# Patient Record
Sex: FEMALE | Employment: FULL TIME | URBAN - METROPOLITAN AREA
[De-identification: names, ages, dates, MRNs, and addresses within clinical notes are randomized per-mention and may not be internally consistent; named-entity substitution may affect disease eponyms.]

---

## 2017-03-17 PROBLEM — A59.9 TRICHOMONIASIS: Status: ACTIVE | Noted: 2017-03-17

## 2017-11-13 ENCOUNTER — OFFICE VISIT (OUTPATIENT)
Dept: URGENT CARE | Facility: CLINIC | Age: 37
End: 2017-11-13
Payer: MEDICAID

## 2017-11-13 VITALS
SYSTOLIC BLOOD PRESSURE: 120 MMHG | WEIGHT: 160 LBS | BODY MASS INDEX: 24.25 KG/M2 | HEART RATE: 79 BPM | TEMPERATURE: 98 F | HEIGHT: 68 IN | DIASTOLIC BLOOD PRESSURE: 76 MMHG | OXYGEN SATURATION: 96 % | RESPIRATION RATE: 18 BRPM

## 2017-11-13 DIAGNOSIS — T30.0 SECOND DEGREE BURNS OF MULTIPLE SITES: Primary | ICD-10-CM

## 2017-11-13 PROCEDURE — 90714 TD VACC NO PRESV 7 YRS+ IM: CPT | Mod: SL,S$GLB,, | Performed by: EMERGENCY MEDICINE

## 2017-11-13 PROCEDURE — 90471 IMMUNIZATION ADMIN: CPT | Mod: S$GLB,VFC,, | Performed by: EMERGENCY MEDICINE

## 2017-11-13 PROCEDURE — 99214 OFFICE O/P EST MOD 30 MIN: CPT | Mod: 25,S$GLB,, | Performed by: PHYSICIAN ASSISTANT

## 2017-11-13 RX ORDER — SILVER SULFADIAZINE 10 G/1000G
CREAM TOPICAL
Qty: 50 G | Refills: 1 | Status: SHIPPED | OUTPATIENT
Start: 2017-11-13 | End: 2018-11-13
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
## 2019-06-08 ENCOUNTER — HOSPITAL ENCOUNTER (EMERGENCY)
Facility: HOSPITAL | Age: 39
Discharge: HOME | End: 2019-06-08
Attending: EMERGENCY MEDICINE
Payer: MEDICAID

## 2019-06-08 VITALS
SYSTOLIC BLOOD PRESSURE: 139 MMHG | HEART RATE: 63 BPM | RESPIRATION RATE: 14 BRPM | DIASTOLIC BLOOD PRESSURE: 74 MMHG | HEIGHT: 68 IN | TEMPERATURE: 98 F | OXYGEN SATURATION: 100 % | WEIGHT: 165 LBS | BODY MASS INDEX: 25.01 KG/M2

## 2019-06-08 DIAGNOSIS — G89.29 CHRONIC LEFT-SIDED LOW BACK PAIN WITH LEFT-SIDED SCIATICA: Primary | ICD-10-CM

## 2019-06-08 DIAGNOSIS — M54.42 CHRONIC LEFT-SIDED LOW BACK PAIN WITH LEFT-SIDED SCIATICA: Primary | ICD-10-CM

## 2019-06-08 LAB
B-HCG UR QL: NEGATIVE
CTP QC/QA: YES

## 2019-06-08 PROCEDURE — 25000003 PHARM REV CODE 250: Performed by: EMERGENCY MEDICINE

## 2019-06-08 PROCEDURE — 99284 EMERGENCY DEPT VISIT MOD MDM: CPT | Mod: 25

## 2019-06-08 PROCEDURE — 81025 URINE PREGNANCY TEST: CPT | Performed by: EMERGENCY MEDICINE

## 2019-06-08 PROCEDURE — 63600175 PHARM REV CODE 636 W HCPCS: Performed by: EMERGENCY MEDICINE

## 2019-06-08 PROCEDURE — 96372 THER/PROPH/DIAG INJ SC/IM: CPT

## 2019-06-08 RX ORDER — ACETAMINOPHEN 500 MG
1000 TABLET ORAL
Status: COMPLETED | OUTPATIENT
Start: 2019-06-08 | End: 2019-06-08

## 2019-06-08 RX ORDER — KETOROLAC TROMETHAMINE 10 MG/1
10 TABLET, FILM COATED ORAL EVERY 6 HOURS PRN
Qty: 28 TABLET | Refills: 0 | Status: SHIPPED | OUTPATIENT
Start: 2019-06-08 | End: 2021-04-25 | Stop reason: CLARIF

## 2019-06-08 RX ORDER — CAPSAICIN 0.03 G/100G
CREAM TOPICAL 2 TIMES DAILY
Qty: 45 G | Refills: 0 | Status: SHIPPED | OUTPATIENT
Start: 2019-06-08 | End: 2021-04-29

## 2019-06-08 RX ORDER — ACETAMINOPHEN 500 MG
500 TABLET ORAL EVERY 6 HOURS PRN
Qty: 28 TABLET | Refills: 0 | Status: SHIPPED | OUTPATIENT
Start: 2019-06-08 | End: 2021-04-25 | Stop reason: CLARIF

## 2019-06-08 RX ORDER — KETOROLAC TROMETHAMINE 30 MG/ML
30 INJECTION, SOLUTION INTRAMUSCULAR; INTRAVENOUS
Status: COMPLETED | OUTPATIENT
Start: 2019-06-08 | End: 2019-06-08

## 2019-06-08 RX ADMIN — ACETAMINOPHEN 1000 MG: 500 TABLET ORAL at 09:06

## 2019-06-08 RX ADMIN — KETOROLAC TROMETHAMINE 30 MG: 30 INJECTION, SOLUTION INTRAMUSCULAR at 08:06

## 2019-06-08 NOTE — ED NOTES
Pt having left sided back pain for a week and a half. Pt states this is reoccurring back pain since accident it has flared up since she tweaked it moving things. Pt has taken 4 ibuprofen, gabapentin, muscle relaxer and placed a lidocaine patch.  Pain radiated from lower back down left leg. Pt denies any fever, chills, chest pain, urinary/bowel incontinence.

## 2019-06-08 NOTE — ED PROVIDER NOTES
Encounter Date: 2019    SCRIBE #1 NOTE: I, Franko Rod, am scribing for, and in the presence of,  Dr. Jerome. I have scribed the entire note.       History     Chief Complaint   Patient presents with    Hip Pain     Reports hx of back pains to L side over past week. Reports pain going from L hip down LLE.      Wandy Shah is a 39 y.o. female who  has a past medical history of Back pain.    The patient presents to the ED due to left lower back pain radiating down left leg for the past 10 days. She notes that her pain started after lifting a heavy object while at work, and she reports increased pain after waking up this morning. Patient reports minimal relief with muscle relaxers and steroids prescribed on 19, as well as Gabapentin and Ibuprofen 800 mg this morning. Patient denies any fever, CP, SOB, abdominal pain, dysuria, hematuria, numbness, weakness, or urinary/bowel incontinence. Patient notes that she has had similar exacerbations of back pain since a prior fall.    The history is provided by the patient.     Review of patient's allergies indicates:   Allergen Reactions    Raspberry (rubus idaeus)      Past Medical History:   Diagnosis Date    Back pain      Past Surgical History:   Procedure Laterality Date     SECTION      TUBAL LIGATION       Family History   Problem Relation Age of Onset    Breast cancer Neg Hx     Colon cancer Neg Hx     Ovarian cancer Neg Hx      Social History     Tobacco Use    Smoking status: Current Every Day Smoker    Smokeless tobacco: Current User   Substance Use Topics    Alcohol use: No    Drug use: No     Review of Systems   Constitutional: Negative for chills and fever.   HENT: Negative for sore throat.    Respiratory: Negative for cough and shortness of breath.    Cardiovascular: Negative for chest pain.   Gastrointestinal: Negative for nausea and vomiting.   Genitourinary: Negative for dysuria, frequency and urgency.   Musculoskeletal: Positive for  back pain. Negative for neck pain and neck stiffness.   Skin: Negative for rash and wound.   Neurological: Negative for syncope and weakness.   Hematological: Does not bruise/bleed easily.   Psychiatric/Behavioral: Negative for agitation, behavioral problems and confusion.       Physical Exam     Initial Vitals [06/08/19 0746]   BP Pulse Resp Temp SpO2   (!) 139/97 69 19 98.3 °F (36.8 °C) 100 %      MAP       --         Physical Exam    Nursing note and vitals reviewed.  Constitutional: She appears well-developed and well-nourished. She is not diaphoretic. No distress.   HENT:   Head: Normocephalic and atraumatic.   Mouth/Throat: Oropharynx is clear and moist.   Eyes: EOM are normal. Pupils are equal, round, and reactive to light.   Neck: No tracheal deviation present.   Cardiovascular: Normal rate, regular rhythm, normal heart sounds and intact distal pulses.   Pulmonary/Chest: Breath sounds normal. No stridor. No respiratory distress. She has no wheezes.   Abdominal: Soft. Bowel sounds are normal. She exhibits no distension and no mass. There is no tenderness.   Musculoskeletal: Normal range of motion. She exhibits tenderness. She exhibits no edema.   Diffuse lumbosacral tenderness  Positive straight leg raise on the left  No CVA TTP   Neurological: She is alert and oriented to person, place, and time. She has normal strength and normal reflexes. No cranial nerve deficit or sensory deficit.   No hyperreflexia   Skin: Skin is warm and dry. Capillary refill takes less than 2 seconds. No rash noted.   No rash noted         ED Course   Procedures  Labs Reviewed   POCT URINE PREGNANCY             Medical Decision Making:   Differential Diagnosis:   Differential Diagnosis includes, but is not limited to:  Cauda equina syndrome, diskitis/osteomyelitis, epidural/paraspinal abscess,disc herniation, spinal stenosis, sciatica, radiculopathy,  lumbar muscle strain, muscle spasm, neuropathic pain.   Clinical Tests:   Lab  Tests: Ordered and Reviewed  ED Management:  After complete evaluation, including thorough history and physical exam, the patient's symptoms are most likely due to  mechanical back pain. There are no concerning features of bilateral weakness, significant new motor/sensory deficits, saddle anesthesia, or bowel/bladder incontinence to suggest acute cauda equina syndrome. On physical exam, there is no focal midline bony tenderness or evidence of significant trauma to suggest acute fracture or hematoma. There is no fever, history of recent surgery, or erythema/fluctuance to suggest acute diskitis, infection, or abscess. The patient was treated with supportive care  and improved. Will provide short course RX of toradol acetaminophen and capsacin upon D/C. Discussed symptomatic and supportive care instructions, including use of heating pads, stretching and ROM exercises, improving posture, and slowly resuming activity as tolerated. Counseled on need to follow-up with PCP for further evaluation if symptoms persist, including further imaging as an outpatient if symptoms persist.                   ED Course as of Jun 08 0914   Sat Jun 08, 2019   0839 Patient reports continued pain. Will continue to observe for medication onset effect.     [EW]   0909 Pain has now improved.    [EW]   0913 39 year old female with acute on chronic back pain. No red flags today. Patient reports is similar pain in past. No history of recent trauma no focal neuro deficits. Patient with pain improved, will discharge with return precautions.     [RN]      ED Course User Index  [EW] Franko Rod  [RN] Javy Jerome Jr., MD     Clinical Impression:       ICD-10-CM ICD-9-CM   1. Chronic left-sided low back pain with left-sided sciatica M54.42 724.2    G89.29 724.3     338.29       Disposition:   Disposition: Discharged  Condition: Stable      Scribe attestation I, Javy Jerome,  personally performed the services described in this documentation. All  medical record entries made by the scribe were at my direction and in my presence.  I have reviewed the chart and agree that the record reflects my personal performance and is accurate and complete. Javy Jerome M.D. 9:14 AM06/08/2019       Javy Jerome Jr., MD  06/08/19 0914

## 2020-06-26 ENCOUNTER — HOSPITAL ENCOUNTER (EMERGENCY)
Facility: HOSPITAL | Age: 40
Discharge: HOME | End: 2020-06-26
Attending: EMERGENCY MEDICINE
Payer: MEDICAID

## 2020-06-26 VITALS
HEART RATE: 81 BPM | WEIGHT: 175 LBS | OXYGEN SATURATION: 98 % | TEMPERATURE: 98 F | BODY MASS INDEX: 26.52 KG/M2 | SYSTOLIC BLOOD PRESSURE: 129 MMHG | DIASTOLIC BLOOD PRESSURE: 67 MMHG | HEIGHT: 68 IN | RESPIRATION RATE: 17 BRPM
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
## 2020-06-26 DIAGNOSIS — S76.011A STRAIN OF RIGHT HIP, INITIAL ENCOUNTER: ICD-10-CM

## 2020-06-26 DIAGNOSIS — S86.911A STRAIN OF RIGHT KNEE, INITIAL ENCOUNTER: Primary | ICD-10-CM

## 2020-06-26 LAB
B-HCG UR QL: NEGATIVE
CTP QC/QA: YES

## 2020-06-26 PROCEDURE — 25000003 PHARM REV CODE 250: Performed by: EMERGENCY MEDICINE

## 2020-06-26 PROCEDURE — 99284 PR EMERGENCY DEPT VISIT,LEVEL IV: ICD-10-PCS | Mod: ,,, | Performed by: EMERGENCY MEDICINE

## 2020-06-26 PROCEDURE — 99284 EMERGENCY DEPT VISIT MOD MDM: CPT | Mod: ,,, | Performed by: EMERGENCY MEDICINE

## 2020-06-26 PROCEDURE — 81025 URINE PREGNANCY TEST: CPT | Performed by: EMERGENCY MEDICINE

## 2020-06-26 PROCEDURE — 99283 EMERGENCY DEPT VISIT LOW MDM: CPT | Mod: 25

## 2020-06-26 RX ORDER — ACETAMINOPHEN 325 MG/1
650 TABLET ORAL
Status: COMPLETED | OUTPATIENT
Start: 2020-06-26 | End: 2020-06-26

## 2020-06-26 RX ORDER — IBUPROFEN 600 MG/1
600 TABLET ORAL
Status: COMPLETED | OUTPATIENT
Start: 2020-06-26 | End: 2020-06-26

## 2020-06-26 RX ADMIN — ACETAMINOPHEN 650 MG: 325 TABLET ORAL at 05:06

## 2020-06-26 RX ADMIN — IBUPROFEN 600 MG: 600 TABLET, FILM COATED ORAL at 05:06

## 2020-06-26 NOTE — ED NOTES
Patient identifiers verified and correct for Wandy Shah  LOC: The patient is awake, alert and aware of environment with an appropriate affect, the patient is oriented x 3 and speaking appropriately.   APPEARANCE: Patient appears comfortable and in no acute distress, patient is clean and well groomed.  SKIN: The skin is warm and dry, color consistent with ethnicity, patient has normal skin turgor and moist mucus membranes, skin intact, no breakdown or bruising noted.   MUSCULOSKELETAL: Patient moving all extremities spontaneously, no swelling noted.slipped at work and c/o right hip pain and right knee pain,  RESPIRATORY: Airway is open and patent, respirations are spontaneous, patient has a normal effort and rate, no accessory muscle use noted  CARDIAC: . Patient has a normal rate and regular rhythm, no edema noted, capillary refill < 3 seconds.   GASTRO: Soft and non tender to palpation, no distention noted, normoactive bowel sounds present in all four quadrants. Pt states bowel movements have been regular.  : Pt denies any pain or frequency with urination.  NEURO: Pt opens eyes spontaneously, behavior appropriate to situation, follows commands, facial expression symmetrical, bilateral hand grasp equal and even, purposeful motor response noted, normal sensation in all extremities when touched with a finger.

## 2020-06-26 NOTE — ED PROVIDER NOTES
Encounter Date: 2020       History     Chief Complaint   Patient presents with    Fall     slipped at work and c/o right hip pain and right knee pain, did not hit head. no loc      HPI     40yF with h/o back pain presents after a slip and fall at work.  She was walking at her workplace and stepped into something slippery and her right leg slid to the right and she caught herself on some freezer is on the left side.  She subsequently developed acute onset right-sided hip pain and right anterior knee pain.  She has not taken anything for the pain as of yet.  She did not hit her head, did not lose consciousness.  She denies any other medical history.  Review of patient's allergies indicates:   Allergen Reactions    Raspberry (rubus idaeus)      Past Medical History:   Diagnosis Date    Back pain      Past Surgical History:   Procedure Laterality Date     SECTION      TUBAL LIGATION       Family History   Problem Relation Age of Onset    Breast cancer Neg Hx     Colon cancer Neg Hx     Ovarian cancer Neg Hx      Social History     Tobacco Use    Smoking status: Current Every Day Smoker    Smokeless tobacco: Current User   Substance Use Topics    Alcohol use: No    Drug use: No     Review of Systems   Constitutional: Negative for chills, diaphoresis, fatigue and fever.   HENT: Negative for congestion, rhinorrhea and sore throat.    Eyes: Negative for visual disturbance.   Respiratory: Negative for cough, chest tightness and shortness of breath.    Cardiovascular: Negative for chest pain.   Gastrointestinal: Negative for abdominal pain, blood in stool, constipation, diarrhea and vomiting.   Genitourinary: Negative for dysuria, hematuria and urgency.   Musculoskeletal: Positive for arthralgias. Negative for back pain.   Skin: Negative for rash.   Neurological: Negative for seizures and syncope.   Hematological: Does not bruise/bleed easily.   Psychiatric/Behavioral: Negative for agitation and  hallucinations.       Physical Exam     Initial Vitals [06/26/20 1659]   BP Pulse Resp Temp SpO2   129/67 81 17 98.1 °F (36.7 °C) 98 %      MAP       --         Physical Exam  Gen: AxOx4, NAD, appears stated age  Eye: EOMI, no scleral icterus, no periorbital edema or ecchymosis  Head: NCAT, no lesions  ENT: neck supple, no stridor, no masses  CVS: warm and well perfused  PULM: normal work of breathing, no stridor  ABD: scaphoid, nondistended  Ext:  Her pelvis is stable to AP and lateral compression, she has mild tenderness over the right greater trochanter and iliac crest, no deformity or pain with internal and external rotation of the hip.  Her knee exam does not show any obvious deformity, does show some mild effusion in the prepatellar area, which is tender to palpation.  She has tenderness over the lateral knee/meniscus.  There is no weakness with extension of the leg or knee.  She does not have any bony deformities or rash or lesion/abrasion over the knee.  There is no laxity or instability of the knee.  She is able to bear weight.  no rash, no deformities  Neuro: MARLOW, gait intact    ED Course   Procedures  Labs Reviewed   POCT URINE PREGNANCY          Imaging Results          X-Ray Knee 3 View Right (Final result)  Result time 06/26/20 18:44:06    Final result by Sami Machado MD (06/26/20 18:44:06)                 Impression:      1. No acute displaced fracture or dislocation of the knee.      Electronically signed by: Sami Machado MD  Date:    06/26/2020  Time:    18:44             Narrative:    EXAMINATION:  XR KNEE 3 VIEW RIGHT    CLINICAL HISTORY:  Unspecified fall, initial encounter    TECHNIQUE:  AP, lateral, and Merchant views of the right knee were performed.    COMPARISON:  None    FINDINGS:  Three views right knee.    No acute displaced fracture or dislocation of the knee.  No radiopaque foreign body.  No large knee joint effusion.                               X-Ray Hip 2 View Right (Final  result)  Result time 06/26/20 18:43:36    Final result by Sami Machado MD (06/26/20 18:43:36)                 Impression:      1. No acute displaced fracture or dislocation of the right hip.      Electronically signed by: Sami Machado MD  Date:    06/26/2020  Time:    18:43             Narrative:    EXAMINATION:  XR HIP 2 VIEW RIGHT    CLINICAL HISTORY:  Unspecified fall, initial encounter    TECHNIQUE:  AP view of the pelvis and frog leg lateral view of the right hip were performed.    COMPARISON:  None    FINDINGS:  Three views right hip.    The bilateral sacroiliac joints are intact.  The pubic symphysis is intact.  The bilateral femoroacetabular joints are intact.                                 Medical Decision Making:   History:   Old Medical Records: I decided to obtain old medical records.  Initial Assessment:   This is a 40-year-old woman who presents status post a slip and fall at work.  She has anterior tender knee, and some hip pain, we will obtain x-rays of both.  I have overall low suspicion for dislocation versus acute fracture, with think x-rays are indicated.  I do not suspect a pelvic fracture, or neurovascular injury.  We will give the patient Tylenol and Motrin.  Clinical Tests:   Radiological Study: Ordered and Reviewed  ED Management:  X-rays by my independent interpretation are negative for acute bony injury.  Patient felt improved after symptomatic treatment.  She was discharged in improved condition.                                 Clinical Impression:       ICD-10-CM ICD-9-CM   1. Strain of right knee, initial encounter  S86.911A 844.9   2. Fall  W19.XXXA E888.9   3. Strain of right hip, initial encounter  S76.011A 843.9             ED Disposition Condition    Discharge Stable        ED Prescriptions     None        Follow-up Information     Follow up With Specialties Details Why Contact Info    [facility] Emergency Medicine  If symptoms worsen [address]  [place] [address]  [phone]                                     Esthela Arizmendi MD  06/27/20 0059

## 2020-06-26 NOTE — DISCHARGE INSTRUCTIONS
For your pain, please take:  Acetaminophen (Tylenol) 650mg by mouth every six hours as needed. Do not take more than instructed, as too much acetaminophen can lead to liver damage.  Naproxen 500mg by mouth every twelve hours as needed. Do not take more than instructed, as too much can cause damage to your stomach and kidneys.     Note: naproxen, aleve, ibuprofen, advil, etc are all the same kind of medicine (Non-steroidal anti-inflammatory medications) and should not be taken together. Choose just one type from this group to take.

## 2020-06-26 NOTE — Clinical Note
Wandy Shah was seen and treated in our emergency department on 6/26/2020.  She may return to work on 06/27/2020.       If you have any questions or concerns, please don't hesitate to call.      Esthela Arizmendi MD

## 2020-10-26 ENCOUNTER — HOSPITAL ENCOUNTER (EMERGENCY)
Facility: HOSPITAL | Age: 40
Discharge: HOME | End: 2020-10-26
Attending: EMERGENCY MEDICINE
Payer: MEDICAID

## 2020-10-26 VITALS
OXYGEN SATURATION: 99 % | DIASTOLIC BLOOD PRESSURE: 75 MMHG | TEMPERATURE: 98 F | RESPIRATION RATE: 18 BRPM | SYSTOLIC BLOOD PRESSURE: 117 MMHG | WEIGHT: 170 LBS | HEIGHT: 68 IN | HEART RATE: 65 BPM | BODY MASS INDEX: 25.76 KG/M2
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
## 2020-10-26 DIAGNOSIS — S16.1XXA STRAIN OF NECK MUSCLE, INITIAL ENCOUNTER: ICD-10-CM

## 2020-10-26 LAB
B-HCG UR QL: NEGATIVE
CTP QC/QA: YES

## 2020-10-26 PROCEDURE — 81025 URINE PREGNANCY TEST: CPT | Performed by: EMERGENCY MEDICINE

## 2020-10-26 PROCEDURE — 99284 PR EMERGENCY DEPT VISIT,LEVEL IV: ICD-10-PCS | Mod: ,,, | Performed by: EMERGENCY MEDICINE

## 2020-10-26 PROCEDURE — 99284 EMERGENCY DEPT VISIT MOD MDM: CPT | Mod: ,,, | Performed by: EMERGENCY MEDICINE

## 2020-10-26 PROCEDURE — 99284 EMERGENCY DEPT VISIT MOD MDM: CPT

## 2020-10-26 PROCEDURE — 25000003 PHARM REV CODE 250: Performed by: EMERGENCY MEDICINE

## 2020-10-26 RX ORDER — METHOCARBAMOL 500 MG/1
500 TABLET, FILM COATED ORAL 3 TIMES DAILY PRN
Qty: 15 TABLET | Refills: 0 | Status: SHIPPED | OUTPATIENT
Start: 2020-10-26 | End: 2020-10-31

## 2020-10-26 RX ORDER — KETOROLAC TROMETHAMINE 10 MG/1
10 TABLET, FILM COATED ORAL
Status: COMPLETED | OUTPATIENT
Start: 2020-10-26 | End: 2020-10-26

## 2020-10-26 RX ORDER — NAPROXEN 500 MG/1
500 TABLET ORAL 2 TIMES DAILY WITH MEALS
Qty: 10 TABLET | Refills: 0 | Status: SHIPPED | OUTPATIENT
Start: 2020-10-26 | End: 2020-10-31

## 2020-10-26 RX ADMIN — KETOROLAC TROMETHAMINE 10 MG: 10 TABLET, FILM COATED ORAL at 10:10
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
## 2020-10-26 NOTE — ED NOTES
Appearance:  Pt awake, alert & oriented to person, place & time.  Pt in no acute distress at present time.  Skin:  Skin warm, dry & intact.  Mucous membranes moist.  Skin turgor normal.  Respiratory:  Respirations even, non-labored.    Neurologic:  Pt moving all extremities without difficulty.  Sensation intact.
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
## 2020-11-17 ENCOUNTER — HOSPITAL ENCOUNTER (EMERGENCY)
Facility: HOSPITAL | Age: 40
Discharge: HOME | End: 2020-11-17
Attending: EMERGENCY MEDICINE
Payer: MEDICAID

## 2020-11-17 VITALS
HEART RATE: 66 BPM | DIASTOLIC BLOOD PRESSURE: 66 MMHG | HEIGHT: 68 IN | BODY MASS INDEX: 25.76 KG/M2 | SYSTOLIC BLOOD PRESSURE: 112 MMHG | WEIGHT: 170 LBS | OXYGEN SATURATION: 100 % | RESPIRATION RATE: 18 BRPM | TEMPERATURE: 99 F

## 2020-11-17 DIAGNOSIS — R05.9 COUGH: ICD-10-CM

## 2020-11-17 DIAGNOSIS — J40 BRONCHITIS: Primary | ICD-10-CM

## 2020-11-17 LAB
B-HCG UR QL: NEGATIVE
CTP QC/QA: YES

## 2020-11-17 PROCEDURE — 81025 URINE PREGNANCY TEST: CPT | Performed by: EMERGENCY MEDICINE

## 2020-11-17 PROCEDURE — 99283 EMERGENCY DEPT VISIT LOW MDM: CPT | Mod: 25

## 2020-11-17 RX ORDER — BENZONATATE 100 MG/1
200 CAPSULE ORAL 3 TIMES DAILY PRN
Qty: 20 CAPSULE | Refills: 0 | Status: SHIPPED | OUTPATIENT
Start: 2020-11-17 | End: 2021-04-25 | Stop reason: CLARIF

## 2020-11-17 RX ORDER — ALBUTEROL SULFATE 90 UG/1
1-2 AEROSOL, METERED RESPIRATORY (INHALATION) EVERY 6 HOURS PRN
Qty: 8 G | Refills: 0 | Status: SHIPPED | OUTPATIENT
Start: 2020-11-17 | End: 2023-03-11

## 2020-11-17 RX ORDER — ALBUTEROL SULFATE 90 UG/1
1-2 AEROSOL, METERED RESPIRATORY (INHALATION) EVERY 6 HOURS PRN
Qty: 8 G | Refills: 0 | Status: SHIPPED | OUTPATIENT
Start: 2020-11-17 | End: 2020-11-17 | Stop reason: SDUPTHER

## 2020-11-17 RX ORDER — BENZONATATE 100 MG/1
200 CAPSULE ORAL 3 TIMES DAILY PRN
Qty: 20 CAPSULE | Refills: 0 | Status: SHIPPED | OUTPATIENT
Start: 2020-11-17 | End: 2020-11-17 | Stop reason: SDUPTHER

## 2020-11-17 NOTE — ED PROVIDER NOTES
Encounter Date: 2020       History     Chief Complaint   Patient presents with    Cough     c/o cough for the past few days. had a virtual visit a few days ago and was started on a cough medication and antibiotic. then went to get a covid test performed yesterday, due to possible recent exposure, but is awaiting results. states yesterday she began having pain to left upper back when coughing, so was told to come to the ER for a chest xray.      40 yr old male presents to the ER with reports of cough. Pt states she was tested for COVID and results are pending. Was also placed on Amoxil and phenergan for cough however is requesting something less sedating. Denies hemoptysis. Not toxic in appearance. No rash or neck stiffness. No vomiting or diarrhea. Pt reports left rib pain from coughing so much. No PMH reported.     The history is provided by the patient. No  was used.     Review of patient's allergies indicates:   Allergen Reactions    Raspberry (rubus idaeus)      Past Medical History:   Diagnosis Date    Back pain      Past Surgical History:   Procedure Laterality Date     SECTION      SINUS SURGERY      TUBAL LIGATION       Family History   Problem Relation Age of Onset    Breast cancer Neg Hx     Colon cancer Neg Hx     Ovarian cancer Neg Hx      Social History     Tobacco Use    Smoking status: Current Every Day Smoker     Packs/day: 1.00     Types: Cigarettes    Smokeless tobacco: Current User   Substance Use Topics    Alcohol use: No    Drug use: No     Review of Systems   Constitutional: Negative for fatigue and fever.   Respiratory: Positive for cough. Negative for shortness of breath.    Cardiovascular: Negative for chest pain and leg swelling.   Gastrointestinal: Negative for constipation, diarrhea and vomiting.   Musculoskeletal: Negative for neck pain and neck stiffness.   Skin: Negative for rash.       Physical Exam     Initial Vitals [20 1014]   BP  Pulse Resp Temp SpO2   113/68 69 18 98.7 °F (37.1 °C) 100 %      MAP       --         Physical Exam    Nursing note and vitals reviewed.  Constitutional: She appears well-developed and well-nourished.   HENT:   Head: Normocephalic.   Right Ear: Hearing and tympanic membrane normal.   Left Ear: Hearing and tympanic membrane normal.   Nose: Nose normal.   Mouth/Throat: Oropharynx is clear and moist.   Eyes: Lids are normal. Pupils are equal, round, and reactive to light.   Neck: Normal range of motion. No neck rigidity.   Cardiovascular: Normal rate.   Pulmonary/Chest: Breath sounds normal. No respiratory distress. She has no wheezes. She has no rhonchi. She has no rales.   Abdominal: Soft. There is no abdominal tenderness.   Musculoskeletal: Normal range of motion.   Neurological: She is alert and oriented to person, place, and time.   Skin: Skin is warm and dry. No rash noted.   Psychiatric: She has a normal mood and affect. Her behavior is normal. Judgment and thought content normal.         ED Course   Procedures  Labs Reviewed   POCT URINE PREGNANCY          Imaging Results          X-Ray Chest 1 View (Final result)  Result time 11/17/20 11:25:45    Final result by Harshad Cedeno DO (11/17/20 11:25:45)                 Impression:      No acute abnormality.      Electronically signed by: Harshad Cedeno  Date:    11/17/2020  Time:    11:25             Narrative:    EXAMINATION:  XR CHEST 1 VIEW    CLINICAL HISTORY:  Cough.    TECHNIQUE:  Single frontal view of the chest was performed.    COMPARISON:  Multiple prior radiographs of the chest, most recent from 07/27/2009.    FINDINGS:  The lungs are well expanded and clear. No focal opacities are seen. The pleural spaces are clear.  The cardiac silhouette is unremarkable.  The visualized osseous structures are unremarkable.                                 Medical Decision Making:   Initial Assessment:   40 yr old male presents to the ER with reports of cough. Pt  states she was tested for COVID and results are pending. Was also placed on Amoxil and phenergan for cough however is requesting something less sedating. Denies hemoptysis. Not toxic in appearance. No rash or neck stiffness. No vomiting or diarrhea. Pt reports left rib pain from coughing so much. No PMH reported.     The history is provided by the patient. No  was used.     Clinical Tests:   Lab Tests: Ordered and Reviewed  Radiological Study: Ordered and Reviewed  ED Management:  ED Course as of Nov 17 1203 Tue Nov 17, 2020  1201 Pt notified of the need for follow up care with pcp and the meds prescribed. She is to quarantine till results of COVId testing are back. Pt is not toxic in appearance with sats of 100%^. Chest xray reviewed with neg findings. Pt is a daily smoker who states she occasionally hears herself wheeze when she get a coughing attack. Pt will be placed on proair, is to continue with previously prescribed meds and will be given Tessalon for day time cough meds.     (DT)    ED Course User Index  (DT) Katy Piedra NP                     ED Course as of Nov 17 1855 Tue Nov 17, 2020   1201 Pt notified of the need for follow up care with pcp and the meds prescribed. She is to quarantine till results of COVId testing are back. Pt is not toxic in appearance with sats of 100%^. Chest xray reviewed with neg findings. Pt is a daily smoker who states she occasionally hears herself wheeze when she get a coughing attack. Pt will be placed on proair, is to continue with previously prescribed meds and will be given Tessalon for day time cough meds.     [DT]      ED Course User Index  [DT] Katy Piedra NP            Clinical Impression:       ICD-10-CM ICD-9-CM   1. Bronchitis  J40 490   2. Cough  R05 786.2                          ED Disposition Condition    Discharge Stable        ED Prescriptions     Medication Sig Dispense Start Date End Date Auth. Provider    albuterol  (PROVENTIL/VENTOLIN HFA) 90 mcg/actuation inhaler  (Status: Discontinued) Inhale 1-2 puffs into the lungs every 6 (six) hours as needed for Wheezing. Rescue 8 g 11/17/2020 11/17/2020 Katy Piedra NP    benzonatate (TESSALON) 100 MG capsule  (Status: Discontinued) Take 2 capsules (200 mg total) by mouth 3 (three) times daily as needed for Cough. 20 capsule 11/17/2020 11/17/2020 Katy Piedra NP    albuterol (PROVENTIL/VENTOLIN HFA) 90 mcg/actuation inhaler Inhale 1-2 puffs into the lungs every 6 (six) hours as needed for Wheezing. Rescue 8 g 11/17/2020 11/17/2021 Katy Piedra NP    benzonatate (TESSALON) 100 MG capsule Take 2 capsules (200 mg total) by mouth 3 (three) times daily as needed for Cough. 20 capsule 11/17/2020  Katy Piedra NP        Follow-up Information     Follow up With Specialties Details Why Contact Info Additional Information    [facility] Internal Medicine Internal Medicine Schedule an appointment as soon as possible for a visit in 2 days  [address], [facility] [address]  [facility] [address] [phone] 2nd Floor [facility], [address] At this time [facility] will only use these entries [facility], [facility], and Emergency Department due to COVID-19 precautions.                                        Katy Piedra NP  11/17/20 1203       Katy Piedra NP  11/17/20 8310

## 2020-11-17 NOTE — ED NOTES
Pt presents to the ED c/o left upper back pain with cough that began on yesterday.  Recently exposed to Covid and tested yesterday but awaiting results. Had telemedicine appointment an few days ago and put on abx for cough. Told to come to ED for chest xray today.     Patient identifiers for Wandy Shah checked and correct.    LOC: The patient is awake, alert and aware of environment with an appropriate affect, the patient is oriented x 3 and speaking appropriately.  APPEARANCE: Patient resting comfortably and in no acute distress, patient is clean and well groomed, patient's clothing are properly fastened.  SKIN: The skin is warm dry and intact .  MUSCULOSKELETAL: Patient moving all extremities well/upper left back pain with coughing   RESPIRATORY: Airway is open and patent, respirations are spontaneous,+ cough   CARDIAC: Patient has a normal rate and rhythm, no periphreal edema noted, capillary refill < 3 seconds.  ABDOMEN: Soft and non tender to palpation, no distention noted.   NEUROLOGIC: PERRL

## 2021-04-25 ENCOUNTER — HOSPITAL ENCOUNTER (EMERGENCY)
Facility: HOSPITAL | Age: 41
Discharge: HOME | End: 2021-04-25
Attending: EMERGENCY MEDICINE
Payer: MEDICAID

## 2021-04-25 VITALS
DIASTOLIC BLOOD PRESSURE: 77 MMHG | SYSTOLIC BLOOD PRESSURE: 126 MMHG | TEMPERATURE: 98 F | HEIGHT: 68 IN | BODY MASS INDEX: 23.04 KG/M2 | HEART RATE: 74 BPM | OXYGEN SATURATION: 99 % | RESPIRATION RATE: 18 BRPM | WEIGHT: 152 LBS

## 2021-04-25 DIAGNOSIS — K63.5 POLYP OF COLON, UNSPECIFIED PART OF COLON, UNSPECIFIED TYPE: ICD-10-CM

## 2021-04-25 DIAGNOSIS — R10.13 EPIGASTRIC PAIN: Primary | ICD-10-CM

## 2021-04-25 DIAGNOSIS — R06.02 SHORTNESS OF BREATH: ICD-10-CM

## 2021-04-25 PROBLEM — F41.1 ANXIETY STATE: Status: ACTIVE | Noted: 2021-04-24

## 2021-04-25 PROBLEM — J32.3 CHRONIC SPHENOIDAL SINUSITIS: Status: ACTIVE | Noted: 2019-06-25

## 2021-04-25 PROBLEM — K21.9 GERD WITHOUT ESOPHAGITIS: Status: ACTIVE | Noted: 2021-04-24

## 2021-04-25 PROBLEM — J34.3 HYPERTROPHY OF BOTH INFERIOR NASAL TURBINATES: Status: ACTIVE | Noted: 2019-06-25

## 2021-04-25 PROBLEM — J32.2 CHRONIC ANTERIOR ETHMOIDAL SINUSITIS: Status: ACTIVE | Noted: 2019-06-25

## 2021-04-25 LAB
ALBUMIN SERPL BCP-MCNC: 3.5 G/DL (ref 3.5–5.2)
ALP SERPL-CCNC: 42 U/L (ref 55–135)
ALT SERPL W/O P-5'-P-CCNC: 10 U/L (ref 10–44)
ANION GAP SERPL CALC-SCNC: 8 MMOL/L (ref 8–16)
AST SERPL-CCNC: 11 U/L (ref 10–40)
B-HCG UR QL: NEGATIVE
BASOPHILS # BLD AUTO: 0.05 K/UL (ref 0–0.2)
BASOPHILS NFR BLD: 0.4 % (ref 0–1.9)
BILIRUB SERPL-MCNC: 0.3 MG/DL (ref 0.1–1)
BILIRUB UR QL STRIP: NEGATIVE
BUN SERPL-MCNC: 11 MG/DL (ref 6–20)
BUN SERPL-MCNC: 12 MG/DL (ref 6–30)
CALCIUM SERPL-MCNC: 8.3 MG/DL (ref 8.7–10.5)
CHLORIDE SERPL-SCNC: 106 MMOL/L (ref 95–110)
CHLORIDE SERPL-SCNC: 110 MMOL/L (ref 95–110)
CLARITY UR REFRACT.AUTO: CLEAR
CO2 SERPL-SCNC: 21 MMOL/L (ref 23–29)
COLOR UR AUTO: YELLOW
CREAT SERPL-MCNC: 0.6 MG/DL (ref 0.5–1.4)
CREAT SERPL-MCNC: 0.7 MG/DL (ref 0.5–1.4)
CTP QC/QA: YES
DIFFERENTIAL METHOD: ABNORMAL
EOSINOPHIL # BLD AUTO: 0.2 K/UL (ref 0–0.5)
EOSINOPHIL NFR BLD: 1.3 % (ref 0–8)
ERYTHROCYTE [DISTWIDTH] IN BLOOD BY AUTOMATED COUNT: 12.9 % (ref 11.5–14.5)
EST. GFR  (AFRICAN AMERICAN): >60 ML/MIN/1.73 M^2
EST. GFR  (NON AFRICAN AMERICAN): >60 ML/MIN/1.73 M^2
GLUCOSE SERPL-MCNC: 94 MG/DL (ref 70–110)
GLUCOSE SERPL-MCNC: 95 MG/DL (ref 70–110)
GLUCOSE UR QL STRIP: NEGATIVE
HCT VFR BLD AUTO: 38.8 % (ref 37–48.5)
HCT VFR BLD CALC: 38 %PCV (ref 36–54)
HGB BLD-MCNC: 12.7 G/DL (ref 12–16)
HGB UR QL STRIP: NEGATIVE
IMM GRANULOCYTES # BLD AUTO: 0.04 K/UL (ref 0–0.04)
IMM GRANULOCYTES NFR BLD AUTO: 0.3 % (ref 0–0.5)
KETONES UR QL STRIP: NEGATIVE
LEUKOCYTE ESTERASE UR QL STRIP: ABNORMAL
LIPASE SERPL-CCNC: 14 U/L (ref 4–60)
LYMPHOCYTES # BLD AUTO: 2.3 K/UL (ref 1–4.8)
LYMPHOCYTES NFR BLD: 20.1 % (ref 18–48)
MCH RBC QN AUTO: 31 PG (ref 27–31)
MCHC RBC AUTO-ENTMCNC: 32.7 G/DL (ref 32–36)
MCV RBC AUTO: 95 FL (ref 82–98)
MICROSCOPIC COMMENT: NORMAL
MONOCYTES # BLD AUTO: 0.9 K/UL (ref 0.3–1)
MONOCYTES NFR BLD: 7.8 % (ref 4–15)
NEUTROPHILS # BLD AUTO: 8 K/UL (ref 1.8–7.7)
NEUTROPHILS NFR BLD: 70.1 % (ref 38–73)
NITRITE UR QL STRIP: NEGATIVE
NRBC BLD-RTO: 0 /100 WBC
PH UR STRIP: 5 [PH] (ref 5–8)
PLATELET # BLD AUTO: 187 K/UL (ref 150–450)
PMV BLD AUTO: 10.2 FL (ref 9.2–12.9)
POC IONIZED CALCIUM: 1.16 MMOL/L (ref 1.06–1.42)
POC TCO2 (MEASURED): 21 MMOL/L (ref 23–29)
POTASSIUM BLD-SCNC: 3.8 MMOL/L (ref 3.5–5.1)
POTASSIUM SERPL-SCNC: 4 MMOL/L (ref 3.5–5.1)
PROT SERPL-MCNC: 6.8 G/DL (ref 6–8.4)
PROT UR QL STRIP: NEGATIVE
RBC # BLD AUTO: 4.1 M/UL (ref 4–5.4)
RBC #/AREA URNS AUTO: 1 /HPF (ref 0–4)
SAMPLE: ABNORMAL
SODIUM BLD-SCNC: 138 MMOL/L (ref 136–145)
SODIUM SERPL-SCNC: 139 MMOL/L (ref 136–145)
SP GR UR STRIP: 1.01 (ref 1–1.03)
SQUAMOUS #/AREA URNS AUTO: 4 /HPF
TROPONIN I SERPL DL<=0.01 NG/ML-MCNC: 0.01 NG/ML (ref 0–0.03)
URN SPEC COLLECT METH UR: ABNORMAL
WBC # BLD AUTO: 11.47 K/UL (ref 3.9–12.7)
WBC #/AREA URNS AUTO: 1 /HPF (ref 0–5)

## 2021-04-25 PROCEDURE — 25500020 PHARM REV CODE 255: Performed by: EMERGENCY MEDICINE

## 2021-04-25 PROCEDURE — 80053 COMPREHEN METABOLIC PANEL: CPT | Performed by: PHYSICIAN ASSISTANT

## 2021-04-25 PROCEDURE — 99285 PR EMERGENCY DEPT VISIT,LEVEL V: ICD-10-PCS | Mod: ,,, | Performed by: PHYSICIAN ASSISTANT

## 2021-04-25 PROCEDURE — 25000003 PHARM REV CODE 250: Performed by: PHYSICIAN ASSISTANT

## 2021-04-25 PROCEDURE — 93010 EKG 12-LEAD: ICD-10-PCS | Mod: ,,, | Performed by: STUDENT IN AN ORGANIZED HEALTH CARE EDUCATION/TRAINING PROGRAM

## 2021-04-25 PROCEDURE — 85025 COMPLETE CBC W/AUTO DIFF WBC: CPT | Performed by: PHYSICIAN ASSISTANT

## 2021-04-25 PROCEDURE — 83690 ASSAY OF LIPASE: CPT | Performed by: PHYSICIAN ASSISTANT

## 2021-04-25 PROCEDURE — 81025 URINE PREGNANCY TEST: CPT | Performed by: PHYSICIAN ASSISTANT

## 2021-04-25 PROCEDURE — 99285 EMERGENCY DEPT VISIT HI MDM: CPT | Mod: ,,, | Performed by: PHYSICIAN ASSISTANT

## 2021-04-25 PROCEDURE — 86803 HEPATITIS C AB TEST: CPT | Performed by: EMERGENCY MEDICINE

## 2021-04-25 PROCEDURE — 81001 URINALYSIS AUTO W/SCOPE: CPT | Performed by: PHYSICIAN ASSISTANT

## 2021-04-25 PROCEDURE — 86703 HIV-1/HIV-2 1 RESULT ANTBDY: CPT | Performed by: EMERGENCY MEDICINE

## 2021-04-25 PROCEDURE — 84484 ASSAY OF TROPONIN QUANT: CPT | Performed by: PHYSICIAN ASSISTANT

## 2021-04-25 PROCEDURE — 99285 EMERGENCY DEPT VISIT HI MDM: CPT | Mod: 25

## 2021-04-25 PROCEDURE — 93005 ELECTROCARDIOGRAM TRACING: CPT

## 2021-04-25 PROCEDURE — 82330 ASSAY OF CALCIUM: CPT

## 2021-04-25 PROCEDURE — 93010 ELECTROCARDIOGRAM REPORT: CPT | Mod: ,,, | Performed by: STUDENT IN AN ORGANIZED HEALTH CARE EDUCATION/TRAINING PROGRAM

## 2021-04-25 PROCEDURE — 80047 BASIC METABLC PNL IONIZED CA: CPT

## 2021-04-25 RX ORDER — MAG HYDROX/ALUMINUM HYD/SIMETH 200-200-20
30 SUSPENSION, ORAL (FINAL DOSE FORM) ORAL
Status: COMPLETED | OUTPATIENT
Start: 2021-04-25 | End: 2021-04-25

## 2021-04-25 RX ORDER — CHLORZOXAZONE 500 MG/1
500 TABLET ORAL 2 TIMES DAILY PRN
COMMUNITY
Start: 2021-01-17 | End: 2021-04-29

## 2021-04-25 RX ORDER — GABAPENTIN 300 MG/1
300 CAPSULE ORAL 3 TIMES DAILY PRN
COMMUNITY
Start: 2021-04-14 | End: 2022-10-11 | Stop reason: SDUPTHER

## 2021-04-25 RX ORDER — ESCITALOPRAM OXALATE 10 MG/1
TABLET ORAL
COMMUNITY
Start: 2021-04-24 | End: 2021-04-29

## 2021-04-25 RX ORDER — MAG HYDROX/ALUMINUM HYD/SIMETH 200-200-20
30 SUSPENSION, ORAL (FINAL DOSE FORM) ORAL
Qty: 769 ML | Refills: 2 | Status: SHIPPED | OUTPATIENT
Start: 2021-04-25 | End: 2021-06-11

## 2021-04-25 RX ADMIN — ALUMINUM HYDROXIDE, MAGNESIUM HYDROXIDE, AND SIMETHICONE 30 ML: 200; 200; 20 SUSPENSION ORAL at 07:04

## 2021-04-25 RX ADMIN — IOHEXOL 75 ML: 350 INJECTION, SOLUTION INTRAVENOUS at 08:04

## 2021-04-26 ENCOUNTER — TELEPHONE (OUTPATIENT)
Dept: EMERGENCY MEDICINE | Facility: HOSPITAL | Age: 41
End: 2021-04-26

## 2021-04-26 ENCOUNTER — NURSE TRIAGE (OUTPATIENT)
Dept: ADMINISTRATIVE | Facility: CLINIC | Age: 41
End: 2021-04-26

## 2021-04-26 LAB
HCV AB SERPL QL IA: NEGATIVE
HIV 1+2 AB+HIV1 P24 AG SERPL QL IA: NEGATIVE

## 2021-04-27 ENCOUNTER — OFFICE VISIT (OUTPATIENT)
Dept: GASTROENTEROLOGY | Facility: CLINIC | Age: 41
End: 2021-04-27
Payer: MEDICAID

## 2021-04-27 ENCOUNTER — PATIENT MESSAGE (OUTPATIENT)
Dept: GASTROENTEROLOGY | Facility: CLINIC | Age: 41
End: 2021-04-27

## 2021-04-27 VITALS
WEIGHT: 149.88 LBS | HEART RATE: 96 BPM | RESPIRATION RATE: 16 BRPM | BODY MASS INDEX: 22.71 KG/M2 | HEIGHT: 68 IN | SYSTOLIC BLOOD PRESSURE: 100 MMHG | OXYGEN SATURATION: 98 % | DIASTOLIC BLOOD PRESSURE: 62 MMHG

## 2021-04-27 DIAGNOSIS — R10.84 GENERALIZED ABDOMINAL PAIN: ICD-10-CM

## 2021-04-27 DIAGNOSIS — K59.04 CHRONIC IDIOPATHIC CONSTIPATION: ICD-10-CM

## 2021-04-27 DIAGNOSIS — Z01.818 PREOPERATIVE EXAMINATION: ICD-10-CM

## 2021-04-27 DIAGNOSIS — R93.5 ABNORMAL CT OF THE ABDOMEN: Primary | ICD-10-CM

## 2021-04-27 PROCEDURE — 99999 PR PBB SHADOW E&M-EST. PATIENT-LVL V: CPT | Mod: PBBFAC,,, | Performed by: NURSE PRACTITIONER

## 2021-04-27 PROCEDURE — 99999 PR PBB SHADOW E&M-EST. PATIENT-LVL V: ICD-10-PCS | Mod: PBBFAC,,, | Performed by: NURSE PRACTITIONER

## 2021-04-27 PROCEDURE — 99203 PR OFFICE/OUTPT VISIT, NEW, LEVL III, 30-44 MIN: ICD-10-PCS | Mod: S$PBB,,, | Performed by: NURSE PRACTITIONER

## 2021-04-27 PROCEDURE — 99203 OFFICE O/P NEW LOW 30 MIN: CPT | Mod: S$PBB,,, | Performed by: NURSE PRACTITIONER

## 2021-04-27 PROCEDURE — 99215 OFFICE O/P EST HI 40 MIN: CPT | Mod: PBBFAC,PO | Performed by: NURSE PRACTITIONER

## 2021-04-27 RX ORDER — DICYCLOMINE HYDROCHLORIDE 20 MG/1
20 TABLET ORAL 3 TIMES DAILY PRN
Qty: 60 TABLET | Refills: 2 | Status: SHIPPED | OUTPATIENT
Start: 2021-04-27 | End: 2021-12-13 | Stop reason: SDUPTHER

## 2021-04-27 RX ORDER — POLYETHYLENE GLYCOL 3350 17 G/17G
17 POWDER, FOR SOLUTION ORAL DAILY
Qty: 510 G | Refills: 11 | Status: SHIPPED | OUTPATIENT
Start: 2021-04-27 | End: 2021-06-10 | Stop reason: ALTCHOICE

## 2021-04-27 RX ORDER — ERGOCALCIFEROL 1.25 MG/1
50000 CAPSULE ORAL
COMMUNITY
Start: 2021-04-24 | End: 2021-09-07

## 2021-04-27 RX ORDER — SODIUM, POTASSIUM,MAG SULFATES 17.5-3.13G
1 SOLUTION, RECONSTITUTED, ORAL ORAL DAILY
Qty: 1 KIT | Refills: 0 | Status: SHIPPED | OUTPATIENT
Start: 2021-04-27 | End: 2021-04-29

## 2021-04-27 RX ORDER — MELOXICAM 7.5 MG/1
7.5 TABLET ORAL 2 TIMES DAILY PRN
COMMUNITY
Start: 2021-04-14 | End: 2021-04-29

## 2021-04-28 PROBLEM — R93.5 ABNORMAL CT OF THE ABDOMEN: Status: ACTIVE | Noted: 2021-04-28

## 2021-04-28 PROBLEM — R10.84 GENERALIZED ABDOMINAL PAIN: Status: ACTIVE | Noted: 2021-04-28

## 2021-04-28 PROBLEM — K59.04 CHRONIC IDIOPATHIC CONSTIPATION: Status: ACTIVE | Noted: 2021-04-28

## 2021-04-29 ENCOUNTER — PATIENT MESSAGE (OUTPATIENT)
Dept: FAMILY MEDICINE | Facility: HOSPITAL | Age: 41
End: 2021-04-29

## 2021-04-29 ENCOUNTER — OFFICE VISIT (OUTPATIENT)
Dept: FAMILY MEDICINE | Facility: HOSPITAL | Age: 41
End: 2021-04-29
Attending: FAMILY MEDICINE
Payer: MEDICAID

## 2021-04-29 VITALS
WEIGHT: 154.31 LBS | BODY MASS INDEX: 23.39 KG/M2 | DIASTOLIC BLOOD PRESSURE: 63 MMHG | SYSTOLIC BLOOD PRESSURE: 100 MMHG | HEIGHT: 68 IN | HEART RATE: 64 BPM

## 2021-04-29 DIAGNOSIS — K59.04 CHRONIC IDIOPATHIC CONSTIPATION: ICD-10-CM

## 2021-04-29 DIAGNOSIS — G89.29 CHRONIC BACK PAIN GREATER THAN 3 MONTHS DURATION: ICD-10-CM

## 2021-04-29 DIAGNOSIS — E55.9 VITAMIN D DEFICIENCY: ICD-10-CM

## 2021-04-29 DIAGNOSIS — D64.9 ANEMIA, UNSPECIFIED TYPE: ICD-10-CM

## 2021-04-29 DIAGNOSIS — R10.84 GENERALIZED ABDOMINAL PAIN: Primary | ICD-10-CM

## 2021-04-29 DIAGNOSIS — R93.5 ABNORMAL CT OF THE ABDOMEN: ICD-10-CM

## 2021-04-29 DIAGNOSIS — M54.9 CHRONIC BACK PAIN GREATER THAN 3 MONTHS DURATION: ICD-10-CM

## 2021-04-29 PROCEDURE — 99214 OFFICE O/P EST MOD 30 MIN: CPT | Performed by: STUDENT IN AN ORGANIZED HEALTH CARE EDUCATION/TRAINING PROGRAM

## 2021-04-29 RX ORDER — LIDOCAINE 50 MG/G
1 PATCH TOPICAL DAILY
Qty: 30 PATCH | Refills: 0 | Status: SHIPPED | OUTPATIENT
Start: 2021-04-29 | End: 2021-05-29

## 2021-05-03 ENCOUNTER — PATIENT MESSAGE (OUTPATIENT)
Dept: FAMILY MEDICINE | Facility: HOSPITAL | Age: 41
End: 2021-05-03

## 2021-05-03 ENCOUNTER — TELEPHONE (OUTPATIENT)
Dept: FAMILY MEDICINE | Facility: HOSPITAL | Age: 41
End: 2021-05-03

## 2021-05-04 ENCOUNTER — TELEPHONE (OUTPATIENT)
Dept: GASTROENTEROLOGY | Facility: CLINIC | Age: 41
End: 2021-05-04

## 2021-05-08 ENCOUNTER — LAB VISIT (OUTPATIENT)
Dept: FAMILY MEDICINE | Facility: CLINIC | Age: 41
End: 2021-05-08
Payer: MEDICAID

## 2021-05-08 DIAGNOSIS — Z01.818 PREOPERATIVE EXAMINATION: ICD-10-CM

## 2021-05-08 LAB — SARS-COV-2 RNA RESP QL NAA+PROBE: NOT DETECTED

## 2021-05-08 PROCEDURE — U0003 INFECTIOUS AGENT DETECTION BY NUCLEIC ACID (DNA OR RNA); SEVERE ACUTE RESPIRATORY SYNDROME CORONAVIRUS 2 (SARS-COV-2) (CORONAVIRUS DISEASE [COVID-19]), AMPLIFIED PROBE TECHNIQUE, MAKING USE OF HIGH THROUGHPUT TECHNOLOGIES AS DESCRIBED BY CMS-2020-01-R: HCPCS | Performed by: NURSE PRACTITIONER

## 2021-05-08 PROCEDURE — U0005 INFEC AGEN DETEC AMPLI PROBE: HCPCS | Performed by: NURSE PRACTITIONER

## 2021-05-11 ENCOUNTER — TELEPHONE (OUTPATIENT)
Dept: GASTROENTEROLOGY | Facility: CLINIC | Age: 41
End: 2021-05-11

## 2021-05-11 PROBLEM — R93.3 ABNORMAL FINDING ON GI TRACT IMAGING: Status: ACTIVE | Noted: 2021-05-11

## 2021-05-14 ENCOUNTER — TELEPHONE (OUTPATIENT)
Dept: GASTROENTEROLOGY | Facility: CLINIC | Age: 41
End: 2021-05-14

## 2021-05-16 ENCOUNTER — HOSPITAL ENCOUNTER (EMERGENCY)
Facility: HOSPITAL | Age: 41
Discharge: HOME | End: 2021-05-16
Attending: EMERGENCY MEDICINE
Payer: MEDICAID

## 2021-05-16 VITALS
OXYGEN SATURATION: 100 % | TEMPERATURE: 98 F | WEIGHT: 150 LBS | SYSTOLIC BLOOD PRESSURE: 95 MMHG | HEART RATE: 66 BPM | BODY MASS INDEX: 22.73 KG/M2 | HEIGHT: 68 IN | RESPIRATION RATE: 18 BRPM | DIASTOLIC BLOOD PRESSURE: 59 MMHG

## 2021-05-16 DIAGNOSIS — R10.9 ABDOMINAL PAIN, UNSPECIFIED ABDOMINAL LOCATION: Primary | ICD-10-CM

## 2021-05-16 LAB
ALBUMIN SERPL BCP-MCNC: 3.5 G/DL (ref 3.5–5.2)
ALP SERPL-CCNC: 38 U/L (ref 55–135)
ALT SERPL W/O P-5'-P-CCNC: 10 U/L (ref 10–44)
ANION GAP SERPL CALC-SCNC: 8 MMOL/L (ref 8–16)
AST SERPL-CCNC: 13 U/L (ref 10–40)
B-HCG UR QL: NEGATIVE
BASOPHILS # BLD AUTO: 0.07 K/UL (ref 0–0.2)
BASOPHILS NFR BLD: 0.8 % (ref 0–1.9)
BILIRUB SERPL-MCNC: 0.3 MG/DL (ref 0.1–1)
BILIRUB UR QL STRIP: NEGATIVE
BUN SERPL-MCNC: 8 MG/DL (ref 6–20)
BUN SERPL-MCNC: 8 MG/DL (ref 6–30)
CALCIUM SERPL-MCNC: 8.7 MG/DL (ref 8.7–10.5)
CHLORIDE SERPL-SCNC: 102 MMOL/L (ref 95–110)
CHLORIDE SERPL-SCNC: 106 MMOL/L (ref 95–110)
CLARITY UR REFRACT.AUTO: CLEAR
CO2 SERPL-SCNC: 24 MMOL/L (ref 23–29)
COLOR UR AUTO: YELLOW
CREAT SERPL-MCNC: 0.7 MG/DL (ref 0.5–1.4)
CREAT SERPL-MCNC: 0.7 MG/DL (ref 0.5–1.4)
CTP QC/QA: YES
DIFFERENTIAL METHOD: ABNORMAL
EOSINOPHIL # BLD AUTO: 0.2 K/UL (ref 0–0.5)
EOSINOPHIL NFR BLD: 2.1 % (ref 0–8)
ERYTHROCYTE [DISTWIDTH] IN BLOOD BY AUTOMATED COUNT: 13.2 % (ref 11.5–14.5)
EST. GFR  (AFRICAN AMERICAN): >60 ML/MIN/1.73 M^2
EST. GFR  (NON AFRICAN AMERICAN): >60 ML/MIN/1.73 M^2
GLUCOSE SERPL-MCNC: 109 MG/DL (ref 70–110)
GLUCOSE SERPL-MCNC: 109 MG/DL (ref 70–110)
GLUCOSE UR QL STRIP: NEGATIVE
HCT VFR BLD AUTO: 36.4 % (ref 37–48.5)
HCT VFR BLD CALC: 35 %PCV (ref 36–54)
HGB BLD-MCNC: 11.7 G/DL (ref 12–16)
HGB UR QL STRIP: NEGATIVE
IMM GRANULOCYTES # BLD AUTO: 0.04 K/UL (ref 0–0.04)
IMM GRANULOCYTES NFR BLD AUTO: 0.5 % (ref 0–0.5)
KETONES UR QL STRIP: NEGATIVE
LEUKOCYTE ESTERASE UR QL STRIP: NEGATIVE
LYMPHOCYTES # BLD AUTO: 2.1 K/UL (ref 1–4.8)
LYMPHOCYTES NFR BLD: 23.7 % (ref 18–48)
MCH RBC QN AUTO: 30.5 PG (ref 27–31)
MCHC RBC AUTO-ENTMCNC: 32.1 G/DL (ref 32–36)
MCV RBC AUTO: 95 FL (ref 82–98)
MONOCYTES # BLD AUTO: 0.6 K/UL (ref 0.3–1)
MONOCYTES NFR BLD: 7 % (ref 4–15)
NEUTROPHILS # BLD AUTO: 5.7 K/UL (ref 1.8–7.7)
NEUTROPHILS NFR BLD: 65.9 % (ref 38–73)
NITRITE UR QL STRIP: NEGATIVE
NRBC BLD-RTO: 0 /100 WBC
PH UR STRIP: 7 [PH] (ref 5–8)
PLATELET # BLD AUTO: 203 K/UL (ref 150–450)
PMV BLD AUTO: 10.1 FL (ref 9.2–12.9)
POC IONIZED CALCIUM: 1.1 MMOL/L (ref 1.06–1.42)
POC TCO2 (MEASURED): 27 MMOL/L (ref 23–29)
POTASSIUM BLD-SCNC: 3.8 MMOL/L (ref 3.5–5.1)
POTASSIUM SERPL-SCNC: 3.8 MMOL/L (ref 3.5–5.1)
PROT SERPL-MCNC: 6.5 G/DL (ref 6–8.4)
PROT UR QL STRIP: NEGATIVE
RBC # BLD AUTO: 3.83 M/UL (ref 4–5.4)
SAMPLE: ABNORMAL
SODIUM BLD-SCNC: 139 MMOL/L (ref 136–145)
SODIUM SERPL-SCNC: 138 MMOL/L (ref 136–145)
SP GR UR STRIP: 1.01 (ref 1–1.03)
URN SPEC COLLECT METH UR: NORMAL
WBC # BLD AUTO: 8.69 K/UL (ref 3.9–12.7)

## 2021-05-16 PROCEDURE — 25000003 PHARM REV CODE 250: Performed by: PHYSICIAN ASSISTANT

## 2021-05-16 PROCEDURE — 25500020 PHARM REV CODE 255: Performed by: EMERGENCY MEDICINE

## 2021-05-16 PROCEDURE — 96361 HYDRATE IV INFUSION ADD-ON: CPT

## 2021-05-16 PROCEDURE — 80047 BASIC METABLC PNL IONIZED CA: CPT

## 2021-05-16 PROCEDURE — 99285 EMERGENCY DEPT VISIT HI MDM: CPT | Mod: 25

## 2021-05-16 PROCEDURE — 99285 EMERGENCY DEPT VISIT HI MDM: CPT | Mod: ,,, | Performed by: PHYSICIAN ASSISTANT

## 2021-05-16 PROCEDURE — 82330 ASSAY OF CALCIUM: CPT

## 2021-05-16 PROCEDURE — 96360 HYDRATION IV INFUSION INIT: CPT

## 2021-05-16 PROCEDURE — 81025 URINE PREGNANCY TEST: CPT | Performed by: PHYSICIAN ASSISTANT

## 2021-05-16 PROCEDURE — 85025 COMPLETE CBC W/AUTO DIFF WBC: CPT | Performed by: PHYSICIAN ASSISTANT

## 2021-05-16 PROCEDURE — 99285 PR EMERGENCY DEPT VISIT,LEVEL V: ICD-10-PCS | Mod: ,,, | Performed by: PHYSICIAN ASSISTANT

## 2021-05-16 PROCEDURE — 80053 COMPREHEN METABOLIC PANEL: CPT | Performed by: PHYSICIAN ASSISTANT

## 2021-05-16 PROCEDURE — 81003 URINALYSIS AUTO W/O SCOPE: CPT | Performed by: PHYSICIAN ASSISTANT

## 2021-05-16 RX ORDER — DICYCLOMINE HYDROCHLORIDE 10 MG/1
20 CAPSULE ORAL
Status: COMPLETED | OUTPATIENT
Start: 2021-05-16 | End: 2021-05-16

## 2021-05-16 RX ORDER — ACETAMINOPHEN 500 MG
1000 TABLET ORAL
Status: COMPLETED | OUTPATIENT
Start: 2021-05-16 | End: 2021-05-16

## 2021-05-16 RX ORDER — MORPHINE SULFATE 4 MG/ML
4 INJECTION, SOLUTION INTRAMUSCULAR; INTRAVENOUS
Status: DISCONTINUED | OUTPATIENT
Start: 2021-05-16 | End: 2021-05-16

## 2021-05-16 RX ADMIN — ACETAMINOPHEN 1000 MG: 500 TABLET, FILM COATED ORAL at 10:05

## 2021-05-16 RX ADMIN — SODIUM CHLORIDE 1000 ML: 0.9 INJECTION, SOLUTION INTRAVENOUS at 09:05

## 2021-05-16 RX ADMIN — IOHEXOL 75 ML: 350 INJECTION, SOLUTION INTRAVENOUS at 07:05

## 2021-05-16 RX ADMIN — SODIUM CHLORIDE 1000 ML: 0.9 INJECTION, SOLUTION INTRAVENOUS at 06:05

## 2021-05-16 RX ADMIN — DICYCLOMINE HYDROCHLORIDE 20 MG: 10 CAPSULE ORAL at 03:05

## 2021-05-19 ENCOUNTER — TELEPHONE (OUTPATIENT)
Dept: GASTROENTEROLOGY | Facility: CLINIC | Age: 41
End: 2021-05-19

## 2021-05-25 ENCOUNTER — OFFICE VISIT (OUTPATIENT)
Dept: GASTROENTEROLOGY | Facility: CLINIC | Age: 41
End: 2021-05-25
Payer: MEDICAID

## 2021-05-25 VITALS
BODY MASS INDEX: 24.13 KG/M2 | HEART RATE: 61 BPM | WEIGHT: 158.69 LBS | SYSTOLIC BLOOD PRESSURE: 110 MMHG | DIASTOLIC BLOOD PRESSURE: 60 MMHG

## 2021-05-25 DIAGNOSIS — K21.9 GERD WITHOUT ESOPHAGITIS: Primary | ICD-10-CM

## 2021-05-25 DIAGNOSIS — R10.84 GENERALIZED ABDOMINAL PAIN: ICD-10-CM

## 2021-05-25 DIAGNOSIS — Z01.818 PREOPERATIVE EXAMINATION: ICD-10-CM

## 2021-05-25 DIAGNOSIS — K59.04 CHRONIC IDIOPATHIC CONSTIPATION: ICD-10-CM

## 2021-05-25 PROCEDURE — 99999 PR PBB SHADOW E&M-EST. PATIENT-LVL IV: CPT | Mod: PBBFAC,,, | Performed by: NURSE PRACTITIONER

## 2021-05-25 PROCEDURE — 99214 OFFICE O/P EST MOD 30 MIN: CPT | Mod: S$PBB,,, | Performed by: NURSE PRACTITIONER

## 2021-05-25 PROCEDURE — 99214 PR OFFICE/OUTPT VISIT, EST, LEVL IV, 30-39 MIN: ICD-10-PCS | Mod: S$PBB,,, | Performed by: NURSE PRACTITIONER

## 2021-05-25 PROCEDURE — 99214 OFFICE O/P EST MOD 30 MIN: CPT | Mod: PBBFAC,PO | Performed by: NURSE PRACTITIONER

## 2021-05-25 PROCEDURE — 99999 PR PBB SHADOW E&M-EST. PATIENT-LVL IV: ICD-10-PCS | Mod: PBBFAC,,, | Performed by: NURSE PRACTITIONER

## 2021-05-25 RX ORDER — PREDNISONE 20 MG/1
40 TABLET ORAL DAILY
COMMUNITY
Start: 2020-12-29 | End: 2021-07-26

## 2021-05-25 RX ORDER — ESCITALOPRAM OXALATE 10 MG/1
TABLET ORAL
COMMUNITY
Start: 2021-05-24 | End: 2021-06-01

## 2021-06-01 ENCOUNTER — OFFICE VISIT (OUTPATIENT)
Dept: FAMILY MEDICINE | Facility: HOSPITAL | Age: 41
End: 2021-06-01
Payer: MEDICAID

## 2021-06-01 VITALS
DIASTOLIC BLOOD PRESSURE: 60 MMHG | HEART RATE: 69 BPM | HEIGHT: 68 IN | WEIGHT: 162.5 LBS | SYSTOLIC BLOOD PRESSURE: 95 MMHG | BODY MASS INDEX: 24.63 KG/M2

## 2021-06-01 DIAGNOSIS — F41.9 ANXIETY: ICD-10-CM

## 2021-06-01 DIAGNOSIS — R10.13 CHRONIC EPIGASTRIC PAIN: ICD-10-CM

## 2021-06-01 DIAGNOSIS — M54.9 BACK PAIN, UNSPECIFIED BACK LOCATION, UNSPECIFIED BACK PAIN LATERALITY, UNSPECIFIED CHRONICITY: Primary | ICD-10-CM

## 2021-06-01 DIAGNOSIS — G89.29 CHRONIC EPIGASTRIC PAIN: ICD-10-CM

## 2021-06-01 PROCEDURE — 99214 OFFICE O/P EST MOD 30 MIN: CPT | Performed by: STUDENT IN AN ORGANIZED HEALTH CARE EDUCATION/TRAINING PROGRAM

## 2021-06-01 RX ORDER — DULOXETIN HYDROCHLORIDE 20 MG/1
20 CAPSULE, DELAYED RELEASE ORAL DAILY
Qty: 30 CAPSULE | Refills: 11 | Status: SHIPPED | OUTPATIENT
Start: 2021-06-01 | End: 2022-05-29 | Stop reason: SDUPTHER

## 2021-06-08 ENCOUNTER — LAB VISIT (OUTPATIENT)
Dept: FAMILY MEDICINE | Facility: CLINIC | Age: 41
End: 2021-06-08
Payer: MEDICAID

## 2021-06-08 DIAGNOSIS — Z01.818 PREOPERATIVE EXAMINATION: ICD-10-CM

## 2021-06-08 LAB — SARS-COV-2 RNA RESP QL NAA+PROBE: NOT DETECTED

## 2021-06-08 PROCEDURE — U0005 INFEC AGEN DETEC AMPLI PROBE: HCPCS | Performed by: NURSE PRACTITIONER

## 2021-06-08 PROCEDURE — U0003 INFECTIOUS AGENT DETECTION BY NUCLEIC ACID (DNA OR RNA); SEVERE ACUTE RESPIRATORY SYNDROME CORONAVIRUS 2 (SARS-COV-2) (CORONAVIRUS DISEASE [COVID-19]), AMPLIFIED PROBE TECHNIQUE, MAKING USE OF HIGH THROUGHPUT TECHNOLOGIES AS DESCRIBED BY CMS-2020-01-R: HCPCS | Performed by: NURSE PRACTITIONER

## 2021-06-11 PROBLEM — K21.9 GERD (GASTROESOPHAGEAL REFLUX DISEASE): Status: ACTIVE | Noted: 2021-06-11

## 2021-06-17 ENCOUNTER — OFFICE VISIT (OUTPATIENT)
Dept: FAMILY MEDICINE | Facility: HOSPITAL | Age: 41
End: 2021-06-17
Payer: MEDICAID

## 2021-06-17 VITALS
WEIGHT: 166.25 LBS | BODY MASS INDEX: 25.2 KG/M2 | HEART RATE: 69 BPM | SYSTOLIC BLOOD PRESSURE: 90 MMHG | HEIGHT: 68 IN | DIASTOLIC BLOOD PRESSURE: 55 MMHG

## 2021-06-17 DIAGNOSIS — Z12.4 ENCOUNTER FOR PAP SMEAR OF CERVIX WITH HPV DNA COTESTING: Primary | ICD-10-CM

## 2021-06-17 DIAGNOSIS — K21.9 GASTROESOPHAGEAL REFLUX DISEASE, UNSPECIFIED WHETHER ESOPHAGITIS PRESENT: ICD-10-CM

## 2021-06-17 PROCEDURE — 99214 OFFICE O/P EST MOD 30 MIN: CPT | Performed by: STUDENT IN AN ORGANIZED HEALTH CARE EDUCATION/TRAINING PROGRAM

## 2021-06-17 RX ORDER — DEXAMETHASONE 4 MG/1
1 TABLET ORAL 2 TIMES DAILY
COMMUNITY
Start: 2021-06-03

## 2021-06-17 RX ORDER — OMEPRAZOLE 20 MG/1
20 CAPSULE, DELAYED RELEASE ORAL DAILY
Qty: 90 CAPSULE | Refills: 3 | Status: SHIPPED | OUTPATIENT
Start: 2021-06-17 | End: 2022-05-29 | Stop reason: SDUPTHER

## 2021-06-18 ENCOUNTER — TELEPHONE (OUTPATIENT)
Dept: GASTROENTEROLOGY | Facility: CLINIC | Age: 41
End: 2021-06-18

## 2021-06-21 ENCOUNTER — TELEPHONE (OUTPATIENT)
Dept: FAMILY MEDICINE | Facility: HOSPITAL | Age: 41
End: 2021-06-21

## 2021-07-02 ENCOUNTER — IMMUNIZATION (OUTPATIENT)
Dept: INTERNAL MEDICINE | Facility: CLINIC | Age: 41
End: 2021-07-02
Payer: MEDICAID

## 2021-07-02 DIAGNOSIS — Z23 NEED FOR VACCINATION: Primary | ICD-10-CM

## 2021-07-02 PROCEDURE — 0011A COVID-19, MRNA, LNP-S, PF, 100 MCG/0.5 ML DOSE VACCINE: CPT | Mod: PBBFAC,PO

## 2021-07-16 ENCOUNTER — TELEPHONE (OUTPATIENT)
Dept: FAMILY MEDICINE | Facility: HOSPITAL | Age: 41
End: 2021-07-16

## 2021-07-16 DIAGNOSIS — R87.612 LOW GRADE SQUAMOUS INTRAEPITHELIAL LESION ON CYTOLOGIC SMEAR OF CERVIX (LGSIL): Primary | ICD-10-CM

## 2021-07-21 ENCOUNTER — TELEPHONE (OUTPATIENT)
Dept: GASTROENTEROLOGY | Facility: CLINIC | Age: 41
End: 2021-07-21

## 2021-07-21 ENCOUNTER — HOSPITAL ENCOUNTER (OUTPATIENT)
Dept: RADIOLOGY | Facility: HOSPITAL | Age: 41
Discharge: HOME | End: 2021-07-21
Attending: INTERNAL MEDICINE
Payer: MEDICAID

## 2021-07-21 DIAGNOSIS — K21.9 GERD (GASTROESOPHAGEAL REFLUX DISEASE): ICD-10-CM

## 2021-07-21 DIAGNOSIS — R10.84 GENERALIZED ABDOMINAL PAIN: ICD-10-CM

## 2021-07-21 PROCEDURE — 78264 GASTRIC EMPTYING IMG STUDY: CPT | Mod: 26,,, | Performed by: RADIOLOGY

## 2021-07-21 PROCEDURE — 78264 GASTRIC EMPTYING IMG STUDY: CPT | Mod: TC

## 2021-07-21 PROCEDURE — 78264 NM GASTRIC EMPTYING: ICD-10-PCS | Mod: 26,,, | Performed by: RADIOLOGY

## 2021-07-22 ENCOUNTER — TELEPHONE (OUTPATIENT)
Dept: GASTROENTEROLOGY | Facility: CLINIC | Age: 41
End: 2021-07-22

## 2021-07-26 ENCOUNTER — OFFICE VISIT (OUTPATIENT)
Dept: FAMILY MEDICINE | Facility: HOSPITAL | Age: 41
End: 2021-07-26
Payer: MEDICAID

## 2021-07-26 ENCOUNTER — LAB VISIT (OUTPATIENT)
Dept: LAB | Facility: HOSPITAL | Age: 41
End: 2021-07-26
Payer: MEDICAID

## 2021-07-26 VITALS
HEIGHT: 68 IN | SYSTOLIC BLOOD PRESSURE: 109 MMHG | HEART RATE: 60 BPM | WEIGHT: 165.81 LBS | DIASTOLIC BLOOD PRESSURE: 71 MMHG | BODY MASS INDEX: 25.13 KG/M2

## 2021-07-26 DIAGNOSIS — R42 POSTURAL DIZZINESS WITH PRESYNCOPE: Primary | ICD-10-CM

## 2021-07-26 DIAGNOSIS — R55 POSTURAL DIZZINESS WITH PRESYNCOPE: ICD-10-CM

## 2021-07-26 DIAGNOSIS — R42 POSTURAL DIZZINESS WITH PRESYNCOPE: ICD-10-CM

## 2021-07-26 DIAGNOSIS — R55 POSTURAL DIZZINESS WITH PRESYNCOPE: Primary | ICD-10-CM

## 2021-07-26 LAB
ALBUMIN SERPL BCP-MCNC: 4 G/DL (ref 3.5–5.2)
ALP SERPL-CCNC: 41 U/L (ref 55–135)
ALT SERPL W/O P-5'-P-CCNC: 16 U/L (ref 10–44)
ANION GAP SERPL CALC-SCNC: 7 MMOL/L (ref 8–16)
AST SERPL-CCNC: 18 U/L (ref 10–40)
BASOPHILS # BLD AUTO: 0.08 K/UL (ref 0–0.2)
BASOPHILS NFR BLD: 0.7 % (ref 0–1.9)
BILIRUB SERPL-MCNC: 0.2 MG/DL (ref 0.1–1)
BUN SERPL-MCNC: 8 MG/DL (ref 6–20)
CALCIUM SERPL-MCNC: 9.2 MG/DL (ref 8.7–10.5)
CHLORIDE SERPL-SCNC: 108 MMOL/L (ref 95–110)
CO2 SERPL-SCNC: 24 MMOL/L (ref 23–29)
CREAT SERPL-MCNC: 0.8 MG/DL (ref 0.5–1.4)
DIFFERENTIAL METHOD: ABNORMAL
EOSINOPHIL # BLD AUTO: 0.1 K/UL (ref 0–0.5)
EOSINOPHIL NFR BLD: 1 % (ref 0–8)
ERYTHROCYTE [DISTWIDTH] IN BLOOD BY AUTOMATED COUNT: 12.8 % (ref 11.5–14.5)
EST. GFR  (AFRICAN AMERICAN): >60 ML/MIN/1.73 M^2
EST. GFR  (NON AFRICAN AMERICAN): >60 ML/MIN/1.73 M^2
GLUCOSE SERPL-MCNC: 91 MG/DL (ref 70–110)
HCT VFR BLD AUTO: 39.5 % (ref 37–48.5)
HGB BLD-MCNC: 12.7 G/DL (ref 12–16)
IMM GRANULOCYTES # BLD AUTO: 0.05 K/UL (ref 0–0.04)
IMM GRANULOCYTES NFR BLD AUTO: 0.5 % (ref 0–0.5)
LYMPHOCYTES # BLD AUTO: 2.5 K/UL (ref 1–4.8)
LYMPHOCYTES NFR BLD: 23.1 % (ref 18–48)
MCH RBC QN AUTO: 31 PG (ref 27–31)
MCHC RBC AUTO-ENTMCNC: 32.2 G/DL (ref 32–36)
MCV RBC AUTO: 96 FL (ref 82–98)
MONOCYTES # BLD AUTO: 0.6 K/UL (ref 0.3–1)
MONOCYTES NFR BLD: 5.5 % (ref 4–15)
NEUTROPHILS # BLD AUTO: 7.5 K/UL (ref 1.8–7.7)
NEUTROPHILS NFR BLD: 69.2 % (ref 38–73)
NRBC BLD-RTO: 0 /100 WBC
PLATELET # BLD AUTO: 255 K/UL (ref 150–450)
PMV BLD AUTO: 10.2 FL (ref 9.2–12.9)
POTASSIUM SERPL-SCNC: 4.8 MMOL/L (ref 3.5–5.1)
PROT SERPL-MCNC: 7.3 G/DL (ref 6–8.4)
RBC # BLD AUTO: 4.1 M/UL (ref 4–5.4)
SODIUM SERPL-SCNC: 139 MMOL/L (ref 136–145)
WBC # BLD AUTO: 10.8 K/UL (ref 3.9–12.7)

## 2021-07-26 PROCEDURE — 80053 COMPREHEN METABOLIC PANEL: CPT

## 2021-07-26 PROCEDURE — 99215 OFFICE O/P EST HI 40 MIN: CPT

## 2021-07-26 PROCEDURE — 85025 COMPLETE CBC W/AUTO DIFF WBC: CPT

## 2021-07-26 PROCEDURE — 36415 COLL VENOUS BLD VENIPUNCTURE: CPT

## 2021-07-26 RX ORDER — ESCITALOPRAM OXALATE 10 MG/1
TABLET ORAL
COMMUNITY
Start: 2021-06-25 | End: 2021-09-07

## 2021-07-26 RX ORDER — MELOXICAM 7.5 MG/1
TABLET ORAL
Status: ON HOLD | COMMUNITY
Start: 2021-07-23 | End: 2021-12-10

## 2021-07-28 ENCOUNTER — PATIENT MESSAGE (OUTPATIENT)
Dept: FAMILY MEDICINE | Facility: HOSPITAL | Age: 41
End: 2021-07-28

## 2021-07-30 ENCOUNTER — IMMUNIZATION (OUTPATIENT)
Dept: INTERNAL MEDICINE | Facility: CLINIC | Age: 41
End: 2021-07-30
Payer: MEDICAID

## 2021-07-30 DIAGNOSIS — Z23 NEED FOR VACCINATION: Primary | ICD-10-CM

## 2021-07-30 PROCEDURE — 0012A COVID-19, MRNA, LNP-S, PF, 100 MCG/0.5 ML DOSE VACCINE: CPT | Mod: CV19,S$GLB,, | Performed by: INTERNAL MEDICINE

## 2021-07-30 PROCEDURE — 91301 COVID-19, MRNA, LNP-S, PF, 100 MCG/0.5 ML DOSE VACCINE: ICD-10-PCS | Mod: S$GLB,,, | Performed by: INTERNAL MEDICINE

## 2021-07-30 PROCEDURE — 0012A COVID-19, MRNA, LNP-S, PF, 100 MCG/0.5 ML DOSE VACCINE: ICD-10-PCS | Mod: CV19,S$GLB,, | Performed by: INTERNAL MEDICINE

## 2021-07-30 PROCEDURE — 91301 COVID-19, MRNA, LNP-S, PF, 100 MCG/0.5 ML DOSE VACCINE: CPT | Mod: S$GLB,,, | Performed by: INTERNAL MEDICINE

## 2021-08-15 ENCOUNTER — HOSPITAL ENCOUNTER (EMERGENCY)
Facility: HOSPITAL | Age: 41
Discharge: HOME | End: 2021-08-15
Attending: EMERGENCY MEDICINE
Payer: MEDICAID

## 2021-08-15 VITALS
WEIGHT: 162 LBS | BODY MASS INDEX: 24.55 KG/M2 | HEART RATE: 94 BPM | HEIGHT: 68 IN | RESPIRATION RATE: 18 BRPM | SYSTOLIC BLOOD PRESSURE: 108 MMHG | TEMPERATURE: 99 F | OXYGEN SATURATION: 95 % | DIASTOLIC BLOOD PRESSURE: 69 MMHG

## 2021-08-15 DIAGNOSIS — M54.41 ACUTE RIGHT-SIDED LOW BACK PAIN WITH RIGHT-SIDED SCIATICA: ICD-10-CM

## 2021-08-15 DIAGNOSIS — R09.81 SINUS CONGESTION: Primary | ICD-10-CM

## 2021-08-15 DIAGNOSIS — J32.3 CHRONIC SPHENOIDAL SINUSITIS: ICD-10-CM

## 2021-08-15 DIAGNOSIS — M54.31 SCIATICA OF RIGHT SIDE: ICD-10-CM

## 2021-08-15 LAB
CTP QC/QA: YES
SARS-COV-2 RDRP RESP QL NAA+PROBE: NEGATIVE

## 2021-08-15 PROCEDURE — U0002 COVID-19 LAB TEST NON-CDC: HCPCS | Performed by: EMERGENCY MEDICINE

## 2021-08-15 PROCEDURE — 25000003 PHARM REV CODE 250: Performed by: PHYSICIAN ASSISTANT

## 2021-08-15 PROCEDURE — 99284 EMERGENCY DEPT VISIT MOD MDM: CPT | Mod: 25

## 2021-08-15 PROCEDURE — 99285 PR EMERGENCY DEPT VISIT,LEVEL V: ICD-10-PCS | Mod: CR,CS,, | Performed by: PHYSICIAN ASSISTANT

## 2021-08-15 PROCEDURE — 99285 EMERGENCY DEPT VISIT HI MDM: CPT | Mod: CR,CS,, | Performed by: PHYSICIAN ASSISTANT

## 2021-08-15 RX ORDER — LIDOCAINE 50 MG/G
1 PATCH TOPICAL
Status: DISCONTINUED | OUTPATIENT
Start: 2021-08-15 | End: 2021-08-15 | Stop reason: HOSPADM

## 2021-08-15 RX ORDER — ACETAMINOPHEN 500 MG
1000 TABLET ORAL
Status: COMPLETED | OUTPATIENT
Start: 2021-08-15 | End: 2021-08-15

## 2021-08-15 RX ORDER — OXYMETAZOLINE HCL 0.05 %
1 SPRAY, NON-AEROSOL (ML) NASAL
Status: COMPLETED | OUTPATIENT
Start: 2021-08-15 | End: 2021-08-15

## 2021-08-15 RX ORDER — METHOCARBAMOL 500 MG/1
1000 TABLET, FILM COATED ORAL 3 TIMES DAILY PRN
Qty: 30 TABLET | Refills: 0 | Status: SHIPPED | OUTPATIENT
Start: 2021-08-15 | End: 2021-08-20

## 2021-08-15 RX ORDER — LIDOCAINE 50 MG/G
1 PATCH TOPICAL DAILY
Qty: 30 PATCH | Refills: 2 | Status: SHIPPED | OUTPATIENT
Start: 2021-08-15 | End: 2023-03-06 | Stop reason: SDUPTHER

## 2021-08-15 RX ORDER — NAPROXEN 500 MG/1
500 TABLET ORAL
Status: COMPLETED | OUTPATIENT
Start: 2021-08-15 | End: 2021-08-15

## 2021-08-15 RX ORDER — METHYLPREDNISOLONE 4 MG/1
TABLET ORAL
Qty: 21 PACKAGE | Refills: 0 | Status: SHIPPED | OUTPATIENT
Start: 2021-08-15 | End: 2021-09-05

## 2021-08-15 RX ORDER — NAPROXEN 500 MG/1
500 TABLET ORAL 2 TIMES DAILY WITH MEALS
Qty: 30 TABLET | Refills: 0 | Status: SHIPPED | OUTPATIENT
Start: 2021-08-15 | End: 2022-02-16

## 2021-08-15 RX ADMIN — LIDOCAINE 1 PATCH: 50 PATCH TOPICAL at 10:08

## 2021-08-15 RX ADMIN — ACETAMINOPHEN 1000 MG: 500 TABLET ORAL at 10:08

## 2021-08-15 RX ADMIN — Medication 1 SPRAY: at 10:08

## 2021-08-15 RX ADMIN — NAPROXEN 500 MG: 500 TABLET ORAL at 10:08

## 2021-08-17 ENCOUNTER — OFFICE VISIT (OUTPATIENT)
Dept: OBSTETRICS AND GYNECOLOGY | Facility: CLINIC | Age: 41
End: 2021-08-17
Payer: MEDICAID

## 2021-08-17 ENCOUNTER — CLINICAL SUPPORT (OUTPATIENT)
Dept: OBSTETRICS AND GYNECOLOGY | Facility: CLINIC | Age: 41
End: 2021-08-17
Payer: MEDICAID

## 2021-08-17 VITALS
SYSTOLIC BLOOD PRESSURE: 119 MMHG | DIASTOLIC BLOOD PRESSURE: 82 MMHG | WEIGHT: 166.75 LBS | BODY MASS INDEX: 25.36 KG/M2

## 2021-08-17 DIAGNOSIS — Z23 NEED FOR HPV VACCINE: Primary | ICD-10-CM

## 2021-08-17 DIAGNOSIS — R87.612 LOW GRADE SQUAMOUS INTRAEPITHELIAL LESION ON CYTOLOGIC SMEAR OF CERVIX (LGSIL): ICD-10-CM

## 2021-08-17 PROCEDURE — 99213 OFFICE O/P EST LOW 20 MIN: CPT | Mod: PBBFAC,PO | Performed by: OBSTETRICS & GYNECOLOGY

## 2021-08-17 PROCEDURE — 99203 PR OFFICE/OUTPT VISIT, NEW, LEVL III, 30-44 MIN: ICD-10-PCS | Mod: S$PBB,,, | Performed by: OBSTETRICS & GYNECOLOGY

## 2021-08-17 PROCEDURE — 99203 OFFICE O/P NEW LOW 30 MIN: CPT | Mod: S$PBB,,, | Performed by: OBSTETRICS & GYNECOLOGY

## 2021-08-17 PROCEDURE — 90471 IMMUNIZATION ADMIN: CPT | Mod: PBBFAC,PO

## 2021-08-17 PROCEDURE — 99999 PR PBB SHADOW E&M-EST. PATIENT-LVL III: CPT | Mod: PBBFAC,,, | Performed by: OBSTETRICS & GYNECOLOGY

## 2021-08-17 PROCEDURE — 99999 PR PBB SHADOW E&M-EST. PATIENT-LVL III: ICD-10-PCS | Mod: PBBFAC,,, | Performed by: OBSTETRICS & GYNECOLOGY

## 2021-08-21 ENCOUNTER — PATIENT MESSAGE (OUTPATIENT)
Dept: FAMILY MEDICINE | Facility: HOSPITAL | Age: 41
End: 2021-08-21

## 2021-08-23 ENCOUNTER — TELEPHONE (OUTPATIENT)
Dept: HEPATOLOGY | Facility: HOSPITAL | Age: 41
End: 2021-08-23

## 2021-08-24 DIAGNOSIS — K59.04 CHRONIC IDIOPATHIC CONSTIPATION: ICD-10-CM

## 2021-08-26 RX ORDER — DULOXETIN HYDROCHLORIDE 20 MG/1
20 CAPSULE, DELAYED RELEASE ORAL DAILY
Qty: 30 CAPSULE | Refills: 11 | Status: CANCELLED | OUTPATIENT
Start: 2021-08-26 | End: 2022-08-26

## 2021-08-28 ENCOUNTER — HOSPITAL ENCOUNTER (EMERGENCY)
Facility: HOSPITAL | Age: 41
Discharge: HOME | End: 2021-08-28
Attending: EMERGENCY MEDICINE
Payer: MEDICAID

## 2021-08-28 VITALS
HEIGHT: 68 IN | SYSTOLIC BLOOD PRESSURE: 123 MMHG | HEART RATE: 73 BPM | TEMPERATURE: 98 F | OXYGEN SATURATION: 98 % | WEIGHT: 163 LBS | DIASTOLIC BLOOD PRESSURE: 77 MMHG | RESPIRATION RATE: 18 BRPM | BODY MASS INDEX: 24.71 KG/M2

## 2021-08-28 DIAGNOSIS — J01.90 SUBACUTE SINUSITIS, UNSPECIFIED LOCATION: Primary | ICD-10-CM

## 2021-08-28 LAB
CTP QC/QA: YES
SARS-COV-2 RDRP RESP QL NAA+PROBE: NEGATIVE

## 2021-08-28 PROCEDURE — 99284 EMERGENCY DEPT VISIT MOD MDM: CPT | Mod: 25

## 2021-08-28 PROCEDURE — U0002 COVID-19 LAB TEST NON-CDC: HCPCS | Performed by: EMERGENCY MEDICINE

## 2021-08-28 PROCEDURE — 99284 EMERGENCY DEPT VISIT MOD MDM: CPT | Mod: CS,,, | Performed by: EMERGENCY MEDICINE

## 2021-08-28 PROCEDURE — 99284 PR EMERGENCY DEPT VISIT,LEVEL IV: ICD-10-PCS | Mod: CS,,, | Performed by: EMERGENCY MEDICINE

## 2021-08-28 RX ORDER — PSEUDOEPHEDRINE HCL 120 MG/1
120 TABLET, FILM COATED, EXTENDED RELEASE ORAL DAILY
Qty: 10 TABLET | Refills: 0 | Status: SHIPPED | OUTPATIENT
Start: 2021-08-28 | End: 2021-09-07

## 2021-08-28 RX ORDER — AMOXICILLIN AND CLAVULANATE POTASSIUM 875; 125 MG/1; MG/1
1 TABLET, FILM COATED ORAL 2 TIMES DAILY
Qty: 14 TABLET | Refills: 0 | Status: SHIPPED | OUTPATIENT
Start: 2021-08-28 | End: 2021-10-23

## 2021-09-07 ENCOUNTER — OFFICE VISIT (OUTPATIENT)
Dept: OTOLARYNGOLOGY | Facility: CLINIC | Age: 41
End: 2021-09-07
Payer: MEDICAID

## 2021-09-07 DIAGNOSIS — J34.89 SINUS PRESSURE: ICD-10-CM

## 2021-09-07 DIAGNOSIS — H92.03 EAR PAIN, BILATERAL: ICD-10-CM

## 2021-09-07 DIAGNOSIS — J31.0 CHRONIC RHINITIS: Primary | ICD-10-CM

## 2021-09-07 DIAGNOSIS — J32.8 OTHER CHRONIC SINUSITIS: ICD-10-CM

## 2021-09-07 DIAGNOSIS — Z98.890 HISTORY OF SINUS SURGERY: ICD-10-CM

## 2021-09-07 PROCEDURE — 99214 OFFICE O/P EST MOD 30 MIN: CPT | Mod: PBBFAC | Performed by: NURSE PRACTITIONER

## 2021-09-07 PROCEDURE — 99999 PR PBB SHADOW E&M-EST. PATIENT-LVL IV: ICD-10-PCS | Mod: PBBFAC,,, | Performed by: NURSE PRACTITIONER

## 2021-09-07 PROCEDURE — 99205 OFFICE O/P NEW HI 60 MIN: CPT | Mod: S$PBB,,, | Performed by: NURSE PRACTITIONER

## 2021-09-07 PROCEDURE — 99999 PR PBB SHADOW E&M-EST. PATIENT-LVL IV: CPT | Mod: PBBFAC,,, | Performed by: NURSE PRACTITIONER

## 2021-09-07 PROCEDURE — 99205 PR OFFICE/OUTPT VISIT, NEW, LEVL V, 60-74 MIN: ICD-10-PCS | Mod: S$PBB,,, | Performed by: NURSE PRACTITIONER

## 2021-09-09 ENCOUNTER — HOSPITAL ENCOUNTER (OUTPATIENT)
Dept: RADIOLOGY | Facility: HOSPITAL | Age: 41
Discharge: HOME | End: 2021-09-09
Attending: NURSE PRACTITIONER
Payer: MEDICAID

## 2021-09-09 DIAGNOSIS — J32.8 OTHER CHRONIC SINUSITIS: ICD-10-CM

## 2021-09-09 PROCEDURE — 70486 CT MEDTRONIC SINUSES WITHOUT: ICD-10-PCS | Mod: 26,,, | Performed by: RADIOLOGY

## 2021-09-09 PROCEDURE — 70486 CT MAXILLOFACIAL W/O DYE: CPT | Mod: TC

## 2021-09-09 PROCEDURE — 70486 CT MAXILLOFACIAL W/O DYE: CPT | Mod: 26,,, | Performed by: RADIOLOGY

## 2021-09-13 ENCOUNTER — TELEPHONE (OUTPATIENT)
Dept: OTOLARYNGOLOGY | Facility: CLINIC | Age: 41
End: 2021-09-13

## 2021-09-13 DIAGNOSIS — R51.9 CHRONIC NONINTRACTABLE HEADACHE, UNSPECIFIED HEADACHE TYPE: Primary | ICD-10-CM

## 2021-09-13 DIAGNOSIS — G89.29 CHRONIC NONINTRACTABLE HEADACHE, UNSPECIFIED HEADACHE TYPE: Primary | ICD-10-CM

## 2021-09-14 ENCOUNTER — PROCEDURE VISIT (OUTPATIENT)
Dept: OBSTETRICS AND GYNECOLOGY | Facility: CLINIC | Age: 41
End: 2021-09-14
Payer: MEDICAID

## 2021-09-14 VITALS
DIASTOLIC BLOOD PRESSURE: 70 MMHG | BODY MASS INDEX: 25.14 KG/M2 | WEIGHT: 165.38 LBS | SYSTOLIC BLOOD PRESSURE: 104 MMHG

## 2021-09-14 DIAGNOSIS — R87.612 LOW GRADE SQUAMOUS INTRAEPITHELIAL LESION ON CYTOLOGIC SMEAR OF CERVIX (LGSIL): Primary | ICD-10-CM

## 2021-09-14 LAB
B-HCG UR QL: NEGATIVE
CTP QC/QA: YES

## 2021-09-14 PROCEDURE — 88305 TISSUE EXAM BY PATHOLOGIST: CPT | Mod: 26,,, | Performed by: PATHOLOGY

## 2021-09-14 PROCEDURE — 57454 BX/CURETT OF CERVIX W/SCOPE: CPT | Mod: PBBFAC,PO | Performed by: OBSTETRICS & GYNECOLOGY

## 2021-09-14 PROCEDURE — 81025 URINE PREGNANCY TEST: CPT | Mod: PBBFAC,PO | Performed by: OBSTETRICS & GYNECOLOGY

## 2021-09-14 PROCEDURE — 99499 NO LOS: ICD-10-PCS | Mod: S$PBB,,, | Performed by: OBSTETRICS & GYNECOLOGY

## 2021-09-14 PROCEDURE — 88305 TISSUE EXAM BY PATHOLOGIST: ICD-10-PCS | Mod: 26,,, | Performed by: PATHOLOGY

## 2021-09-14 PROCEDURE — 57454 PR COLPOSC,CERVIX W/ADJ VAG,W/BX & CURRETAG: ICD-10-PCS | Mod: S$PBB,,, | Performed by: OBSTETRICS & GYNECOLOGY

## 2021-09-14 PROCEDURE — 88305 TISSUE EXAM BY PATHOLOGIST: CPT | Mod: 59 | Performed by: PATHOLOGY

## 2021-09-14 PROCEDURE — 57454 BX/CURETT OF CERVIX W/SCOPE: CPT | Mod: S$PBB,,, | Performed by: OBSTETRICS & GYNECOLOGY

## 2021-09-14 PROCEDURE — 99499 UNLISTED E&M SERVICE: CPT | Mod: S$PBB,,, | Performed by: OBSTETRICS & GYNECOLOGY

## 2021-09-15 ENCOUNTER — TELEPHONE (OUTPATIENT)
Dept: OBSTETRICS AND GYNECOLOGY | Facility: CLINIC | Age: 41
End: 2021-09-15

## 2021-09-16 ENCOUNTER — TELEPHONE (OUTPATIENT)
Dept: OBSTETRICS AND GYNECOLOGY | Facility: CLINIC | Age: 41
End: 2021-09-16

## 2021-09-21 ENCOUNTER — PATIENT MESSAGE (OUTPATIENT)
Dept: OBSTETRICS AND GYNECOLOGY | Facility: CLINIC | Age: 41
End: 2021-09-21

## 2021-09-21 ENCOUNTER — TELEPHONE (OUTPATIENT)
Dept: OBSTETRICS AND GYNECOLOGY | Facility: CLINIC | Age: 41
End: 2021-09-21

## 2021-09-21 LAB
FINAL PATHOLOGIC DIAGNOSIS: NORMAL
GROSS: NORMAL
Lab: NORMAL

## 2021-09-22 ENCOUNTER — TELEPHONE (OUTPATIENT)
Dept: OBSTETRICS AND GYNECOLOGY | Facility: CLINIC | Age: 41
End: 2021-09-22

## 2021-09-22 ENCOUNTER — TELEPHONE (OUTPATIENT)
Dept: NEUROLOGY | Facility: CLINIC | Age: 41
End: 2021-09-22

## 2021-09-22 ENCOUNTER — OFFICE VISIT (OUTPATIENT)
Dept: NEUROLOGY | Facility: CLINIC | Age: 41
End: 2021-09-22
Payer: MEDICAID

## 2021-09-22 VITALS
WEIGHT: 167.75 LBS | HEART RATE: 69 BPM | SYSTOLIC BLOOD PRESSURE: 102 MMHG | BODY MASS INDEX: 25.51 KG/M2 | DIASTOLIC BLOOD PRESSURE: 63 MMHG

## 2021-09-22 DIAGNOSIS — G43.009 MIGRAINE WITHOUT AURA AND WITHOUT STATUS MIGRAINOSUS, NOT INTRACTABLE: ICD-10-CM

## 2021-09-22 DIAGNOSIS — G89.29 CHRONIC NONINTRACTABLE HEADACHE, UNSPECIFIED HEADACHE TYPE: ICD-10-CM

## 2021-09-22 DIAGNOSIS — R51.9 CHRONIC NONINTRACTABLE HEADACHE, UNSPECIFIED HEADACHE TYPE: ICD-10-CM

## 2021-09-22 PROCEDURE — 99999 PR PBB SHADOW E&M-EST. PATIENT-LVL III: CPT | Mod: PBBFAC,,, | Performed by: NEUROMUSCULOSKELETAL MEDICINE & OMM

## 2021-09-22 PROCEDURE — 99205 OFFICE O/P NEW HI 60 MIN: CPT | Mod: S$PBB,,, | Performed by: NEUROMUSCULOSKELETAL MEDICINE & OMM

## 2021-09-22 PROCEDURE — 99213 OFFICE O/P EST LOW 20 MIN: CPT | Mod: PBBFAC,PO | Performed by: NEUROMUSCULOSKELETAL MEDICINE & OMM

## 2021-09-22 PROCEDURE — 99205 PR OFFICE/OUTPT VISIT, NEW, LEVL V, 60-74 MIN: ICD-10-PCS | Mod: S$PBB,,, | Performed by: NEUROMUSCULOSKELETAL MEDICINE & OMM

## 2021-09-22 PROCEDURE — 99999 PR PBB SHADOW E&M-EST. PATIENT-LVL III: ICD-10-PCS | Mod: PBBFAC,,, | Performed by: NEUROMUSCULOSKELETAL MEDICINE & OMM

## 2021-09-22 RX ORDER — SUMATRIPTAN SUCCINATE 100 MG/1
100 TABLET ORAL
Qty: 10 TABLET | Refills: 3 | Status: SHIPPED | OUTPATIENT
Start: 2021-09-22 | End: 2023-03-11

## 2021-09-22 RX ORDER — AMITRIPTYLINE HYDROCHLORIDE 10 MG/1
10 TABLET, FILM COATED ORAL NIGHTLY PRN
Qty: 30 TABLET | Refills: 1 | Status: SHIPPED | OUTPATIENT
Start: 2021-09-22 | End: 2021-11-18 | Stop reason: SDUPTHER

## 2021-09-23 ENCOUNTER — IMMUNIZATION (OUTPATIENT)
Dept: PHARMACY | Facility: CLINIC | Age: 41
End: 2021-09-23
Payer: MEDICAID

## 2021-09-23 ENCOUNTER — TELEPHONE (OUTPATIENT)
Dept: OBSTETRICS AND GYNECOLOGY | Facility: CLINIC | Age: 41
End: 2021-09-23

## 2021-10-23 ENCOUNTER — HOSPITAL ENCOUNTER (EMERGENCY)
Facility: HOSPITAL | Age: 41
Discharge: HOME | End: 2021-10-23
Attending: EMERGENCY MEDICINE
Payer: MEDICAID

## 2021-10-23 VITALS
RESPIRATION RATE: 16 BRPM | OXYGEN SATURATION: 98 % | HEIGHT: 68 IN | WEIGHT: 165 LBS | BODY MASS INDEX: 25.01 KG/M2 | TEMPERATURE: 98 F | DIASTOLIC BLOOD PRESSURE: 71 MMHG | HEART RATE: 92 BPM | SYSTOLIC BLOOD PRESSURE: 115 MMHG

## 2021-10-23 DIAGNOSIS — J06.9 URI WITH COUGH AND CONGESTION: Primary | ICD-10-CM

## 2021-10-23 LAB
CTP QC/QA: YES
SARS-COV-2 RDRP RESP QL NAA+PROBE: NEGATIVE

## 2021-10-23 PROCEDURE — U0002 COVID-19 LAB TEST NON-CDC: HCPCS | Performed by: EMERGENCY MEDICINE

## 2021-10-23 PROCEDURE — 99282 EMERGENCY DEPT VISIT SF MDM: CPT | Mod: CS,,, | Performed by: EMERGENCY MEDICINE

## 2021-10-23 PROCEDURE — 99282 PR EMERGENCY DEPT VISIT,LEVEL II: ICD-10-PCS | Mod: CS,,, | Performed by: EMERGENCY MEDICINE

## 2021-10-23 PROCEDURE — 99283 EMERGENCY DEPT VISIT LOW MDM: CPT

## 2021-10-23 RX ORDER — BENZONATATE 100 MG/1
100 CAPSULE ORAL 3 TIMES DAILY PRN
Qty: 20 CAPSULE | Refills: 0 | Status: SHIPPED | OUTPATIENT
Start: 2021-10-23 | End: 2021-11-02

## 2021-10-24 ENCOUNTER — PATIENT MESSAGE (OUTPATIENT)
Dept: FAMILY MEDICINE | Facility: HOSPITAL | Age: 41
End: 2021-10-24
Payer: MEDICAID

## 2021-11-11 ENCOUNTER — OFFICE VISIT (OUTPATIENT)
Dept: FAMILY MEDICINE | Facility: HOSPITAL | Age: 41
End: 2021-11-11
Attending: FAMILY MEDICINE
Payer: MEDICAID

## 2021-11-11 VITALS
DIASTOLIC BLOOD PRESSURE: 65 MMHG | HEIGHT: 68 IN | HEART RATE: 73 BPM | BODY MASS INDEX: 24.96 KG/M2 | WEIGHT: 164.69 LBS | SYSTOLIC BLOOD PRESSURE: 104 MMHG

## 2021-11-11 DIAGNOSIS — D18.01 CHERRY ANGIOMA: Primary | ICD-10-CM

## 2021-11-11 DIAGNOSIS — Z23 INFLUENZA VACCINE ADMINISTERED: ICD-10-CM

## 2021-11-11 DIAGNOSIS — F43.22 ADJUSTMENT DISORDER WITH ANXIETY: ICD-10-CM

## 2021-11-11 PROCEDURE — 90471 IMMUNIZATION ADMIN: CPT

## 2021-11-11 PROCEDURE — 99214 OFFICE O/P EST MOD 30 MIN: CPT | Performed by: STUDENT IN AN ORGANIZED HEALTH CARE EDUCATION/TRAINING PROGRAM

## 2021-11-11 RX ORDER — FLUTICASONE PROPIONATE 50 MCG
SPRAY, SUSPENSION (ML) NASAL
Qty: 16 G | Refills: 3 | Status: SHIPPED | OUTPATIENT
Start: 2021-11-11 | End: 2022-10-11 | Stop reason: SDUPTHER

## 2021-11-12 PROBLEM — D18.01 CHERRY ANGIOMA: Status: ACTIVE | Noted: 2021-11-12

## 2021-11-16 ENCOUNTER — TELEPHONE (OUTPATIENT)
Dept: NEUROLOGY | Facility: CLINIC | Age: 41
End: 2021-11-16
Payer: MEDICAID

## 2021-11-19 RX ORDER — AMITRIPTYLINE HYDROCHLORIDE 10 MG/1
10 TABLET, FILM COATED ORAL NIGHTLY PRN
Qty: 30 TABLET | Refills: 1 | Status: SHIPPED | OUTPATIENT
Start: 2021-11-19 | End: 2022-03-31

## 2021-11-30 ENCOUNTER — OFFICE VISIT (OUTPATIENT)
Dept: OBSTETRICS AND GYNECOLOGY | Facility: CLINIC | Age: 41
End: 2021-11-30
Payer: MEDICAID

## 2021-11-30 VITALS
SYSTOLIC BLOOD PRESSURE: 100 MMHG | BODY MASS INDEX: 25.13 KG/M2 | DIASTOLIC BLOOD PRESSURE: 64 MMHG | WEIGHT: 165.81 LBS | HEIGHT: 68 IN

## 2021-11-30 DIAGNOSIS — N87.1 MODERATE DYSPLASIA OF CERVIX (CIN II): ICD-10-CM

## 2021-11-30 DIAGNOSIS — N87.1 DYSPLASIA OF CERVIX, HIGH GRADE CIN 2: Primary | ICD-10-CM

## 2021-11-30 PROCEDURE — 99999 PR PBB SHADOW E&M-EST. PATIENT-LVL III: ICD-10-PCS | Mod: PBBFAC,,, | Performed by: OBSTETRICS & GYNECOLOGY

## 2021-11-30 PROCEDURE — 99499 NO LOS: ICD-10-PCS | Mod: S$PBB,,, | Performed by: OBSTETRICS & GYNECOLOGY

## 2021-11-30 PROCEDURE — 99999 PR PBB SHADOW E&M-EST. PATIENT-LVL III: CPT | Mod: PBBFAC,,, | Performed by: OBSTETRICS & GYNECOLOGY

## 2021-11-30 PROCEDURE — 99213 OFFICE O/P EST LOW 20 MIN: CPT | Mod: PBBFAC,PO | Performed by: OBSTETRICS & GYNECOLOGY

## 2021-11-30 PROCEDURE — 99499 UNLISTED E&M SERVICE: CPT | Mod: S$PBB,,, | Performed by: OBSTETRICS & GYNECOLOGY

## 2021-11-30 RX ORDER — LIDOCAINE HYDROCHLORIDE 10 MG/ML
1 INJECTION, SOLUTION EPIDURAL; INFILTRATION; INTRACAUDAL; PERINEURAL ONCE
Status: CANCELLED | OUTPATIENT
Start: 2021-11-30 | End: 2021-11-30

## 2021-11-30 RX ORDER — MUPIROCIN 20 MG/G
OINTMENT TOPICAL
Status: CANCELLED | OUTPATIENT
Start: 2021-11-30

## 2021-11-30 RX ORDER — ONDANSETRON 4 MG/1
8 TABLET, ORALLY DISINTEGRATING ORAL EVERY 8 HOURS PRN
Status: CANCELLED | OUTPATIENT
Start: 2021-11-30

## 2021-12-06 ENCOUNTER — TELEPHONE (OUTPATIENT)
Dept: OBSTETRICS AND GYNECOLOGY | Facility: CLINIC | Age: 41
End: 2021-12-06
Payer: MEDICAID

## 2021-12-11 DIAGNOSIS — R10.84 GENERALIZED ABDOMINAL PAIN: ICD-10-CM

## 2021-12-13 ENCOUNTER — TELEPHONE (OUTPATIENT)
Dept: GASTROENTEROLOGY | Facility: CLINIC | Age: 41
End: 2021-12-13
Payer: MEDICAID

## 2021-12-13 DIAGNOSIS — R10.84 GENERALIZED ABDOMINAL PAIN: ICD-10-CM

## 2021-12-13 RX ORDER — DICYCLOMINE HYDROCHLORIDE 20 MG/1
20 TABLET ORAL 3 TIMES DAILY PRN
Qty: 60 TABLET | Refills: 2 | Status: SHIPPED | OUTPATIENT
Start: 2021-12-13 | End: 2022-07-11 | Stop reason: SDUPTHER

## 2021-12-13 RX ORDER — DICYCLOMINE HYDROCHLORIDE 20 MG/1
20 TABLET ORAL 3 TIMES DAILY PRN
Qty: 60 TABLET | Refills: 2 | Status: CANCELLED | OUTPATIENT
Start: 2021-12-13

## 2021-12-21 ENCOUNTER — CLINICAL SUPPORT (OUTPATIENT)
Dept: OBSTETRICS AND GYNECOLOGY | Facility: CLINIC | Age: 41
End: 2021-12-21
Payer: MEDICAID

## 2021-12-21 ENCOUNTER — TELEPHONE (OUTPATIENT)
Dept: OBSTETRICS AND GYNECOLOGY | Facility: CLINIC | Age: 41
End: 2021-12-21

## 2021-12-21 DIAGNOSIS — Z23 NEED FOR HPV VACCINE: Primary | ICD-10-CM

## 2021-12-21 PROCEDURE — 90651 9VHPV VACCINE 2/3 DOSE IM: CPT | Mod: PBBFAC,PO

## 2021-12-21 PROCEDURE — 90471 IMMUNIZATION ADMIN: CPT | Mod: PBBFAC,PO

## 2021-12-21 RX ADMIN — HUMAN PAPILLOMAVIRUS 9-VALENT VACCINE, RECOMBINANT 0.5 ML: 30; 40; 60; 40; 20; 20; 20; 20; 20 INJECTION, SUSPENSION INTRAMUSCULAR at 10:12

## 2022-01-03 ENCOUNTER — PATIENT MESSAGE (OUTPATIENT)
Dept: ADMINISTRATIVE | Facility: OTHER | Age: 42
End: 2022-01-03
Payer: MEDICAID

## 2022-01-04 ENCOUNTER — TELEPHONE (OUTPATIENT)
Dept: OBSTETRICS AND GYNECOLOGY | Facility: HOSPITAL | Age: 42
End: 2022-01-04
Payer: MEDICAID

## 2022-01-04 NOTE — TELEPHONE ENCOUNTER
Patient has appointment already 1/25 at 10am she wanted to know if she can keep that appointment she already has to discuss results or does she really have to come next week. Please advise.

## 2022-01-08 ENCOUNTER — IMMUNIZATION (OUTPATIENT)
Dept: PRIMARY CARE CLINIC | Facility: CLINIC | Age: 42
End: 2022-01-08
Payer: MEDICAID

## 2022-01-08 DIAGNOSIS — Z23 NEED FOR VACCINATION: Primary | ICD-10-CM

## 2022-01-08 PROCEDURE — 0064A COVID-19, MRNA, LNP-S, PF, 100 MCG/0.25 ML DOSE VACCINE (MODERNA BOOSTER): CPT | Mod: CV19,PBBFAC | Performed by: INTERNAL MEDICINE

## 2022-01-08 PROCEDURE — 91306 COVID-19, MRNA, LNP-S, PF, 100 MCG/0.25 ML DOSE VACCINE (MODERNA BOOSTER): CPT | Mod: PBBFAC | Performed by: INTERNAL MEDICINE

## 2022-01-25 ENCOUNTER — OFFICE VISIT (OUTPATIENT)
Dept: OBSTETRICS AND GYNECOLOGY | Facility: CLINIC | Age: 42
End: 2022-01-25
Payer: MEDICAID

## 2022-01-25 VITALS
WEIGHT: 169.56 LBS | SYSTOLIC BLOOD PRESSURE: 110 MMHG | DIASTOLIC BLOOD PRESSURE: 70 MMHG | BODY MASS INDEX: 25.78 KG/M2

## 2022-01-25 DIAGNOSIS — Z11.3 SCREENING FOR STD (SEXUALLY TRANSMITTED DISEASE): Primary | ICD-10-CM

## 2022-01-25 DIAGNOSIS — N87.1 DYSPLASIA OF CERVIX, HIGH GRADE CIN 2: ICD-10-CM

## 2022-01-25 PROCEDURE — 3078F DIAST BP <80 MM HG: CPT | Mod: CPTII,,, | Performed by: OBSTETRICS & GYNECOLOGY

## 2022-01-25 PROCEDURE — 87801 DETECT AGNT MULT DNA AMPLI: CPT | Performed by: OBSTETRICS & GYNECOLOGY

## 2022-01-25 PROCEDURE — 99214 OFFICE O/P EST MOD 30 MIN: CPT | Mod: 57,S$PBB,, | Performed by: OBSTETRICS & GYNECOLOGY

## 2022-01-25 PROCEDURE — 99999 PR PBB SHADOW E&M-EST. PATIENT-LVL III: ICD-10-PCS | Mod: PBBFAC,,, | Performed by: OBSTETRICS & GYNECOLOGY

## 2022-01-25 PROCEDURE — 99999 PR PBB SHADOW E&M-EST. PATIENT-LVL III: CPT | Mod: PBBFAC,,, | Performed by: OBSTETRICS & GYNECOLOGY

## 2022-01-25 PROCEDURE — 3008F BODY MASS INDEX DOCD: CPT | Mod: CPTII,,, | Performed by: OBSTETRICS & GYNECOLOGY

## 2022-01-25 PROCEDURE — 3074F SYST BP LT 130 MM HG: CPT | Mod: CPTII,,, | Performed by: OBSTETRICS & GYNECOLOGY

## 2022-01-25 PROCEDURE — 87481 CANDIDA DNA AMP PROBE: CPT | Mod: 59 | Performed by: OBSTETRICS & GYNECOLOGY

## 2022-01-25 PROCEDURE — 3074F PR MOST RECENT SYSTOLIC BLOOD PRESSURE < 130 MM HG: ICD-10-PCS | Mod: CPTII,,, | Performed by: OBSTETRICS & GYNECOLOGY

## 2022-01-25 PROCEDURE — 87491 CHLMYD TRACH DNA AMP PROBE: CPT | Performed by: OBSTETRICS & GYNECOLOGY

## 2022-01-25 PROCEDURE — 87591 N.GONORRHOEAE DNA AMP PROB: CPT | Mod: 59 | Performed by: OBSTETRICS & GYNECOLOGY

## 2022-01-25 PROCEDURE — 1159F MED LIST DOCD IN RCRD: CPT | Mod: CPTII,,, | Performed by: OBSTETRICS & GYNECOLOGY

## 2022-01-25 PROCEDURE — 99213 OFFICE O/P EST LOW 20 MIN: CPT | Mod: PBBFAC,PO | Performed by: OBSTETRICS & GYNECOLOGY

## 2022-01-25 PROCEDURE — 3008F PR BODY MASS INDEX (BMI) DOCUMENTED: ICD-10-PCS | Mod: CPTII,,, | Performed by: OBSTETRICS & GYNECOLOGY

## 2022-01-25 PROCEDURE — 3078F PR MOST RECENT DIASTOLIC BLOOD PRESSURE < 80 MM HG: ICD-10-PCS | Mod: CPTII,,, | Performed by: OBSTETRICS & GYNECOLOGY

## 2022-01-25 PROCEDURE — 99214 PR OFFICE/OUTPT VISIT, EST, LEVL IV, 30-39 MIN: ICD-10-PCS | Mod: 57,S$PBB,, | Performed by: OBSTETRICS & GYNECOLOGY

## 2022-01-25 PROCEDURE — 1159F PR MEDICATION LIST DOCUMENTED IN MEDICAL RECORD: ICD-10-PCS | Mod: CPTII,,, | Performed by: OBSTETRICS & GYNECOLOGY

## 2022-01-25 NOTE — PROGRESS NOTES
CC:here to discuss path report  Chief Complaint   Patient presents with    Post-op Evaluation       HPI:    41 y.o.   OB History        2    Para   2    Term                AB        Living           SAB        IAB        Ectopic        Multiple        Live Births                   Complaining of: h/o CKC done 12/10/21 with extensive dz across ectocervix, Path came back with + magin of CIN2-3 in 1 area and her to discuss mfgmt plan  No complaints currently    (Not in a hospital admission)      Review of patient's allergies indicates:   Allergen Reactions    Raspberry (rubus idaeus)         Past Medical History:   Diagnosis Date    Anemia     Back pain      Past Surgical History:   Procedure Laterality Date     SECTION      COLD KNIFE CONIZATION OF CERVIX  12/10/2021    Procedure: CONE BIOPSY, CERVIX, USING COLD KNIFE;  Surgeon: Modesto Nassar MD;  Location: [facility] OR;  Service: OB/GYN;;    COLONOSCOPY N/A 2021    Procedure: COLONOSCOPY;  Surgeon: Emily Cruz MD;  Location: [facility] ENDO;  Service: Endoscopy;  Laterality: N/A;    ESOPHAGOGASTRODUODENOSCOPY N/A 2021    Procedure: EGD (ESOPHAGOGASTRODUODENOSCOPY);  Surgeon: Emily Cruz MD;  Location: [facility] ENDO;  Service: Endoscopy;  Laterality: N/A;    SINUS SURGERY      TUBAL LIGATION       Family History   Problem Relation Age of Onset    Hypertension Mother     Diabetes Mother     Breast cancer Neg Hx     Colon cancer Neg Hx     Ovarian cancer Neg Hx      Social History     Tobacco Use    Smoking status: Former Smoker     Packs/day: 0.00     Quit date: 3/3/2021     Years since quittin.8    Smokeless tobacco: Never Used   Substance Use Topics    Alcohol use: No    Drug use: Yes     Types: Marijuana     Comment: daily     ROS:  GENERAL: Feeling well overall. Denies fever or chills.   SKIN: Denies rash or lesions.   HEAD: Denies head injury or headache.   NODES: Denies enlarged lymph nodes.   CHEST:  Denies chest pain or shortness of breath.   CARDIOVASCULAR: Denies palpitations or left sided chest pain.    ABDOMEN: Denies diarrhea, nausea, vomiting or rectal bleeding.   URINARY: No dysuria, hematuria, or burning on urination.  REPRODUCTIVE: See HPI.   BREASTS: Denies pain, lumps, or nipple discharge.   HEMATOLOGIC: No easy bruisability or excessive bleeding.   MUSCULOSKELETAL: Denies joint pain or swelling.   NEUROLOGIC: Denies syncope or weakness.   PSYCHIATRIC: Denies depression, anxiety or mood swings.      PE: /70 (BP Location: Left arm, Patient Position: Sitting)   Wt 76.9 kg (169 lb 8.5 oz)   LMP 01/18/2022 (Exact Date)   BMI 25.78 kg/m²      APPEARANCE: Well nourished, well developed, in no acute distress.  SKIN: Normal skin turgor, no lesions.  NECK: Neck symmetric without masses or thyromegaly.  NODES: No inguinal, cervical, axillary or femoral lymph node enlargement.  CARDIOVASCULAR: Normal S1, S2. No rubs, murmurs or gallops.  NEUROLOGIC: Normal mood and affect. No depression or anxiety.   ABDOMEN: Soft. No tenderness or masses. No hepatosplenomegaly. No hernias.  RESPIRATORY: Normal respiratory effort with no retractions or use of accessory muscles.  BREASTS: Symmetrical, no skin changes or visible lesions. No palpable masses, nipple discharge, or adenopathy bilaterally.   BLADDER: Non-tender  GENITALIA: normal external genitalia, no erythema, no discharge  URETHRA: normal urethra, normal urethral meatus  VAGINA: Normal  CERVIX: minimal residual cervix left but healed  UTERUS: normal  ADNEXA: no mass, fullness, tenderness    ASSESSMENT/ PLAN    Wandy was seen today for post-op evaluation.    Diagnoses and all orders for this visit:    Screening for STD (sexually transmitted disease)  -     C. trachomatis/N. gonorrhoeae by AMP DNA Ochsponce; Vagina  -     Vaginosis Screen by DNA Probe    Dysplasia of cervix, high grade ENZO 2      discuss mgmt options including rpt ckc vs hysterectomy. Pt  desires definitive tx to be hysterectomy. Finished childbearing and repeating ckc would be difficult given minimal amount of residual cervical tissue present  Consents signed today  Robotic TLH/BS keeping nml ovaries and ok with open procedure if necessary  Discussed added risks with h/o c/s x 2       The risks, benefits, and indications of the procedure were discussed with the patient and her family members if present.  These included bleeding, transfusion, infection, damage to surrounding tissues (bowel, bladder, ureter), wound separation, lymphedema, conversion to laparotomy if laparoscopic, perioperative cardiac events, VTE, pneumonia, and possible death.  She voiced understanding, all questions were answered and consents were signed.           Modesto Nassar MD

## 2022-01-27 LAB
BACTERIAL VAGINOSIS DNA: NEGATIVE
C TRACH DNA SPEC QL NAA+PROBE: NOT DETECTED
CANDIDA GLABRATA DNA: NEGATIVE
CANDIDA KRUSEI DNA: NEGATIVE
CANDIDA RRNA VAG QL PROBE: NEGATIVE
N GONORRHOEA DNA SPEC QL NAA+PROBE: NOT DETECTED
T VAGINALIS RRNA GENITAL QL PROBE: NEGATIVE

## 2022-02-01 ENCOUNTER — LAB VISIT (OUTPATIENT)
Dept: LAB | Facility: HOSPITAL | Age: 42
End: 2022-02-01
Attending: OBSTETRICS & GYNECOLOGY
Payer: MEDICAID

## 2022-02-01 ENCOUNTER — PATIENT MESSAGE (OUTPATIENT)
Dept: OBSTETRICS AND GYNECOLOGY | Facility: CLINIC | Age: 42
End: 2022-02-01
Payer: MEDICAID

## 2022-02-01 DIAGNOSIS — N39.0 URINARY TRACT INFECTION WITHOUT HEMATURIA, SITE UNSPECIFIED: Primary | ICD-10-CM

## 2022-02-01 DIAGNOSIS — N39.0 URINARY TRACT INFECTION WITHOUT HEMATURIA, SITE UNSPECIFIED: ICD-10-CM

## 2022-02-01 PROCEDURE — 87086 URINE CULTURE/COLONY COUNT: CPT | Performed by: OBSTETRICS & GYNECOLOGY

## 2022-02-02 LAB — BACTERIA UR CULT: NO GROWTH

## 2022-02-04 DIAGNOSIS — Z01.818 PRE-OP TESTING: ICD-10-CM

## 2022-02-15 ENCOUNTER — LAB VISIT (OUTPATIENT)
Dept: LAB | Facility: HOSPITAL | Age: 42
End: 2022-02-15
Attending: SPECIALIST
Payer: MEDICAID

## 2022-02-15 ENCOUNTER — OFFICE VISIT (OUTPATIENT)
Dept: FAMILY MEDICINE | Facility: HOSPITAL | Age: 42
End: 2022-02-15
Attending: SPECIALIST
Payer: MEDICAID

## 2022-02-15 VITALS
WEIGHT: 166.25 LBS | BODY MASS INDEX: 25.2 KG/M2 | HEART RATE: 69 BPM | DIASTOLIC BLOOD PRESSURE: 71 MMHG | SYSTOLIC BLOOD PRESSURE: 102 MMHG | HEIGHT: 68 IN

## 2022-02-15 DIAGNOSIS — R10.31 RIGHT LOWER QUADRANT ABDOMINAL PAIN: Primary | ICD-10-CM

## 2022-02-15 DIAGNOSIS — Z00.00 ANNUAL PHYSICAL EXAM: ICD-10-CM

## 2022-02-15 DIAGNOSIS — R10.31 RIGHT LOWER QUADRANT ABDOMINAL PAIN: ICD-10-CM

## 2022-02-15 LAB
BASOPHILS # BLD AUTO: 0.06 K/UL (ref 0–0.2)
BASOPHILS NFR BLD: 0.5 % (ref 0–1.9)
CHOLEST SERPL-MCNC: 174 MG/DL (ref 120–199)
CHOLEST/HDLC SERPL: 4.6 {RATIO} (ref 2–5)
DIFFERENTIAL METHOD: ABNORMAL
EOSINOPHIL # BLD AUTO: 0.2 K/UL (ref 0–0.5)
EOSINOPHIL NFR BLD: 1.4 % (ref 0–8)
ERYTHROCYTE [DISTWIDTH] IN BLOOD BY AUTOMATED COUNT: 12.8 % (ref 11.5–14.5)
HCT VFR BLD AUTO: 39.7 % (ref 37–48.5)
HDLC SERPL-MCNC: 38 MG/DL (ref 40–75)
HDLC SERPL: 21.8 % (ref 20–50)
HGB BLD-MCNC: 13.3 G/DL (ref 12–16)
IMM GRANULOCYTES # BLD AUTO: 0.07 K/UL (ref 0–0.04)
IMM GRANULOCYTES NFR BLD AUTO: 0.6 % (ref 0–0.5)
LDLC SERPL CALC-MCNC: 114 MG/DL (ref 63–159)
LYMPHOCYTES # BLD AUTO: 2.6 K/UL (ref 1–4.8)
LYMPHOCYTES NFR BLD: 23.3 % (ref 18–48)
MCH RBC QN AUTO: 31.3 PG (ref 27–31)
MCHC RBC AUTO-ENTMCNC: 33.5 G/DL (ref 32–36)
MCV RBC AUTO: 93 FL (ref 82–98)
MONOCYTES # BLD AUTO: 0.8 K/UL (ref 0.3–1)
MONOCYTES NFR BLD: 7.4 % (ref 4–15)
NEUTROPHILS # BLD AUTO: 7.4 K/UL (ref 1.8–7.7)
NEUTROPHILS NFR BLD: 66.8 % (ref 38–73)
NONHDLC SERPL-MCNC: 136 MG/DL
NRBC BLD-RTO: 0 /100 WBC
PLATELET # BLD AUTO: 231 K/UL (ref 150–450)
PMV BLD AUTO: 10 FL (ref 9.2–12.9)
RBC # BLD AUTO: 4.25 M/UL (ref 4–5.4)
TRIGL SERPL-MCNC: 110 MG/DL (ref 30–150)
WBC # BLD AUTO: 11.01 K/UL (ref 3.9–12.7)

## 2022-02-15 PROCEDURE — 80061 LIPID PANEL: CPT | Performed by: STUDENT IN AN ORGANIZED HEALTH CARE EDUCATION/TRAINING PROGRAM

## 2022-02-15 PROCEDURE — 36415 COLL VENOUS BLD VENIPUNCTURE: CPT | Performed by: STUDENT IN AN ORGANIZED HEALTH CARE EDUCATION/TRAINING PROGRAM

## 2022-02-15 PROCEDURE — 85025 COMPLETE CBC W/AUTO DIFF WBC: CPT | Performed by: STUDENT IN AN ORGANIZED HEALTH CARE EDUCATION/TRAINING PROGRAM

## 2022-02-15 PROCEDURE — 99215 OFFICE O/P EST HI 40 MIN: CPT | Performed by: STUDENT IN AN ORGANIZED HEALTH CARE EDUCATION/TRAINING PROGRAM

## 2022-02-15 NOTE — PROGRESS NOTES
[facility]               Clinic H&P    Subjective                                                                                                                                                                           Chief Complaint: abdominal pain, poor healing of hands    Wandy Shah is a 41 y.o. female who  has a past medical history of Anemia and Back pain. The patient presents to clinic for   HPI     Right lower quadrant pain: 8/10; pressure in lower belly. Aggravated by palpation. 3x a week. Lasting about an hour. Patient on daily ibuprofen with no relief in pain. BM every other day. Loose bowel movements. No straining.   Recent underwent testing with OBGYn  UPT negative and UA negative  Previously evaluated about 6 months ago for similar presentation. CT abdomen and pelvis performed, found to have hiatal hernia and left ovarian follicle but no appendicitis.     asthma: seen today by pulmonology on 2/15/2022. Encouraged to continue rina prn, inhaled fluticasone and h2 blocker. Restart ICS.     Headaches: following with neurology, started on amitryptiline and imitrex for Headaches    Gastroparesis/ slow gastric emptying: follows with GI, underwent EGD recently. No findings noted or on pathology. Recommended to continue gastroparesis diet. Reports following 5 meals a day and reports pain is still persistent.     Pap smear: abnormal pap smears with colpos with OBGyn. Pending possible hysterectomy with ObGyn next month      Health Maintenance   Topic Date Due    Pap Smear  06/17/2022    TETANUS VACCINE  11/13/2027    Hepatitis C Screening  Completed    Lipid Panel  Completed    UTD on covid vaccination x3  Influenza 2021    Review of Systems   Constitutional: Negative for activity change.   Respiratory: Negative for cough and wheezing.    Cardiovascular: Negative for chest pain and palpitations.   Musculoskeletal: Negative for back pain.   Neurological: Negative for headaches.     "    Objective                                                                                                                                                                             Vitals:    02/15/22 1020   BP: 102/71   BP Location: Left arm   Patient Position: Sitting   BP Method: Medium (Automatic)   Pulse: 69   Weight: 75.4 kg (166 lb 3.6 oz)   Height: 5' 8" (1.727 m)      Body mass index is 25.27 kg/m².    Physical Exam  Vitals reviewed.   Constitutional:       General: She is not in acute distress.     Appearance: Normal appearance.   HENT:      Head: Normocephalic and atraumatic.   Cardiovascular:      Rate and Rhythm: Normal rate and regular rhythm.      Pulses: Normal pulses.   Pulmonary:      Effort: Pulmonary effort is normal.      Breath sounds: Normal breath sounds.   Abdominal:      General: Bowel sounds are normal.      Palpations: Abdomen is soft.      Tenderness: There is abdominal tenderness (RLQ tenderness, no rebound).   Musculoskeletal:         General: No swelling or tenderness.   Skin:     Capillary Refill: Capillary refill takes less than 2 seconds.      Coloration: Skin is not jaundiced.   Neurological:      General: No focal deficit present.      Mental Status: She is alert and oriented to person, place, and time.   Psychiatric:         Mood and Affect: Mood normal.         Behavior: Behavior normal.         Laboratory:  Lab Results   Component Value Date    WBC 11.01 02/15/2022    HGB 13.3 02/15/2022    HCT 39.7 02/15/2022    MCV 93 02/15/2022     02/15/2022       Chemistry        Component Value Date/Time     07/26/2021 1546    K 4.8 07/26/2021 1546     07/26/2021 1546    CO2 24 07/26/2021 1546    BUN 8 07/26/2021 1546    CREATININE 0.8 07/26/2021 1546    GLU 91 07/26/2021 1546        Component Value Date/Time    CALCIUM 9.2 07/26/2021 1546    ALKPHOS 41 (L) 07/26/2021 1546    AST 18 07/26/2021 1546    ALT 16 07/26/2021 1546    BILITOT 0.2 07/26/2021 1546    " ESTGFRAFRICA >60 07/26/2021 1546    EGFRNONAA >60 07/26/2021 1546          No results found for: MG, PHOS  Lab Results   Component Value Date    CHOL 174 02/15/2022    HDL 38 (L) 02/15/2022    LDLCALC 114.0 02/15/2022    TRIG 110 02/15/2022     The ASCVD Risk score (Alicia GALLARDO Jr., et al., 2013) failed to calculate for the following reasons:    The 2013 ASCVD risk score is only valid for ages 40 to 79     5-7.4% - shared decision making, consider mod-intensity statin  7.5-14.9% - shared decision making, consider mod-high inten statin (if +diab 40-74 yo, initiate or cont mod/high inten statin)  >15% - initiate or cont mod-high inten statin... consider high-inten statin (if +diab 40-74 yo, initiate or cont high inten statin)  if +diab 40-74 yo, + LDL  - initiate or cont mod inten statin  if +diab 40-74 yo, + LDL >190 - initiate or cont high inten statin  Lab Results   Component Value Date    LDLCALC 114.0 02/15/2022         No results found for: TSH  No results found for: HGBA1C      Assessment/Plan                                                                                                                                                                Wandy Shah is a 41 y.o. female who presents to clinic with:    1. Right lower quadrant abdominal pain    2. Annual physical exam         Problem List Items Addressed This Visit    None     Visit Diagnoses     Right lower quadrant abdominal pain    -  Primary    Relevant Orders    CBC Auto Differential (Completed)    US Abdomen Complete    Annual physical exam        Relevant Orders    Lipid Panel (Completed)      blood work ordered  US ordered 2/2 to persistent pain      Medication List with Changes/Refills   Current Medications    ALBUTEROL (PROVENTIL/VENTOLIN HFA) 90 MCG/ACTUATION INHALER    Inhale 1-2 puffs into the lungs every 6 (six) hours as needed for Wheezing. Rescue    AMITRIPTYLINE (ELAVIL) 10 MG TABLET    Take 1 tablet (10 mg total) by mouth nightly as  needed for Insomnia.    ASCORBIC ACID, VITAMIN C, (VITAMIN C) 500 MG TABLET    Take 500 mg by mouth once daily.    DICYCLOMINE (BENTYL) 20 MG TABLET    Take 1 tablet (20 mg total) by mouth 3 (three) times daily as needed (abdominal pain).    DULOXETINE (CYMBALTA) 20 MG CAPSULE    Take 1 capsule (20 mg total) by mouth once daily.    FERROUS GLUCONATE (FERGON) 324 MG TABLET    Take 324 mg by mouth daily with breakfast.    FLOVENT  MCG/ACTUATION INHALER    1 puff 2 (two) times daily.    FLUTICASONE PROPIONATE (FLONASE) 50 MCG/ACTUATION NASAL SPRAY    SPRAY 2 SPRAY(S) EVERY DAY BY INTRANASAL ROUTE.    GABAPENTIN (NEURONTIN) 300 MG CAPSULE    Take 300 mg by mouth 3 (three) times daily as needed.    IBUPROFEN (ADVIL,MOTRIN) 600 MG TABLET    Take 1 tablet (600 mg total) by mouth every 6 (six) hours as needed for Pain.    LIDOCAINE (LIDODERM) 5 %    Place 1 patch onto the skin once daily. Remove & Discard patch within 12 hours or as directed by MD    LINACLOTIDE (LINZESS) 72 MCG CAP CAPSULE    Take 1 capsule (72 mcg total) by mouth once daily.    LORATADINE (CLARITIN) 10 MG TABLET        NAPROXEN (NAPROSYN) 500 MG TABLET    Take 1 tablet (500 mg total) by mouth 2 (two) times daily with meals.    OMEPRAZOLE (PRILOSEC) 20 MG CAPSULE    Take 1 capsule (20 mg total) by mouth once daily.    OXYCODONE-ACETAMINOPHEN (PERCOCET) 5-325 MG PER TABLET    Take 1 tablet by mouth every 4 (four) hours as needed for Pain.    SUMATRIPTAN (IMITREX) 100 MG TABLET    Take 1 tablet (100 mg total) by mouth every 2 (two) hours as needed for Migraine.       Patient counseled about the importance of healthy dietary habits as well as routine physical activity and exercise for better health outcomes.    The patient's diagnosis, medications, and proper use of medications were dicussed. The importance of close follow up to discuss labs, modify medications, and monitor any potential side effects was also discussed. The patient was also informed of  the importance of any future cancer screening.     No follow-ups on file.     Patient expressed understanding after counseling regarding diagnosis and recommendations.  Case discussed with staff, Dr. Keenan Pineda MD  \[facility]\"" Family Medicine HO-2

## 2022-02-16 ENCOUNTER — PATIENT MESSAGE (OUTPATIENT)
Dept: FAMILY MEDICINE | Facility: HOSPITAL | Age: 42
End: 2022-02-16
Payer: MEDICAID

## 2022-02-20 ENCOUNTER — HOSPITAL ENCOUNTER (EMERGENCY)
Facility: HOSPITAL | Age: 42
Discharge: HOME | End: 2022-02-20
Attending: EMERGENCY MEDICINE
Payer: MEDICAID

## 2022-02-20 VITALS
DIASTOLIC BLOOD PRESSURE: 64 MMHG | SYSTOLIC BLOOD PRESSURE: 92 MMHG | HEART RATE: 57 BPM | OXYGEN SATURATION: 99 % | TEMPERATURE: 98 F | WEIGHT: 166 LBS | RESPIRATION RATE: 16 BRPM | BODY MASS INDEX: 25.24 KG/M2

## 2022-02-20 DIAGNOSIS — R10.9 ABDOMINAL PAIN, UNSPECIFIED ABDOMINAL LOCATION: Primary | ICD-10-CM

## 2022-02-20 LAB
ALBUMIN SERPL BCP-MCNC: 4 G/DL (ref 3.5–5.2)
ALP SERPL-CCNC: 44 U/L (ref 55–135)
ALT SERPL W/O P-5'-P-CCNC: 13 U/L (ref 10–44)
AMORPH CRY UR QL COMP ASSIST: ABNORMAL
ANION GAP SERPL CALC-SCNC: 9 MMOL/L (ref 8–16)
AST SERPL-CCNC: 17 U/L (ref 10–40)
BACTERIA #/AREA URNS AUTO: ABNORMAL /HPF
BASOPHILS # BLD AUTO: 0.05 K/UL (ref 0–0.2)
BASOPHILS NFR BLD: 0.5 % (ref 0–1.9)
BILIRUB SERPL-MCNC: 0.4 MG/DL (ref 0.1–1)
BILIRUB UR QL STRIP: NEGATIVE
BUN SERPL-MCNC: 13 MG/DL (ref 6–20)
CALCIUM SERPL-MCNC: 9.2 MG/DL (ref 8.7–10.5)
CHLORIDE SERPL-SCNC: 107 MMOL/L (ref 95–110)
CLARITY UR REFRACT.AUTO: ABNORMAL
CO2 SERPL-SCNC: 24 MMOL/L (ref 23–29)
COLOR UR AUTO: YELLOW
CREAT SERPL-MCNC: 0.8 MG/DL (ref 0.5–1.4)
DIFFERENTIAL METHOD: ABNORMAL
EOSINOPHIL # BLD AUTO: 0.2 K/UL (ref 0–0.5)
EOSINOPHIL NFR BLD: 2.1 % (ref 0–8)
ERYTHROCYTE [DISTWIDTH] IN BLOOD BY AUTOMATED COUNT: 12.5 % (ref 11.5–14.5)
EST. GFR  (AFRICAN AMERICAN): >60 ML/MIN/1.73 M^2
EST. GFR  (NON AFRICAN AMERICAN): >60 ML/MIN/1.73 M^2
GLUCOSE SERPL-MCNC: 108 MG/DL (ref 70–110)
GLUCOSE UR QL STRIP: NEGATIVE
HCT VFR BLD AUTO: 41.8 % (ref 37–48.5)
HGB BLD-MCNC: 13.7 G/DL (ref 12–16)
HGB UR QL STRIP: ABNORMAL
IMM GRANULOCYTES # BLD AUTO: 0.05 K/UL (ref 0–0.04)
IMM GRANULOCYTES NFR BLD AUTO: 0.5 % (ref 0–0.5)
KETONES UR QL STRIP: NEGATIVE
LEUKOCYTE ESTERASE UR QL STRIP: ABNORMAL
LYMPHOCYTES # BLD AUTO: 2.5 K/UL (ref 1–4.8)
LYMPHOCYTES NFR BLD: 24.3 % (ref 18–48)
MCH RBC QN AUTO: 31.3 PG (ref 27–31)
MCHC RBC AUTO-ENTMCNC: 32.8 G/DL (ref 32–36)
MCV RBC AUTO: 95 FL (ref 82–98)
MICROSCOPIC COMMENT: ABNORMAL
MONOCYTES # BLD AUTO: 0.6 K/UL (ref 0.3–1)
MONOCYTES NFR BLD: 6.1 % (ref 4–15)
NEUTROPHILS # BLD AUTO: 6.9 K/UL (ref 1.8–7.7)
NEUTROPHILS NFR BLD: 66.5 % (ref 38–73)
NITRITE UR QL STRIP: NEGATIVE
NRBC BLD-RTO: 0 /100 WBC
PH UR STRIP: 7 [PH] (ref 5–8)
PLATELET # BLD AUTO: 245 K/UL (ref 150–450)
PMV BLD AUTO: 9.9 FL (ref 9.2–12.9)
POTASSIUM SERPL-SCNC: 4 MMOL/L (ref 3.5–5.1)
PROT SERPL-MCNC: 7.5 G/DL (ref 6–8.4)
PROT UR QL STRIP: NEGATIVE
RBC # BLD AUTO: 4.38 M/UL (ref 4–5.4)
RBC #/AREA URNS AUTO: 2 /HPF (ref 0–4)
SODIUM SERPL-SCNC: 140 MMOL/L (ref 136–145)
SP GR UR STRIP: 1.01 (ref 1–1.03)
SQUAMOUS #/AREA URNS AUTO: 13 /HPF
URN SPEC COLLECT METH UR: ABNORMAL
WBC # BLD AUTO: 10.39 K/UL (ref 3.9–12.7)
WBC #/AREA URNS AUTO: 3 /HPF (ref 0–5)

## 2022-02-20 PROCEDURE — 80053 COMPREHEN METABOLIC PANEL: CPT | Performed by: PHYSICIAN ASSISTANT

## 2022-02-20 PROCEDURE — 99285 EMERGENCY DEPT VISIT HI MDM: CPT | Mod: 25

## 2022-02-20 PROCEDURE — 85025 COMPLETE CBC W/AUTO DIFF WBC: CPT | Performed by: PHYSICIAN ASSISTANT

## 2022-02-20 PROCEDURE — 86803 HEPATITIS C AB TEST: CPT | Performed by: EMERGENCY MEDICINE

## 2022-02-20 PROCEDURE — 99285 PR EMERGENCY DEPT VISIT,LEVEL V: ICD-10-PCS | Mod: ,,, | Performed by: PHYSICIAN ASSISTANT

## 2022-02-20 PROCEDURE — 63600175 PHARM REV CODE 636 W HCPCS: Performed by: PHYSICIAN ASSISTANT

## 2022-02-20 PROCEDURE — 99285 EMERGENCY DEPT VISIT HI MDM: CPT | Mod: ,,, | Performed by: PHYSICIAN ASSISTANT

## 2022-02-20 PROCEDURE — 87389 HIV-1 AG W/HIV-1&-2 AB AG IA: CPT | Performed by: EMERGENCY MEDICINE

## 2022-02-20 PROCEDURE — 81001 URINALYSIS AUTO W/SCOPE: CPT | Performed by: PHYSICIAN ASSISTANT

## 2022-02-20 PROCEDURE — 25500020 PHARM REV CODE 255: Performed by: EMERGENCY MEDICINE

## 2022-02-20 PROCEDURE — 96374 THER/PROPH/DIAG INJ IV PUSH: CPT

## 2022-02-20 RX ORDER — KETOROLAC TROMETHAMINE 30 MG/ML
10 INJECTION, SOLUTION INTRAMUSCULAR; INTRAVENOUS
Status: COMPLETED | OUTPATIENT
Start: 2022-02-20 | End: 2022-02-20

## 2022-02-20 RX ORDER — NAPROXEN 500 MG/1
500 TABLET ORAL 2 TIMES DAILY WITH MEALS
Qty: 20 TABLET | Refills: 0 | Status: ON HOLD | OUTPATIENT
Start: 2022-02-20 | End: 2022-03-16 | Stop reason: HOSPADM

## 2022-02-20 RX ADMIN — IOHEXOL 100 ML: 350 INJECTION, SOLUTION INTRAVENOUS at 09:02

## 2022-02-20 RX ADMIN — KETOROLAC TROMETHAMINE 10 MG: 30 INJECTION, SOLUTION INTRAMUSCULAR at 08:02

## 2022-02-20 NOTE — ED PROVIDER NOTES
"Encounter Date: 2022       History     Chief Complaint   Patient presents with    Abdominal Pain     Reporting RLQ abd x few months, worsening over the last few weeks. Put a pain patch on her stomach, no relief. Denies N/V/D     42 y/o F with history of anemia, HPV scheduled for hysterectomy 3/16/22 presents to the ED c/o RLQ abdominal pain x 1 month, worse x 1 week.  She describes the pain as intermittent 4/10 "pressure". Pain is not improved with BC powder, lidoderm patches, bentyl. She denies any falls or trauma. PSHx significant for  x2 and tubal ligation. She denies f/c, chest pain, SOB, nausea, dysuria, diarrhea, vaginal bleeding, vaginal discharge, headache.     The history is provided by the patient.     Review of patient's allergies indicates:   Allergen Reactions    Raspberry (rubus idaeus)      Past Medical History:   Diagnosis Date    Anemia     Back pain      Past Surgical History:   Procedure Laterality Date     SECTION      COLD KNIFE CONIZATION OF CERVIX  12/10/2021    Procedure: CONE BIOPSY, CERVIX, USING COLD KNIFE;  Surgeon: Modesto Nassar MD;  [facility]  Service: [facility]/GYN;;    COLONOSCOPY N/A 2021    Procedure: COLONOSCOPY;  Surgeon: Emily Cruz MD;  [facility];  Service: Endoscopy;  Laterality: N/A;    ESOPHAGOGASTRODUODENOSCOPY N/A 2021    Procedure: EGD (ESOPHAGOGASTRODUODENOSCOPY);  Surgeon: Emily Cruz MD;  [facility];  Service: Endoscopy;  Laterality: N/A;    SINUS SURGERY      TUBAL LIGATION       Family History   Problem Relation Age of Onset    Hypertension Mother     Diabetes Mother     Breast cancer Neg Hx     Colon cancer Neg Hx     Ovarian cancer Neg Hx      Social History     Tobacco Use    Smoking status: Former Smoker     Packs/day: 0.00     Quit date: 3/3/2021     Years since quittin.9    Smokeless tobacco: Never Used   Substance Use Topics    Alcohol use: No    Drug use: Yes     " Types: Marijuana     Comment: daily     Review of Systems   Constitutional: Negative for chills and fever.   HENT: Negative for congestion, rhinorrhea and sore throat.    Eyes: Negative for photophobia and visual disturbance.   Respiratory: Negative for cough and shortness of breath.    Cardiovascular: Negative for chest pain.   Gastrointestinal: Positive for abdominal pain. Negative for constipation, diarrhea, nausea and vomiting.   Genitourinary: Negative for dysuria, hematuria, vaginal bleeding and vaginal discharge.   Musculoskeletal: Negative for back pain.   Skin: Negative for rash.   Neurological: Negative for light-headedness, numbness and headaches.   Psychiatric/Behavioral: Negative for confusion.       Physical Exam     Initial Vitals [02/20/22 0657]   BP Pulse Resp Temp SpO2   114/71 83 16 98.3 °F (36.8 °C) 98 %      MAP       --         Physical Exam    Nursing note and vitals reviewed.  Constitutional: She appears well-developed and well-nourished. She is not diaphoretic. No distress.   HENT:   Head: Normocephalic and atraumatic.   Neck: Neck supple.   Normal range of motion.  Cardiovascular: Normal rate, regular rhythm and normal heart sounds. Exam reveals no gallop and no friction rub.    No murmur heard.  Pulmonary/Chest: Breath sounds normal. She has no wheezes. She has no rhonchi. She has no rales.   Abdominal: Abdomen is soft. Bowel sounds are normal. There is abdominal tenderness (R lower abdomen). There is no rebound and no guarding.   Musculoskeletal:         General: Normal range of motion.      Cervical back: Normal range of motion and neck supple.     Neurological: She is alert and oriented to person, place, and time.   Skin: Skin is warm and dry. No rash noted. No erythema.   Psychiatric: She has a normal mood and affect.         ED Course   Procedures  Labs Reviewed   CBC W/ AUTO DIFFERENTIAL - Abnormal; Notable for the following components:       Result Value    MCH 31.3 (*)     Immature  Grans (Abs) 0.05 (*)     All other components within normal limits   COMPREHENSIVE METABOLIC PANEL - Abnormal; Notable for the following components:    Alkaline Phosphatase 44 (*)     All other components within normal limits   URINALYSIS, REFLEX TO URINE CULTURE - Abnormal; Notable for the following components:    Appearance, UA Cloudy (*)     Occult Blood UA 2+ (*)     Leukocytes, UA 1+ (*)     All other components within normal limits    Narrative:     Specimen Source->Urine   URINALYSIS MICROSCOPIC - Abnormal; Notable for the following components:    Amorphous, UA Many (*)     All other components within normal limits    Narrative:     Specimen Source->Urine   HIV 1 / 2 ANTIBODY   HEPATITIS C ANTIBODY          Imaging Results          CT Abdomen Pelvis With Contrast (Final result)  Result time 02/20/22 09:34:41    Final result by Sadia Betancourt MD (02/20/22 09:34:41)                 Impression:      1.  Mild, stable hepatomegaly 2.  Etiology for right lower abdominal pain is not well identified.  Previously described fluid in small large bowel no longer seen.      Electronically signed by: Sadia Betancourt  Date:    02/20/2022  Time:    09:34             Narrative:    EXAMINATION:  CT ABDOMEN PELVIS WITH CONTRAST    CLINICAL HISTORY:  RLQ abdominal pain (Age >= 14y);    TECHNIQUE:  Low dose axial images, sagittal and coronal reformations were obtained from the lung bases to the pubic symphysis following the IV administration of 100 mL of Omnipaque 350 .  Oral contrast was not administered.    COMPARISON:  CT of the abdomen    FINDINGS:  Abdomen:    - Lower thorax:Within normal limits.    - Lung bases: No infiltrates and no nodules.    - Liver: 18 cm at the right midclavicular line span; otherwise unremarkable.    - Gallbladder: Within normal limits.    - Bile Ducts: No evidence of intra or extra hepatic biliary ductal dilation.    - Spleen: Within normal limits.    - Kidneys: A few low-density lesions, stable,  largest in left lower pole up to 1.5 cm.    - Adrenals: Stable, mild thickening of the left adrenal gland lateral limb.    - Pancreas: No mass or peripancreatic fat stranding.    - Retroperitoneum:  No significant adenopathy.    - Vascular: Mild calcific disease of the infrarenal abdominal aorta and bilateral common iliac vessels.    - Abdominal wall:  Unremarkable.    Pelvis:    Trace flea fluid.    Bowel/Mesentery:    The appendix is not well identified, however there are no pericecal inflammatory changes.    Bones:  No acute osseous abnormality and no suspicious lytic or blastic lesion.                                 Medications   ketorolac injection 9.999 mg (9.999 mg Intravenous Given 2/20/22 0832)   iohexoL (OMNIPAQUE 350) injection 100 mL (100 mLs Intravenous Given 2/20/22 0915)     Medical Decision Making:   History:   Old Medical Records: I decided to obtain old medical records.  Old Records Summarized: records from clinic visits and records from previous admission(s).       <> Summary of Records: Followed by Dr Cesario ALLEN.  12/10/21 - cone biopsy of the cervix  1/25/22 - appt with OBGYN, plan for hysterectomy (scheduled 3/16/22)  Clinical Tests:   Lab Tests: Ordered  Radiological Study: Ordered       APC / Resident Notes:   42 y/o F with history of anemia, HPV scheduled for hysterectomy 3/16/22 presents to the ED c/o RLQ abdominal pain x 1 month, worse x 1 week. VSS. RRR. Lungs clear. Abdomen soft and tender in RLQ. No acute abdomen. No rashes. No CVA tenderness. DDx includes but is not limited to UTI, pyelonephritis, appendicitis, ovarian cyst/mass. Will get labs, CT abdomen/pelvis.    UA with no evidence of infection.  No leukocytosis.  CMP unremarkable.    CT abdomen pelvis with no identifiable cause of right lower quadrant pain.  Pain resolved with Toradol provided in the ED.  Due to chronicity of pain, do not suspect ovarian torsion at this time.    I do not feel that she needs any further labs  ambulation, po intake, pain control or imaging at this time. Stable for discharge.     She was discharged with a prescription for naproxen.  She will follow up with her OBGYN.  Strict ED return precautions given.  All of the patient's questions were answered.  I reviewed the patient's chart, labs, and imaging and discussed the case with my supervising physician.                    Clinical Impression:   Final diagnoses:  [R10.9] Abdominal pain, unspecified abdominal location (Primary)          ED Disposition Condition    Discharge Stable        ED Prescriptions     Medication Sig Dispense Start Date End Date Auth. Provider    naproxen (NAPROSYN) 500 MG tablet Take 1 tablet (500 mg total) by mouth 2 (two) times daily with meals. 20 tablet 2/20/2022  Jenni Wade PA-C        Follow-up Information     Follow up With Specialties Details Why Contact Info    Modesto Nassar MD Obstetrics and Gynecology   [address] [place] [address]  [address] [place] [id number]  [phone]             Jenni Wade PA-C  02/20/22 3328     as above

## 2022-02-20 NOTE — ED NOTES
Intermittent RLQ abd pain x 1 month that has become more constant x 1 week. Denies n/v/d/f/urinary problems. Feels pressure, rates pain 5/10. Denies bulge.

## 2022-02-20 NOTE — ED NOTES
"Patient upset, yelling regarding request to take jacket off as patient is "cold" Explained need for PIV, blood, CT and med administration. Update to PA, second nurse in to start IV.  "

## 2022-02-22 LAB — HIV 1+2 AB+HIV1 P24 AG SERPL QL IA: NEGATIVE

## 2022-02-23 ENCOUNTER — HOSPITAL ENCOUNTER (OUTPATIENT)
Dept: RADIOLOGY | Facility: HOSPITAL | Age: 42
Discharge: HOME | End: 2022-02-23
Attending: STUDENT IN AN ORGANIZED HEALTH CARE EDUCATION/TRAINING PROGRAM
Payer: MEDICAID

## 2022-02-23 DIAGNOSIS — R10.31 RIGHT LOWER QUADRANT ABDOMINAL PAIN: ICD-10-CM

## 2022-02-23 LAB — HCV AB SERPL QL IA: NEGATIVE

## 2022-02-23 PROCEDURE — 76705 ECHO EXAM OF ABDOMEN: CPT | Mod: 26,,, | Performed by: INTERNAL MEDICINE

## 2022-02-23 PROCEDURE — 76705 US ABDOMEN LIMITED: ICD-10-PCS | Mod: 26,,, | Performed by: INTERNAL MEDICINE

## 2022-02-23 PROCEDURE — 76705 ECHO EXAM OF ABDOMEN: CPT | Mod: TC

## 2022-02-24 ENCOUNTER — PATIENT MESSAGE (OUTPATIENT)
Dept: FAMILY MEDICINE | Facility: HOSPITAL | Age: 42
End: 2022-02-24
Payer: MEDICAID

## 2022-03-04 ENCOUNTER — PATIENT MESSAGE (OUTPATIENT)
Dept: FAMILY MEDICINE | Facility: HOSPITAL | Age: 42
End: 2022-03-04
Payer: MEDICAID

## 2022-03-10 ENCOUNTER — ANESTHESIA EVENT (OUTPATIENT)
Dept: SURGERY | Facility: HOSPITAL | Age: 42
End: 2022-03-10
Payer: MEDICAID

## 2022-03-10 ENCOUNTER — HOSPITAL ENCOUNTER (OUTPATIENT)
Dept: PREADMISSION TESTING | Facility: HOSPITAL | Age: 42
Discharge: HOME | End: 2022-03-10
Attending: OBSTETRICS & GYNECOLOGY
Payer: MEDICAID

## 2022-03-10 VITALS
DIASTOLIC BLOOD PRESSURE: 63 MMHG | BODY MASS INDEX: 25.64 KG/M2 | TEMPERATURE: 98 F | WEIGHT: 169.19 LBS | RESPIRATION RATE: 16 BRPM | OXYGEN SATURATION: 100 % | HEART RATE: 75 BPM | HEIGHT: 68 IN | SYSTOLIC BLOOD PRESSURE: 106 MMHG

## 2022-03-10 RX ORDER — SODIUM CHLORIDE, SODIUM LACTATE, POTASSIUM CHLORIDE, CALCIUM CHLORIDE 600; 310; 30; 20 MG/100ML; MG/100ML; MG/100ML; MG/100ML
INJECTION, SOLUTION INTRAVENOUS CONTINUOUS
Status: CANCELLED | OUTPATIENT
Start: 2022-03-10

## 2022-03-10 RX ORDER — LIDOCAINE HYDROCHLORIDE 10 MG/ML
1 INJECTION, SOLUTION EPIDURAL; INFILTRATION; INTRACAUDAL; PERINEURAL ONCE
Status: CANCELLED | OUTPATIENT
Start: 2022-03-10 | End: 2022-03-10

## 2022-03-10 RX ORDER — SCOLOPAMINE TRANSDERMAL SYSTEM 1 MG/1
1 PATCH, EXTENDED RELEASE TRANSDERMAL
Status: CANCELLED | OUTPATIENT
Start: 2022-03-10

## 2022-03-10 NOTE — ANESTHESIA PREPROCEDURE EVALUATION
03/10/2022  Wandy Shah is a 41 y.o., female scheduled for  ROBOTIC HYSTERECTOMY and ROBOTIC SALPINGECTOMY 3/16/22.    Past Medical History:   Diagnosis Date    Anemia     Back pain      Past Surgical History:   Procedure Laterality Date     SECTION      COLD KNIFE CONIZATION OF CERVIX  12/10/2021    Procedure: CONE BIOPSY, CERVIX, USING COLD KNIFE;  Surgeon: Modesto Nassar MD;  Location: [facility] OR;  Service: OB/GYN;;    COLONOSCOPY N/A 2021    Procedure: COLONOSCOPY;  Surgeon: Emily Cruz MD;  Location: [facility] ENDO;  Service: Endoscopy;  Laterality: N/A;    ESOPHAGOGASTRODUODENOSCOPY N/A 2021    Procedure: EGD (ESOPHAGOGASTRODUODENOSCOPY);  Surgeon: Emily Cruz MD;  Location: [facility] ENDO;  Service: Endoscopy;  Laterality: N/A;    SINUS SURGERY      TUBAL LIGATION       Pre-op Assessment    I have reviewed the Patient Summary Reports.     I have reviewed the Nursing Notes.    I have reviewed the Medications.     Review of Systems  Anesthesia Hx:  No problems with previous Anesthesia Denies Hx of Anesthetic complications  History of prior surgery of interest to airway management or planning:  Denies Personal Hx of Anesthesia complications.   Social:  No Alcohol Use, Non-Smoker    Hematology/Oncology:     Oncology Normal    -- Anemia:   EENT/Dental:EENT/Dental Normal   Cardiovascular:  Cardiovascular Normal Exercise tolerance: good  Denies Pacemaker.  Denies Hypertension.   Denies Angina. no hyperlipidemia    Pulmonary:   Asthma mild Denies Shortness of breath.    Renal/:  Renal/ Normal     Hepatic/GI:   GERD, well controlled Denies Liver Disease.    Musculoskeletal:  Musculoskeletal Normal    Neurological:   Denies TIA. Denies CVA. Headaches Denies Seizures.    Endocrine:  Endocrine Normal    Psych:  Psychiatric Normal           Physical Exam  General: Well nourished  and Cooperative    Airway:  Mallampati: II / I  Mouth Opening: Normal  TM Distance: Normal  Tongue: Normal  Neck ROM: Normal ROM    Dental:  Dentures    Chest/Lungs:  Clear to auscultation, Normal Respiratory Rate    Heart:  Rate: Normal  Rhythm: Regular Rhythm  Sounds: Normal        Anesthesia Plan  Type of Anesthesia, risks & benefits discussed:    Anesthesia Type: Gen ETT  Intra-op Monitoring Plan: Standard ASA Monitors  Post Op Pain Control Plan: multimodal analgesia  Induction:  IV  Airway Plan: Direct  Informed Consent: Informed consent signed with the Patient and all parties understand the risks and agree with anesthesia plan.  All questions answered.   ASA Score: 2  Anesthesia Plan Notes:       Ready For Surgery From Anesthesia Perspective.     .

## 2022-03-10 NOTE — DISCHARGE INSTRUCTIONS
Your surgery is scheduled for 3/16/22  Please report to Hospital [facility] Lobby on the 1st Floor at 0800a.m.    THIS TIME IS SUBJECT TO CHANGE.  YOU WILL RECEIVE A PHONE CALL THE DAY BEFORE SURGERY BY 3:30 PM TO CONFIRM YOUR TIME OF ARRIVAL.  IF YOU HAVE NOT RECEIVED A PHONE CALL BY 3:30 PM THE DAY BEFORE YOUR SURGERY PLEASE CALL [phone]     INSTRUCTIONS IMPORTANT!!!  ¨ Do not eat or drink after 12 midnight-including water, candy, gum, & mints. OK to brush teeth.      ____  Proceed to [facility] on *** for additional testing.        ____  Do not wear makeup, including mascara.  ____  No powder, lotions or creams to surgical area.  ____  Please remove all jewelry, including piercings and leave at home.  ____  No money or valuables needed. Please leave at home.  ____  Please bring any documents given by your doctor.  ____  If going home the same day, arrange for a ride home. You will not be able to             drive if Anesthesia was used.  ____  Children under 18 years require a parent / guardian present the entire time             they are in surgery / recovery.  ____  Wear loose fitting clothing. Allow for dressings, bandages.  ____  Stop Aspirin, Ibuprofen, Motrin, Aleve, Goody's/BC powders, Excedrine and Naproxen (NSAIDS) at least 3-5 days before surgery, unless otherwise instructed by your doctor, or the nurse.   You MAY use Tylenol/acetaminophen until day of surgery.  ____  Wash the surgical area with Hibiclens the night before surgery, and again the             morning of surgery.  Be sure to rinse hibiclens off completely (if instructed by   nurse).  ____  If you take diabetic medication, do not take am of surgery unless instructed by Doctor.  ____  Call MD for temperature above 101 degrees or any other signs of infection such as Urinary (bladder) infection, Upper respiratory infection, skin boils, etc.  ____ Stop taking any Fish Oil supplement or any Vitamins that contain Vitamin E at  least 5 days prior to surgery.  ____ Do Not wear your contact lenses the day of your procedure.  You may wear your glasses.      ____Do not shave surgical site for 3 days prior to surgery.  ____ Practice Good hand washing before, during, and after procedure.      I have read or had read and explained to me, and understand the above information.  Additional comments or instructions:  For additional questions call [phone]      ANESTHESIA SIDE EFFECTS  -For the first 24 hours after surgery:  Do not drive, use heavy equipment, make important decisions, or drink alcohol  -It is normal to feel sleepy for several hours.  Rest until you are more awake.  -Have someone stay with you, if needed.  They can watch for problems and help keep you safe.  -Some possible post anesthesia side effects include: nausea and vomiting, sore throat and hoarseness, sleepiness, and dizziness.        Pre-Op Bathing Instructions    Before surgery, you can play an important role in your own health.    Because skin is not sterile, we need to be sure that your skin is as free of germs as possible. By following the instructions below, you can reduce the number of germs on your skin before surgery.    IMPORTANT: You will need to shower with a special soap called Hibiclens*, available at any pharmacy.  If you are allergic to Chlorhexidine (the antiseptic in Hibiclens), use an antibacterial soap such as Dial Soap for your preoperative shower.  You will shower with Hibiclens both the night before your surgery and the morning of your surgery.  Do not use Hibiclens on the head, face or genitals to avoid injury to those areas.    STEP #1: THE NIGHT BEFORE YOUR SURGERY     Do not shave the area of your body where your surgery will be performed.  Shower and wash your hair and body as usual with your normal soap and shampoo.  Rinse your hair and body thoroughly after you shower to remove all soap residue.  With your hand, apply one packet of Hibiclens soap to  the surgical site.   Wash the site gently for five (5) minutes. Do not scrub your skin too hard.   Do not wash with your regular soap after Hibiclens is used.  Rinse your body thoroughly.  Pat yourself dry with a clean, soft towel.  Do not use lotion, cream, or powder.  Wear clean clothes.    STEP #2: THE MORNING OF YOUR SURGERY     Repeat Step #1.    * Not to be used by people allergic to Chlorhexidine.

## 2022-03-10 NOTE — PRE-PROCEDURE INSTRUCTIONS
Oli Shah [phone]    Allergies, medical, surgical, family and psychosocial histories reviewed with patient. Periop plan of care reviewed. Preop instructions given, including medications to take and to hold. Hibiclens soap and instructions on use given. Time allotted for questions to be addressed.  Patient verbalized understanding.

## 2022-03-13 ENCOUNTER — LAB VISIT (OUTPATIENT)
Dept: URGENT CARE | Facility: CLINIC | Age: 42
End: 2022-03-13
Payer: MEDICAID

## 2022-03-13 DIAGNOSIS — Z01.818 PRE-OP TESTING: ICD-10-CM

## 2022-03-13 PROCEDURE — U0003 INFECTIOUS AGENT DETECTION BY NUCLEIC ACID (DNA OR RNA); SEVERE ACUTE RESPIRATORY SYNDROME CORONAVIRUS 2 (SARS-COV-2) (CORONAVIRUS DISEASE [COVID-19]), AMPLIFIED PROBE TECHNIQUE, MAKING USE OF HIGH THROUGHPUT TECHNOLOGIES AS DESCRIBED BY CMS-2020-01-R: HCPCS | Performed by: OBSTETRICS & GYNECOLOGY

## 2022-03-13 PROCEDURE — U0005 INFEC AGEN DETEC AMPLI PROBE: HCPCS | Performed by: OBSTETRICS & GYNECOLOGY

## 2022-03-13 NOTE — PROGRESS NOTES

## 2022-03-14 LAB
SARS-COV-2 RNA RESP QL NAA+PROBE: NOT DETECTED
SARS-COV-2- CYCLE NUMBER: NORMAL

## 2022-03-16 ENCOUNTER — HOSPITAL ENCOUNTER (OUTPATIENT)
Facility: HOSPITAL | Age: 42
Discharge: HOME | End: 2022-03-16
Attending: OBSTETRICS & GYNECOLOGY | Admitting: OBSTETRICS & GYNECOLOGY
Payer: MEDICAID

## 2022-03-16 ENCOUNTER — ANESTHESIA (OUTPATIENT)
Dept: SURGERY | Facility: HOSPITAL | Age: 42
End: 2022-03-16
Payer: MEDICAID

## 2022-03-16 VITALS
SYSTOLIC BLOOD PRESSURE: 114 MMHG | DIASTOLIC BLOOD PRESSURE: 62 MMHG | RESPIRATION RATE: 16 BRPM | OXYGEN SATURATION: 97 % | BODY MASS INDEX: 24.25 KG/M2 | WEIGHT: 160 LBS | HEIGHT: 68 IN | TEMPERATURE: 98 F | HEART RATE: 65 BPM

## 2022-03-16 DIAGNOSIS — N87.1 MODERATE DYSPLASIA OF CERVIX (CIN II): ICD-10-CM

## 2022-03-16 LAB
ABO + RH BLD: NORMAL
B-HCG UR QL: NEGATIVE
BLD GP AB SCN CELLS X3 SERPL QL: NORMAL
CTP QC/QA: YES
POCT GLUCOSE: 97 MG/DL (ref 70–110)

## 2022-03-16 PROCEDURE — 25000003 PHARM REV CODE 250: Performed by: OBSTETRICS & GYNECOLOGY

## 2022-03-16 PROCEDURE — 88307 TISSUE EXAM BY PATHOLOGIST: CPT | Mod: 26,,, | Performed by: PATHOLOGY

## 2022-03-16 PROCEDURE — 86850 RBC ANTIBODY SCREEN: CPT | Performed by: OBSTETRICS & GYNECOLOGY

## 2022-03-16 PROCEDURE — 25000003 PHARM REV CODE 250: Performed by: NURSE PRACTITIONER

## 2022-03-16 PROCEDURE — 88307 PR  SURG PATH,LEVEL V: ICD-10-PCS | Mod: 26,,, | Performed by: PATHOLOGY

## 2022-03-16 PROCEDURE — 63600175 PHARM REV CODE 636 W HCPCS: Performed by: NURSE PRACTITIONER

## 2022-03-16 PROCEDURE — 36000712 HC OR TIME LEV V 1ST 15 MIN: Performed by: OBSTETRICS & GYNECOLOGY

## 2022-03-16 PROCEDURE — 86900 BLOOD TYPING SEROLOGIC ABO: CPT | Performed by: OBSTETRICS & GYNECOLOGY

## 2022-03-16 PROCEDURE — 27201423 OPTIME MED/SURG SUP & DEVICES STERILE SUPPLY: Performed by: OBSTETRICS & GYNECOLOGY

## 2022-03-16 PROCEDURE — 88307 TISSUE EXAM BY PATHOLOGIST: CPT | Performed by: PATHOLOGY

## 2022-03-16 PROCEDURE — 71000015 HC POSTOP RECOV 1ST HR: Performed by: OBSTETRICS & GYNECOLOGY

## 2022-03-16 PROCEDURE — 25000003 PHARM REV CODE 250: Performed by: NURSE ANESTHETIST, CERTIFIED REGISTERED

## 2022-03-16 PROCEDURE — 37000009 HC ANESTHESIA EA ADD 15 MINS: Performed by: OBSTETRICS & GYNECOLOGY

## 2022-03-16 PROCEDURE — 71000033 HC RECOVERY, INTIAL HOUR: Performed by: OBSTETRICS & GYNECOLOGY

## 2022-03-16 PROCEDURE — 58571 PR LAPAROSCOPY W TOT HYSTERECTUTERUS <=250 GRAM  W TUBE/OVARY: ICD-10-PCS | Mod: 22,,, | Performed by: OBSTETRICS & GYNECOLOGY

## 2022-03-16 PROCEDURE — 63600175 PHARM REV CODE 636 W HCPCS: Performed by: NURSE ANESTHETIST, CERTIFIED REGISTERED

## 2022-03-16 PROCEDURE — 36000713 HC OR TIME LEV V EA ADD 15 MIN: Performed by: OBSTETRICS & GYNECOLOGY

## 2022-03-16 PROCEDURE — C2628 CATHETER, OCCLUSION: HCPCS | Performed by: OBSTETRICS & GYNECOLOGY

## 2022-03-16 PROCEDURE — 81025 URINE PREGNANCY TEST: CPT | Performed by: OBSTETRICS & GYNECOLOGY

## 2022-03-16 PROCEDURE — 00840 ANES IPER PX LOWER ABD NOS: CPT | Performed by: OBSTETRICS & GYNECOLOGY

## 2022-03-16 PROCEDURE — 58571 TLH W/T/O 250 G OR LESS: CPT | Mod: 22,,, | Performed by: OBSTETRICS & GYNECOLOGY

## 2022-03-16 PROCEDURE — 37000008 HC ANESTHESIA 1ST 15 MINUTES: Performed by: OBSTETRICS & GYNECOLOGY

## 2022-03-16 RX ORDER — KETOROLAC TROMETHAMINE 30 MG/ML
INJECTION, SOLUTION INTRAMUSCULAR; INTRAVENOUS
Status: DISCONTINUED | OUTPATIENT
Start: 2022-03-16 | End: 2022-03-16

## 2022-03-16 RX ORDER — CEFAZOLIN SODIUM 1 G/3ML
INJECTION, POWDER, FOR SOLUTION INTRAMUSCULAR; INTRAVENOUS
Status: DISCONTINUED | OUTPATIENT
Start: 2022-03-16 | End: 2022-03-16

## 2022-03-16 RX ORDER — DIPHENHYDRAMINE HCL 25 MG
25 CAPSULE ORAL EVERY 4 HOURS PRN
Status: DISCONTINUED | OUTPATIENT
Start: 2022-03-16 | End: 2022-03-16 | Stop reason: HOSPADM

## 2022-03-16 RX ORDER — ACETAMINOPHEN 10 MG/ML
INJECTION, SOLUTION INTRAVENOUS
Status: DISCONTINUED | OUTPATIENT
Start: 2022-03-16 | End: 2022-03-16

## 2022-03-16 RX ORDER — DEXAMETHASONE SODIUM PHOSPHATE 4 MG/ML
INJECTION, SOLUTION INTRA-ARTICULAR; INTRALESIONAL; INTRAMUSCULAR; INTRAVENOUS; SOFT TISSUE
Status: DISCONTINUED | OUTPATIENT
Start: 2022-03-16 | End: 2022-03-16

## 2022-03-16 RX ORDER — CEFAZOLIN SODIUM 2 G/50ML
2 SOLUTION INTRAVENOUS
Status: DISCONTINUED | OUTPATIENT
Start: 2022-03-16 | End: 2022-03-16 | Stop reason: HOSPADM

## 2022-03-16 RX ORDER — SCOLOPAMINE TRANSDERMAL SYSTEM 1 MG/1
1 PATCH, EXTENDED RELEASE TRANSDERMAL
Status: DISCONTINUED | OUTPATIENT
Start: 2022-03-16 | End: 2022-03-16 | Stop reason: HOSPADM

## 2022-03-16 RX ORDER — OXYCODONE HYDROCHLORIDE 5 MG/1
5 TABLET ORAL EVERY 4 HOURS PRN
Status: DISCONTINUED | OUTPATIENT
Start: 2022-03-16 | End: 2022-03-16 | Stop reason: HOSPADM

## 2022-03-16 RX ORDER — FENTANYL CITRATE 50 UG/ML
INJECTION, SOLUTION INTRAMUSCULAR; INTRAVENOUS
Status: DISCONTINUED | OUTPATIENT
Start: 2022-03-16 | End: 2022-03-16

## 2022-03-16 RX ORDER — BUPIVACAINE HYDROCHLORIDE 2.5 MG/ML
INJECTION, SOLUTION INFILTRATION; PERINEURAL
Status: DISCONTINUED | OUTPATIENT
Start: 2022-03-16 | End: 2022-03-16 | Stop reason: HOSPADM

## 2022-03-16 RX ORDER — IBUPROFEN 600 MG/1
600 TABLET ORAL EVERY 6 HOURS PRN
Qty: 60 TABLET | Refills: 1 | Status: SHIPPED | OUTPATIENT
Start: 2022-03-16 | End: 2022-10-11

## 2022-03-16 RX ORDER — PROPOFOL 10 MG/ML
VIAL (ML) INTRAVENOUS
Status: DISCONTINUED | OUTPATIENT
Start: 2022-03-16 | End: 2022-03-16

## 2022-03-16 RX ORDER — OXYCODONE HYDROCHLORIDE 5 MG/1
5 TABLET ORAL
Status: DISCONTINUED | OUTPATIENT
Start: 2022-03-16 | End: 2022-03-16 | Stop reason: HOSPADM

## 2022-03-16 RX ORDER — HYDROMORPHONE HYDROCHLORIDE 2 MG/ML
0.5 INJECTION, SOLUTION INTRAMUSCULAR; INTRAVENOUS; SUBCUTANEOUS EVERY 5 MIN PRN
Status: DISCONTINUED | OUTPATIENT
Start: 2022-03-16 | End: 2022-03-16 | Stop reason: HOSPADM

## 2022-03-16 RX ORDER — DIPHENHYDRAMINE HYDROCHLORIDE 50 MG/ML
25 INJECTION INTRAMUSCULAR; INTRAVENOUS EVERY 4 HOURS PRN
Status: DISCONTINUED | OUTPATIENT
Start: 2022-03-16 | End: 2022-03-16 | Stop reason: HOSPADM

## 2022-03-16 RX ORDER — ONDANSETRON HYDROCHLORIDE 2 MG/ML
INJECTION, SOLUTION INTRAMUSCULAR; INTRAVENOUS
Status: DISCONTINUED | OUTPATIENT
Start: 2022-03-16 | End: 2022-03-16

## 2022-03-16 RX ORDER — ONDANSETRON 8 MG/1
8 TABLET, ORALLY DISINTEGRATING ORAL EVERY 8 HOURS PRN
Status: DISCONTINUED | OUTPATIENT
Start: 2022-03-16 | End: 2022-03-16 | Stop reason: HOSPADM

## 2022-03-16 RX ORDER — OXYCODONE AND ACETAMINOPHEN 5; 325 MG/1; MG/1
1 TABLET ORAL EVERY 4 HOURS PRN
Qty: 30 TABLET | Refills: 0 | Status: SHIPPED | OUTPATIENT
Start: 2022-03-16 | End: 2022-06-20

## 2022-03-16 RX ORDER — MUPIROCIN 20 MG/G
OINTMENT TOPICAL
Status: DISCONTINUED | OUTPATIENT
Start: 2022-03-16 | End: 2022-03-16 | Stop reason: HOSPADM

## 2022-03-16 RX ORDER — MIDAZOLAM HYDROCHLORIDE 1 MG/ML
INJECTION INTRAMUSCULAR; INTRAVENOUS
Status: DISCONTINUED | OUTPATIENT
Start: 2022-03-16 | End: 2022-03-16

## 2022-03-16 RX ORDER — IBUPROFEN 600 MG/1
600 TABLET ORAL EVERY 6 HOURS PRN
Status: DISCONTINUED | OUTPATIENT
Start: 2022-03-16 | End: 2022-03-16 | Stop reason: HOSPADM

## 2022-03-16 RX ORDER — ONDANSETRON 2 MG/ML
4 INJECTION INTRAMUSCULAR; INTRAVENOUS DAILY PRN
Status: DISCONTINUED | OUTPATIENT
Start: 2022-03-16 | End: 2022-03-16 | Stop reason: HOSPADM

## 2022-03-16 RX ORDER — PROCHLORPERAZINE EDISYLATE 5 MG/ML
5 INJECTION INTRAMUSCULAR; INTRAVENOUS EVERY 6 HOURS PRN
Status: DISCONTINUED | OUTPATIENT
Start: 2022-03-16 | End: 2022-03-16 | Stop reason: HOSPADM

## 2022-03-16 RX ORDER — VECURONIUM BROMIDE FOR INJECTION 1 MG/ML
INJECTION, POWDER, LYOPHILIZED, FOR SOLUTION INTRAVENOUS
Status: DISCONTINUED | OUTPATIENT
Start: 2022-03-16 | End: 2022-03-16

## 2022-03-16 RX ORDER — LIDOCAINE HYDROCHLORIDE 10 MG/ML
1 INJECTION, SOLUTION EPIDURAL; INFILTRATION; INTRACAUDAL; PERINEURAL ONCE
Status: DISCONTINUED | OUTPATIENT
Start: 2022-03-16 | End: 2022-03-16 | Stop reason: HOSPADM

## 2022-03-16 RX ORDER — NEOSTIGMINE METHYLSULFATE 1 MG/ML
INJECTION, SOLUTION INTRAVENOUS
Status: DISCONTINUED | OUTPATIENT
Start: 2022-03-16 | End: 2022-03-16

## 2022-03-16 RX ORDER — LIDOCAINE HCL/PF 100 MG/5ML
SYRINGE (ML) INTRAVENOUS
Status: DISCONTINUED | OUTPATIENT
Start: 2022-03-16 | End: 2022-03-16

## 2022-03-16 RX ORDER — OXYCODONE HYDROCHLORIDE 5 MG/1
10 TABLET ORAL EVERY 4 HOURS PRN
Status: DISCONTINUED | OUTPATIENT
Start: 2022-03-16 | End: 2022-03-16 | Stop reason: HOSPADM

## 2022-03-16 RX ORDER — ROCURONIUM BROMIDE 10 MG/ML
INJECTION, SOLUTION INTRAVENOUS
Status: DISCONTINUED | OUTPATIENT
Start: 2022-03-16 | End: 2022-03-16

## 2022-03-16 RX ORDER — EPHEDRINE SULFATE 50 MG/ML
INJECTION, SOLUTION INTRAVENOUS
Status: DISCONTINUED | OUTPATIENT
Start: 2022-03-16 | End: 2022-03-16

## 2022-03-16 RX ORDER — SODIUM CHLORIDE, SODIUM LACTATE, POTASSIUM CHLORIDE, CALCIUM CHLORIDE 600; 310; 30; 20 MG/100ML; MG/100ML; MG/100ML; MG/100ML
INJECTION, SOLUTION INTRAVENOUS CONTINUOUS
Status: DISCONTINUED | OUTPATIENT
Start: 2022-03-16 | End: 2022-03-16 | Stop reason: HOSPADM

## 2022-03-16 RX ADMIN — SODIUM CHLORIDE, SODIUM LACTATE, POTASSIUM CHLORIDE, AND CALCIUM CHLORIDE: .6; .31; .03; .02 INJECTION, SOLUTION INTRAVENOUS at 07:03

## 2022-03-16 RX ADMIN — MIDAZOLAM HYDROCHLORIDE 2 MG: 1 INJECTION, SOLUTION INTRAMUSCULAR; INTRAVENOUS at 07:03

## 2022-03-16 RX ADMIN — KETOROLAC TROMETHAMINE 30 MG: 30 INJECTION, SOLUTION INTRAMUSCULAR; INTRAVENOUS at 09:03

## 2022-03-16 RX ADMIN — ONDANSETRON 8 MG: 2 INJECTION, SOLUTION INTRAMUSCULAR; INTRAVENOUS at 09:03

## 2022-03-16 RX ADMIN — ROCURONIUM BROMIDE 35 MG: 10 INJECTION, SOLUTION INTRAVENOUS at 07:03

## 2022-03-16 RX ADMIN — EPHEDRINE SULFATE 10 MG: 50 INJECTION, SOLUTION INTRAMUSCULAR; INTRAVENOUS; SUBCUTANEOUS at 07:03

## 2022-03-16 RX ADMIN — FENTANYL CITRATE 50 MCG: 50 INJECTION, SOLUTION INTRAMUSCULAR; INTRAVENOUS at 08:03

## 2022-03-16 RX ADMIN — NEOSTIGMINE METHYLSULFATE 4 MG: 1 INJECTION INTRAVENOUS at 10:03

## 2022-03-16 RX ADMIN — FENTANYL CITRATE 25 MCG: 50 INJECTION, SOLUTION INTRAMUSCULAR; INTRAVENOUS at 10:03

## 2022-03-16 RX ADMIN — FENTANYL CITRATE 100 MCG: 50 INJECTION, SOLUTION INTRAMUSCULAR; INTRAVENOUS at 07:03

## 2022-03-16 RX ADMIN — DEXAMETHASONE SODIUM PHOSPHATE 8 MG: 4 INJECTION, SOLUTION INTRA-ARTICULAR; INTRALESIONAL; INTRAMUSCULAR; INTRAVENOUS; SOFT TISSUE at 07:03

## 2022-03-16 RX ADMIN — SODIUM CHLORIDE, SODIUM LACTATE, POTASSIUM CHLORIDE, AND CALCIUM CHLORIDE: .6; .31; .03; .02 INJECTION, SOLUTION INTRAVENOUS at 05:03

## 2022-03-16 RX ADMIN — GLYCOPYRROLATE 0.6 MG: 0.2 INJECTION, SOLUTION INTRAMUSCULAR; INTRAVITREAL at 10:03

## 2022-03-16 RX ADMIN — VECURONIUM BROMIDE 3 MG: 10 INJECTION, POWDER, LYOPHILIZED, FOR SOLUTION INTRAVENOUS at 08:03

## 2022-03-16 RX ADMIN — ACETAMINOPHEN 1000 MG: 10 INJECTION, SOLUTION INTRAVENOUS at 09:03

## 2022-03-16 RX ADMIN — ROCURONIUM BROMIDE 15 MG: 10 INJECTION, SOLUTION INTRAVENOUS at 07:03

## 2022-03-16 RX ADMIN — SODIUM CHLORIDE: 0.9 INJECTION, SOLUTION INTRAVENOUS at 07:03

## 2022-03-16 RX ADMIN — LIDOCAINE HYDROCHLORIDE 60 MG: 20 INJECTION, SOLUTION INTRAVENOUS at 07:03

## 2022-03-16 RX ADMIN — SCOPALAMINE 1 PATCH: 1 PATCH, EXTENDED RELEASE TRANSDERMAL at 05:03

## 2022-03-16 RX ADMIN — MUPIROCIN: 20 OINTMENT TOPICAL at 05:03

## 2022-03-16 RX ADMIN — VECURONIUM BROMIDE 2 MG: 10 INJECTION, POWDER, LYOPHILIZED, FOR SOLUTION INTRAVENOUS at 08:03

## 2022-03-16 RX ADMIN — CEFAZOLIN 2 G: 330 INJECTION, POWDER, FOR SOLUTION INTRAMUSCULAR; INTRAVENOUS at 07:03

## 2022-03-16 RX ADMIN — PROPOFOL 200 MG: 10 INJECTION, EMULSION INTRAVENOUS at 07:03

## 2022-03-16 RX ADMIN — SUGAMMADEX 145 MG: 100 INJECTION, SOLUTION INTRAVENOUS at 10:03

## 2022-03-16 RX ADMIN — FENTANYL CITRATE 50 MCG: 50 INJECTION, SOLUTION INTRAMUSCULAR; INTRAVENOUS at 09:03

## 2022-03-16 NOTE — H&P
 CIN3         HPI:     41 y.o.            OB History         2    Para   2    Term                AB        Living            SAB        IAB        Ectopic        Multiple        Live Births                     Complaining of: h/o CKC done 12/10/21 with extensive dz across ectocervix, Path came back with + magin of CIN2-3 in 1 area and her to discuss mfgmt plan  No complaints currently     (Not in a hospital admission)             Review of patient's allergies indicates:   Allergen Reactions    Raspberry (rubus idaeus)                Past Medical History:   Diagnosis Date    Anemia      Back pain              Past Surgical History:   Procedure Laterality Date     SECTION        COLD KNIFE CONIZATION OF CERVIX   12/10/2021     Procedure: CONE BIOPSY, CERVIX, USING COLD KNIFE;  Surgeon: Modesto Nassar MD;  Location: [facility] OR;  Service: OB/GYN;;    COLONOSCOPY N/A 2021     Procedure: COLONOSCOPY;  Surgeon: Emily Cruz MD;  Location: [facility] ENDO;  Service: Endoscopy;  Laterality: N/A;    ESOPHAGOGASTRODUODENOSCOPY N/A 2021     Procedure: EGD (ESOPHAGOGASTRODUODENOSCOPY);  Surgeon: Emily Cruz MD;  Location: [facility] ENDO;  Service: Endoscopy;  Laterality: N/A;    SINUS SURGERY        TUBAL LIGATION                Family History   Problem Relation Age of Onset    Hypertension Mother      Diabetes Mother      Breast cancer Neg Hx      Colon cancer Neg Hx      Ovarian cancer Neg Hx        Social History      Tobacco Use    Smoking status: Former Smoker       Packs/day: 0.00       Quit date: 3/3/2021       Years since quittin.8    Smokeless tobacco: Never Used   Substance Use Topics    Alcohol use: No    Drug use: Yes       Types: Marijuana       Comment: daily      ROS:  GENERAL: Feeling well overall. Denies fever or chills.   SKIN: Denies rash or lesions.   HEAD: Denies head injury or headache.   NODES: Denies enlarged lymph nodes.   CHEST: Denies chest  pain or shortness of breath.   CARDIOVASCULAR: Denies palpitations or left sided chest pain.    ABDOMEN: Denies diarrhea, nausea, vomiting or rectal bleeding.   URINARY: No dysuria, hematuria, or burning on urination.  REPRODUCTIVE: See HPI.   BREASTS: Denies pain, lumps, or nipple discharge.   HEMATOLOGIC: No easy bruisability or excessive bleeding.   MUSCULOSKELETAL: Denies joint pain or swelling.   NEUROLOGIC: Denies syncope or weakness.   PSYCHIATRIC: Denies depression, anxiety or mood swings.        PE: /70 (BP Location: Left arm, Patient Position: Sitting)   Wt 76.9 kg (169 lb 8.5 oz)   LMP 01/18/2022 (Exact Date)   BMI 25.78 kg/m²       APPEARANCE: Well nourished, well developed, in no acute distress.  SKIN: Normal skin turgor, no lesions.  NECK: Neck symmetric without masses or thyromegaly.  NODES: No inguinal, cervical, axillary or femoral lymph node enlargement.  CARDIOVASCULAR: Normal S1, S2. No rubs, murmurs or gallops.  NEUROLOGIC: Normal mood and affect. No depression or anxiety.   ABDOMEN: Soft. No tenderness or masses. No hepatosplenomegaly. No hernias.  RESPIRATORY: Normal respiratory effort with no retractions or use of accessory muscles.  BREASTS: Symmetrical, no skin changes or visible lesions. No palpable masses, nipple discharge, or adenopathy bilaterally.   BLADDER: Non-tender  GENITALIA: normal external genitalia, no erythema, no discharge  URETHRA: normal urethra, normal urethral meatus  VAGINA: Normal  CERVIX: minimal residual cervix left but healed  UTERUS: normal  ADNEXA: no mass, fullness, tenderness     ASSESSMENT/ PLAN     Wandy was seen today for post-op evaluation.     Diagnoses and all orders for this visit:     Screening for STD (sexually transmitted disease)  -     C. trachomatis/N. gonorrhoeae by AMP DNA [facility]; Vagina  -     Vaginosis Screen by DNA Probe     Dysplasia of cervix, high grade ENZO 2        discuss mgmt options including rpt ckc vs hysterectomy. Pt desires  definitive tx to be hysterectomy. Finished childbearing and repeating ckc would be difficult given minimal amount of residual cervical tissue present  Consents signed today  Robotic TLH/BS keeping nml ovaries and ok with open procedure if necessary  Discussed added risks with h/o c/s x 2        The risks, benefits, and indications of the procedure were discussed with the patient and her family members if present.  These included bleeding, transfusion, infection, damage to surrounding tissues (bowel, bladder, ureter), wound separation, lymphedema, conversion to laparotomy if laparoscopic, perioperative cardiac events, VTE, pneumonia, and possible death.  She voiced understanding, all questions were answered and consents were signed.              Modesto Nassar MD

## 2022-03-16 NOTE — TRANSFER OF CARE
"Anesthesia Transfer of Care Note    Patient: Wandy Shah    Procedure(s) Performed: Procedure(s) (LRB):  ROBOTIC HYSTERECTOMY (N/A)  ROBOTIC SALPINGECTOMY (Bilateral)    Patient location: PACU    Anesthesia Type: general    Transport from OR: Transported from OR on 100% O2 by closed face mask with adequate spontaneous ventilation    Post pain: adequate analgesia    Post assessment: no apparent anesthetic complications and tolerated procedure well    Post vital signs: stable    Level of consciousness: sedated    Nausea/Vomiting: no nausea/vomiting    Complications: none    Transfer of care protocol was followed      Last vitals:   Visit Vitals  /68 (BP Location: Right arm, Patient Position: Lying)   Pulse 68   Temp 36.8 °C (98.2 °F) (Skin)   Resp 16   Ht 5' 8" (1.727 m)   Wt 72.6 kg (160 lb)   LMP 02/15/2022 (Exact Date)   SpO2 99%   Breastfeeding No   BMI 24.33 kg/m²     "

## 2022-03-16 NOTE — OP NOTE
DATE OF PROCEDURE:  3/1622     PREOPERATIVE DIAGNOSIS:    Moderate /severe dysplasia     POSTOPERATIVE DIAGNOSIS:    same     SURGERY:  Robotic TLH/BS     SURGEON:  Modesto Nassar M.D.     ASSISTANT:  Jazz Phelps     ANESTHESIA:  General endotracheal tube anesthesia.     ESTIMATED BLOOD LOSS:  20 mL     FINDINGS:  Normal bilateral tubes and ovaries and normal uterus. With extensive adhesions of bladder to ant uterus     SPECIMENS:  Uterus and cervix. Bilateral tubes   COMPLICATIONS:  None.     OPERATIVE REPORT IN DETAIL:  After assuring informed consent, the patient was   taken to the Operating Room where general anesthesia was administered.  She was   then placed in lithotomy position.  Her vagina was prepped and then her abdomen   was prepped.  The patient was then draped.  A weighted speculum was placed in   the vagina.  The anterior lip of the cervix was grasped with a single-tooth   tenaculum and theRumi uterine manipulator was placed into the endocervix and   the endocervical balloon was inflated.  The vaginal cuff occluder was then   insufflated.  Once this was done, the single-tooth tenaculum had been removed.  The   Soria catheter was then placed.Legs were lowered and bed leveled and then  Attention was then turned to the abdomen and an   Supraumbilical incision was made with the scalpel while tenting up on the   abdomen, a Veress needle was inserted.  A saline drop test was noted to be   within normal limits.  An 8 mm trocar then was placed into the supraumbilical   incision site and laparoscopic confirmation and location was achieved.  The   trocars were inserted while tenting up on the abdomen.  Once this was done, 8 mm   trocars were placed lateral of midline trocar in the right and left lower quadrants under laparoscopic visualization without complication. Remote centers were set and the robot was docked and tension released from trocar sites.  Attention was then turned to the uterus   with the  findings noted above.  The Tubes across the meso salpinx to th uteroovarian ligament was then cauterized   and transected using the Vessel sealer device and then the round ligament was   cauterized and transected using the bipol;ar and endoshears device.  The bladder flap was then   developed.  The same procedure was performed on the opposite side.  Extensive lysis of bladder adhesions off anterior uterus too about 50 minutes. Once the   bladder was well down in the uterine arteries had been cauterized and transected   using the bipolar and endoshear devices, then using the laparoscopic scissors, the anterior colpotomy incision was made and the  cup was identified. The scissors were used until the entire colpotomy incision was made until the cervix was disconnected from the vaginal cuff. The uterus was then pulled into the vagina to use as a vaginal cuff occluder. Then using 0 V-Lock suture, the vaginal cuff angle on the right was obtained and then several running stitches  were placed into the vaginal cuff untilThe left vaginal cuff angle was closed then the suture locked by running back to the right midline of cuff.. The area was very hemostatic. The area was copiously irrigated.  No bleeding was noted.    Pneumoperitoneum was released.  No bleeding was noted. . The patient tolerated the procedure well.  Pneumoperitoneum was released, The skin was closed using 4-0 Monocryl suture in a subcuticular fashion.    The patient tolerated the procedure   well.  All counts were correct x2. Vaginal sweep performed and cuff intact and dry. The patient was taken to Recovery Room in   stable condition.    [facility]    Discharge Note    SUMMARY     Admit Date: 10/1/2019    Discharge Date and Time:  03/16/2022 10:16 AM    [id number] y/o wityh mod severe dysplasia with + margins after CKC desires Hsyt for definitive TX. Had proceduer w/o complications and was d/c' home on same day.  Final Diagnosis:  Post-Op Diagnosis Codes:  Moderate/severe dysplasia]    Disposition: Home or Self Care    Follow Up/Patient Instructions:     Medications:  Reconciled Home Medications:      Medication List      ASK your doctor about these medications    albuterol 90 mcg/actuation inhaler  Commonly known as: PROVENTIL/VENTOLIN HFA  Inhale 1-2 puffs into the lungs every 6 (six) hours as needed for Wheezing. Rescue     amitriptyline 10 MG tablet  Commonly known as: ELAVIL  Take 1 tablet (10 mg total) by mouth nightly as needed for Insomnia.     ascorbic acid (vitamin C) 500 MG tablet  Commonly known as: VITAMIN C  Take 500 mg by mouth once daily.     dicyclomine 20 mg tablet  Commonly known as: BENTYL  Take 1 tablet (20 mg total) by mouth 3 (three) times daily as needed (abdominal pain).     DULoxetine 20 MG capsule  Commonly known as: CYMBALTA  Take 1 capsule (20 mg total) by mouth once daily.     ferrous gluconate 324 MG tablet  Commonly known as: FERGON  Take 324 mg by mouth daily with breakfast.     * FLOVENT  mcg/actuation inhaler  Generic drug: fluticasone propionate  1 puff 2 (two) times daily.     * fluticasone propionate 50 mcg/actuation nasal spray  Commonly known as: FLONASE  SPRAY 2 SPRAY(S) EVERY DAY BY INTRANASAL ROUTE.     gabapentin 300 MG capsule  Commonly known as: NEURONTIN  Take 300 mg by mouth 3 (three) times daily as needed.     ibuprofen 600 MG tablet  Commonly known as: ADVIL,MOTRIN  Take 1 tablet (600 mg total) by mouth every 6 (six) hours as needed for Pain.     LIDOcaine 5 %  Commonly known as: LIDODERM  Place 1 patch onto the skin once daily. Remove & Discard patch within 12 hours or as directed by MD MANUEL 72 mcg Cap capsule  Generic drug: linaCLOtide  Take 1 capsule (72 mcg total) by mouth once daily.     loratadine 10 mg tablet  Commonly known as: CLARITIN     naproxen 500 MG tablet  Commonly known as: NAPROSYN  Take 1 tablet (500 mg total) by mouth 2 (two) times daily with meals.      omeprazole 20 MG capsule  Commonly known as: PRILOSEC  Take 1 capsule (20 mg total) by mouth once daily.     oxyCODONE-acetaminophen 5-325 mg per tablet  Commonly known as: PERCOCET  Take 1 tablet by mouth every 4 (four) hours as needed for Pain.     sumatriptan 100 MG tablet  Commonly known as: IMITREX  Take 1 tablet (100 mg total) by mouth every 2 (two) hours as needed for Migraine.         * This list has 2 medication(s) that are the same as other medications prescribed for you. Read the directions carefully, and ask your doctor or other care provider to review them with you.              D/C pt. To home in stable condition  Reg diet  Ad vito activity with pelvic rest x 6 wks  Call for fever >101, heavy vag bleeding, pain uncontrolled w/ pain meds  F/u in office in 1 weekand  6-8wks for final check-up  Motrin 600mg, Percocet 5/325mg  Modesto Nassar MD

## 2022-03-16 NOTE — PLAN OF CARE
Pt meets criteria for discharge. VSS. Tolerating clear liquids. Voiding spontaneously. Pt and daughter given AVS, states understanding of discharge instructions. Prescriptions delivered to bedside. IV removed. Pt discharged to home.

## 2022-03-17 ENCOUNTER — TELEPHONE (OUTPATIENT)
Dept: OBSTETRICS AND GYNECOLOGY | Facility: CLINIC | Age: 42
End: 2022-03-17
Payer: MEDICAID

## 2022-03-17 NOTE — ANESTHESIA POSTPROCEDURE EVALUATION
Anesthesia Post Evaluation    Patient: Wandy Shah    Procedure(s) Performed: Procedure(s) (LRB):  ROBOTIC HYSTERECTOMY (N/A)  ROBOTIC SALPINGECTOMY (Bilateral)    Final Anesthesia Type: general      Patient location during evaluation: PACU  Patient participation: Yes- Able to Participate  Level of consciousness: awake and alert  Post-procedure vital signs: reviewed and stable  Pain management: adequate  Airway patency: patent  RAH mitigation strategies: Multimodal analgesia  PONV status at discharge: No PONV  Anesthetic complications: no      Cardiovascular status: hemodynamically stable and blood pressure returned to baseline  Respiratory status: room air, unassisted and spontaneous ventilation  Hydration status: euvolemic  Follow-up not needed.          Vitals Value Taken Time   /62 03/16/22 1200   Temp 36.5 °C (97.7 °F) 03/16/22 1200   Pulse 65 03/16/22 1200   Resp 16 03/16/22 1200   SpO2 97 % 03/16/22 1200         Event Time   Out of Recovery 11:18:41         Pain/Ildefonso Score: Ildefonso Score: 10 (3/16/2022 12:00 PM)

## 2022-03-17 NOTE — TELEPHONE ENCOUNTER
----- Message from Trenton Jolly sent at 3/17/2022  7:58 AM CDT -----  Name Of Caller: Wandy        Provider Name: Modesto Nassar        Does patient feel the need to be seen today? no        Relationship to the Pt?: patient         Contact Preference?: [phone]        What is the nature of the call?:  Patient states that she had a hysterectomy on yesterday an needs after-care instructions, patient needs to know when she can take a bath or shower and when she can remove her bandage

## 2022-03-20 DIAGNOSIS — K59.04 CHRONIC IDIOPATHIC CONSTIPATION: ICD-10-CM

## 2022-03-22 LAB
FINAL PATHOLOGIC DIAGNOSIS: NORMAL
GROSS: NORMAL
Lab: NORMAL

## 2022-03-24 ENCOUNTER — HOSPITAL ENCOUNTER (EMERGENCY)
Facility: HOSPITAL | Age: 42
Discharge: LEFT BEFORE DISCHARGE | End: 2022-03-24
Payer: MEDICAID

## 2022-03-24 VITALS
RESPIRATION RATE: 20 BRPM | HEIGHT: 68 IN | HEART RATE: 82 BPM | SYSTOLIC BLOOD PRESSURE: 108 MMHG | TEMPERATURE: 98 F | OXYGEN SATURATION: 98 % | WEIGHT: 160 LBS | DIASTOLIC BLOOD PRESSURE: 56 MMHG | BODY MASS INDEX: 24.25 KG/M2

## 2022-03-24 LAB
B-HCG UR QL: NEGATIVE
CTP QC/QA: YES

## 2022-03-24 PROCEDURE — 99900041 HC LEFT WITHOUT BEING SEEN- EMERGENCY

## 2022-03-24 PROCEDURE — 81025 URINE PREGNANCY TEST: CPT | Performed by: NURSE PRACTITIONER

## 2022-03-24 RX ORDER — ACETAMINOPHEN 325 MG/1
650 TABLET ORAL
Status: DISCONTINUED | OUTPATIENT
Start: 2022-03-24 | End: 2022-03-24 | Stop reason: HOSPADM

## 2022-03-24 NOTE — FIRST PROVIDER EVALUATION
Emergency Department TeleTriage Encounter Note      CHIEF COMPLAINT    Chief Complaint   Patient presents with    Back Pain     Exacerbation of chronic lower back pain since yesterday after bending over.        VITAL SIGNS   Initial Vitals [03/24/22 1652]   BP Pulse Resp Temp SpO2   (!) 108/56 82 20 98.1 °F (36.7 °C) 98 %      MAP       --            ALLERGIES    Review of patient's allergies indicates:   Allergen Reactions    Raspberry (rubus idaeus)        PROVIDER TRIAGE NOTE  This is a teletriage evaluation of a 41 y.o. female presenting to the ED with c/o back pain following bending over yesterday. Tried meds without relief. Limited physical exam via telehealth: The patient is awake, alert, answering questions appropriately and is not respiratory distress. Initial orders will be placed and care will be transferred to an alternate provider when patient is roomed for a full evaluation. Any additional orders and the final disposition will be determined by that provider.         ORDERS  Labs Reviewed - No data to display    ED Orders (720h ago, onward)    Start Ordered     Status Ordering Provider    03/24/22 1800 03/24/22 1749  acetaminophen tablet 650 mg  ED 1 Time         Ordered LANEY CAREY    03/24/22 1749 03/24/22 1749  POCT urine pregnancy  Once         Ordered LANEY CAREY            Virtual Visit Note: The provider triage portion of this emergency department evaluation and documentation was performed via [employer], a HIPAA-compliant telemedicine application, in concert with a tele-presenter in the room. A face to face patient evaluation with one of my colleagues will occur once the patient is placed in an emergency department room.      DISCLAIMER: This note was prepared with [employer] voice recognition transcription software. Garbled syntax, mangled pronouns, and other bizarre constructions may be attributed to that software system.

## 2022-03-25 ENCOUNTER — PATIENT MESSAGE (OUTPATIENT)
Dept: OBSTETRICS AND GYNECOLOGY | Facility: CLINIC | Age: 42
End: 2022-03-25
Payer: MEDICAID

## 2022-03-25 NOTE — ED NOTES
Pt. Is hostile and cursing loudly to others in waiting room regarding wait time and order of patients being called to back. She is aggressive verbally toward ed tech (Maribel) in the tele triage process. I attempted to explain triage to patient and implementation of tele triage process to expedite treatment for patients. She is angry for upt being ordered when she had a hysterectomy. The patient was aggressive and verbally abusive to ED staff. Multiple derogatory comments made to nursing staff with threatening body language and tone. Security attempted to deescalate pt. Unsuccessfully and she was escorted out of ed. Pt. Left stable without distress.

## 2022-03-31 ENCOUNTER — OFFICE VISIT (OUTPATIENT)
Dept: NEUROLOGY | Facility: CLINIC | Age: 42
End: 2022-03-31
Payer: MEDICAID

## 2022-03-31 VITALS
HEART RATE: 78 BPM | BODY MASS INDEX: 25.62 KG/M2 | HEIGHT: 68 IN | WEIGHT: 169.06 LBS | DIASTOLIC BLOOD PRESSURE: 63 MMHG | SYSTOLIC BLOOD PRESSURE: 99 MMHG

## 2022-03-31 DIAGNOSIS — G43.009 MIGRAINE WITHOUT AURA AND WITHOUT STATUS MIGRAINOSUS, NOT INTRACTABLE: Primary | ICD-10-CM

## 2022-03-31 PROCEDURE — 3008F PR BODY MASS INDEX (BMI) DOCUMENTED: ICD-10-PCS | Mod: CPTII,,, | Performed by: NEUROMUSCULOSKELETAL MEDICINE & OMM

## 2022-03-31 PROCEDURE — 99999 PR PBB SHADOW E&M-EST. PATIENT-LVL III: CPT | Mod: PBBFAC,,, | Performed by: NEUROMUSCULOSKELETAL MEDICINE & OMM

## 2022-03-31 PROCEDURE — 99999 PR PBB SHADOW E&M-EST. PATIENT-LVL III: ICD-10-PCS | Mod: PBBFAC,,, | Performed by: NEUROMUSCULOSKELETAL MEDICINE & OMM

## 2022-03-31 PROCEDURE — 1159F MED LIST DOCD IN RCRD: CPT | Mod: CPTII,,, | Performed by: NEUROMUSCULOSKELETAL MEDICINE & OMM

## 2022-03-31 PROCEDURE — 99215 PR OFFICE/OUTPT VISIT, EST, LEVL V, 40-54 MIN: ICD-10-PCS | Mod: S$PBB,,, | Performed by: NEUROMUSCULOSKELETAL MEDICINE & OMM

## 2022-03-31 PROCEDURE — 99215 OFFICE O/P EST HI 40 MIN: CPT | Mod: S$PBB,,, | Performed by: NEUROMUSCULOSKELETAL MEDICINE & OMM

## 2022-03-31 PROCEDURE — 99213 OFFICE O/P EST LOW 20 MIN: CPT | Mod: PBBFAC,PO | Performed by: NEUROMUSCULOSKELETAL MEDICINE & OMM

## 2022-03-31 PROCEDURE — 3078F DIAST BP <80 MM HG: CPT | Mod: CPTII,,, | Performed by: NEUROMUSCULOSKELETAL MEDICINE & OMM

## 2022-03-31 PROCEDURE — 3078F PR MOST RECENT DIASTOLIC BLOOD PRESSURE < 80 MM HG: ICD-10-PCS | Mod: CPTII,,, | Performed by: NEUROMUSCULOSKELETAL MEDICINE & OMM

## 2022-03-31 PROCEDURE — 3008F BODY MASS INDEX DOCD: CPT | Mod: CPTII,,, | Performed by: NEUROMUSCULOSKELETAL MEDICINE & OMM

## 2022-03-31 PROCEDURE — 3074F PR MOST RECENT SYSTOLIC BLOOD PRESSURE < 130 MM HG: ICD-10-PCS | Mod: CPTII,,, | Performed by: NEUROMUSCULOSKELETAL MEDICINE & OMM

## 2022-03-31 PROCEDURE — 1159F PR MEDICATION LIST DOCUMENTED IN MEDICAL RECORD: ICD-10-PCS | Mod: CPTII,,, | Performed by: NEUROMUSCULOSKELETAL MEDICINE & OMM

## 2022-03-31 PROCEDURE — 3074F SYST BP LT 130 MM HG: CPT | Mod: CPTII,,, | Performed by: NEUROMUSCULOSKELETAL MEDICINE & OMM

## 2022-03-31 RX ORDER — METHOCARBAMOL 500 MG/1
500 TABLET, FILM COATED ORAL 4 TIMES DAILY
COMMUNITY
Start: 2022-03-26 | End: 2022-10-19

## 2022-03-31 RX ORDER — AMITRIPTYLINE HYDROCHLORIDE 25 MG/1
25 TABLET, FILM COATED ORAL NIGHTLY
Qty: 30 TABLET | Refills: 3 | Status: SHIPPED | OUTPATIENT
Start: 2022-03-31 | End: 2022-08-08 | Stop reason: SDUPTHER

## 2022-03-31 RX ORDER — HYDROCODONE BITARTRATE AND ACETAMINOPHEN 5; 325 MG/1; MG/1
1 TABLET ORAL EVERY 6 HOURS PRN
COMMUNITY
Start: 2022-03-26 | End: 2022-06-20

## 2022-03-31 RX ORDER — CELECOXIB 200 MG/1
200 CAPSULE ORAL 2 TIMES DAILY
COMMUNITY
Start: 2022-03-26 | End: 2022-06-20
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
## 2022-05-10 ENCOUNTER — OFFICE VISIT (OUTPATIENT)
Dept: OBSTETRICS AND GYNECOLOGY | Facility: CLINIC | Age: 42
End: 2022-05-10
Payer: MEDICAID

## 2022-05-10 VITALS — DIASTOLIC BLOOD PRESSURE: 68 MMHG | WEIGHT: 166 LBS | BODY MASS INDEX: 25.24 KG/M2 | SYSTOLIC BLOOD PRESSURE: 116 MMHG

## 2022-05-10 DIAGNOSIS — Z98.890 POST-OPERATIVE STATE: Primary | ICD-10-CM

## 2022-05-10 DIAGNOSIS — N95.2 VAGINAL ATROPHY: ICD-10-CM

## 2022-05-10 PROCEDURE — 99999 PR PBB SHADOW E&M-EST. PATIENT-LVL III: CPT | Mod: PBBFAC,,, | Performed by: OBSTETRICS & GYNECOLOGY

## 2022-05-10 PROCEDURE — 1159F MED LIST DOCD IN RCRD: CPT | Mod: CPTII,,, | Performed by: OBSTETRICS & GYNECOLOGY

## 2022-05-10 PROCEDURE — 99024 PR POST-OP FOLLOW-UP VISIT: ICD-10-PCS | Mod: ,,, | Performed by: OBSTETRICS & GYNECOLOGY

## 2022-05-10 PROCEDURE — 3008F PR BODY MASS INDEX (BMI) DOCUMENTED: ICD-10-PCS | Mod: CPTII,,, | Performed by: OBSTETRICS & GYNECOLOGY

## 2022-05-10 PROCEDURE — 3078F PR MOST RECENT DIASTOLIC BLOOD PRESSURE < 80 MM HG: ICD-10-PCS | Mod: CPTII,,, | Performed by: OBSTETRICS & GYNECOLOGY

## 2022-05-10 PROCEDURE — 1160F RVW MEDS BY RX/DR IN RCRD: CPT | Mod: CPTII,,, | Performed by: OBSTETRICS & GYNECOLOGY

## 2022-05-10 PROCEDURE — 3074F PR MOST RECENT SYSTOLIC BLOOD PRESSURE < 130 MM HG: ICD-10-PCS | Mod: CPTII,,, | Performed by: OBSTETRICS & GYNECOLOGY

## 2022-05-10 PROCEDURE — 1160F PR REVIEW ALL MEDS BY PRESCRIBER/CLIN PHARMACIST DOCUMENTED: ICD-10-PCS | Mod: CPTII,,, | Performed by: OBSTETRICS & GYNECOLOGY

## 2022-05-10 PROCEDURE — 99213 OFFICE O/P EST LOW 20 MIN: CPT | Mod: PBBFAC,PO | Performed by: OBSTETRICS & GYNECOLOGY

## 2022-05-10 PROCEDURE — 99024 POSTOP FOLLOW-UP VISIT: CPT | Mod: ,,, | Performed by: OBSTETRICS & GYNECOLOGY

## 2022-05-10 PROCEDURE — 1159F PR MEDICATION LIST DOCUMENTED IN MEDICAL RECORD: ICD-10-PCS | Mod: CPTII,,, | Performed by: OBSTETRICS & GYNECOLOGY

## 2022-05-10 PROCEDURE — 3074F SYST BP LT 130 MM HG: CPT | Mod: CPTII,,, | Performed by: OBSTETRICS & GYNECOLOGY

## 2022-05-10 PROCEDURE — 3078F DIAST BP <80 MM HG: CPT | Mod: CPTII,,, | Performed by: OBSTETRICS & GYNECOLOGY

## 2022-05-10 PROCEDURE — 99999 PR PBB SHADOW E&M-EST. PATIENT-LVL III: ICD-10-PCS | Mod: PBBFAC,,, | Performed by: OBSTETRICS & GYNECOLOGY

## 2022-05-10 PROCEDURE — 3008F BODY MASS INDEX DOCD: CPT | Mod: CPTII,,, | Performed by: OBSTETRICS & GYNECOLOGY

## 2022-05-10 RX ORDER — ESTRADIOL 0.1 MG/G
1 CREAM VAGINAL
Qty: 42.5 G | Refills: 1 | Status: SHIPPED | OUTPATIENT
Start: 2022-05-12 | End: 2023-04-26 | Stop reason: SDUPTHER

## 2022-05-10 NOTE — PROGRESS NOTES
CC:  Chief Complaint   Patient presents with    Post-op Evaluation       HPI:    41 y.o.   OB History        2    Para   2    Term                AB        Living           SAB        IAB        Ectopic        Multiple        Live Births                   S/p hysterectomy on 3/16/22. Doing well. No complaints. Incisions well healed. Denies vaginal d/c, vaginal bleeding, dysuria. ROS otherwise negative.    (Not in a hospital admission)      Review of patient's allergies indicates:   Allergen Reactions    Raspberry (rubus idaeus)         Past Medical History:   Diagnosis Date    Anemia     Back pain      Past Surgical History:   Procedure Laterality Date     SECTION      COLD KNIFE CONIZATION OF CERVIX  12/10/2021    Procedure: CONE BIOPSY, CERVIX, USING COLD KNIFE;  Surgeon: Modesto Nassar MD;  Location: [facility] OR;  Service: OB/GYN;;    COLONOSCOPY N/A 2021    Procedure: COLONOSCOPY;  Surgeon: Emily Cruz MD;  Location: [facility];  Service: Endoscopy;  Laterality: N/A;    ESOPHAGOGASTRODUODENOSCOPY N/A 2021    Procedure: EGD (ESOPHAGOGASTRODUODENOSCOPY);  Surgeon: Emily Cruz MD;  Location: [facility];  Service: Endoscopy;  Laterality: N/A;    ROBOT-ASSISTED LAPAROSCOPIC HYSTERECTOMY N/A 3/16/2022    Procedure: ROBOTIC HYSTERECTOMY;  Surgeon: Modesto Nassar MD;  Location: [facility] OR;  Service: OB/GYN;  Laterality: N/A;    ROBOT-ASSISTED SALPINGECTOMY Bilateral 3/16/2022    Procedure: ROBOTIC SALPINGECTOMY;  Surgeon: Modesto Nassar MD;  Location: [facility] OR;  Service: OB/GYN;  Laterality: Bilateral;    SINUS SURGERY      TUBAL LIGATION       Family History   Problem Relation Age of Onset    Hypertension Mother     Diabetes Mother     Breast cancer Neg Hx     Colon cancer Neg Hx     Ovarian cancer Neg Hx      Social History     Tobacco Use    Smoking status: Former Smoker     Packs/day: 0.00     Quit date: 3/3/2021     Years since quittin.1     Smokeless tobacco: Never Used   Substance Use Topics    Alcohol use: No    Drug use: Yes     Types: Marijuana     Comment: daily     ROS:  GENERAL: Feeling well overall. Denies fever or chills.   SKIN: Denies rash or lesions.   HEAD: Denies head injury or headache.   NODES: Denies enlarged lymph nodes.   CHEST: Denies chest pain or shortness of breath.   CARDIOVASCULAR: Denies palpitations or left sided chest pain.    ABDOMEN: Denies diarrhea, nausea, vomiting or rectal bleeding.   URINARY: No dysuria, hematuria, or burning on urination.  REPRODUCTIVE: See HPI.   BREASTS: Denies pain, lumps, or nipple discharge.   HEMATOLOGIC: No easy bruisability or excessive bleeding.   MUSCULOSKELETAL: Denies joint pain or swelling.   NEUROLOGIC: Denies syncope or weakness.   PSYCHIATRIC: Denies depression, anxiety or mood swings.      PE: /68   Wt 75.3 kg (166 lb)   LMP 03/16/2022 (Exact Date)   BMI 25.24 kg/m²      APPEARANCE: Well nourished, well developed, in no acute distress.  SKIN: Normal skin turgor, no lesions. Incisions well healed  NECK: Neck symmetric without masses or thyromegaly.  NODES: No inguinal, cervical, axillary or femoral lymph node enlargement.  CARDIOVASCULAR: Normal S1, S2. No rubs, murmurs or gallops.  NEUROLOGIC: Normal mood and affect. No depression or anxiety.   ABDOMEN: Soft. No tenderness or masses. No hepatosplenomegaly. No hernias.  RESPIRATORY: Normal respiratory effort with no retractions or use of accessory muscles.  BLADDER: Non-tender  Cuff healed with a smal amout of atrophy at cuff scar , no bleeding when probed    ASSESSMENT/ PLAN    Wandy was seen today for post-op evaluation.    Diagnoses and all orders for this visit:    Post-operative state    Vaginal atrophy    Other orders  -     estradioL (ESTRACE) 0.01 % (0.1 mg/gram) vaginal cream; Place 1 g vaginally twice a week. Use 1/2 gram nightly for 1st week    RTC for annual.    resumke intercourse in 1 wk    Modesto Nassar  MD

## 2022-06-01 ENCOUNTER — PATIENT MESSAGE (OUTPATIENT)
Dept: FAMILY MEDICINE | Facility: HOSPITAL | Age: 42
End: 2022-06-01
Payer: MEDICAID

## 2022-06-17 ENCOUNTER — LAB VISIT (OUTPATIENT)
Dept: LAB | Facility: HOSPITAL | Age: 42
End: 2022-06-17
Attending: FAMILY MEDICINE
Payer: MEDICAID

## 2022-06-17 ENCOUNTER — OFFICE VISIT (OUTPATIENT)
Dept: FAMILY MEDICINE | Facility: HOSPITAL | Age: 42
End: 2022-06-17
Attending: FAMILY MEDICINE
Payer: MEDICAID

## 2022-06-17 VITALS
HEIGHT: 68 IN | DIASTOLIC BLOOD PRESSURE: 68 MMHG | HEART RATE: 86 BPM | SYSTOLIC BLOOD PRESSURE: 97 MMHG | BODY MASS INDEX: 25.79 KG/M2 | WEIGHT: 170.19 LBS

## 2022-06-17 DIAGNOSIS — F41.9 ANXIETY: ICD-10-CM

## 2022-06-17 DIAGNOSIS — R23.3 SKIN SPOTS, RED: ICD-10-CM

## 2022-06-17 DIAGNOSIS — R42 DIZZINESS: ICD-10-CM

## 2022-06-17 DIAGNOSIS — K21.9 GASTROESOPHAGEAL REFLUX DISEASE, UNSPECIFIED WHETHER ESOPHAGITIS PRESENT: ICD-10-CM

## 2022-06-17 DIAGNOSIS — K21.9 GASTROESOPHAGEAL REFLUX DISEASE, UNSPECIFIED WHETHER ESOPHAGITIS PRESENT: Primary | ICD-10-CM

## 2022-06-17 LAB
ALBUMIN SERPL BCP-MCNC: 3.8 G/DL (ref 3.5–5.2)
ALP SERPL-CCNC: 39 U/L (ref 55–135)
ALT SERPL W/O P-5'-P-CCNC: 12 U/L (ref 10–44)
ANION GAP SERPL CALC-SCNC: 11 MMOL/L (ref 8–16)
AST SERPL-CCNC: 18 U/L (ref 10–40)
BASOPHILS # BLD AUTO: 0.08 K/UL (ref 0–0.2)
BASOPHILS NFR BLD: 0.7 % (ref 0–1.9)
BILIRUB SERPL-MCNC: 0.4 MG/DL (ref 0.1–1)
BUN SERPL-MCNC: 14 MG/DL (ref 6–20)
CALCIUM SERPL-MCNC: 8.9 MG/DL (ref 8.7–10.5)
CHLORIDE SERPL-SCNC: 105 MMOL/L (ref 95–110)
CO2 SERPL-SCNC: 24 MMOL/L (ref 23–29)
CREAT SERPL-MCNC: 0.8 MG/DL (ref 0.5–1.4)
DIFFERENTIAL METHOD: ABNORMAL
EOSINOPHIL # BLD AUTO: 0.3 K/UL (ref 0–0.5)
EOSINOPHIL NFR BLD: 2.5 % (ref 0–8)
ERYTHROCYTE [DISTWIDTH] IN BLOOD BY AUTOMATED COUNT: 13.3 % (ref 11.5–14.5)
EST. GFR  (AFRICAN AMERICAN): >60 ML/MIN/1.73 M^2
EST. GFR  (NON AFRICAN AMERICAN): >60 ML/MIN/1.73 M^2
GLUCOSE SERPL-MCNC: 85 MG/DL (ref 70–110)
HCT VFR BLD AUTO: 39.9 % (ref 37–48.5)
HGB BLD-MCNC: 13.4 G/DL (ref 12–16)
IMM GRANULOCYTES # BLD AUTO: 0.09 K/UL (ref 0–0.04)
IMM GRANULOCYTES NFR BLD AUTO: 0.7 % (ref 0–0.5)
LYMPHOCYTES # BLD AUTO: 3 K/UL (ref 1–4.8)
LYMPHOCYTES NFR BLD: 24.2 % (ref 18–48)
MCH RBC QN AUTO: 31.6 PG (ref 27–31)
MCHC RBC AUTO-ENTMCNC: 33.6 G/DL (ref 32–36)
MCV RBC AUTO: 94 FL (ref 82–98)
MONOCYTES # BLD AUTO: 0.8 K/UL (ref 0.3–1)
MONOCYTES NFR BLD: 6.6 % (ref 4–15)
NEUTROPHILS # BLD AUTO: 8 K/UL (ref 1.8–7.7)
NEUTROPHILS NFR BLD: 65.3 % (ref 38–73)
NRBC BLD-RTO: 0 /100 WBC
PLATELET # BLD AUTO: 205 K/UL (ref 150–450)
PMV BLD AUTO: 10.2 FL (ref 9.2–12.9)
POTASSIUM SERPL-SCNC: 4.4 MMOL/L (ref 3.5–5.1)
PROT SERPL-MCNC: 7.2 G/DL (ref 6–8.4)
RBC # BLD AUTO: 4.24 M/UL (ref 4–5.4)
SODIUM SERPL-SCNC: 140 MMOL/L (ref 136–145)
WBC # BLD AUTO: 12.21 K/UL (ref 3.9–12.7)

## 2022-06-17 PROCEDURE — 36415 COLL VENOUS BLD VENIPUNCTURE: CPT | Performed by: STUDENT IN AN ORGANIZED HEALTH CARE EDUCATION/TRAINING PROGRAM

## 2022-06-17 PROCEDURE — 80053 COMPREHEN METABOLIC PANEL: CPT | Performed by: STUDENT IN AN ORGANIZED HEALTH CARE EDUCATION/TRAINING PROGRAM

## 2022-06-17 PROCEDURE — 85025 COMPLETE CBC W/AUTO DIFF WBC: CPT | Performed by: STUDENT IN AN ORGANIZED HEALTH CARE EDUCATION/TRAINING PROGRAM

## 2022-06-17 PROCEDURE — 99215 OFFICE O/P EST HI 40 MIN: CPT | Performed by: STUDENT IN AN ORGANIZED HEALTH CARE EDUCATION/TRAINING PROGRAM

## 2022-06-17 RX ORDER — DULOXETIN HYDROCHLORIDE 30 MG/1
30 CAPSULE, DELAYED RELEASE ORAL DAILY
Qty: 30 CAPSULE | Refills: 1 | Status: SHIPPED | OUTPATIENT
Start: 2022-06-17 | End: 2022-08-08 | Stop reason: SDUPTHER

## 2022-06-17 RX ORDER — MECLIZINE HCL 12.5 MG 12.5 MG/1
12.5 TABLET ORAL 2 TIMES DAILY PRN
Qty: 28 TABLET | Refills: 0 | Status: SHIPPED | OUTPATIENT
Start: 2022-06-17 | End: 2022-08-08 | Stop reason: SDUPTHER

## 2022-06-17 RX ORDER — SUCRALFATE 1 G/1
1 TABLET ORAL
Qty: 120 TABLET | Refills: 0 | Status: SHIPPED | OUTPATIENT
Start: 2022-06-17 | End: 2022-07-11

## 2022-06-17 RX ORDER — HYDROCORTISONE 25 MG/G
CREAM TOPICAL 2 TIMES DAILY
Qty: 30 G | Refills: 0 | Status: SHIPPED | OUTPATIENT
Start: 2022-06-17 | End: 2023-10-13 | Stop reason: SDUPTHER

## 2022-06-17 NOTE — PROGRESS NOTES
"            [facility]               Clinic H&P    Subjective                                                                                                                                                                           Chief Complaint: abdominal pain/anxiety/red spots    Wandy Shah is a 42 y.o. female who  has a past medical history of Anemia and Back pain. The patient presents to clinic for   HPI     ACOSTA  cymbalta 20 mg  ACOSTA 20 today    Low blood pressure:  Light headed and dizzy  Reports feeling cold  Reports dizziness when leaning forwards primarily at work  No dizziness with head movements to either side    prilosec for GERD not alleviating  Reports called GI specialist and was informed needs new referral    Abdominal pain  linzess and stool softener  Reports taking linzess only at night, prescribed prn TID    Health Maintenance   Topic Date Due    Mammogram  Never done    TETANUS VACCINE  11/13/2027    Hepatitis C Screening  Completed    Lipid Panel  Completed        Review of Systems   Constitutional: Negative for chills and fever.   HENT: Negative for ear pain and sore throat.    Eyes: Negative for discharge and redness.   Respiratory: Negative for cough and chest tightness.    Cardiovascular: Negative for chest pain and leg swelling.   Gastrointestinal: Negative for abdominal pain and diarrhea.   Genitourinary: Negative for dysuria and hematuria.   Musculoskeletal: Negative for back pain and myalgias.   Skin: Negative for pallor and rash.   Neurological: Negative for dizziness and headaches.   Psychiatric/Behavioral: Negative for confusion. The patient is not nervous/anxious.         Objective                                                                                                                                                                             Vitals:    06/17/22 1035   BP: 97/68   Pulse: 86   Weight: 77.2 kg (170 lb 3.1 oz)   Height: 5' 8" (1.727 m)      Body " mass index is 25.88 kg/m².    Physical Exam  Vitals reviewed.   Constitutional:       General: She is not in acute distress.     Appearance: Normal appearance.   HENT:      Head: Normocephalic and atraumatic.   Eyes:      General:         Right eye: No discharge.         Left eye: No discharge.      Extraocular Movements: Extraocular movements intact.      Pupils: Pupils are equal, round, and reactive to light.   Cardiovascular:      Rate and Rhythm: Normal rate and regular rhythm.      Pulses: Normal pulses.      Heart sounds: No murmur heard.  Pulmonary:      Effort: Pulmonary effort is normal. No respiratory distress.      Breath sounds: Normal breath sounds. No wheezing.   Abdominal:      General: Abdomen is flat. Bowel sounds are normal.      Palpations: Abdomen is soft.      Tenderness: There is no abdominal tenderness.   Musculoskeletal:         General: No swelling or tenderness.   Skin:     Capillary Refill: Capillary refill takes less than 2 seconds.      Coloration: Skin is not jaundiced.   Neurological:      General: No focal deficit present.      Mental Status: She is alert and oriented to person, place, and time.   Psychiatric:         Mood and Affect: Mood normal.         Behavior: Behavior normal.         Laboratory:  Lab Results   Component Value Date    WBC 12.21 06/17/2022    HGB 13.4 06/17/2022    HCT 39.9 06/17/2022    MCV 94 06/17/2022     06/17/2022       Chemistry        Component Value Date/Time     06/17/2022 1200    K 4.4 06/17/2022 1200     06/17/2022 1200    CO2 24 06/17/2022 1200    BUN 14 06/17/2022 1200    CREATININE 0.8 06/17/2022 1200    GLU 85 06/17/2022 1200        Component Value Date/Time    CALCIUM 8.9 06/17/2022 1200    ALKPHOS 39 (L) 06/17/2022 1200    AST 18 06/17/2022 1200    ALT 12 06/17/2022 1200    BILITOT 0.4 06/17/2022 1200    ESTGFRAFRICA >60 06/17/2022 1200    EGFRNONAA >60 06/17/2022 1200          No results found for: MG, PHOS  Lab Results    Component Value Date    CHOL 174 02/15/2022    HDL 38 (L) 02/15/2022    LDLCALC 114.0 02/15/2022    TRIG 110 02/15/2022     The ASCVD Risk score (Alicia GALLARDO Jr., et al., 2013) failed to calculate for the following reasons:    Unable to determine if patient is Non-      5-7.4% - shared decision making, consider mod-intensity statin  7.5-14.9% - shared decision making, consider mod-high inten statin (if +diab 40-76 yo, initiate or cont mod/high inten statin)  >15% - initiate or cont mod-high inten statin... consider high-inten statin (if +diab 40-76 yo, initiate or cont high inten statin)  if +diab 40-76 yo, + LDL  - initiate or cont mod inten statin  if +diab 40-76 yo, + LDL >190 - initiate or cont high inten statin  Lab Results   Component Value Date    LDLCALC 114.0 02/15/2022       No results found for: TSH  No results found for: HGBA1C      Assessment/Plan                                                                                                                                                                Wandy Shah is a 42 y.o. female who presents to clinic with:    1. Gastroesophageal reflux disease, unspecified whether esophagitis present    2. Skin spots, red    3. Anxiety    4. Dizziness         Problem List Items Addressed This Visit        GI    GERD (gastroesophageal reflux disease) - Primary    Relevant Orders    CBC Auto Differential (Completed)    Comprehensive Metabolic Panel (Completed)    Ambulatory referral/consult to Gastroenterology      Other Visit Diagnoses     Skin spots, red        Relevant Medications    hydrocortisone 2.5 % cream    Anxiety        Relevant Medications    DULoxetine (CYMBALTA) 30 MG capsule    Other Relevant Orders    Ambulatory referral/consult to Psychology    Dizziness        Relevant Medications    meclizine (ANTIVERT) 12.5 mg tablet        Sitting 90/70  Laying 100/72  Sitting 90/68  Negative orthostatics. Educated to increase water  intake    Increased cymbalta    Referral to GI placed  Medication List with Changes/Refills   New Medications    DULOXETINE (CYMBALTA) 30 MG CAPSULE    Take 1 capsule (30 mg total) by mouth once daily.    HYDROCORTISONE 2.5 % CREAM    Apply topically 2 (two) times daily. for 14 days    MECLIZINE (ANTIVERT) 12.5 MG TABLET    Take 1 tablet (12.5 mg total) by mouth 2 (two) times daily as needed for Dizziness.    SUCRALFATE (CARAFATE) 1 GRAM TABLET    Take 1 tablet (1 g total) by mouth 4 (four) times daily before meals and nightly.   Current Medications    ALBUTEROL (PROVENTIL/VENTOLIN HFA) 90 MCG/ACTUATION INHALER    Inhale 1-2 puffs into the lungs every 6 (six) hours as needed for Wheezing. Rescue    AMITRIPTYLINE (ELAVIL) 25 MG TABLET    Take 1 tablet (25 mg total) by mouth every evening.    ASCORBIC ACID, VITAMIN C, (VITAMIN C) 500 MG TABLET    Take 500 mg by mouth once daily.    CELECOXIB (CELEBREX) 200 MG CAPSULE    Take 200 mg by mouth 2 (two) times daily.    DICYCLOMINE (BENTYL) 20 MG TABLET    Take 1 tablet (20 mg total) by mouth 3 (three) times daily as needed (abdominal pain).    ESTRADIOL (ESTRACE) 0.01 % (0.1 MG/GRAM) VAGINAL CREAM    Place 1 g vaginally twice a week. Use 1/2 gram nightly for 1st week    FLOVENT  MCG/ACTUATION INHALER    1 puff 2 (two) times daily.    FLUTICASONE PROPIONATE (FLONASE) 50 MCG/ACTUATION NASAL SPRAY    SPRAY 2 SPRAY(S) EVERY DAY BY INTRANASAL ROUTE.    GABAPENTIN (NEURONTIN) 300 MG CAPSULE    Take 300 mg by mouth 3 (three) times daily as needed.    HYDROCODONE-ACETAMINOPHEN (NORCO) 5-325 MG PER TABLET    Take 1 tablet by mouth every 6 (six) hours as needed.    IBUPROFEN (ADVIL,MOTRIN) 600 MG TABLET    Take 1 tablet (600 mg total) by mouth every 6 (six) hours as needed for Pain.    LIDOCAINE (LIDODERM) 5 %    Place 1 patch onto the skin once daily. Remove & Discard patch within 12 hours or as directed by MD    LINACLOTIDE (LINZESS) 72 MCG CAP CAPSULE    Take 1 capsule (72  mcg total) by mouth once daily.    LORATADINE (CLARITIN) 10 MG TABLET        METHOCARBAMOL (ROBAXIN) 500 MG TAB    Take 500 mg by mouth 4 (four) times daily.    OMEPRAZOLE (PRILOSEC) 20 MG CAPSULE    Take 1 capsule (20 mg total) by mouth once daily.    OXYCODONE-ACETAMINOPHEN (PERCOCET) 5-325 MG PER TABLET    Take 1 tablet by mouth every 4 (four) hours as needed for Pain.    SUMATRIPTAN (IMITREX) 100 MG TABLET    Take 1 tablet (100 mg total) by mouth every 2 (two) hours as needed for Migraine.   Discontinued Medications    DULOXETINE (CYMBALTA) 20 MG CAPSULE    Take 1 capsule (20 mg total) by mouth once daily.       Patient counseled about the importance of healthy dietary habits as well as routine physical activity and exercise for better health outcomes.    The patient's diagnosis, medications, and proper use of medications were dicussed. The importance of close follow up to discuss labs, modify medications, and monitor any potential side effects was also discussed. The patient was also informed of the importance of any future cancer screening.     No follow-ups on file.     Patient expressed understanding after counseling regarding diagnosis and recommendations.  Case discussed with staff, Dr. Jason Pineda MD  [facility] -2

## 2022-07-11 ENCOUNTER — OFFICE VISIT (OUTPATIENT)
Dept: GASTROENTEROLOGY | Facility: CLINIC | Age: 42
End: 2022-07-11
Payer: MEDICAID

## 2022-07-11 VITALS
BODY MASS INDEX: 25.73 KG/M2 | SYSTOLIC BLOOD PRESSURE: 104 MMHG | HEIGHT: 68 IN | DIASTOLIC BLOOD PRESSURE: 60 MMHG | WEIGHT: 169.81 LBS

## 2022-07-11 DIAGNOSIS — R10.84 GENERALIZED ABDOMINAL PAIN: ICD-10-CM

## 2022-07-11 DIAGNOSIS — K21.9 GASTROESOPHAGEAL REFLUX DISEASE, UNSPECIFIED WHETHER ESOPHAGITIS PRESENT: ICD-10-CM

## 2022-07-11 DIAGNOSIS — K58.1 IRRITABLE BOWEL SYNDROME WITH CONSTIPATION: Primary | ICD-10-CM

## 2022-07-11 PROCEDURE — 3078F DIAST BP <80 MM HG: CPT | Mod: CPTII,,, | Performed by: NURSE PRACTITIONER

## 2022-07-11 PROCEDURE — 3008F BODY MASS INDEX DOCD: CPT | Mod: CPTII,,, | Performed by: NURSE PRACTITIONER

## 2022-07-11 PROCEDURE — 1160F PR REVIEW ALL MEDS BY PRESCRIBER/CLIN PHARMACIST DOCUMENTED: ICD-10-PCS | Mod: CPTII,,, | Performed by: NURSE PRACTITIONER

## 2022-07-11 PROCEDURE — 1160F RVW MEDS BY RX/DR IN RCRD: CPT | Mod: CPTII,,, | Performed by: NURSE PRACTITIONER

## 2022-07-11 PROCEDURE — 99215 OFFICE O/P EST HI 40 MIN: CPT | Mod: PBBFAC,PO | Performed by: NURSE PRACTITIONER

## 2022-07-11 PROCEDURE — 99999 PR PBB SHADOW E&M-EST. PATIENT-LVL V: ICD-10-PCS | Mod: PBBFAC,,, | Performed by: NURSE PRACTITIONER

## 2022-07-11 PROCEDURE — 1159F PR MEDICATION LIST DOCUMENTED IN MEDICAL RECORD: ICD-10-PCS | Mod: CPTII,,, | Performed by: NURSE PRACTITIONER

## 2022-07-11 PROCEDURE — 3008F PR BODY MASS INDEX (BMI) DOCUMENTED: ICD-10-PCS | Mod: CPTII,,, | Performed by: NURSE PRACTITIONER

## 2022-07-11 PROCEDURE — 99999 PR PBB SHADOW E&M-EST. PATIENT-LVL V: CPT | Mod: PBBFAC,,, | Performed by: NURSE PRACTITIONER

## 2022-07-11 PROCEDURE — 99214 OFFICE O/P EST MOD 30 MIN: CPT | Mod: S$PBB,,, | Performed by: NURSE PRACTITIONER

## 2022-07-11 PROCEDURE — 3078F PR MOST RECENT DIASTOLIC BLOOD PRESSURE < 80 MM HG: ICD-10-PCS | Mod: CPTII,,, | Performed by: NURSE PRACTITIONER

## 2022-07-11 PROCEDURE — 3074F SYST BP LT 130 MM HG: CPT | Mod: CPTII,,, | Performed by: NURSE PRACTITIONER

## 2022-07-11 PROCEDURE — 1159F MED LIST DOCD IN RCRD: CPT | Mod: CPTII,,, | Performed by: NURSE PRACTITIONER

## 2022-07-11 PROCEDURE — 99214 PR OFFICE/OUTPT VISIT, EST, LEVL IV, 30-39 MIN: ICD-10-PCS | Mod: S$PBB,,, | Performed by: NURSE PRACTITIONER

## 2022-07-11 PROCEDURE — 3074F PR MOST RECENT SYSTOLIC BLOOD PRESSURE < 130 MM HG: ICD-10-PCS | Mod: CPTII,,, | Performed by: NURSE PRACTITIONER

## 2022-07-11 RX ORDER — OMEPRAZOLE 40 MG/1
40 CAPSULE, DELAYED RELEASE ORAL DAILY
Qty: 30 CAPSULE | Refills: 5 | Status: SHIPPED | OUTPATIENT
Start: 2022-07-11 | End: 2023-01-30 | Stop reason: SDUPTHER

## 2022-07-11 RX ORDER — DICYCLOMINE HYDROCHLORIDE 20 MG/1
20 TABLET ORAL 3 TIMES DAILY PRN
Qty: 60 TABLET | Refills: 5 | Status: SHIPPED | OUTPATIENT
Start: 2022-07-11 | End: 2023-09-09 | Stop reason: SDUPTHER

## 2022-07-11 NOTE — PROGRESS NOTES
Subjective:       Patient ID: Wandy Shah is a 42 y.o. female.    Chief Complaint: Constipation, Abdominal Pain, and Gastroesophageal Reflux    41 y/o female with chronic constipation and GERD presents to clinic for follow up. Patient reports increased NSAIDs (BC powder) use for chronic back pain recently. Has upper abdominal pain worse with food. No melena, hematochezia or hematemsis.     Constipation  This is a chronic problem. The current episode started more than 1 year ago. The problem has been waxing and waning since onset. Her stool frequency is 2 to 3 times per week. The stool is described as firm and pellet like. The patient is not on a high fiber diet. She does not exercise regularly. There has not been adequate water intake. Associated symptoms include abdominal pain, bloating and nausea. Pertinent negatives include no diarrhea, flatus, hematochezia, hemorrhoids, rectal pain, vomiting or weight loss. Risk factors include change in medication usage/dosage. She has tried laxatives and stool softeners for the symptoms. The treatment provided mild relief.   Abdominal Pain  This is a recurrent problem. The current episode started more than 1 month ago. The onset quality is gradual. The problem occurs intermittently. The problem has been gradually worsening. The pain is located in the generalized abdominal region. The quality of the pain is aching, dull and burning. Associated symptoms include constipation and nausea. Pertinent negatives include no diarrhea, flatus, hematochezia, vomiting or weight loss. The pain is aggravated by eating. The pain is relieved by nothing. She has tried antacids, H2 blockers and proton pump inhibitors for the symptoms. The treatment provided mild relief. Prior diagnostic workup includes lower endoscopy and upper endoscopy. Her past medical history is significant for GERD.       Past Medical History:   Diagnosis Date    Anemia     Back pain        Past Surgical History:    Procedure Laterality Date     SECTION      COLD KNIFE CONIZATION OF CERVIX  12/10/2021    Procedure: CONE BIOPSY, CERVIX, USING COLD KNIFE;  Surgeon: Modesto Nassar MD;  Location: [facility] OR;  Service: OB/GYN;;    COLONOSCOPY N/A 2021    Procedure: COLONOSCOPY;  Surgeon: Emily Cruz MD;  Location: [facility] ENDO;  Service: Endoscopy;  Laterality: N/A;    ESOPHAGOGASTRODUODENOSCOPY N/A 2021    Procedure: EGD (ESOPHAGOGASTRODUODENOSCOPY);  Surgeon: Emily Cruz MD;  Location: [facility] ENDO;  Service: Endoscopy;  Laterality: N/A;    ROBOT-ASSISTED LAPAROSCOPIC HYSTERECTOMY N/A 3/16/2022    Procedure: ROBOTIC HYSTERECTOMY;  Surgeon: Modesto Nassar MD;  Location: [facility] OR;  Service: OB/GYN;  Laterality: N/A;    ROBOT-ASSISTED SALPINGECTOMY Bilateral 3/16/2022    Procedure: ROBOTIC SALPINGECTOMY;  Surgeon: Modesto Nassar MD;  Location: [facility] OR;  Service: OB/GYN;  Laterality: Bilateral;    SINUS SURGERY      TUBAL LIGATION         Family History   Problem Relation Age of Onset    Hypertension Mother     Diabetes Mother     Breast cancer Neg Hx     Colon cancer Neg Hx     Ovarian cancer Neg Hx        Social History     Socioeconomic History    Marital status:    Tobacco Use    Smoking status: Former Smoker     Packs/day: 0.00     Quit date: 3/3/2021     Years since quittin.3    Smokeless tobacco: Never Used   Substance and Sexual Activity    Alcohol use: No    Drug use: Yes     Types: Marijuana     Comment: daily    Sexual activity: Yes     Partners: Male     Birth control/protection: See Surgical Hx       Review of Systems   Constitutional: Negative for appetite change, unexpected weight change and weight loss.   HENT: Negative for trouble swallowing.    Respiratory: Negative for shortness of breath.    Cardiovascular: Negative for chest pain.   Gastrointestinal: Positive for abdominal pain, bloating, constipation and nausea. Negative for diarrhea, flatus,  "hematochezia, hemorrhoids, rectal pain and vomiting.   Neurological: Negative for dizziness and weakness.   Hematological: Negative for adenopathy. Does not bruise/bleed easily.   Psychiatric/Behavioral: Negative for dysphoric mood.         Objective:     Vitals:    07/11/22 0852   BP: 104/60   BP Location: Right arm   Patient Position: Sitting   BP Method: Medium (Manual)   Weight: 77 kg (169 lb 12.8 oz)   Height: 5' 8" (1.727 m)          Physical Exam  Constitutional:       General: She is not in acute distress.     Appearance: Normal appearance. She is not ill-appearing.   HENT:      Head: Normocephalic.   Eyes:      Conjunctiva/sclera: Conjunctivae normal.      Pupils: Pupils are equal, round, and reactive to light.   Pulmonary:      Effort: Pulmonary effort is normal. No respiratory distress.   Musculoskeletal:         General: Normal range of motion.      Cervical back: Normal range of motion.   Skin:     General: Skin is warm and dry.   Neurological:      Mental Status: She is alert and oriented to person, place, and time.   Psychiatric:         Mood and Affect: Mood normal.         Behavior: Behavior normal.               Assessment:         ICD-10-CM ICD-9-CM   1. Irritable bowel syndrome with constipation  K58.1 564.1   2. Generalized abdominal pain  R10.84 789.07   3. Gastroesophageal reflux disease, unspecified whether esophagitis present  K21.9 530.81       Plan:       Irritable bowel syndrome with constipation  -     linaCLOtide (LINZESS) 145 mcg Cap capsule; Take 1 capsule (145 mcg total) by mouth once daily.  Dispense: 30 capsule; Refill: 5  -     dicyclomine (BENTYL) 20 mg tablet; Take 1 tablet (20 mg total) by mouth 3 (three) times daily as needed (abdominal pain).  Dispense: 60 tablet; Refill: 5    Generalized abdominal pain  -     dicyclomine (BENTYL) 20 mg tablet; Take 1 tablet (20 mg total) by mouth 3 (three) times daily as needed (abdominal pain).  Dispense: 60 tablet; Refill: " 5    Gastroesophageal reflux disease, unspecified whether esophagitis present  -     Ambulatory referral/consult to Gastroenterology  -     omeprazole (PRILOSEC) 40 MG capsule; Take 1 capsule (40 mg total) by mouth once daily.  Dispense: 30 capsule; Refill: 5      Follow up in about 6 months (around 1/11/2023).     Patient's Medications   New Prescriptions    LINACLOTIDE (LINZESS) 145 MCG CAP CAPSULE    Take 1 capsule (145 mcg total) by mouth once daily.    OMEPRAZOLE (PRILOSEC) 40 MG CAPSULE    Take 1 capsule (40 mg total) by mouth once daily.   Previous Medications    ALBUTEROL (PROVENTIL/VENTOLIN HFA) 90 MCG/ACTUATION INHALER    Inhale 1-2 puffs into the lungs every 6 (six) hours as needed for Wheezing. Rescue    AMITRIPTYLINE (ELAVIL) 25 MG TABLET    Take 1 tablet (25 mg total) by mouth every evening.    ASCORBIC ACID, VITAMIN C, (VITAMIN C) 500 MG TABLET    Take 500 mg by mouth once daily.    DULOXETINE (CYMBALTA) 30 MG CAPSULE    Take 1 capsule (30 mg total) by mouth once daily.    ESTRADIOL (ESTRACE) 0.01 % (0.1 MG/GRAM) VAGINAL CREAM    Place 1 g vaginally twice a week. Use 1/2 gram nightly for 1st week    FLOVENT  MCG/ACTUATION INHALER    1 puff 2 (two) times daily.    FLUTICASONE PROPIONATE (FLONASE) 50 MCG/ACTUATION NASAL SPRAY    SPRAY 2 SPRAY(S) EVERY DAY BY INTRANASAL ROUTE.    GABAPENTIN (NEURONTIN) 300 MG CAPSULE    Take 300 mg by mouth 3 (three) times daily as needed.    HYDROCORTISONE 2.5 % CREAM    Apply topically 2 (two) times daily. for 14 days    IBUPROFEN (ADVIL,MOTRIN) 600 MG TABLET    Take 1 tablet (600 mg total) by mouth every 6 (six) hours as needed for Pain.    LIDOCAINE (LIDODERM) 5 %    Place 1 patch onto the skin once daily. Remove & Discard patch within 12 hours or as directed by MD    LORATADINE (CLARITIN) 10 MG TABLET        MECLIZINE (ANTIVERT) 12.5 MG TABLET    Take 1 tablet (12.5 mg total) by mouth 2 (two) times daily as needed for Dizziness.    METHOCARBAMOL (ROBAXIN) 500  MG TAB    Take 500 mg by mouth 4 (four) times daily.    SUMATRIPTAN (IMITREX) 100 MG TABLET    Take 1 tablet (100 mg total) by mouth every 2 (two) hours as needed for Migraine.   Modified Medications    Modified Medication Previous Medication    DICYCLOMINE (BENTYL) 20 MG TABLET dicyclomine (BENTYL) 20 mg tablet       Take 1 tablet (20 mg total) by mouth 3 (three) times daily as needed (abdominal pain).    Take 1 tablet (20 mg total) by mouth 3 (three) times daily as needed (abdominal pain).   Discontinued Medications    LINACLOTIDE (LINZESS) 72 MCG CAP CAPSULE    Take 1 capsule (72 mcg total) by mouth once daily.    OMEPRAZOLE (PRILOSEC) 20 MG CAPSULE    Take 1 capsule (20 mg total) by mouth once daily.    SUCRALFATE (CARAFATE) 1 GRAM TABLET    Take 1 tablet (1 g total) by mouth 4 (four) times daily before meals and nightly.

## 2022-07-11 NOTE — PATIENT INSTRUCTIONS
Colon cleanse instructions: Purchase 2 bottles of magnesium citrate (can be purchased from any pharmacy or [facility] [employer] [facility]). Drink 1 bottle of magnesium citrate and repeat 2nd bottle in 2-3 hours to clean out your colon. Resume Linzess 145 mcg daily.

## 2022-07-30 ENCOUNTER — HOSPITAL ENCOUNTER (EMERGENCY)
Facility: HOSPITAL | Age: 42
Discharge: HOME | End: 2022-07-30
Attending: EMERGENCY MEDICINE
Payer: MEDICAID

## 2022-07-30 VITALS
BODY MASS INDEX: 25.76 KG/M2 | RESPIRATION RATE: 16 BRPM | HEART RATE: 58 BPM | HEIGHT: 68 IN | DIASTOLIC BLOOD PRESSURE: 60 MMHG | OXYGEN SATURATION: 100 % | SYSTOLIC BLOOD PRESSURE: 102 MMHG | WEIGHT: 170 LBS | TEMPERATURE: 98 F

## 2022-07-30 DIAGNOSIS — L73.9 FOLLICULITIS: Primary | ICD-10-CM

## 2022-07-30 PROCEDURE — 99284 EMERGENCY DEPT VISIT MOD MDM: CPT | Mod: ,,, | Performed by: PHYSICIAN ASSISTANT

## 2022-07-30 PROCEDURE — 99284 PR EMERGENCY DEPT VISIT,LEVEL IV: ICD-10-PCS | Mod: ,,, | Performed by: PHYSICIAN ASSISTANT

## 2022-07-30 PROCEDURE — 99284 EMERGENCY DEPT VISIT MOD MDM: CPT

## 2022-07-30 RX ORDER — SULFAMETHOXAZOLE AND TRIMETHOPRIM 800; 160 MG/1; MG/1
1 TABLET ORAL 2 TIMES DAILY
Qty: 14 TABLET | Refills: 0 | Status: SHIPPED | OUTPATIENT
Start: 2022-07-30 | End: 2022-08-06

## 2022-07-30 RX ORDER — MUPIROCIN 20 MG/G
OINTMENT TOPICAL 2 TIMES DAILY
Qty: 15 G | Refills: 0 | Status: SHIPPED | OUTPATIENT
Start: 2022-07-30 | End: 2022-08-09

## 2022-07-30 RX ORDER — MUPIROCIN 20 MG/G
OINTMENT TOPICAL 2 TIMES DAILY
Qty: 15 G | Refills: 0 | Status: SHIPPED | OUTPATIENT
Start: 2022-07-30 | End: 2022-07-30 | Stop reason: SDUPTHER

## 2022-07-30 NOTE — DISCHARGE INSTRUCTIONS
Please apply warm compresses to the area daily. Take Bactrim as prescribed and to completion. Use mupirocin twice daily in your nose. Follow up with your primary doctor or return to the ER for any new or worsening symptoms.

## 2022-07-30 NOTE — ED NOTES
Pt identifiers Wandy Shah were checked and are  correct  LOC: The patient is awake, alert, aware of environment with an appropriate affect. Oriented x4, speaking appropriately  APPEARANCE: Pt resting comfortably, in no acute distress, pt is clean and well groomed, clothing properly fastened  SKIN: Skin warm, dry and intact, normal skin turgor, moist mucus membranes  Abscess noted to left lower buttock  RESPIRATORY: Airway is open and patent, respirations are spontaneous, even and unlabored, normal effort and rate  CARDIAC: Normal rate and rhythm, no peripheral edema noted, capillary refill < 3 seconds, bilateral radial pulses 2+  ABDOMEN: Soft, nontender, nondistended.   NEUROLOGIC: facial expression is symmetrical, patient moving all extremities spontaneously, normal sensation in all extremities when touched with a finger.  Follows all commands appropriately  MUSCULOSKELETAL: No obvious deformities.

## 2022-07-30 NOTE — ED PROVIDER NOTES
Encounter Date: 2022       History     Chief Complaint   Patient presents with    Abscess     Vaginal area, and 'butt', pain to L breast     42-year-old female with past medical history of anemia and depression presents to the emergency department with chief complaint of abscess to her left buttock and genitals. She noticed the buttock abscess one weak ago. Reports some bloody purulent drainage at home.  She noticed abscess near her genitals 2 days ago.  This one has not yet drained.  She denies fever, chills, chest pain, shortness of breath, vomiting, diarrhea. She denies other worsening or alleviating factors.         Review of patient's allergies indicates:   Allergen Reactions    Raspberry (rubus idaeus)      Past Medical History:   Diagnosis Date    Anemia     Back pain     Depression      Past Surgical History:   Procedure Laterality Date     SECTION      COLD KNIFE CONIZATION OF CERVIX  12/10/2021    Procedure: CONE BIOPSY, CERVIX, USING COLD KNIFE;  Surgeon: Modesto Nassar MD;  Location: [facility] OR;  Service: OB/GYN;;    COLONOSCOPY N/A 2021    Procedure: COLONOSCOPY;  Surgeon: Emily Cruz MD;  Location: [facility];  Service: Endoscopy;  Laterality: N/A;    ESOPHAGOGASTRODUODENOSCOPY N/A 2021    Procedure: EGD (ESOPHAGOGASTRODUODENOSCOPY);  Surgeon: Emily Cruz MD;  Location: [facility];  Service: Endoscopy;  Laterality: N/A;    ROBOT-ASSISTED LAPAROSCOPIC HYSTERECTOMY N/A 3/16/2022    Procedure: ROBOTIC HYSTERECTOMY;  Surgeon: Modesto Nassar MD;  Location: [facility] OR;  Service: OB/GYN;  Laterality: N/A;    ROBOT-ASSISTED SALPINGECTOMY Bilateral 3/16/2022    Procedure: ROBOTIC SALPINGECTOMY;  Surgeon: Modesto Nassar MD;  Location: [facility] OR;  Service: OB/GYN;  Laterality: Bilateral;    SINUS SURGERY      TUBAL LIGATION       Family History   Problem Relation Age of Onset    Hypertension Mother     Diabetes Mother     Breast cancer Neg Hx     Colon  cancer Neg Hx     Ovarian cancer Neg Hx      Social History     Tobacco Use    Smoking status: Former Smoker     Packs/day: 0.00     Quit date: 3/3/2021     Years since quittin.4    Smokeless tobacco: Never Used   Substance Use Topics    Alcohol use: No    Drug use: Yes     Types: Marijuana     Comment: daily     Review of Systems   Constitutional: Negative for fever.   HENT: Negative for sore throat.    Respiratory: Negative for shortness of breath.    Cardiovascular: Negative for chest pain.   Gastrointestinal: Negative for nausea.   Genitourinary: Negative for dysuria.   Musculoskeletal: Negative for back pain.   Skin: Positive for wound. Negative for rash.   Neurological: Negative for weakness.   Hematological: Does not bruise/bleed easily.       Physical Exam     Initial Vitals [22 0746]   BP Pulse Resp Temp SpO2   102/64 85 18 98.6 °F (37 °C) 99 %      MAP       --         Physical Exam    Nursing note and vitals reviewed.  Constitutional: She appears well-developed and well-nourished. She is not diaphoretic. No distress.   HENT:   Head: Normocephalic and atraumatic.   Mouth/Throat: Oropharynx is clear and moist.   Eyes: Conjunctivae and EOM are normal. Pupils are equal, round, and reactive to light.   Neck: Neck supple.   Normal range of motion.  Cardiovascular: Normal rate.   Pulmonary/Chest: No respiratory distress.   Abdominal: She exhibits no distension.   Musculoskeletal:         General: Normal range of motion.      Cervical back: Normal range of motion and neck supple.     Neurological: She is alert and oriented to person, place, and time. She has normal strength. No cranial nerve deficit or sensory deficit. GCS score is 15. GCS eye subscore is 4. GCS verbal subscore is 5. GCS motor subscore is 6.   Skin: Skin is warm and dry. Capillary refill takes less than 2 seconds.   Folliculitis (about 1 cm in size)  noted to the left lower buttock and just inferiorly to the right labia majora   No  fluctuance or crepitus    Psychiatric: She has a normal mood and affect. Her behavior is normal. Judgment and thought content normal.         ED Course   Procedures  Labs Reviewed - No data to display       Imaging Results    None          Medications - No data to display  Medical Decision Making:   History:   Old Medical Records: I decided to obtain old medical records.  Initial Assessment:   Emergent evaluation of a 42-year-old female who presents to the emergency department with chief complaint of boil to the left buttock and inferiorly to the right labia majora that she noticed about 1 week ago.  Endorses minimal amount of bloody, purulent drainage.  She denies fevers, chills, or systemic symptoms.  Patient is afebrile, hemodynamically stable, nontoxic appearing.  Differential Diagnosis:   Differential diagnosis includes but is not limited to folliculitis, abscess, cellulitis.   ED Management:  Physical exam consistent with folliculitis.  There is no evidence of a drainable abscess.  Will treat with Bactrim and mupirocin to apply nasally twice daily.  Recommend close outpatient follow-up.  Return precautions given.  All questions answered.  The patient was instructed to follow up with a primary care provider or to return to the emergency department for worsening symptoms. The treatment plan was discussed with the patient who demonstrated understanding and comfort with plan. The patient's history, physical exam, and plan of care was discussed with and agreed upon with my supervising physician.                       Clinical Impression:   Final diagnoses:  [L73.9] Folliculitis (Primary)          ED Disposition Condition    Discharge Stable        ED Prescriptions     Medication Sig Dispense Start Date End Date Auth. Provider    sulfamethoxazole-trimethoprim 800-160mg (BACTRIM DS) 800-160 mg Tab Take 1 tablet by mouth 2 (two) times daily. for 7 days 14 tablet 7/30/2022 8/6/2022 Penny Ann PA-C     mupirocin (BACTROBAN) 2 % ointment  (Status: Discontinued) Apply topically 2 (two) times daily. for 10 days 15 g 7/30/2022 7/30/2022 Penny Ann PA-C    mupirocin (BACTROBAN) 2 % ointment Apply topically 2 (two) times daily. for 10 days 15 g 7/30/2022 8/9/2022 Penny Ann PA-C        Follow-up Information     Follow up With Specialties Details Why Contact Info    [facility] - Emergency Dept Emergency Medicine Go to  If symptoms worsen [address] [facility] [address] [phone]    Du Pineda MD Family Medicine Schedule an appointment as soon as possible for a visit in 1 week  [address] [address] [phone]             Penny Ann PA-C  07/30/22 8575

## 2022-08-02 ENCOUNTER — HOSPITAL ENCOUNTER (EMERGENCY)
Facility: HOSPITAL | Age: 42
Discharge: HOME | End: 2022-08-02
Attending: EMERGENCY MEDICINE
Payer: MEDICAID

## 2022-08-02 ENCOUNTER — TELEPHONE (OUTPATIENT)
Dept: OBSTETRICS AND GYNECOLOGY | Facility: CLINIC | Age: 42
End: 2022-08-02
Payer: MEDICAID

## 2022-08-02 VITALS
WEIGHT: 170 LBS | TEMPERATURE: 98 F | RESPIRATION RATE: 20 BRPM | OXYGEN SATURATION: 99 % | DIASTOLIC BLOOD PRESSURE: 75 MMHG | BODY MASS INDEX: 25.76 KG/M2 | HEART RATE: 88 BPM | HEIGHT: 68 IN | SYSTOLIC BLOOD PRESSURE: 111 MMHG

## 2022-08-02 DIAGNOSIS — N64.52 BREAST DISCHARGE: Primary | ICD-10-CM

## 2022-08-02 DIAGNOSIS — Z12.39 SCREENING BREAST EXAMINATION: Primary | ICD-10-CM

## 2022-08-02 PROCEDURE — 99284 EMERGENCY DEPT VISIT MOD MDM: CPT

## 2022-08-02 RX ORDER — AMITRIPTYLINE HYDROCHLORIDE 25 MG/1
25 TABLET, FILM COATED ORAL NIGHTLY
Qty: 30 TABLET | Refills: 3 | Status: CANCELLED | OUTPATIENT
Start: 2022-08-02 | End: 2023-08-02

## 2022-08-02 NOTE — ED PROVIDER NOTES
Encounter Date: 2022    SCRIBE #1 NOTE: I, Shaylameg Vigil , am scribing for, and in the presence of, Kayt Piedra NP.       History     Chief Complaint   Patient presents with    Breast Discharge     Patient stated that she occasionally checks her nipples and she squeezed them last night and puss and blood came out of right nipple. +tenderness and no redness noted no fever     Wandy Shah is a 42 y.o. female who  has a past medical history of Anemia, Back pain, and Depression.    The patient presents to the ED due to Breast Discharge.   Patient reports puss and blood came out of her right nipple yesterday . She has no redness to the site. No fever, rash, chest pain,and  neck stiffness. Pt states she has not had any prior imaging of her breasts.         The history is provided by the patient. No  was used.     Review of patient's allergies indicates:   Allergen Reactions    Raspberry (rubus idaeus)      Past Medical History:   Diagnosis Date    Anemia     Back pain     Depression      Past Surgical History:   Procedure Laterality Date     SECTION      COLD KNIFE CONIZATION OF CERVIX  12/10/2021    Procedure: CONE BIOPSY, CERVIX, USING COLD KNIFE;  Surgeon: Modesto Nassar MD;  Location: [facility] OR;  Service: OB/GYN;;    COLONOSCOPY N/A 2021    Procedure: COLONOSCOPY;  Surgeon: Emily Cruz MD;  Location: [facility];  Service: Endoscopy;  Laterality: N/A;    ESOPHAGOGASTRODUODENOSCOPY N/A 2021    Procedure: EGD (ESOPHAGOGASTRODUODENOSCOPY);  Surgeon: Emily Cruz MD;  Location: [facility];  Service: Endoscopy;  Laterality: N/A;    ROBOT-ASSISTED LAPAROSCOPIC HYSTERECTOMY N/A 3/16/2022    Procedure: ROBOTIC HYSTERECTOMY;  Surgeon: Modesto Nassar MD;  Location: [facility] OR;  Service: OB/GYN;  Laterality: N/A;    ROBOT-ASSISTED SALPINGECTOMY Bilateral 3/16/2022    Procedure: ROBOTIC SALPINGECTOMY;  Surgeon: Modesto Nassar MD;  Location:  [facility] OR;  Service: OB/GYN;  Laterality: Bilateral;    SINUS SURGERY      TUBAL LIGATION       Family History   Problem Relation Age of Onset    Hypertension Mother     Diabetes Mother     Breast cancer Neg Hx     Colon cancer Neg Hx     Ovarian cancer Neg Hx      Social History     Tobacco Use    Smoking status: Former Smoker     Packs/day: 0.00     Quit date: 3/3/2021     Years since quittin.4    Smokeless tobacco: Never Used   Substance Use Topics    Alcohol use: No    Drug use: Yes     Types: Marijuana     Comment: daily     Review of Systems   Constitutional: Negative for fever.   HENT: Negative for sore throat.    Respiratory: Negative for shortness of breath.    Cardiovascular: Negative for chest pain.   Gastrointestinal: Negative for nausea.   Genitourinary: Negative for dysuria.   Musculoskeletal: Negative for back pain and neck stiffness.   Skin: Negative for color change and rash.        Discharge from right breast    Neurological: Negative for weakness.   Hematological: Does not bruise/bleed easily.       Physical Exam     Initial Vitals [22 1220]   BP Pulse Resp Temp SpO2   111/75 88 20 98.3 °F (36.8 °C) 99 %      MAP       --         Physical Exam    Constitutional: She appears well-developed and well-nourished.   HENT:   Head: Normocephalic.   Right Ear: Hearing and tympanic membrane normal.   Left Ear: Hearing and tympanic membrane normal.   Nose: Nose normal.   Mouth/Throat: Oropharynx is clear and moist.   Eyes: Lids are normal. Pupils are equal, round, and reactive to light.   Neck:   Normal range of motion.  Cardiovascular: Normal rate.   Pulmonary/Chest: Breath sounds normal. No respiratory distress. She has no wheezes. She has no rhonchi.   Multiple fibrocystic breast tissue to right breast   Bloody discharge on examination.   No erythema noted.    Abdominal: Abdomen is soft. There is no abdominal tenderness.   Musculoskeletal:         General: Normal range of motion.       Cervical back: Normal range of motion. No rigidity.     Neurological: She is alert and oriented to person, place, and time.   Skin: Skin is warm and dry. No rash noted.   Psychiatric: She has a normal mood and affect. Her behavior is normal. Judgment and thought content normal.         ED Course   Procedures  Labs Reviewed - No data to display       Imaging Results    None          Medications - No data to display  Medical Decision Making:   Differential Diagnosis:   Differential Diagnosis includes, but is not limited to:  Cellulitis, abscess, necrotizing fasciitis, osteomyelitis, septic joint, MRSA, DVT, drug eruption, allergic/contact dermatitis, EM/SJS, viral exanthem, local trauma/contusion            Scribe Attestation:   Scribe #1: I performed the above scribed service and the documentation accurately describes the services I performed. I attest to the accuracy of the note.        ED Course as of 08/02/22 2360   Tue Aug 02, 2022   1256 Spoke with Dr Mercedes who states he will place the order for mammogram and ultrasound. Will follow pt in clinic.  [DT]      ED Course User Index  [DT] Katy Piedra NP             Clinical Impression:   Final diagnoses:  [N64.52] Breast discharge (Primary)          ED Disposition Condition    Discharge Stable       I, Katy Piedra NP, personally performed the services described in this documentation.All medical record entries made by the scribe were at my direction and in my presence.I have reviewed the chart and agree that the record reflects my personal performance and is accurate and complete.     ED Prescriptions     None        Follow-up Information     Follow up With Specialties Details Why Contact Info    Modesto Mercedes MD Obstetrics and Gynecology Schedule an appointment as soon as possible for a visit in 2 days  [address] [facility]  [address] [place] [id number] [phone]             Katy Piedra NP  08/02/22 5162

## 2022-08-02 NOTE — TELEPHONE ENCOUNTER
----- Message from Madeline García sent at 8/2/2022  1:09 PM CDT -----  Regarding: hosp f/u      Patient is being discharged from [facility] and is requiring a hospital follow up with Dr. Villalta with in 10 days.  I am unable to schedule an appointment within that time frame.      Please schedule a sooner appointment and message me back to advise patient prior to discharge.       DX:  Breast discharge        Thanks,     Madeline Chavez Tristan/Discharge

## 2022-08-02 NOTE — ED NOTES
Full assessment deferred to provider ANGEL Piedra. Patient AAox4 with steady gait. Denies chest pain or SOB.

## 2022-08-02 NOTE — DISCHARGE INSTRUCTIONS

## 2022-08-03 ENCOUNTER — PATIENT MESSAGE (OUTPATIENT)
Dept: OBSTETRICS AND GYNECOLOGY | Facility: CLINIC | Age: 42
End: 2022-08-03
Payer: MEDICAID

## 2022-08-03 ENCOUNTER — HOSPITAL ENCOUNTER (OUTPATIENT)
Dept: RADIOLOGY | Facility: HOSPITAL | Age: 42
Discharge: HOME | End: 2022-08-03
Attending: OBSTETRICS & GYNECOLOGY
Payer: MEDICAID

## 2022-08-03 ENCOUNTER — TELEPHONE (OUTPATIENT)
Dept: OBSTETRICS AND GYNECOLOGY | Facility: CLINIC | Age: 42
End: 2022-08-03
Payer: MEDICAID

## 2022-08-03 DIAGNOSIS — N64.52 NIPPLE DISCHARGE: Primary | ICD-10-CM

## 2022-08-03 DIAGNOSIS — Z12.39 SCREENING BREAST EXAMINATION: ICD-10-CM

## 2022-08-03 NOTE — TELEPHONE ENCOUNTER
Patient stated today when she went for her mammo in [place] her appt had to be reschedule due to protocal because she has bloody discharge coming from her nipple she has a appt coming up on Tuesday to be seen due to her pain and discharge from her breast. I am trying to get her in at another location before then patient is willing to travel to another location have this done because they scheduled her out next month. She said they needed a diagnostic mammo order to be place due to the discharge. Please advise so I can schedule her.

## 2022-08-04 ENCOUNTER — PATIENT OUTREACH (OUTPATIENT)
Dept: EMERGENCY MEDICINE | Facility: HOSPITAL | Age: 42
End: 2022-08-04
Payer: MEDICAID

## 2022-08-04 NOTE — PROGRESS NOTES
Patient declined ED navigation assessment and denied any needs at this time.     Patient states the nurse from her dr's office is working on getting her scheduled for a diagnostic mammogram and are supposed to call her within the next half hour to let her know when she is scheduled.        Amber Gee, ED Navigator, [facility]  [phone], ext. [phone]

## 2022-08-08 DIAGNOSIS — R42 DIZZINESS: ICD-10-CM

## 2022-08-08 DIAGNOSIS — F41.9 ANXIETY: ICD-10-CM

## 2022-08-08 RX ORDER — MECLIZINE HCL 12.5 MG 12.5 MG/1
12.5 TABLET ORAL 2 TIMES DAILY PRN
Qty: 28 TABLET | Refills: 0 | Status: SHIPPED | OUTPATIENT
Start: 2022-08-08

## 2022-08-08 RX ORDER — DULOXETIN HYDROCHLORIDE 30 MG/1
30 CAPSULE, DELAYED RELEASE ORAL DAILY
Qty: 30 CAPSULE | Refills: 1 | Status: SHIPPED | OUTPATIENT
Start: 2022-08-08 | End: 2022-11-16 | Stop reason: SDUPTHER

## 2022-08-10 RX ORDER — AMITRIPTYLINE HYDROCHLORIDE 25 MG/1
25 TABLET, FILM COATED ORAL NIGHTLY
Qty: 30 TABLET | Refills: 3 | Status: SHIPPED | OUTPATIENT
Start: 2022-08-10 | End: 2022-11-09

## 2022-08-19 ENCOUNTER — HOSPITAL ENCOUNTER (OUTPATIENT)
Dept: RADIOLOGY | Facility: HOSPITAL | Age: 42
Discharge: HOME | End: 2022-08-19
Attending: OBSTETRICS & GYNECOLOGY
Payer: MEDICAID

## 2022-08-19 DIAGNOSIS — N64.52 NIPPLE DISCHARGE: ICD-10-CM

## 2022-08-19 PROCEDURE — 76642 ULTRASOUND BREAST LIMITED: CPT | Mod: TC,PO,RT

## 2022-08-19 PROCEDURE — 77066 DX MAMMO INCL CAD BI: CPT | Mod: TC,PO

## 2022-08-23 ENCOUNTER — OFFICE VISIT (OUTPATIENT)
Dept: OBSTETRICS AND GYNECOLOGY | Facility: CLINIC | Age: 42
End: 2022-08-23
Payer: MEDICAID

## 2022-08-23 VITALS — DIASTOLIC BLOOD PRESSURE: 82 MMHG | WEIGHT: 177.5 LBS | SYSTOLIC BLOOD PRESSURE: 118 MMHG | BODY MASS INDEX: 26.98 KG/M2

## 2022-08-23 DIAGNOSIS — N64.52 NIPPLE DISCHARGE IN FEMALE: Primary | ICD-10-CM

## 2022-08-23 DIAGNOSIS — N60.19 FIBROCYSTIC BREAST CHANGES, UNSPECIFIED LATERALITY: ICD-10-CM

## 2022-08-23 PROCEDURE — 1160F RVW MEDS BY RX/DR IN RCRD: CPT | Mod: CPTII,,, | Performed by: OBSTETRICS & GYNECOLOGY

## 2022-08-23 PROCEDURE — 3079F DIAST BP 80-89 MM HG: CPT | Mod: CPTII,,, | Performed by: OBSTETRICS & GYNECOLOGY

## 2022-08-23 PROCEDURE — 99213 OFFICE O/P EST LOW 20 MIN: CPT | Mod: PBBFAC,PO | Performed by: OBSTETRICS & GYNECOLOGY

## 2022-08-23 PROCEDURE — 99213 OFFICE O/P EST LOW 20 MIN: CPT | Mod: S$PBB,,, | Performed by: OBSTETRICS & GYNECOLOGY

## 2022-08-23 PROCEDURE — 3074F PR MOST RECENT SYSTOLIC BLOOD PRESSURE < 130 MM HG: ICD-10-PCS | Mod: CPTII,,, | Performed by: OBSTETRICS & GYNECOLOGY

## 2022-08-23 PROCEDURE — 3008F BODY MASS INDEX DOCD: CPT | Mod: CPTII,,, | Performed by: OBSTETRICS & GYNECOLOGY

## 2022-08-23 PROCEDURE — 99999 PR PBB SHADOW E&M-EST. PATIENT-LVL III: CPT | Mod: PBBFAC,,, | Performed by: OBSTETRICS & GYNECOLOGY

## 2022-08-23 PROCEDURE — 1160F PR REVIEW ALL MEDS BY PRESCRIBER/CLIN PHARMACIST DOCUMENTED: ICD-10-PCS | Mod: CPTII,,, | Performed by: OBSTETRICS & GYNECOLOGY

## 2022-08-23 PROCEDURE — 99213 PR OFFICE/OUTPT VISIT, EST, LEVL III, 20-29 MIN: ICD-10-PCS | Mod: S$PBB,,, | Performed by: OBSTETRICS & GYNECOLOGY

## 2022-08-23 PROCEDURE — 1159F MED LIST DOCD IN RCRD: CPT | Mod: CPTII,,, | Performed by: OBSTETRICS & GYNECOLOGY

## 2022-08-23 PROCEDURE — 3074F SYST BP LT 130 MM HG: CPT | Mod: CPTII,,, | Performed by: OBSTETRICS & GYNECOLOGY

## 2022-08-23 PROCEDURE — 1159F PR MEDICATION LIST DOCUMENTED IN MEDICAL RECORD: ICD-10-PCS | Mod: CPTII,,, | Performed by: OBSTETRICS & GYNECOLOGY

## 2022-08-23 PROCEDURE — 3079F PR MOST RECENT DIASTOLIC BLOOD PRESSURE 80-89 MM HG: ICD-10-PCS | Mod: CPTII,,, | Performed by: OBSTETRICS & GYNECOLOGY

## 2022-08-23 PROCEDURE — 3008F PR BODY MASS INDEX (BMI) DOCUMENTED: ICD-10-PCS | Mod: CPTII,,, | Performed by: OBSTETRICS & GYNECOLOGY

## 2022-08-23 PROCEDURE — 99999 PR PBB SHADOW E&M-EST. PATIENT-LVL III: ICD-10-PCS | Mod: PBBFAC,,, | Performed by: OBSTETRICS & GYNECOLOGY

## 2022-08-23 NOTE — PROGRESS NOTES
CC:No chief complaint on file.      HPI:    42 y.o.   OB History        2    Para   2    Term   2            AB        Living   2       SAB        IAB        Ectopic        Multiple        Live Births                   Complaining of:     Nipple discharge  Onset earlier this month  evaluted in the ED and discharged   Mammogram with US BIRADS 1  reports initial discharge bloody and purulent and has since transitioned to clear consistency  Reports heaviness associated with it  No skin changes or erythema or swelling  Has avoided squeezing breast and nipple area after evaluation in ED  No previous episodes    Recently underwent hysterectomy earlier this year    (Not in a hospital admission)      Review of patient's allergies indicates:   Allergen Reactions    Raspberry (rubus idaeus)         Past Medical History:   Diagnosis Date    Anemia     Back pain     Depression      Past Surgical History:   Procedure Laterality Date     SECTION      COLD KNIFE CONIZATION OF CERVIX  12/10/2021    Procedure: CONE BIOPSY, CERVIX, USING COLD KNIFE;  Surgeon: Modesto Nassar MD;  Location: [facility] OR;  Service: OB/GYN;;    COLONOSCOPY N/A 2021    Procedure: COLONOSCOPY;  Surgeon: Emily Cruz MD;  Location: [facility];  Service: Endoscopy;  Laterality: N/A;    ESOPHAGOGASTRODUODENOSCOPY N/A 2021    Procedure: EGD (ESOPHAGOGASTRODUODENOSCOPY);  Surgeon: Emily Cruz MD;  Location: [facility];  Service: Endoscopy;  Laterality: N/A;    HYSTERECTOMY      ROBOT-ASSISTED LAPAROSCOPIC HYSTERECTOMY N/A 2022    Procedure: ROBOTIC HYSTERECTOMY;  Surgeon: Modesto Nassar MD;  Location: [facility] OR;  Service: OB/GYN;  Laterality: N/A;    ROBOT-ASSISTED SALPINGECTOMY Bilateral 2022    Procedure: ROBOTIC SALPINGECTOMY;  Surgeon: Modesto Nassar MD;  Location: [facility] OR;  Service: OB/GYN;  Laterality: Bilateral;    SINUS SURGERY      TUBAL LIGATION       Family History    Problem Relation Age of Onset    Hypertension Mother     Diabetes Mother     Breast cancer Neg Hx     Colon cancer Neg Hx     Ovarian cancer Neg Hx      Social History     Tobacco Use    Smoking status: Former Smoker     Packs/day: 0.00     Quit date: 3/3/2021     Years since quittin.4    Smokeless tobacco: Never Used   Substance Use Topics    Alcohol use: No    Drug use: Yes     Types: Marijuana     Comment: daily     ROS:  GENERAL: Feeling well overall. Denies fever or chills.   SKIN: Denies rash or lesions.   HEAD: Denies head injury or headache.   NODES: Denies enlarged lymph nodes.   CHEST: Denies chest pain or shortness of breath.   CARDIOVASCULAR: Denies palpitations or left sided chest pain.    ABDOMEN: Denies diarrhea, nausea, vomiting or rectal bleeding.   URINARY: No dysuria, hematuria, or burning on urination.  REPRODUCTIVE: See HPI.   BREASTS: Denies pain, lumps, or nipple discharge.   HEMATOLOGIC: No easy bruisability or excessive bleeding.   MUSCULOSKELETAL: Denies joint pain or swelling.   NEUROLOGIC: Denies syncope or weakness.   PSYCHIATRIC: Denies depression, anxiety or mood swings.      PE: /82   Wt 80.5 kg (177 lb 7.5 oz)   LMP 2022 (Exact Date)   BMI 26.98 kg/m²      APPEARANCE: Well nourished, well developed, in no acute distress.  SKIN: Normal skin turgor, no lesions.  NECK: Neck symmetric without masses or thyromegaly.  NODES: No inguinal, cervical, axillary or femoral lymph node enlargement.  CARDIOVASCULAR: Normal S1, S2. No rubs, murmurs or gallops.  NEUROLOGIC: Normal mood and affect. No depression or anxiety.   ABDOMEN: Soft. No tenderness or masses. No hepatosplenomegaly. No hernias.  RESPIRATORY: Normal respiratory effort with no retractions or use of accessory muscles.  BREASTS: Symmetrical, no skin changes or visible lesions. FBC breast changes but no discreet concerning mass and unable to express any nipple d/c, nipple discharge, or adenopathy  bilaterally.   BLADDER: Non-tender  Chaperone present    ASSESSMENT/ PLAN    Diagnoses and all orders for this visit:    Nipple discharge in female      Likely fibrocystic breast changes  Education given to avoid stimulation and monitor  If persists or spontaneous, gen surg referral next step      Case discussed with Modesto Pineda MD  [facility] HO-3  8/23/2022 1:21 PM

## 2022-09-04 RX ORDER — SUMATRIPTAN SUCCINATE 100 MG/1
100 TABLET ORAL
Qty: 10 TABLET | Refills: 3 | Status: CANCELLED | OUTPATIENT
Start: 2022-09-04 | End: 2022-10-04

## 2022-10-11 ENCOUNTER — OFFICE VISIT (OUTPATIENT)
Dept: FAMILY MEDICINE | Facility: HOSPITAL | Age: 42
End: 2022-10-11
Payer: MEDICAID

## 2022-10-11 VITALS
DIASTOLIC BLOOD PRESSURE: 71 MMHG | WEIGHT: 183.19 LBS | HEIGHT: 68 IN | SYSTOLIC BLOOD PRESSURE: 107 MMHG | BODY MASS INDEX: 27.76 KG/M2 | HEART RATE: 75 BPM

## 2022-10-11 DIAGNOSIS — M54.9 BACK PAIN, UNSPECIFIED BACK LOCATION, UNSPECIFIED BACK PAIN LATERALITY, UNSPECIFIED CHRONICITY: Primary | ICD-10-CM

## 2022-10-11 PROCEDURE — 99215 OFFICE O/P EST HI 40 MIN: CPT

## 2022-10-11 RX ORDER — LIDOCAINE 50 MG/G
1 PATCH TOPICAL DAILY
Qty: 5 PATCH | Refills: 0 | Status: SHIPPED | OUTPATIENT
Start: 2022-10-11 | End: 2022-10-18

## 2022-10-11 RX ORDER — CEPHALEXIN 250 MG/1
2 CAPSULE ORAL 2 TIMES DAILY
COMMUNITY
Start: 2022-09-20

## 2022-10-11 RX ORDER — GABAPENTIN 300 MG/1
300 CAPSULE ORAL 3 TIMES DAILY PRN
Qty: 10 CAPSULE | Refills: 1 | Status: SHIPPED | OUTPATIENT
Start: 2022-10-11 | End: 2023-11-29 | Stop reason: SDUPTHER

## 2022-10-11 RX ORDER — ACETAMINOPHEN 500 MG
500 TABLET ORAL EVERY 8 HOURS PRN
Qty: 30 TABLET | Refills: 1 | OUTPATIENT
Start: 2022-10-11 | End: 2023-03-11

## 2022-10-11 NOTE — PROGRESS NOTES
[facility]  History & Physical    SUBJECTIVE:     Chief Complaint:   Chief Complaint   Patient presents with    Back Pain     X 3-4 days       History of Present Illness:  42 y.o. female who  has a past medical history of Anemia, Back pain, GERD, and Depression. presents to clinic today for establishing care. She complains of 3-4 days of right lower back pain secondary to lifting heavy objects at work. The pain does not extend down her leg.  Patient states her symptoms are mildly improved since the weekend, but certain positions (lying down) still may elicit pain. She denies fevers, rashes, changes to bowel/urinary habits, numbness to legs.        Allergies:  Review of patient's allergies indicates:   Allergen Reactions    Raspberry (rubus idaeus)        Home Medications:  Current Outpatient Medications on File Prior to Visit   Medication Sig    amitriptyline (ELAVIL) 25 MG tablet Take 1 tablet (25 mg total) by mouth every evening.    ascorbic acid, vitamin C, (VITAMIN C) 500 MG tablet Take 500 mg by mouth once daily.    cetirizine (ZYRTEC) 10 MG tablet Take 1 tablet daily by mouth    dicyclomine (BENTYL) 20 mg tablet Take 1 tablet (20 mg total) by mouth 3 (three) times daily as needed (abdominal pain).    DULoxetine (CYMBALTA) 30 MG capsule Take 1 capsule (30 mg total) by mouth once daily.    estradioL (ESTRACE) 0.01 % (0.1 mg/gram) vaginal cream Place 1 g vaginally twice a week. Use 1/2 gram nightly for 1st week    FLOVENT  mcg/actuation inhaler 1 puff 2 (two) times daily.    fluticasone propionate (FLONASE) 50 mcg/actuation nasal spray 2 sprays daily by Nasal route    LIDOcaine (LIDODERM) 5 % Place 1 patch onto the skin once daily. Remove & Discard patch within 12 hours or as directed by MD    linaCLOtide (LINZESS) 145 mcg Cap capsule Take 1 capsule (145 mcg total) by mouth once daily.    loratadine (CLARITIN) 10 mg tablet     meclizine (ANTIVERT) 12.5 mg tablet Take 1 tablet (12.5 mg total) by  mouth 2 (two) times daily as needed for Dizziness.    montelukast (SINGULAIR) 10 mg tablet Take 1 tablet nightly by mouth    omeprazole (PRILOSEC) 40 MG capsule Take 1 capsule (40 mg total) by mouth once daily.    [DISCONTINUED] gabapentin (NEURONTIN) 300 MG capsule Take 300 mg by mouth 3 (three) times daily as needed.    [DISCONTINUED] ibuprofen (ADVIL,MOTRIN) 600 MG tablet Take 1 tablet (600 mg total) by mouth every 6 (six) hours as needed for Pain.    ADVAIR DISKUS 100-50 mcg/dose diskus inhaler Inhale 2 puffs into the lungs 2 (two) times daily.    albuterol (PROVENTIL/VENTOLIN HFA) 90 mcg/actuation inhaler Inhale 1-2 puffs into the lungs every 6 (six) hours as needed for Wheezing. Rescue    fluticasone propionate (FLONASE) 50 mcg/actuation nasal spray SPRAY 2 SPRAY(S) EVERY DAY BY INTRANASAL ROUTE.    hydrocortisone 2.5 % cream Apply topically 2 (two) times daily. for 14 days    methocarbamoL (ROBAXIN) 500 MG Tab Take 500 mg by mouth 4 (four) times daily.    sumatriptan (IMITREX) 100 MG tablet Take 1 tablet (100 mg total) by mouth every 2 (two) hours as needed for Migraine.     No current facility-administered medications on file prior to visit.       Past Medical History:   Diagnosis Date    Anemia     Back pain     Depression      Past Surgical History:   Procedure Laterality Date     SECTION      COLD KNIFE CONIZATION OF CERVIX  12/10/2021    Procedure: CONE BIOPSY, CERVIX, USING COLD KNIFE;  Surgeon: Modesto Nassar MD;  [facility];  Service: OB/GYN;;    COLONOSCOPY N/A 2021    Procedure: COLONOSCOPY;  Surgeon: Emily Cruz MD;  [facility];  Service: Endoscopy;  Laterality: N/A;    ESOPHAGOGASTRODUODENOSCOPY N/A 2021    Procedure: EGD (ESOPHAGOGASTRODUODENOSCOPY);  Surgeon: Emily Cruz MD;  [facility];  Service: Endoscopy;  Laterality: N/A;    HYSTERECTOMY      ROBOT-ASSISTED LAPAROSCOPIC HYSTERECTOMY N/A 2022    Procedure: ROBOTIC  HYSTERECTOMY;  Surgeon: Modesto Nassar MD;  Location: [facility] OR;  Service: OB/GYN;  Laterality: N/A;    ROBOT-ASSISTED SALPINGECTOMY Bilateral 2022    Procedure: ROBOTIC SALPINGECTOMY;  Surgeon: Modesto Nassar MD;  Location: [facility] OR;  Service: OB/GYN;  Laterality: Bilateral;    SINUS SURGERY      TUBAL LIGATION       Family History   Problem Relation Age of Onset    Hypertension Mother     Diabetes Mother     Breast cancer Neg Hx     Colon cancer Neg Hx     Ovarian cancer Neg Hx      Social History     Tobacco Use    Smoking status: Former     Packs/day: 0.00     Types: Cigarettes     Quit date: 3/3/2021     Years since quittin.6    Smokeless tobacco: Never   Substance Use Topics    Alcohol use: No    Drug use: Yes     Types: Marijuana     Comment: daily        Review of Systems   Constitutional:  Negative for fever and weight loss.   Respiratory:  Negative for cough and shortness of breath.    Cardiovascular:  Negative for chest pain and leg swelling.   Gastrointestinal:  Negative for constipation, nausea and vomiting.   Genitourinary:  Negative for flank pain and frequency.   Musculoskeletal:  Positive for back pain. Negative for falls.   Skin:  Negative for rash.   Neurological:  Negative for dizziness, focal weakness and headaches.      OBJECTIVE:     Vital Signs:  Pulse: 75 (10/11/22 1447)  BP: 107/71 (10/11/22 1447)    Physical Exam  Constitutional:       Appearance: Normal appearance.   HENT:      Head: Normocephalic and atraumatic.   Cardiovascular:      Rate and Rhythm: Normal rate and regular rhythm.      Pulses: Normal pulses.      Heart sounds: Normal heart sounds.   Pulmonary:      Effort: Pulmonary effort is normal.      Breath sounds: Normal breath sounds.   Abdominal:      General: Abdomen is flat.      Palpations: Abdomen is soft. There is no mass.      Tenderness: There is no abdominal tenderness.   Musculoskeletal:         General: Normal range of motion.      Cervical back: Normal  range of motion and neck supple.      Right lower leg: No edema.      Left lower leg: No edema.   Neurological:      Mental Status: She is alert and oriented to person, place, and time.      Sensory: No sensory deficit.     A/P:  Back pain (right lumbar)   - Gabapentin 300mg tid   - Tylenol 500mg tid   - Lidocaine patch qd (one/12hrs)   - Discontinue NSAIDs in setting of GERD    - counseled on stretching, moderate activity        DUE AT NEXT/FUTURE VISIT:          No follow-ups on file.      Sanchez Dodd  [facility], PGY-1  10/11/2022    This note was partially created using [employer] Voice Recognition software. Typographical and content errors may occur with this process. While efforts are made to detect and correct such errors, in some cases errors will persist. For this reason, wording in this document should be considered in the proper context and not strictly verbatim     The following information is provided to all patients.  This information is to help you find resources for any of the problems found today that may be affecting your health:                Living healthy guide: www.[facility].[place].gov       Understanding Diabetes: www.diabetes.org       Eating healthy: www.cdc.gov/healthyweight      CDC home safety checklist: www.cdc.gov/steadi/patient.html      Agency on Aging: www.goea.[place].gov       Alcoholics anonymous (AA): www.aa.org      Physical Activity: www.lizbeth.nih.gov/ki8zwki       Tobacco use: www.quitwithusla.org

## 2022-10-14 ENCOUNTER — PATIENT MESSAGE (OUTPATIENT)
Dept: FAMILY MEDICINE | Facility: HOSPITAL | Age: 42
End: 2022-10-14
Payer: MEDICAID

## 2022-10-14 ENCOUNTER — PATIENT OUTREACH (OUTPATIENT)
Dept: EMERGENCY MEDICINE | Facility: HOSPITAL | Age: 42
End: 2022-10-14

## 2022-10-14 ENCOUNTER — HOSPITAL ENCOUNTER (EMERGENCY)
Facility: HOSPITAL | Age: 42
Discharge: HOME | End: 2022-10-14
Attending: EMERGENCY MEDICINE
Payer: MEDICAID

## 2022-10-14 VITALS
TEMPERATURE: 99 F | DIASTOLIC BLOOD PRESSURE: 75 MMHG | HEIGHT: 68 IN | HEART RATE: 96 BPM | SYSTOLIC BLOOD PRESSURE: 145 MMHG | WEIGHT: 176 LBS | OXYGEN SATURATION: 99 % | RESPIRATION RATE: 18 BRPM | BODY MASS INDEX: 26.67 KG/M2

## 2022-10-14 DIAGNOSIS — M54.31 SCIATICA OF RIGHT SIDE: Primary | ICD-10-CM

## 2022-10-14 PROCEDURE — 99284 EMERGENCY DEPT VISIT MOD MDM: CPT | Mod: ,,, | Performed by: EMERGENCY MEDICINE

## 2022-10-14 PROCEDURE — 99284 PR EMERGENCY DEPT VISIT,LEVEL IV: ICD-10-PCS | Mod: ,,, | Performed by: EMERGENCY MEDICINE

## 2022-10-14 PROCEDURE — 96372 THER/PROPH/DIAG INJ SC/IM: CPT | Performed by: EMERGENCY MEDICINE

## 2022-10-14 PROCEDURE — 25000003 PHARM REV CODE 250: Performed by: EMERGENCY MEDICINE

## 2022-10-14 PROCEDURE — 63600175 PHARM REV CODE 636 W HCPCS: Performed by: EMERGENCY MEDICINE

## 2022-10-14 PROCEDURE — 99284 EMERGENCY DEPT VISIT MOD MDM: CPT | Mod: 25

## 2022-10-14 RX ORDER — ACETAMINOPHEN 500 MG
1000 TABLET ORAL
Status: COMPLETED | OUTPATIENT
Start: 2022-10-14 | End: 2022-10-14

## 2022-10-14 RX ORDER — MORPHINE SULFATE 15 MG/1
15 TABLET ORAL
Status: COMPLETED | OUTPATIENT
Start: 2022-10-14 | End: 2022-10-14

## 2022-10-14 RX ORDER — KETOROLAC TROMETHAMINE 30 MG/ML
10 INJECTION, SOLUTION INTRAMUSCULAR; INTRAVENOUS
Status: COMPLETED | OUTPATIENT
Start: 2022-10-14 | End: 2022-10-14

## 2022-10-14 RX ADMIN — MORPHINE SULFATE 15 MG: 15 TABLET ORAL at 11:10

## 2022-10-14 RX ADMIN — KETOROLAC TROMETHAMINE 10 MG: 30 INJECTION, SOLUTION INTRAMUSCULAR; INTRAVENOUS at 11:10

## 2022-10-14 RX ADMIN — ACETAMINOPHEN 1000 MG: 500 TABLET ORAL at 11:10

## 2022-10-14 NOTE — ED NOTES
Patient identifiers verified and correct for Ms Shah  C/C: Right lower back pain SEE NN  APPEARANCE: awake and alert in NAD.  SKIN: warm, dry and intact. No breakdown or bruising.  MUSCULOSKELETAL: Patient moving all extremities spontaneously, no obvious swelling or deformities noted. Ambulates independently.  RESPIRATORY: Denies shortness of breath.Respirations unlabored.   CARDIAC: Denies CP, 2+ distal pulses; no peripheral edema  ABDOMEN: S/ND/NT, Denies nausea  : voids spontaneously, denies difficulty  Neurologic: AAO x 4; follows commands equal strength in all extremities; denies numbness/tingling. Denies dizziness  Denies new weakness, reports burning pain that radiates down leg
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
## 2022-10-14 NOTE — ED NOTES
Patient states right lower back pain  that radiates down leg onset Saturday, gotten worse. Lidocaine, Gabapentin and Tylenol

## 2022-10-15 NOTE — PROGRESS NOTES
Cherry Juares LPN  ED Navigator  Emergency Department    [place] [facility] [place]  Role: Community Health Worker    Date: 10/14/2022  Patient Name: Wandy Shah  MRN: [id number]  PCP: Du Pineda MD    Assessment:     Wandy Shah is a 42 y.o. female who has presented to ED for Back Pain. Patient has visited the ED 3 times in the past 3 months. Patient did contact PCP.     ED Navigator Initial Assessment    ED Navigator Enrollment Documentation  Consent to Services  Does patient consent to completing the assessment?: Yes  Contact  Method of Initial Contact: Phone  Transportation  Does the patient have issues with Transportation?: No  Does the patient have transportation to and from healthcare appointments?: Yes  Insurance Coverage  Do you have coverage/adequate coverage?: Yes  Type/kind of coverage: [facility] COMMUNITY PLAN [facility] [employer] [place]  Is patient able to afford co-pays/deductibles?: Yes  Is patient able to afford HME or supplies?: Yes  Does patient have an established [facility] PCP?: Yes  Able to access?: Yes  Does the patient have a lack of adequate coverage?: No  Specialist Appointment  Did the patient come to the ED to see a specialist?: No  Does the patient have a pending specialist referral?: No  Does the patient have a specialist appointment made?: No  PCP Follow Up Appointment  Has the patient had an appointment with a primary care provider in the past year?: Yes  Approximate date: 10/11/22  Provider: Sanchez Dodd MD  Does the patient have a follow up appontment with a PCP?: No  When was the last time you saw your PCP?: 10/11/22  Why does the patient not have a follow up scheduled?: Other (see comments) (Comment: Has not scheduled yet)  Medications  Is patient able to afford medication?: Yes  Is patient unable to get medication due to lack of transportation?: No  Psychological  Does the patient have psycho-social concerns?: Yes  What concerns does the patient have?: Anxiety and/or  Depression (Comment: Pt is already receiving behavioral health services)  Food  Does the patient have concerns about food?: No  Communication/Education  Does the patient have limited English proficiency/English not primary language?: No  Does patient have low literacy and/or low health literacy?: Yes  Does patient have concerns with care?: No  Does patient have dissatisfaction with care?: No  Other Financial Concerns  Does the patient have immediate financial distress?: No  Does the patient have general financial concerns?: No  Other Social Barriers/Concerns  Does the patient have any additional barriers or concerns?: None  Primary Barrier  Barriers identified: Cognitive barrier (health literacy, language and communication, etc.)  Root Cause of ED Utilization: Patient Knowledge/Low Health Literacy  Plan to address Patient Knowledge/Low Health Literacy: Provided information for [facility] On [facility] 24/7 Nurse triage line [phone] or 1-866-[facility] ([phone])  Next steps: Provided Education  Was education/educational materials provided surrounding PCP services/creating a medical home?: Yes Was education verbal or written?: Written     Was education/educational materials provided surrounding low cost, healthy foods?: Yes Was education verbal or written?: Written     Was education/educational materials provided surrounding other items? If so, use comment to explain.: Yes (Comment: [place] Virtual Visit Flyer, Eating Healthy Plate, [facility], [facility] on call RN, [facility] PCP scheduling assistance) Was education verbal or written?: Written   Plan: Provided information for [facility] 24/7 Nurse triage line, [phone] or 1-866-[facility] ([phone])  Expected Date of Follow Up 1: 1/18/23  Additional Documentation: Spoke with patient today and she was agreeable to enrollment and subsequent F/U calls. Pt. Denies any psychosocial needs at this time. Pt. Is established with primary care and was last seen on  10/11/22. Pt stated she feels better after D/C from the ED and is laying down resting at this time. Pt. Denies smoking. Pt. Denied the need for any outside community resources at this time. [facility] form, [place] Virtual Visit Flyer, [facility] PCP scheduling assistance, [facility] on call RN#, and Heart Healthy Diet education all sent to e-mail.          Social History     Socioeconomic History    Marital status:    Tobacco Use    Smoking status: Former     Packs/day: 0.00     Types: Cigarettes     Quit date: 3/3/2021     Years since quittin.6    Smokeless tobacco: Never   Substance and Sexual Activity    Alcohol use: No    Drug use: Yes     Types: Marijuana     Comment: daily    Sexual activity: Yes     Partners: Male     Birth control/protection: See Surgical Hx     Social Determinants of Health     Financial Resource Strain: Low Risk     Difficulty of Paying Living Expenses: Not hard at all   Food Insecurity: No Food Insecurity    Worried About Running Out of Food in the Last Year: Never true    Ran Out of Food in the Last Year: Never true   Transportation Needs: No Transportation Needs    Lack of Transportation (Medical): No    Lack of Transportation (Non-Medical): No   Stress: Stress Concern Present    Feeling of Stress : To some extent   Social Connections: Unknown    Marital Status:    Housing Stability: Unknown    Unable to Pay for Housing in the Last Year: No    Unstable Housing in the Last Year: No       Plan:   Spoke with patient today and she was agreeable to enrollment and subsequent F/U calls. Pt. Denies any psychosocial needs at this time. Pt. Is established with primary care and was last seen on 10/11/22. Pt stated she feels better after D/C from the ED and is laying down resting at this time. Pt. Denies smoking. Pt. Denied the need for any outside community resources at this time. [facility] Right [facility] form, [place] Virtual Visit Flyer, [facility] PCP scheduling assistance, [facility] on call  RN#, and Heart Healthy Diet education all sent to e-mail.

## 2022-10-17 ENCOUNTER — TELEPHONE (OUTPATIENT)
Dept: FAMILY MEDICINE | Facility: HOSPITAL | Age: 42
End: 2022-10-17
Payer: MEDICAID

## 2022-10-17 NOTE — TELEPHONE ENCOUNTER
Replied to text message concerning musculoskeletal pain... advising not to continue exercises, if they are causing increased pain. Cautioned against complete lack of activity.    Sanchez Dodd MD  Family Medicine, PGY-1

## 2022-10-18 ENCOUNTER — PATIENT MESSAGE (OUTPATIENT)
Dept: FAMILY MEDICINE | Facility: HOSPITAL | Age: 42
End: 2022-10-18
Payer: MEDICAID

## 2022-10-18 NOTE — PROGRESS NOTES
I was present in clinic during the visit and assume the primary medical care of this patient. I have seen the patient, and reviewed the Resident's progress note. I have personally interviewed and examined the patient at bedside and agree with the findings.

## 2022-10-19 ENCOUNTER — OFFICE VISIT (OUTPATIENT)
Dept: FAMILY MEDICINE | Facility: HOSPITAL | Age: 42
End: 2022-10-19
Payer: MEDICAID

## 2022-10-19 VITALS
BODY MASS INDEX: 29.57 KG/M2 | SYSTOLIC BLOOD PRESSURE: 118 MMHG | DIASTOLIC BLOOD PRESSURE: 74 MMHG | HEIGHT: 68 IN | HEART RATE: 84 BPM | WEIGHT: 195.13 LBS

## 2022-10-19 DIAGNOSIS — M54.50 ACUTE BILATERAL LOW BACK PAIN WITHOUT SCIATICA: Primary | ICD-10-CM

## 2022-10-19 PROCEDURE — 96372 THER/PROPH/DIAG INJ SC/IM: CPT

## 2022-10-19 PROCEDURE — 99215 OFFICE O/P EST HI 40 MIN: CPT | Performed by: STUDENT IN AN ORGANIZED HEALTH CARE EDUCATION/TRAINING PROGRAM

## 2022-10-19 RX ORDER — KETOROLAC TROMETHAMINE 30 MG/ML
60 INJECTION, SOLUTION INTRAMUSCULAR; INTRAVENOUS
Status: COMPLETED | OUTPATIENT
Start: 2022-10-19 | End: 2022-10-19

## 2022-10-19 RX ORDER — METHOCARBAMOL 500 MG/1
500 TABLET, FILM COATED ORAL 3 TIMES DAILY PRN
Qty: 42 TABLET | Refills: 0 | Status: SHIPPED | OUTPATIENT
Start: 2022-10-19 | End: 2022-11-03

## 2022-10-19 RX ORDER — NAPROXEN 500 MG/1
500 TABLET ORAL 2 TIMES DAILY WITH MEALS
Qty: 28 TABLET | Refills: 0 | Status: SHIPPED | OUTPATIENT
Start: 2022-10-19 | End: 2022-11-03

## 2022-10-19 RX ADMIN — KETOROLAC TROMETHAMINE 60 MG: 30 INJECTION, SOLUTION INTRAMUSCULAR at 10:10

## 2022-10-19 NOTE — PROGRESS NOTES
"Progress Note  [facility]    Subjective:     Patient ID: Wandy Shah is a 42 y.o. female   Chief Complaint: Back pain & bilateral LE edema     History of Present Illness: Patient is a 43 y/o F with PMHx of anemia, GERD, depression, asthma and back pain  who presents to clinic for back pain & LE swelling.     Back pain: states that she injured her back at work when lifting > 50 lbs. Saw Dr. Dodd 10/11 for the pain; takes Gabapentin 300 mg TID & Tylenol 500 mg BID and recommended to practice spine strengthening & stretching exercises. Reports worsening pain brought her to the ED where she was told she has sciatica. Has been wearing a back brace which has provided her with some relief. At this visit, she rates her pain as a 7/10 and describes the pain as "shooting and stabbing." Denies radiating pain, numbness, urinary or bowel incontinence, and loss in sensation.     LE swelling: states that she has been dealing with LE edema for 1.5 years now. Is followed by a Cardiologist on the [facility] for the LE swelling who recommended wearing compression stocking. Has undergone a full work-up with US. Reports that this swelling began yesterday (10/18) after a 12 hour day of standing at work. Has not been able to wear her compression stockings because of her back pain which makes putting them on difficult. Decreases her legs as feeling "heavy." She reports that normally when the swelling happens, it usually resolves itself after she rests her legs. But states that this time is different, she says she has even tried elevating her legs on 4 pillows last night with minimal relief.        Review of patient's allergies indicates:   Allergen Reactions    Raspberry (rubus idaeus)         Past Medical History:   Diagnosis Date    Anemia     Back pain     Depression       Past Surgical History:   Procedure Laterality Date     SECTION      COLD KNIFE CONIZATION OF CERVIX  12/10/2021    Procedure: CONE " "BIOPSY, CERVIX, USING COLD KNIFE;  Surgeon: Modesto Nassar MD;  Location: [facility] OR;  Service: OB/GYN;;    COLONOSCOPY N/A 2021    Procedure: COLONOSCOPY;  Surgeon: Emily Cruz MD;  Location: [facility] ENDO;  Service: Endoscopy;  Laterality: N/A;    ESOPHAGOGASTRODUODENOSCOPY N/A 2021    Procedure: EGD (ESOPHAGOGASTRODUODENOSCOPY);  Surgeon: Emily Cruz MD;  Location: [facility] ENDO;  Service: Endoscopy;  Laterality: N/A;    HYSTERECTOMY      ROBOT-ASSISTED LAPAROSCOPIC HYSTERECTOMY N/A 2022    Procedure: ROBOTIC HYSTERECTOMY;  Surgeon: Modesto Nassar MD;  Location: [facility] OR;  Service: OB/GYN;  Laterality: N/A;    ROBOT-ASSISTED SALPINGECTOMY Bilateral 2022    Procedure: ROBOTIC SALPINGECTOMY;  Surgeon: Modesto Nassar MD;  Location: [facility] OR;  Service: OB/GYN;  Laterality: Bilateral;    SINUS SURGERY      TUBAL LIGATION        Family History   Problem Relation Age of Onset    Hypertension Mother     Diabetes Mother     Breast cancer Neg Hx     Colon cancer Neg Hx     Ovarian cancer Neg Hx       Social History     Tobacco Use    Smoking status: Former     Packs/day: 0.00     Types: Cigarettes     Quit date: 3/3/2021     Years since quittin.6    Smokeless tobacco: Never   Substance Use Topics    Alcohol use: No      Review of Systems:  Constitutional:  Negative for fever and weight loss.   Respiratory:  Negative for cough and shortness of breath.    Cardiovascular:  Negative for chest pain and leg swelling.   Gastrointestinal:  Negative for constipation, nausea and vomiting.   Genitourinary:  Negative for flank pain and frequency.   Musculoskeletal:  Positive for back pain. Negative for falls.   Skin:  Negative for rash.   Neurological:  Negative for dizziness, focal weakness and headaches.  Objective:     Vitals:    10/19/22 1005   BP: 118/74   Pulse: 84   Weight: 88.5 kg (195 lb 1.7 oz)   Height: 5' 8" (1.727 m)     BMI: 26.76 kg/m2       Physical Exam:  Constitutional:      "  Appearance: Normal appearance.   HENT:      Head: Normocephalic and atraumatic.   Cardiovascular:      Rate and Rhythm: Normal rate and regular rhythm.      Pulses: Normal pulses.      Heart sounds: Normal heart sounds.   Pulmonary:      Effort: Pulmonary effort is normal.      Breath sounds: Normal breath sounds.   Abdominal:      General: Abdomen is flat.      Palpations: Abdomen is soft. There is no mass.      Tenderness: There is no abdominal tenderness.   Musculoskeletal:         General: Normal range of motion.      Cervical back: Normal range of motion and neck supple.      Right lower leg: Edema +1     Left lower leg: Edema +1   Neurological:      Mental Status: She is alert and oriented to person, place, and time.      Sensory: No sensory deficit.     Assessment/Plan:     Ms. Wandy Shah is a 42 y.o. F with PMHx of anemia, GERD, depression, asthma and back pain who presents to clinic for back pain & LE swelling.   Encouraged pt to follow up with her cardiologist for her edema.    Acute bilateral low back pain without sciatica     Plan:   1. Acute bilateral low back pain without sciatica  Will treat with scheduled nsaids and muscle relaxers, if pain does not improve will refer to PT, will also give toradol shot while in office  - naproxen (NAPROSYN) 500 MG tablet; Take 1 tablet (500 mg total) by mouth 2 (two) times daily with meals. for 14 days  Dispense: 28 tablet; Refill: 0  - methocarbamoL (ROBAXIN) 500 MG Tab; Take 1 tablet (500 mg total) by mouth 3 (three) times daily as needed (back pain).  Dispense: 42 tablet; Refill: 0  - ketorolac injection 60 mg      Follow-up: February 2023 [facility]   [facility]      Jr Esparza MD, MPH  [facility], PGY-2

## 2022-10-19 NOTE — PROGRESS NOTES
Per order from Dr. Esparza pt given Toradol 60 mg IM in LUQ of buttocks. Tolerated well. BandAid applied.

## 2022-10-26 ENCOUNTER — PATIENT MESSAGE (OUTPATIENT)
Dept: FAMILY MEDICINE | Facility: HOSPITAL | Age: 42
End: 2022-10-26
Payer: MEDICAID

## 2022-10-26 DIAGNOSIS — M54.41 ACUTE BILATERAL LOW BACK PAIN WITH RIGHT-SIDED SCIATICA: Primary | ICD-10-CM

## 2022-10-28 NOTE — PROGRESS NOTES
I assume primary medical responsibility for this patient. I have reviewed the history, physical, and assessement & treatment plan with the resident and agree that the care is reasonable and necessary. This service has been performed by a resident without the presence of a teaching physician under the primary care exception. If necessary, an addendum of additional findings or evaluation beyond the resident documentation will be noted below.     Starla Alanis MD

## 2022-10-29 ENCOUNTER — HOSPITAL ENCOUNTER (EMERGENCY)
Facility: HOSPITAL | Age: 42
Discharge: HOME | End: 2022-10-29
Attending: EMERGENCY MEDICINE
Payer: MEDICAID

## 2022-10-29 VITALS
HEART RATE: 67 BPM | SYSTOLIC BLOOD PRESSURE: 102 MMHG | WEIGHT: 190 LBS | OXYGEN SATURATION: 99 % | RESPIRATION RATE: 16 BRPM | HEIGHT: 68 IN | BODY MASS INDEX: 28.79 KG/M2 | DIASTOLIC BLOOD PRESSURE: 66 MMHG | TEMPERATURE: 98 F

## 2022-10-29 DIAGNOSIS — M54.31 SCIATICA OF RIGHT SIDE: Primary | ICD-10-CM

## 2022-10-29 DIAGNOSIS — M79.89 LEG SWELLING: ICD-10-CM

## 2022-10-29 LAB
ALBUMIN SERPL BCP-MCNC: 3.8 G/DL (ref 3.5–5.2)
ALP SERPL-CCNC: 50 U/L (ref 55–135)
ALT SERPL W/O P-5'-P-CCNC: 61 U/L (ref 10–44)
ANION GAP SERPL CALC-SCNC: 8 MMOL/L (ref 8–16)
AST SERPL-CCNC: 43 U/L (ref 10–40)
BASOPHILS # BLD AUTO: 0.07 K/UL (ref 0–0.2)
BASOPHILS NFR BLD: 0.7 % (ref 0–1.9)
BILIRUB SERPL-MCNC: 0.4 MG/DL (ref 0.1–1)
BUN SERPL-MCNC: 13 MG/DL (ref 6–20)
CALCIUM SERPL-MCNC: 9.3 MG/DL (ref 8.7–10.5)
CHLORIDE SERPL-SCNC: 103 MMOL/L (ref 95–110)
CO2 SERPL-SCNC: 26 MMOL/L (ref 23–29)
CREAT SERPL-MCNC: 0.7 MG/DL (ref 0.5–1.4)
DIFFERENTIAL METHOD: ABNORMAL
EOSINOPHIL # BLD AUTO: 0.5 K/UL (ref 0–0.5)
EOSINOPHIL NFR BLD: 5.1 % (ref 0–8)
ERYTHROCYTE [DISTWIDTH] IN BLOOD BY AUTOMATED COUNT: 12.7 % (ref 11.5–14.5)
EST. GFR  (NO RACE VARIABLE): >60 ML/MIN/1.73 M^2
GLUCOSE SERPL-MCNC: 95 MG/DL (ref 70–110)
HCT VFR BLD AUTO: 38 % (ref 37–48.5)
HGB BLD-MCNC: 12.7 G/DL (ref 12–16)
IMM GRANULOCYTES # BLD AUTO: 0.09 K/UL (ref 0–0.04)
IMM GRANULOCYTES NFR BLD AUTO: 0.9 % (ref 0–0.5)
LYMPHOCYTES # BLD AUTO: 2.2 K/UL (ref 1–4.8)
LYMPHOCYTES NFR BLD: 22.2 % (ref 18–48)
MCH RBC QN AUTO: 31.8 PG (ref 27–31)
MCHC RBC AUTO-ENTMCNC: 33.4 G/DL (ref 32–36)
MCV RBC AUTO: 95 FL (ref 82–98)
MONOCYTES # BLD AUTO: 0.7 K/UL (ref 0.3–1)
MONOCYTES NFR BLD: 7.5 % (ref 4–15)
NEUTROPHILS # BLD AUTO: 6.2 K/UL (ref 1.8–7.7)
NEUTROPHILS NFR BLD: 63.6 % (ref 38–73)
NRBC BLD-RTO: 0 /100 WBC
PLATELET # BLD AUTO: 253 K/UL (ref 150–450)
PMV BLD AUTO: 9.6 FL (ref 9.2–12.9)
POTASSIUM SERPL-SCNC: 3.5 MMOL/L (ref 3.5–5.1)
PROT SERPL-MCNC: 7.2 G/DL (ref 6–8.4)
RBC # BLD AUTO: 3.99 M/UL (ref 4–5.4)
SODIUM SERPL-SCNC: 137 MMOL/L (ref 136–145)
WBC # BLD AUTO: 9.76 K/UL (ref 3.9–12.7)

## 2022-10-29 PROCEDURE — 99284 EMERGENCY DEPT VISIT MOD MDM: CPT | Mod: ,,, | Performed by: PHYSICIAN ASSISTANT

## 2022-10-29 PROCEDURE — 99284 PR EMERGENCY DEPT VISIT,LEVEL IV: ICD-10-PCS | Mod: ,,, | Performed by: PHYSICIAN ASSISTANT

## 2022-10-29 PROCEDURE — 25000003 PHARM REV CODE 250: Performed by: PHYSICIAN ASSISTANT

## 2022-10-29 PROCEDURE — 96374 THER/PROPH/DIAG INJ IV PUSH: CPT

## 2022-10-29 PROCEDURE — 63600175 PHARM REV CODE 636 W HCPCS: Performed by: PHYSICIAN ASSISTANT

## 2022-10-29 PROCEDURE — 80053 COMPREHEN METABOLIC PANEL: CPT | Performed by: PHYSICIAN ASSISTANT

## 2022-10-29 PROCEDURE — 99285 EMERGENCY DEPT VISIT HI MDM: CPT | Mod: 25

## 2022-10-29 PROCEDURE — 85025 COMPLETE CBC W/AUTO DIFF WBC: CPT | Performed by: PHYSICIAN ASSISTANT

## 2022-10-29 RX ORDER — DEXAMETHASONE SODIUM PHOSPHATE 4 MG/ML
8 INJECTION, SOLUTION INTRA-ARTICULAR; INTRALESIONAL; INTRAMUSCULAR; INTRAVENOUS; SOFT TISSUE
Status: COMPLETED | OUTPATIENT
Start: 2022-10-29 | End: 2022-10-29

## 2022-10-29 RX ORDER — GABAPENTIN 300 MG/1
300 CAPSULE ORAL
Status: COMPLETED | OUTPATIENT
Start: 2022-10-29 | End: 2022-10-29

## 2022-10-29 RX ORDER — LIDOCAINE 50 MG/G
1 PATCH TOPICAL
Status: DISCONTINUED | OUTPATIENT
Start: 2022-10-29 | End: 2022-10-29 | Stop reason: HOSPADM

## 2022-10-29 RX ORDER — ACETAMINOPHEN 500 MG
1000 TABLET ORAL
Status: COMPLETED | OUTPATIENT
Start: 2022-10-29 | End: 2022-10-29

## 2022-10-29 RX ADMIN — GABAPENTIN 300 MG: 300 CAPSULE ORAL at 06:10

## 2022-10-29 RX ADMIN — DEXAMETHASONE SODIUM PHOSPHATE 8 MG: 4 INJECTION INTRA-ARTICULAR; INTRALESIONAL; INTRAMUSCULAR; INTRAVENOUS; SOFT TISSUE at 06:10

## 2022-10-29 RX ADMIN — LIDOCAINE 1 PATCH: 50 PATCH CUTANEOUS at 06:10

## 2022-10-29 RX ADMIN — ACETAMINOPHEN 1000 MG: 500 TABLET ORAL at 06:10

## 2022-10-29 NOTE — ED PROVIDER NOTES
Encounter Date: 10/29/2022       History     Chief Complaint   Patient presents with    Sciatica     Hx sciatica, right hip pain radiating down her right leg x 2 weeks, reports meds at home to helping     42-year-old female with history of anemia, depression and sciatica presents to ED for right lower back pain.  Reports 2 weeks gas we worsening back pain that radiates down her right leg down to her heels.  Describes it as burning, tingling with occasional numbness with no relief from her home medications.  Is currently taking naproxen, Robaxin and gabapentin.  Recently seen her PCP and started back stretches this week which she feels has worsened her pain.  Of note she also endorses swelling to her right thigh.  Currently rates her pain 10/10.  Denies any fevers/chills, saddle anesthesia, history of IV drug use or loss of bowel or bladder.  No previous DVT history reports that she quit smoking a year ago, denies any hormonal therapy or recent travel or surgery.  Denies shortness of breath.    The history is provided by the patient.   Review of patient's allergies indicates:   Allergen Reactions    Raspberry (rubus idaeus)      Past Medical History:   Diagnosis Date    Anemia     Back pain     Depression      Past Surgical History:   Procedure Laterality Date     SECTION      COLD KNIFE CONIZATION OF CERVIX  12/10/2021    Procedure: CONE BIOPSY, CERVIX, USING COLD KNIFE;  Surgeon: Modesto Nassar MD;  Location: [facility] OR;  Service: OB/GYN;;    COLONOSCOPY N/A 2021    Procedure: COLONOSCOPY;  Surgeon: Emily Cruz MD;  Location: [facility] ENDO;  Service: Endoscopy;  Laterality: N/A;    ESOPHAGOGASTRODUODENOSCOPY N/A 2021    Procedure: EGD (ESOPHAGOGASTRODUODENOSCOPY);  Surgeon: Emily Cruz MD;  Location: [facility] ENDO;  Service: Endoscopy;  Laterality: N/A;    HYSTERECTOMY      ROBOT-ASSISTED LAPAROSCOPIC HYSTERECTOMY N/A 2022    Procedure: ROBOTIC HYSTERECTOMY;  Surgeon: Modesto MORRIS  MD Cesario;  Location: [facility] OR;  Service: OB/GYN;  Laterality: N/A;    ROBOT-ASSISTED SALPINGECTOMY Bilateral 2022    Procedure: ROBOTIC SALPINGECTOMY;  Surgeon: Modesto Nassar MD;  Location: [facility] OR;  Service: OB/GYN;  Laterality: Bilateral;    SINUS SURGERY      TUBAL LIGATION       Family History   Problem Relation Age of Onset    Hypertension Mother     Diabetes Mother     Breast cancer Neg Hx     Colon cancer Neg Hx     Ovarian cancer Neg Hx      Social History     Tobacco Use    Smoking status: Former     Packs/day: 0.00     Types: Cigarettes     Quit date: 3/3/2021     Years since quittin.6    Smokeless tobacco: Never   Substance Use Topics    Alcohol use: No    Drug use: Yes     Types: Marijuana     Comment: daily     Review of Systems   Constitutional:  Negative for chills and fever.   HENT:  Negative for sore throat.    Respiratory:  Negative for cough and shortness of breath.    Cardiovascular:  Negative for chest pain.   Gastrointestinal:  Negative for abdominal pain.   Genitourinary:  Negative for difficulty urinating.   Musculoskeletal:  Positive for back pain.   Skin: Negative.    Neurological:  Negative for weakness.   Psychiatric/Behavioral:  Negative for confusion.      Physical Exam     Initial Vitals [10/29/22 1535]   BP Pulse Resp Temp SpO2   113/66 85 16 97.8 °F (36.6 °C) 99 %      MAP       --         Physical Exam    Nursing note and vitals reviewed.  Constitutional: She appears well-developed and well-nourished.   HENT:   Head: Normocephalic and atraumatic.   Eyes: Pupils are equal, round, and reactive to light.   Neck: Neck supple.   Normal range of motion.  Cardiovascular:  Normal rate, regular rhythm, normal heart sounds and intact distal pulses.           Pulmonary/Chest: Breath sounds normal.   Abdominal: Abdomen is soft. Bowel sounds are normal. There is no abdominal tenderness.   Musculoskeletal:         General: Normal range of motion.      Cervical back: Normal  range of motion and neck supple.      Comments: No midline cervical, thoracic or lumbar tenderness.  Mild right lower paraspinal tenderness as well as tenderness to her right upper buttock.  Positive straight leg test on the right leg.  Negative cross straight leg test raise.  Lower extremities are neurovascularly intact.  Sensation to touch intact.  2+ DP/PT.     Neurological: She is alert and oriented to person, place, and time. GCS score is 15. GCS eye subscore is 4. GCS verbal subscore is 5. GCS motor subscore is 6.   Skin: Skin is warm and dry. Capillary refill takes less than 2 seconds.   Prominent spider varicosities to the right thigh   Psychiatric: She has a normal mood and affect.       ED Course   Procedures  Labs Reviewed   CBC W/ AUTO DIFFERENTIAL - Abnormal; Notable for the following components:       Result Value    RBC 3.99 (*)     MCH 31.8 (*)     Immature Granulocytes 0.9 (*)     Immature Grans (Abs) 0.09 (*)     All other components within normal limits   COMPREHENSIVE METABOLIC PANEL - Abnormal; Notable for the following components:    Alkaline Phosphatase 50 (*)     AST 43 (*)     ALT 61 (*)     All other components within normal limits          Imaging Results              US Lower Extremity Veins Right (Final result)  Result time 10/29/22 19:03:54   Procedure changed from US Lower Extremity Veins Left     Final result by Trenton Steel MD (10/29/22 19:03:54)                   Impression:      No evidence of deep venous thrombosis in the right lower extremity.    Electronically signed by resident: Natalia Cuevas  Date:    10/29/2022  Time:    18:55    Electronically signed by: Trenton Steel MD  Date:    10/29/2022  Time:    19:03               Narrative:    EXAMINATION:  US LOWER EXTREMITY VEINS RIGHT    CLINICAL HISTORY:  swelling; Other specified soft tissue disorders    TECHNIQUE:  Duplex and color flow Doppler evaluation and graded compression of the right lower extremity veins was  performed.    COMPARISON:  None    FINDINGS:  Right thigh veins: The common femoral, femoral, popliteal, and upper greater saphenous veins are patent and free of thrombus. The veins are normally compressible and have normal phasic flow and augmentation response.    Right calf veins: The visualized calf veins are patent.    Contralateral CFV: The contralateral (left) common femoral vein is patent and free of thrombus.    Miscellaneous: None                                       Medications   LIDOcaine 5 % patch 1 patch (1 patch Transdermal Patch Applied 10/29/22 1828)   acetaminophen tablet 1,000 mg (1,000 mg Oral Given 10/29/22 1829)   dexAMETHasone injection 8 mg (8 mg Intravenous Given 10/29/22 1829)   gabapentin capsule 300 mg (300 mg Oral Given 10/29/22 1829)     Medical Decision Making:   History:   Old Medical Records: I decided to obtain old medical records.  Clinical Tests:   Radiological Study: Ordered and Reviewed     APC / Resident Notes:   42 y.o. year old female presenting with right lower back pain and leg swelling.  On exam patient is afebrile and nontoxic.  Heart rate and rhythm are regular. Lungs with clear breath sounds throughout. Abdomen is soft, nontender. No edema.  No midline cervical, thoracic or lumbar tenderness.  Mild right lower paraspinal tenderness in the lumbar region as well as tenderness to the right upper buttocks.  Positive straight leg test on the right leg.  Lower extremities neurovascularly intact.  No obvious swelling noted to the lower extremity.    DDx includes but is not limited to sciatica, DVT, cauda equina    ED workup reveals low risk for infectious etiology and denies any red flag symptoms for low back pain.  Doubt cauda equina or infectious etiologies.  Patient has history of chronic right side a lower back pain with sciatica which this appears to be acute on chronic exacerbation.  Minimal relief from multimodal pain control home.  Was given home meds as well as  Decadron which improved her symptoms in the ED today.  Ultrasound of the right lower extremity does not show any evidence of DVT.  Will have patient follow-up with PCP.  Referral placed for functional restoration clinic for her chronic sciatica.  Patient overall well-appearing in no acute distress with reassuring vitals and requesting to go home and is comfortable with discharge.  Verbalizes agreement understanding with plan.  Discussed return to ED precautions.    Discussed findings and plan with patient who verbalized understanding and agrees with the plan and course of treatment. Return to ED precautions discussed. Patient is stable for discharge. I discussed the care of this patient with my supervising physician.                       Clinical Impression:   Final diagnoses:  [M79.89] Leg swelling  [M54.31] Sciatica of right side (Primary)        ED Disposition Condition    Discharge Stable          ED Prescriptions    None       Follow-up Information       Follow up With Specialties Details Why Contact Info Additional Information    Du Pineda MD Family Medicine  As needed [address] [facility]  [place] [address] [place] [id number]  [phone]       [facility] Pain Medicine Schedule an appointment as soon as possible for a visit   [address] [facility], [address]  [facility] [address]  [phone] Internal Medicine - [facility], 8th Floor Please park in [address] and use [place] elevators             Khanh Coyne PA-C  10/29/22 1933

## 2022-10-29 NOTE — ED TRIAGE NOTES
Wandy Shah, a 42 y.o. female presents to the ED w/ complaint of right leg sciatica pain. Pt stated this started 2 weeks ago.    Triage note:  Chief Complaint   Patient presents with    Sciatica     Hx sciatica, right hip pain radiating down her right leg x 2 weeks, reports meds at home to helping     Review of patient's allergies indicates:   Allergen Reactions    Raspberry (rubus idaeus)      Past Medical History:   Diagnosis Date    Anemia     Back pain     Depression

## 2022-10-30 NOTE — DISCHARGE INSTRUCTIONS
Your seen in the emergency department for sciatica and leg swelling.  The ultrasound of her legs do not show any blood clots.  I have placed a referral for outpatient pain management/physical therapy at the functional restoration clinic.  Please make the closest available appointment.  Continue your home medications for your symptoms.  Monitor your symptoms and return to ED sooner for any new worsening symptoms.

## 2022-11-02 ENCOUNTER — PATIENT MESSAGE (OUTPATIENT)
Dept: FAMILY MEDICINE | Facility: HOSPITAL | Age: 42
End: 2022-11-02

## 2022-11-02 ENCOUNTER — OFFICE VISIT (OUTPATIENT)
Dept: FAMILY MEDICINE | Facility: HOSPITAL | Age: 42
End: 2022-11-02
Payer: MEDICAID

## 2022-11-02 VITALS
SYSTOLIC BLOOD PRESSURE: 111 MMHG | HEART RATE: 80 BPM | BODY MASS INDEX: 28.9 KG/M2 | WEIGHT: 190.69 LBS | DIASTOLIC BLOOD PRESSURE: 67 MMHG | HEIGHT: 68 IN

## 2022-11-02 DIAGNOSIS — M54.41 ACUTE LEFT-SIDED LOW BACK PAIN WITH BILATERAL SCIATICA: Primary | ICD-10-CM

## 2022-11-02 DIAGNOSIS — Z83.3 FAMILY HISTORY OF DIABETES MELLITUS TYPE II: ICD-10-CM

## 2022-11-02 DIAGNOSIS — Z23 NEED FOR PROPHYLACTIC VACCINATION AND INOCULATION AGAINST INFLUENZA: ICD-10-CM

## 2022-11-02 DIAGNOSIS — M54.42 ACUTE LEFT-SIDED LOW BACK PAIN WITH BILATERAL SCIATICA: Primary | ICD-10-CM

## 2022-11-02 PROCEDURE — 90471 IMMUNIZATION ADMIN: CPT

## 2022-11-02 PROCEDURE — 99215 OFFICE O/P EST HI 40 MIN: CPT | Performed by: STUDENT IN AN ORGANIZED HEALTH CARE EDUCATION/TRAINING PROGRAM

## 2022-11-02 RX ORDER — METHYLPREDNISOLONE 4 MG/1
TABLET ORAL
COMMUNITY
Start: 2022-11-01

## 2022-11-02 NOTE — PROGRESS NOTES
Clinic Note  [facility]    Subjective:      Wandy Shah is a 42 y.o. female with PMHx migraines, asthma, GERD. Patient has had multiple PCP and ED presentations recently for lower back pain with R sided sciatica, last ED visit yesterday where she was discharged with oral steroids and 8 doses of norco. Patient has not yet picked up norco. Patient came today for ED follow up, reports feeling somewhat better s/p 3 doses of oral steroids. Patient still reporting significant numbness/tingling down R leg. 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 Patient referred to PT this morning by colleague, motivated to attend. Patient without other complaint at this time. Amenable to flu shot and DM/TSH testing (recent CBC and CMP unremarkable).      Review of Systems   Constitutional:  Negative for chills and fatigue.   Respiratory:  Negative for cough and shortness of breath.    Cardiovascular:  Negative for chest pain, palpitations and leg swelling.   Gastrointestinal:  Negative for diarrhea, nausea and vomiting.   Endocrine: Negative for cold intolerance and heat intolerance.   Genitourinary:  Negative for dysuria and frequency.   Musculoskeletal:  Positive for back pain. Negative for arthralgias and joint swelling.   Skin:  Negative for rash.   Neurological:  Positive for numbness. Negative for dizziness and headaches.   Psychiatric/Behavioral:  Negative for dysphoric mood. The patient is not nervous/anxious.          Objective:      Vitals:    11/02/22 0957   BP: 111/67   Pulse: 80     Body mass index is 29 kg/m².    Physical Exam  Constitutional:       General: She is not in acute distress.     Appearance: Normal appearance. She is not ill-appearing.   HENT:      Head: Normocephalic and atraumatic.   Eyes:      Extraocular Movements: Extraocular movements intact.      Pupils: Pupils are equal, round, and reactive to light.   Cardiovascular:      Rate and Rhythm:  Normal rate and regular rhythm.      Pulses: Normal pulses.      Heart sounds: Normal heart sounds.   Pulmonary:      Effort: Pulmonary effort is normal.      Breath sounds: Normal breath sounds.   Abdominal:      General: Abdomen is flat. Bowel sounds are normal.      Palpations: Abdomen is soft.   Musculoskeletal:         General: Tenderness present. No swelling or signs of injury. Normal range of motion.      Cervical back: Normal range of motion.      Right lower leg: No edema.      Left lower leg: No edema.   Skin:     General: Skin is warm and dry.      Coloration: Skin is not pale.      Findings: No rash.   Neurological:      General: No focal deficit present.      Mental Status: She is alert and oriented to person, place, and time.      Motor: No weakness.   Psychiatric:         Mood and Affect: Mood normal.         Behavior: Behavior normal.      Assessment/Plan:      Wandy Shah is a 42 y.o. year old female who presents to clinic for sciatica and health screening.    1. Acute left-sided low back pain with bilateral sciatica  - PT  - Continue gabapentin, oral steroids, norco PRN  - Continue home famotidine while on steroids    2. Family history of diabetes mellitus type II    - Hemoglobin A1C; Future  - TSH; Future    3. Need for prophylactic vaccination and inoculation against influenza    - Flu Vaccine - Quadrivalent *Preferred* (PF) (6 months & older)      Follow up as needed.    Patient discussed with attending physician, Dr. Annabelle Schuler MD  [facility] PGY-2  11/02/2022

## 2022-11-02 NOTE — PROGRESS NOTES
Flu consent signed by patient. Flu vaccine administered.  Flu consent signed by patient. Flu vaccine administered.

## 2022-11-02 NOTE — TELEPHONE ENCOUNTER
Notified via [facility] of patient having worsening pain evaluated in the ED and responded to decadron. Given steroids on discharge.    Will order PT and if no improvement in a few weeks of PT, will consider ordering imaging to assess for pain.    Messaged patient via portal with all information.    Du Pineda MD  [facility] HO-3  11/2/2022 8:57 AM

## 2022-11-13 ENCOUNTER — IMMUNIZATION (OUTPATIENT)
Dept: PRIMARY CARE CLINIC | Facility: CLINIC | Age: 42
End: 2022-11-13
Payer: MEDICAID

## 2022-11-13 DIAGNOSIS — Z23 NEED FOR VACCINATION: Primary | ICD-10-CM

## 2022-11-13 PROCEDURE — 91313 COVID-19, MRNA, LNP-S, BIVALENT BOOSTER, PF, 50 MCG/0.5 ML: CPT | Mod: PBBFAC | Performed by: INTERNAL MEDICINE

## 2022-11-13 PROCEDURE — 0134A COVID-19, MRNA, LNP-S, BIVALENT BOOSTER, PF, 50 MCG/0.5 ML: CPT | Mod: CV19,PBBFAC | Performed by: INTERNAL MEDICINE

## 2022-11-15 DIAGNOSIS — F41.9 ANXIETY: ICD-10-CM

## 2022-11-15 RX ORDER — DULOXETIN HYDROCHLORIDE 30 MG/1
30 CAPSULE, DELAYED RELEASE ORAL DAILY
Qty: 30 CAPSULE | Refills: 1 | Status: CANCELLED | OUTPATIENT
Start: 2022-11-15 | End: 2023-01-14

## 2022-11-16 ENCOUNTER — PATIENT MESSAGE (OUTPATIENT)
Dept: FAMILY MEDICINE | Facility: HOSPITAL | Age: 42
End: 2022-11-16
Payer: MEDICAID

## 2022-11-16 DIAGNOSIS — F41.9 ANXIETY: ICD-10-CM

## 2022-11-17 RX ORDER — DULOXETIN HYDROCHLORIDE 30 MG/1
30 CAPSULE, DELAYED RELEASE ORAL DAILY
Qty: 30 CAPSULE | Refills: 1 | Status: SHIPPED | OUTPATIENT
Start: 2022-11-17 | End: 2023-02-08

## 2022-11-25 ENCOUNTER — CLINICAL SUPPORT (OUTPATIENT)
Dept: REHABILITATION | Facility: HOSPITAL | Age: 42
End: 2022-11-25
Payer: MEDICAID

## 2022-11-25 DIAGNOSIS — M54.41 ACUTE BILATERAL LOW BACK PAIN WITH RIGHT-SIDED SCIATICA: ICD-10-CM

## 2022-11-25 PROCEDURE — 97110 THERAPEUTIC EXERCISES: CPT

## 2022-11-25 PROCEDURE — 97161 PT EVAL LOW COMPLEX 20 MIN: CPT

## 2022-11-25 NOTE — PLAN OF CARE
[facility]   Physical Therapy Initial Evaluation     Date: 11/25/2022   Name: Wandy Shah  Clinic Number: [id number]    Therapy Diagnosis:   Encounter Diagnosis   Name Primary?    Acute bilateral low back pain with right-sided sciatica      Physician: Du Pineda MD    Physician Orders: PT Eval and Treat   Medical Diagnosis from Referral: M54.41 (ICD-10-CM) - Acute bilateral low back pain with right-sided sciatica  Evaluation Date: 11/25/2022  Authorization Period Expiration: TBD  Plan of Care Expiration: 2/25/2023  Progress Note Due: 12/26/2022  Visit # / Visits authorized: 1/ 1   FOTO: 0/5    Precautions: Standard     Time In: 835a  Time Out: 915a  Total Appointment Time (timed & untimed codes): 40 minutes      SUBJECTIVE     Date of onset: October    History of current condition - Wandy reports: she irritated her sciatic nerve in October. She used a back brace that helped some. SHe has stiffness and numbness/tingling. This has been for a couple weeks the pain is a pinching across the lower back and feeling like someone is kicking her butt. She has pain more frequently on R but L witll hurt when it's bad. Bending forward and coming straight back up or squatting down will make it hurt worse. Turning can also trigger it. Epson salt bath will help some. She has been still running, squatting, jumping jacks for exercise. She has had some buckling and weakness in her legs. She had a hysterectomy in March.     Falls: not recently    Imaging, none    Prior Therapy: none  Social History: lives with family in [facility] with 3 PAULY and handrail  Occupation: [employer]  Prior Level of Function: completely independent  Current Level of Function: independent but unable to play with her son    Pain:  Current 5/10, worst 10/10, best 2/10   Location: bilateral back , feet , lower legs, and upper legs   Description: Aching, Tight, and pinching  Aggravating Factors: Bending and Lifting  Easing Factors: epson salt  baths    Patients goals: to be able to move around with pain that's no more than a 1 or 2.      Medical History:   Past Medical History:   Diagnosis Date    Anemia     Back pain     Depression        Surgical History:   Wandy Shah  has a past surgical history that includes  section; Tubal ligation; Sinus surgery; Colonoscopy (N/A, 2021); Esophagogastroduodenoscopy (N/A, 2021); Cold knife conization of cervix (12/10/2021); Robot-assisted laparoscopic hysterectomy (N/A, 2022); Robot-assisted salpingectomy (Bilateral, 2022); and Hysterectomy.    Medications:   Wandy has a current medication list which includes the following prescription(s): acetaminophen, advair diskus, albuterol, amitriptyline, ascorbic acid (vitamin c), cetirizine, dicyclomine, duloxetine, estradiol, flovent hfa, fluarix quad [id number] (pf), fluticasone propionate, gabapentin, hydrocodone-acetaminophen, hydrocortisone, lidocaine, linaclotide, loratadine, meclizine, methylprednisolone, montelukast, omeprazole, and sumatriptan.    Allergies:   Review of patient's allergies indicates:   Allergen Reactions    Raspberry (rubus idaeus)           OBJECTIVE     Lumbar AROM: Pain/Dysfunction with Movement: !=pain   Flexion: 40 degrees Posterior pinching   Extension: 25 degrees stiffness   Right side bendin degrees    Left side bendin degrees    Right rotation: 10% limited    Left rotation: 10% limited R posterior thigh tingling      Right Left Comment: !=pain   Hip Flexion: 4/5 5/5    Hip ABD: 4/ 5/5    Hip ADD: 4/5 5/5    Hip Extension: NT NT Performs fair bridge against gravity   Hip IR: 4/5 5/5    Hip ER: 4/5 5/5         Right Left Comment ! = pain   Knee extension: 4+/5 5/5    Knee flexion: 4+/5 5/5    Ankle DF: 4+/5 5/5    Ankle PF: 4+/5 5/5         Posture: fwd trunk lean    Neural Tension Positive/Negative   Slump test Negative     Prone hip ext motor control: dec'd glute activation    Prone Knee Bend  (Femoral N):     Sacroiliac Screen:    - Distraction   - Thigh Thrust: negative   - Gaenslen   - Sacral Thrust: negative   - Compression    Palpation: TTP at R L4-5 paraspinals    Joint mobility: dec'd with PA glides t/o lumbar and thoracic spine    Lumbar Manual Distraction: NT    CR: positive B, improved with core activation    FADIR: negative    90/90 Hamstring test: impaired B      Limitation/Restriction for FOTO Survey    Therapist reviewed FOTO scores for Wandy Shah on 11/25/2022.   FOTO documents entered into [facility] - see Media section.    Limitation Score: 37%         TREATMENT     Total Treatment time (time-based codes) separate from Evaluation: 10 minutes      Wandy received the treatments listed below:      therapeutic exercises to develop strength, endurance, flexibility, and posture for 10 minutes including:  Review and demonstration of HEP including the following:  Seated hamstring stretch 3x30s B  Supine TrA activation 2x10, 5s  Supine Sciatic nerve flossing x30    Next session:  -Open books  -pallof press  -clam iso  -piriformis stretch    manual therapy techniques:   Next session:  PA thoracic mobs      PATIENT EDUCATION AND HOME EXERCISES     Education provided:   - Role of PT  - PT POC  - HEP    Written Home Exercises Provided: yes. Exercises were reviewed and Wandy was able to demonstrate them prior to the end of the session.  Wandy demonstrated good  understanding of the education provided. See EMR under Patient Instructions for exercises provided during therapy sessions.    ASSESSMENT     Wandy is a 42 y.o. female referred to outpatient Physical Therapy with a medical diagnosis of acute low back pain with right sided sciatica. Patient presents with impairments consistent with medical diagnosis. She presents with impaired lumbar ROM and dec'd R LE strength. No focal weakness noted. She presents with dec'd PA glides of thoracic and lumbar spine. She had reproduction so symptoms only with L  lumbar rotation. Positive CR with improvement with core activation indicates impaired core motor control and strength. Significant impairment in hamstring tissue extensibility as indicated by impaired 90/90 hamstring test.     Patient prognosis is Excellent.   Patient will benefit from skilled outpatient Physical Therapy to address the deficits stated above and in the chart below, provide patient /family education, and to maximize patientt's level of independence.     Plan of care discussed with patient: Yes  Patient's spiritual, cultural and educational needs considered and patient is agreeable to the plan of care and goals as stated below:     Anticipated Barriers for therapy: scheduling    Medical Necessity is demonstrated by the following  History  Co-morbidities and personal factors that may impact the plan of care Co-morbidities:   See history above    Personal Factors:   no deficits     low   Examination  Body Structures and Functions, activity limitations and participation restrictions that may impact the plan of care Body Regions:   back  lower extremities  trunk    Body Systems:    ROM  strength  gait  motor control    Participation Restrictions:   Playing with her son    Activity limitations:   Learning and applying knowledge  no deficits    General Tasks and Commands  no deficits    Communication  no deficits    Mobility  lifting and carrying objects    Self care  no deficits    Domestic Life  no deficits    Interactions/Relationships  no deficits    Life Areas  employment    Community and Social Life  recreation and leisure         low   Clinical Presentation stable and uncomplicated low   Decision Making/ Complexity Score: low     Goals:  Short Term Goals (4 Weeks):  1. Pt will be compliant with HEP to supplement PT in decreasing pain with functional mobility.  2. Pt will perform pallof press with good control to demonstrate improved core strength.  3. Pt will improve lumbar flexion ROM by 10  degrees to improve functional mobility.  4. Pt will improve impaired LE MMTs by 1/2 score to improve strength for functional tasks.  Long Term Goals (8 Weeks):   1. Pt will improve FOTO score to </= 27% limited to decrease perceived limitation with maintaining/changing body position.   2. Pt will perform floor to waist lift with good control to demonstrate improved core strength.  3. Pt will improve impaired LE MMTs by 1 score to improve strength for functional tasks.  4. Pt will report no pain during lumbar ROM to promote functional mobility.       PLAN   Plan of care Certification: 11/25/2022 to 2/25/2022.    Outpatient Physical Therapy 2 times weekly for 12 weeks to include the following interventions: Cervical/Lumbar Traction, Electrical Stimulation  , Gait Training, Manual Therapy, Moist Heat/ Ice, Neuromuscular Re-ed, Patient Education, Self Care, Therapeutic Activities, Therapeutic Exercise, Ultrasound, and dry needling, IASTM, and kinesiotaping PRN.     Neo Call, PT      I CERTIFY THE NEED FOR THESE SERVICES FURNISHED UNDER THIS PLAN OF TREATMENT AND WHILE UNDER MY CARE   Physician's comments:     Physician's Signature: ___________________________________________________

## 2022-11-28 ENCOUNTER — PATIENT MESSAGE (OUTPATIENT)
Dept: REHABILITATION | Facility: HOSPITAL | Age: 42
End: 2022-11-28
Payer: MEDICAID

## 2022-12-03 ENCOUNTER — PATIENT MESSAGE (OUTPATIENT)
Dept: GASTROENTEROLOGY | Facility: CLINIC | Age: 42
End: 2022-12-03
Payer: MEDICAID

## 2022-12-05 NOTE — PROGRESS NOTES
"[facility]   Physical Therapy Treatment Note     Name: Wandy Shah  Clinic Number: [id number]    Therapy Diagnosis:   Encounter Diagnosis   Name Primary?    Acute bilateral low back pain with right-sided sciatica Yes     Physician: Du Pineda MD    Visit Date: 12/6/2022    Physician Orders: PT Eval and Treat   Medical Diagnosis from Referral: M54.41 (ICD-10-CM) - Acute bilateral low back pain with right-sided sciatica  Evaluation Date: 11/25/2022  Authorization Period Expiration: 11/25/2023  Plan of Care Expiration: 2/25/2023  Progress Note Due: 12/26/2022  Visit # / Visits authorized: 1/ 1, 1/20  FOTO: 0/5  PTA Visit #: 1/5     Precautions: Standard    Time In: 7:10 am (pt arrived late)   Time Out: 7:49 am   Total Billable Time: 39 minutes    Subjective     Pt reports: Pt said her pain is at a minimal this morning but will most likely be worse later on today.   She was compliant with home exercise program.  Response to previous treatment: muscle soreness  Functional change: no changes at this time    Pain: 1/10  Location: bilateral low back      Objective     therapeutic exercises to develop strength, endurance, flexibility, and posture for 31 minutes including:  Review and demonstration of HEP including the following:  Seated hamstring stretch 3x30s B  Supine TrA activation 2x10, 5s  Supine Sciatic nerve flossing x30  -Open books 10x5" (B)  -pallof press   -clam iso 2x30" (B)  -piriformis stretch 3x30" (B)  TrA c/ SB 2x10 5"  DKTC c/ SB 10x5"         manual therapy techniques:  were applied to the: T-spine for 8 minutes, including:  PA thoracic mobs         Home Exercises Provided and Patient Education Provided     Education provided:   - HEP    Written Home Exercises Provided: yes.  Exercises were reviewed and Wandy was able to demonstrate them prior to the end of the session.  Wandy demonstrated good  understanding of the education provided.     See EMR under Patient Instructions " for exercises provided 12/6/2022.    Assessment     No adverse effects to therex, responded well to new and established therex. Required minimal cueing for SL exercises to maintain proper form. Performed PA thoracic mobilizations with initial decreased joint excursion, however improved with manual therapy techniques. Added in PF stretch to home exercise program.  Continue following POC for next session    Wandy Is progressing well towards her goals.   Pt prognosis is Excellent.     Pt will continue to benefit from skilled outpatient physical therapy to address the deficits listed in the problem list box on initial evaluation, provide pt/family education and to maximize pt's level of independence in the home and community environment.     Pt's spiritual, cultural and educational needs considered and pt agreeable to plan of care and goals.     Anticipated barriers to physical therapy: scheduling     Goals:   Short Term Goals (4 Weeks):  1. Pt will be compliant with HEP to supplement PT in decreasing pain with functional mobility.  2. Pt will perform pallof press with good control to demonstrate improved core strength.  3. Pt will improve lumbar flexion ROM by 10 degrees to improve functional mobility.  4. Pt will improve impaired LE MMTs by 1/2 score to improve strength for functional tasks.  Long Term Goals (8 Weeks):   1. Pt will improve FOTO score to </= 27% limited to decrease perceived limitation with maintaining/changing body position.   2. Pt will perform floor to waist lift with good control to demonstrate improved core strength.  3. Pt will improve impaired LE MMTs by 1 score to improve strength for functional tasks.  4. Pt will report no pain during lumbar ROM to promote functional mobility.     Plan   Plan of care Certification: 11/25/2022 to 2/25/2022.     Continue with current POC.     FRANCIS Mccall, CAPRI

## 2022-12-06 ENCOUNTER — CLINICAL SUPPORT (OUTPATIENT)
Dept: REHABILITATION | Facility: HOSPITAL | Age: 42
End: 2022-12-06
Payer: MEDICAID

## 2022-12-06 DIAGNOSIS — M54.41 ACUTE BILATERAL LOW BACK PAIN WITH RIGHT-SIDED SCIATICA: Primary | ICD-10-CM

## 2022-12-06 PROCEDURE — 97110 THERAPEUTIC EXERCISES: CPT | Mod: CQ

## 2022-12-12 NOTE — PROGRESS NOTES
"[facility]   Physical Therapy Treatment Note     Name: Wandy Shah  Clinic Number: [id number]    Therapy Diagnosis:   Encounter Diagnosis   Name Primary?    Acute bilateral low back pain with right-sided sciatica Yes       Physician: Du Pineda MD    Visit Date: 12/13/2022    Physician Orders: PT Eval and Treat   Medical Diagnosis from Referral: M54.41 (ICD-10-CM) - Acute bilateral low back pain with right-sided sciatica  Evaluation Date: 11/25/2022  Authorization Period Expiration: 11/25/2023  Plan of Care Expiration: 2/25/2023  Progress Note Due: 12/26/2022  Visit # / Visits authorized: 1/ 1, 2/20  FOTO: 2/5  PTA Visit #: 2/5     Precautions: Standard    Time In: 7:14 am (pt arrived late)   Time Out: 7:43 am   Total Billable Time: 29 minutes    Subjective     Pt reports: that she is only having slight pain in her mid back with no indications of radicular symptoms.    She was compliant with home exercise program.  Response to previous treatment: muscle soreness  Functional change: no changes at this time    Pain: 1/10  Location: bilateral low back      Objective     therapeutic exercises to develop strength, endurance, flexibility, and posture for 29  minutes including:  Seated hamstring stretch 3x30s B  Supine Sciatic nerve flossing x30  -Open books 10x5" (B)  -pallof press on CC 7lb 15x ea   -clam iso 2x30" (B)  - hip abd lilian 2x30"   -piriformis stretch 3x30" (B)  TrA c/ SB 2x10 5"     - obliques 10x ea   Bridges c/ gait belt 2x10 3"   DKTC c/ SB 10x5"         manual therapy techniques:  were applied to the: T-spine for 0 minutes, including:  PA thoracic mobs         Home Exercises Provided and Patient Education Provided     Education provided:   - HEP    Written Home Exercises Provided: yes.  Exercises were reviewed and Wandy was able to demonstrate them prior to the end of the session.  Wandy demonstrated good  understanding of the education provided.     See EMR under Patient " Instructions for exercises provided 12/6/2022.    Assessment     Continued with previously established therex this session, however also incorporated several LE and core strengthening activities this session. This included sidelying hip abduction isometric holds and bridges, as well as pallof press. Pt required several verbal and occasional tactile cues for proper form during sidelying activities and pallof presses. Pt with no complaints of increased pain following session. Will continue to progress pt per her tolerance and POC during upcoming sessions.     Wandy Is progressing well towards her goals.   Pt prognosis is Excellent.     Pt will continue to benefit from skilled outpatient physical therapy to address the deficits listed in the problem list box on initial evaluation, provide pt/family education and to maximize pt's level of independence in the home and community environment.     Pt's spiritual, cultural and educational needs considered and pt agreeable to plan of care and goals.     Anticipated barriers to physical therapy: scheduling     Goals:   Short Term Goals (4 Weeks):  1. Pt will be compliant with HEP to supplement PT in decreasing pain with functional mobility.  2. Pt will perform pallof press with good control to demonstrate improved core strength.  3. Pt will improve lumbar flexion ROM by 10 degrees to improve functional mobility.  4. Pt will improve impaired LE MMTs by 1/2 score to improve strength for functional tasks.  Long Term Goals (8 Weeks):   1. Pt will improve FOTO score to </= 27% limited to decrease perceived limitation with maintaining/changing body position.   2. Pt will perform floor to waist lift with good control to demonstrate improved core strength.  3. Pt will improve impaired LE MMTs by 1 score to improve strength for functional tasks.  4. Pt will report no pain during lumbar ROM to promote functional mobility.     Plan   Plan of care Certification: 11/25/2022 to 2/25/2022.      Continue with current POC.     FRANCIS Mccall, PTA

## 2022-12-13 ENCOUNTER — CLINICAL SUPPORT (OUTPATIENT)
Dept: REHABILITATION | Facility: HOSPITAL | Age: 42
End: 2022-12-13
Payer: MEDICAID

## 2022-12-13 DIAGNOSIS — M54.41 ACUTE BILATERAL LOW BACK PAIN WITH RIGHT-SIDED SCIATICA: Primary | ICD-10-CM

## 2022-12-13 PROCEDURE — 97110 THERAPEUTIC EXERCISES: CPT | Mod: CQ

## 2022-12-14 DIAGNOSIS — K58.1 IRRITABLE BOWEL SYNDROME WITH CONSTIPATION: ICD-10-CM

## 2022-12-20 ENCOUNTER — TELEPHONE (OUTPATIENT)
Dept: GASTROENTEROLOGY | Facility: CLINIC | Age: 42
End: 2022-12-20
Payer: MEDICAID

## 2022-12-20 ENCOUNTER — OFFICE VISIT (OUTPATIENT)
Dept: GASTROENTEROLOGY | Facility: CLINIC | Age: 42
End: 2022-12-20
Payer: MEDICAID

## 2022-12-20 VITALS
WEIGHT: 193.63 LBS | SYSTOLIC BLOOD PRESSURE: 120 MMHG | HEIGHT: 68 IN | HEART RATE: 94 BPM | DIASTOLIC BLOOD PRESSURE: 77 MMHG | BODY MASS INDEX: 29.35 KG/M2

## 2022-12-20 DIAGNOSIS — R10.30 LOWER ABDOMINAL PAIN: ICD-10-CM

## 2022-12-20 DIAGNOSIS — K58.1 IRRITABLE BOWEL SYNDROME WITH CONSTIPATION: Primary | ICD-10-CM

## 2022-12-20 PROCEDURE — 3008F PR BODY MASS INDEX (BMI) DOCUMENTED: ICD-10-PCS | Mod: CPTII,,, | Performed by: NURSE PRACTITIONER

## 2022-12-20 PROCEDURE — 99999 PR PBB SHADOW E&M-EST. PATIENT-LVL V: CPT | Mod: PBBFAC,,, | Performed by: NURSE PRACTITIONER

## 2022-12-20 PROCEDURE — 3078F PR MOST RECENT DIASTOLIC BLOOD PRESSURE < 80 MM HG: ICD-10-PCS | Mod: CPTII,,, | Performed by: NURSE PRACTITIONER

## 2022-12-20 PROCEDURE — 3044F HG A1C LEVEL LT 7.0%: CPT | Mod: CPTII,,, | Performed by: NURSE PRACTITIONER

## 2022-12-20 PROCEDURE — 1159F MED LIST DOCD IN RCRD: CPT | Mod: CPTII,,, | Performed by: NURSE PRACTITIONER

## 2022-12-20 PROCEDURE — 3008F BODY MASS INDEX DOCD: CPT | Mod: CPTII,,, | Performed by: NURSE PRACTITIONER

## 2022-12-20 PROCEDURE — 99214 PR OFFICE/OUTPT VISIT, EST, LEVL IV, 30-39 MIN: ICD-10-PCS | Mod: S$PBB,,, | Performed by: NURSE PRACTITIONER

## 2022-12-20 PROCEDURE — 3044F PR MOST RECENT HEMOGLOBIN A1C LEVEL <7.0%: ICD-10-PCS | Mod: CPTII,,, | Performed by: NURSE PRACTITIONER

## 2022-12-20 PROCEDURE — 99215 OFFICE O/P EST HI 40 MIN: CPT | Mod: PBBFAC,PO | Performed by: NURSE PRACTITIONER

## 2022-12-20 PROCEDURE — 99999 PR PBB SHADOW E&M-EST. PATIENT-LVL V: ICD-10-PCS | Mod: PBBFAC,,, | Performed by: NURSE PRACTITIONER

## 2022-12-20 PROCEDURE — 1160F RVW MEDS BY RX/DR IN RCRD: CPT | Mod: CPTII,,, | Performed by: NURSE PRACTITIONER

## 2022-12-20 PROCEDURE — 99214 OFFICE O/P EST MOD 30 MIN: CPT | Mod: S$PBB,,, | Performed by: NURSE PRACTITIONER

## 2022-12-20 PROCEDURE — 3078F DIAST BP <80 MM HG: CPT | Mod: CPTII,,, | Performed by: NURSE PRACTITIONER

## 2022-12-20 PROCEDURE — 1160F PR REVIEW ALL MEDS BY PRESCRIBER/CLIN PHARMACIST DOCUMENTED: ICD-10-PCS | Mod: CPTII,,, | Performed by: NURSE PRACTITIONER

## 2022-12-20 PROCEDURE — 3074F SYST BP LT 130 MM HG: CPT | Mod: CPTII,,, | Performed by: NURSE PRACTITIONER

## 2022-12-20 PROCEDURE — 3074F PR MOST RECENT SYSTOLIC BLOOD PRESSURE < 130 MM HG: ICD-10-PCS | Mod: CPTII,,, | Performed by: NURSE PRACTITIONER

## 2022-12-20 PROCEDURE — 1159F PR MEDICATION LIST DOCUMENTED IN MEDICAL RECORD: ICD-10-PCS | Mod: CPTII,,, | Performed by: NURSE PRACTITIONER

## 2022-12-20 NOTE — TELEPHONE ENCOUNTER
----- Message from Teresa Sanford sent at 12/20/2022  8:28 AM CST -----  Contact: 8:30 ady running 10 mins behind  Type:  Needs Medical Advice    Who Called: pt  Symptoms (please be specific):    How long has patient had these symptoms:    Pharmacy name and phone #:    Would the patient rather a call back or a response via [facility]?   Best Call Back Number:   Additional Information: running late

## 2022-12-21 RX ORDER — POLYETHYLENE GLYCOL 3350 17 G/17G
17 POWDER, FOR SOLUTION ORAL DAILY
Qty: 510 G | Refills: 11 | Status: SHIPPED | OUTPATIENT
Start: 2022-12-21 | End: 2024-01-10 | Stop reason: SDUPTHER

## 2022-12-21 NOTE — PROGRESS NOTES
Subjective:       Patient ID: Wandy Shah is a 42 y.o. female.    Chief Complaint: Abdominal Pain (LLQ) and Constipation    41 y/o female with GERD and constipation presents to clinic for follow up. States Linzess 145 mcg daily is no longer effective. Has BM every 2-3 days. Feels full and bloated most days.       Past Medical History:   Diagnosis Date    Anemia     Back pain     Depression        Past Surgical History:   Procedure Laterality Date     SECTION      COLD KNIFE CONIZATION OF CERVIX  12/10/2021    Procedure: CONE BIOPSY, CERVIX, USING COLD KNIFE;  Surgeon: Modesto Nassar MD;  Location: [facility] OR;  Service: OB/GYN;;    COLONOSCOPY N/A 2021    Procedure: COLONOSCOPY;  Surgeon: Emily Cruz MD;  Location: [facility] ENDO;  Service: Endoscopy;  Laterality: N/A;    ESOPHAGOGASTRODUODENOSCOPY N/A 2021    Procedure: EGD (ESOPHAGOGASTRODUODENOSCOPY);  Surgeon: Emily Cruz MD;  Location: [facility];  Service: Endoscopy;  Laterality: N/A;    HYSTERECTOMY      ROBOT-ASSISTED LAPAROSCOPIC HYSTERECTOMY N/A 2022    Procedure: ROBOTIC HYSTERECTOMY;  Surgeon: Modesto Nassar MD;  Location: [facility] OR;  Service: OB/GYN;  Laterality: N/A;    ROBOT-ASSISTED SALPINGECTOMY Bilateral 2022    Procedure: ROBOTIC SALPINGECTOMY;  Surgeon: Modesto Nassar MD;  Location: [facility] OR;  Service: OB/GYN;  Laterality: Bilateral;    SINUS SURGERY      TUBAL LIGATION         Family History   Problem Relation Age of Onset    Hypertension Mother     Diabetes Mother     Breast cancer Neg Hx     Colon cancer Neg Hx     Ovarian cancer Neg Hx        Social History     Socioeconomic History    Marital status:    Tobacco Use    Smoking status: Former     Packs/day: 0.00     Types: Cigarettes     Quit date: 3/3/2021     Years since quittin.8    Smokeless tobacco: Never   Substance and Sexual Activity    Alcohol use: No    Drug use: Yes     Types: Marijuana     Comment: daily    Sexual activity:  "Yes     Partners: Male     Birth control/protection: See Surgical Hx     Social Determinants of Health     Financial Resource Strain: Low Risk     Difficulty of Paying Living Expenses: Not hard at all   Food Insecurity: No Food Insecurity    Worried About Running Out of Food in the Last Year: Never true    Ran Out of Food in the Last Year: Never true   Transportation Needs: No Transportation Needs    Lack of Transportation (Medical): No    Lack of Transportation (Non-Medical): No   Stress: Stress Concern Present    Feeling of Stress : To some extent   Social Connections: Unknown    Marital Status:    Housing Stability: Unknown    Unable to Pay for Housing in the Last Year: No    Unstable Housing in the Last Year: No       Review of Systems   Constitutional:  Negative for appetite change and unexpected weight change.   HENT:  Negative for trouble swallowing.    Respiratory:  Negative for shortness of breath.    Cardiovascular:  Negative for chest pain.   Gastrointestinal:  Positive for abdominal pain, constipation and nausea. Negative for diarrhea, rectal pain and vomiting.   Neurological:  Negative for dizziness and weakness.   Hematological:  Negative for adenopathy. Does not bruise/bleed easily.   Psychiatric/Behavioral:  Negative for dysphoric mood.        Objective:     Vitals:    12/20/22 0855   BP: 120/77   BP Location: Right arm   Patient Position: Sitting   BP Method: Large (Automatic)   Pulse: 94   Weight: 87.8 kg (193 lb 9.6 oz)   Height: 5' 8" (1.727 m)          Physical Exam  Constitutional:       General: She is not in acute distress.     Appearance: Normal appearance. She is not ill-appearing.   HENT:      Head: Normocephalic.   Eyes:      Conjunctiva/sclera: Conjunctivae normal.      Pupils: Pupils are equal, round, and reactive to light.   Pulmonary:      Effort: Pulmonary effort is normal. No respiratory distress.   Musculoskeletal:         General: Normal range of motion.      Cervical " back: Normal range of motion.   Skin:     General: Skin is warm and dry.   Neurological:      Mental Status: She is alert and oriented to person, place, and time.   Psychiatric:         Mood and Affect: Mood normal.         Behavior: Behavior normal.             Assessment:         ICD-10-CM ICD-9-CM   1. Irritable bowel syndrome with constipation  K58.1 564.1   2. Lower abdominal pain  R10.30 789.09       Plan:       Irritable bowel syndrome with constipation  -     X-Ray Abdomen Flat And Erect; Future; Expected date: 12/20/2022  -     linaCLOtide (LINZESS) 290 mcg Cap capsule; Take 1 capsule (290 mcg total) by mouth once daily.  Dispense: 30 capsule; Refill: 3  -     polyethylene glycol (GLYCOLAX) 17 gram/dose powder; Take 17 g by mouth once daily.  Dispense: 510 g; Refill: 11    Lower abdominal pain  -     X-Ray Abdomen Flat And Erect; Future; Expected date: 12/20/2022      Follow up if symptoms worsen or fail to improve.     Patient's Medications   New Prescriptions    LINACLOTIDE (LINZESS) 290 MCG CAP CAPSULE    Take 1 capsule (290 mcg total) by mouth once daily.    POLYETHYLENE GLYCOL (GLYCOLAX) 17 GRAM/DOSE POWDER    Take 17 g by mouth once daily.   Previous Medications    ACETAMINOPHEN (TYLENOL) 500 MG TABLET    Take 1 tablet (500 mg total) by mouth every 8 (eight) hours as needed for Pain.    ADVAIR DISKUS 100-50 MCG/DOSE DISKUS INHALER    Inhale 2 puffs into the lungs 2 (two) times daily.    ALBUTEROL (PROVENTIL/VENTOLIN HFA) 90 MCG/ACTUATION INHALER    Inhale 1-2 puffs into the lungs every 6 (six) hours as needed for Wheezing. Rescue    AMITRIPTYLINE (ELAVIL) 25 MG TABLET    Take 1 tablet (25 mg total) by mouth every evening.    ASCORBIC ACID, VITAMIN C, (VITAMIN C) 500 MG TABLET    Take 500 mg by mouth once daily.    CETIRIZINE (ZYRTEC) 10 MG TABLET    Take 1 tablet daily by mouth    DICYCLOMINE (BENTYL) 20 MG TABLET    Take 1 tablet (20 mg total) by mouth 3 (three) times daily as needed (abdominal  pain).    DULOXETINE (CYMBALTA) 30 MG CAPSULE    Take 1 capsule (30 mg total) by mouth once daily.    ESTRADIOL (ESTRACE) 0.01 % (0.1 MG/GRAM) VAGINAL CREAM    Place 1 g vaginally twice a week. Use 1/2 gram nightly for 1st week    FLOVENT  MCG/ACTUATION INHALER    1 puff 2 (two) times daily.    FLU VACC [id number] 6MOS UP,PF, (FLUARIX QUAD 8100-4789, PF,) 60 MCG (15 MCG X 4)/0.5 ML SYRG    Inject 0.5ml by h    FLUTICASONE PROPIONATE (FLONASE) 50 MCG/ACTUATION NASAL SPRAY    2 sprays daily by Nasal route    GABAPENTIN (NEURONTIN) 300 MG CAPSULE    Take 1 capsule (300 mg total) by mouth 3 (three) times daily as needed.    HYDROCODONE-ACETAMINOPHEN (NORCO) 5-325 MG PER TABLET    Take 1 tablet by mouth every 6 (six) hours as needed.    HYDROCORTISONE 2.5 % CREAM    Apply topically 2 (two) times daily. for 14 days    LIDOCAINE (LIDODERM) 5 %    Place 1 patch onto the skin once daily. Remove & Discard patch within 12 hours or as directed by MD    LORATADINE (CLARITIN) 10 MG TABLET        MECLIZINE (ANTIVERT) 12.5 MG TABLET    Take 1 tablet (12.5 mg total) by mouth 2 (two) times daily as needed for Dizziness.    METHYLPREDNISOLONE (MEDROL DOSEPACK) 4 MG TABLET        MONTELUKAST (SINGULAIR) 10 MG TABLET    Take 1 tablet nightly by mouth    OMEPRAZOLE (PRILOSEC) 40 MG CAPSULE    Take 1 capsule (40 mg total) by mouth once daily.    SUMATRIPTAN (IMITREX) 100 MG TABLET    Take 1 tablet (100 mg total) by mouth every 2 (two) hours as needed for Migraine.   Modified Medications    No medications on file   Discontinued Medications    LINACLOTIDE (LINZESS) 145 MCG CAP CAPSULE    Take 1 capsule (145 mcg total) by mouth once daily.

## 2022-12-29 ENCOUNTER — PATIENT MESSAGE (OUTPATIENT)
Dept: GASTROENTEROLOGY | Facility: CLINIC | Age: 42
End: 2022-12-29
Payer: MEDICAID

## 2023-01-09 ENCOUNTER — OFFICE VISIT (OUTPATIENT)
Dept: GASTROENTEROLOGY | Facility: CLINIC | Age: 43
End: 2023-01-09
Payer: MEDICAID

## 2023-01-09 VITALS
HEIGHT: 68 IN | DIASTOLIC BLOOD PRESSURE: 70 MMHG | BODY MASS INDEX: 29 KG/M2 | HEART RATE: 91 BPM | WEIGHT: 191.31 LBS | SYSTOLIC BLOOD PRESSURE: 106 MMHG

## 2023-01-09 DIAGNOSIS — K58.1 IRRITABLE BOWEL SYNDROME WITH CONSTIPATION: Primary | ICD-10-CM

## 2023-01-09 DIAGNOSIS — R10.84 GENERALIZED ABDOMINAL PAIN: ICD-10-CM

## 2023-01-09 PROCEDURE — 1159F PR MEDICATION LIST DOCUMENTED IN MEDICAL RECORD: ICD-10-PCS | Mod: CPTII,,, | Performed by: NURSE PRACTITIONER

## 2023-01-09 PROCEDURE — 3008F PR BODY MASS INDEX (BMI) DOCUMENTED: ICD-10-PCS | Mod: CPTII,,, | Performed by: NURSE PRACTITIONER

## 2023-01-09 PROCEDURE — 1160F PR REVIEW ALL MEDS BY PRESCRIBER/CLIN PHARMACIST DOCUMENTED: ICD-10-PCS | Mod: CPTII,,, | Performed by: NURSE PRACTITIONER

## 2023-01-09 PROCEDURE — 3078F DIAST BP <80 MM HG: CPT | Mod: CPTII,,, | Performed by: NURSE PRACTITIONER

## 2023-01-09 PROCEDURE — 99214 PR OFFICE/OUTPT VISIT, EST, LEVL IV, 30-39 MIN: ICD-10-PCS | Mod: S$PBB,,, | Performed by: NURSE PRACTITIONER

## 2023-01-09 PROCEDURE — 99999 PR PBB SHADOW E&M-EST. PATIENT-LVL V: CPT | Mod: PBBFAC,,, | Performed by: NURSE PRACTITIONER

## 2023-01-09 PROCEDURE — 3008F BODY MASS INDEX DOCD: CPT | Mod: CPTII,,, | Performed by: NURSE PRACTITIONER

## 2023-01-09 PROCEDURE — 99214 OFFICE O/P EST MOD 30 MIN: CPT | Mod: S$PBB,,, | Performed by: NURSE PRACTITIONER

## 2023-01-09 PROCEDURE — 1160F RVW MEDS BY RX/DR IN RCRD: CPT | Mod: CPTII,,, | Performed by: NURSE PRACTITIONER

## 2023-01-09 PROCEDURE — 1159F MED LIST DOCD IN RCRD: CPT | Mod: CPTII,,, | Performed by: NURSE PRACTITIONER

## 2023-01-09 PROCEDURE — 3074F PR MOST RECENT SYSTOLIC BLOOD PRESSURE < 130 MM HG: ICD-10-PCS | Mod: CPTII,,, | Performed by: NURSE PRACTITIONER

## 2023-01-09 PROCEDURE — 99215 OFFICE O/P EST HI 40 MIN: CPT | Mod: PBBFAC,PO | Performed by: NURSE PRACTITIONER

## 2023-01-09 PROCEDURE — 99999 PR PBB SHADOW E&M-EST. PATIENT-LVL V: ICD-10-PCS | Mod: PBBFAC,,, | Performed by: NURSE PRACTITIONER

## 2023-01-09 PROCEDURE — 3078F PR MOST RECENT DIASTOLIC BLOOD PRESSURE < 80 MM HG: ICD-10-PCS | Mod: CPTII,,, | Performed by: NURSE PRACTITIONER

## 2023-01-09 PROCEDURE — 3074F SYST BP LT 130 MM HG: CPT | Mod: CPTII,,, | Performed by: NURSE PRACTITIONER

## 2023-01-09 RX ORDER — LUBIPROSTONE 24 UG/1
24 CAPSULE ORAL 2 TIMES DAILY WITH MEALS
Qty: 60 CAPSULE | Refills: 2 | Status: SHIPPED | OUTPATIENT
Start: 2023-01-09 | End: 2023-01-18

## 2023-01-09 NOTE — PROGRESS NOTES
Subjective:       Patient ID: Wandy Shah is a 42 y.o. female.    Chief Complaint: Abdominal Pain (Lower), Diarrhea, and Constipation    43 y/o female with GERD and chronic constipation presents to clinic with c/o abdominal pain. Patient reports intermittent lower abdominal pain for several months. Pain occasionally relieved by BM. Does not have BM daily despite taking Linzess 290 mcg daily and Miralax daily. Will often go 2-3 days with not BM followed by 1-2 days of non-bloody diarrhea. Has tried dicyclomine prn with minimal symptom relief.       Past Medical History:   Diagnosis Date    Anemia     Back pain     Depression        Past Surgical History:   Procedure Laterality Date     SECTION      COLD KNIFE CONIZATION OF CERVIX  12/10/2021    Procedure: CONE BIOPSY, CERVIX, USING COLD KNIFE;  Surgeon: Modesto Nassar MD;  Location: [facility] OR;  Service: OB/GYN;;    COLONOSCOPY N/A 2021    Procedure: COLONOSCOPY;  Surgeon: Emily Cruz MD;  Location: [facility];  Service: Endoscopy;  Laterality: N/A;    ESOPHAGOGASTRODUODENOSCOPY N/A 2021    Procedure: EGD (ESOPHAGOGASTRODUODENOSCOPY);  Surgeon: Emily Cruz MD;  Location: [facility] ENDO;  Service: Endoscopy;  Laterality: N/A;    HYSTERECTOMY      ROBOT-ASSISTED LAPAROSCOPIC HYSTERECTOMY N/A 2022    Procedure: ROBOTIC HYSTERECTOMY;  Surgeon: Modesto Nassar MD;  Location: [facility] OR;  Service: OB/GYN;  Laterality: N/A;    ROBOT-ASSISTED SALPINGECTOMY Bilateral 2022    Procedure: ROBOTIC SALPINGECTOMY;  Surgeon: Modesto Nassar MD;  Location: [facility] OR;  Service: OB/GYN;  Laterality: Bilateral;    SINUS SURGERY      TUBAL LIGATION         Family History   Problem Relation Age of Onset    Hypertension Mother     Diabetes Mother     Breast cancer Neg Hx     Colon cancer Neg Hx     Ovarian cancer Neg Hx        Social History     Socioeconomic History    Marital status:    Tobacco Use    Smoking status: Former      "Packs/day: 0.00     Types: Cigarettes     Quit date: 3/3/2021     Years since quittin.8    Smokeless tobacco: Never   Substance and Sexual Activity    Alcohol use: No    Drug use: Yes     Types: Marijuana     Comment: daily    Sexual activity: Yes     Partners: Male     Birth control/protection: See Surgical Hx     Social Determinants of Health     Financial Resource Strain: Low Risk     Difficulty of Paying Living Expenses: Not hard at all   Food Insecurity: No Food Insecurity    Worried About Running Out of Food in the Last Year: Never true    Ran Out of Food in the Last Year: Never true   Transportation Needs: No Transportation Needs    Lack of Transportation (Medical): No    Lack of Transportation (Non-Medical): No   Stress: Stress Concern Present    Feeling of Stress : To some extent   Social Connections: Unknown    Marital Status:    Housing Stability: Unknown    Unable to Pay for Housing in the Last Year: No    Unstable Housing in the Last Year: No       Review of Systems   Constitutional:  Negative for appetite change and unexpected weight change.   HENT:  Negative for trouble swallowing.    Respiratory:  Negative for shortness of breath.    Cardiovascular:  Negative for chest pain.   Gastrointestinal:  Positive for abdominal pain, constipation, diarrhea and nausea. Negative for rectal pain and vomiting.   Neurological:  Negative for dizziness and weakness.   Hematological:  Negative for adenopathy. Does not bruise/bleed easily.   Psychiatric/Behavioral:  Negative for dysphoric mood.        Objective:     Vitals:    23 1059   BP: 106/70   BP Location: Right arm   Patient Position: Sitting   BP Method: Large (Automatic)   Pulse: 91   Weight: 86.8 kg (191 lb 4.8 oz)   Height: 5' 8" (1.727 m)          Physical Exam  Constitutional:       General: She is not in acute distress.     Appearance: Normal appearance. She is not ill-appearing.   HENT:      Head: Normocephalic.   Eyes:      " Conjunctiva/sclera: Conjunctivae normal.      Pupils: Pupils are equal, round, and reactive to light.   Pulmonary:      Effort: Pulmonary effort is normal. No respiratory distress.   Abdominal:      General: Abdomen is flat. Bowel sounds are normal. There is no distension.      Tenderness: There is generalized abdominal tenderness.   Musculoskeletal:         General: Normal range of motion.      Cervical back: Normal range of motion.   Skin:     General: Skin is warm and dry.   Neurological:      Mental Status: She is alert and oriented to person, place, and time.   Psychiatric:         Mood and Affect: Mood normal.         Behavior: Behavior normal.             Assessment:         ICD-10-CM ICD-9-CM   1. Irritable bowel syndrome with constipation  K58.1 564.1   2. Generalized abdominal pain  R10.84 789.07       Plan:       Irritable bowel syndrome with constipation  -     lubiprostone (AMITIZA) 24 MCG Cap; Take 1 capsule (24 mcg total) by mouth 2 (two) times daily with meals.  Dispense: 60 capsule; Refill: 2    Generalized abdominal pain  -     CT Abdomen Pelvis With Contrast; Future; Expected date: 01/09/2023  -     NM Gastric Emptying; Future; Expected date: 01/09/2023      Follow up in about 4 weeks (around 2/6/2023) for medication management, If symptoms worsen or fail to improve.     Patient's Medications   New Prescriptions    LUBIPROSTONE (AMITIZA) 24 MCG CAP    Take 1 capsule (24 mcg total) by mouth 2 (two) times daily with meals.   Previous Medications    ACETAMINOPHEN (TYLENOL) 500 MG TABLET    Take 1 tablet (500 mg total) by mouth every 8 (eight) hours as needed for Pain.    ADVAIR DISKUS 100-50 MCG/DOSE DISKUS INHALER    Inhale 2 puffs into the lungs 2 (two) times daily.    ALBUTEROL (PROVENTIL/VENTOLIN HFA) 90 MCG/ACTUATION INHALER    Inhale 1-2 puffs into the lungs every 6 (six) hours as needed for Wheezing. Rescue    AMITRIPTYLINE (ELAVIL) 25 MG TABLET    Take 1 tablet (25 mg total) by mouth every  evening.    ASCORBIC ACID, VITAMIN C, (VITAMIN C) 500 MG TABLET    Take 500 mg by mouth once daily.    CETIRIZINE (ZYRTEC) 10 MG TABLET    Take 1 tablet daily by mouth    DICYCLOMINE (BENTYL) 20 MG TABLET    Take 1 tablet (20 mg total) by mouth 3 (three) times daily as needed (abdominal pain).    DULOXETINE (CYMBALTA) 30 MG CAPSULE    Take 1 capsule (30 mg total) by mouth once daily.    ESTRADIOL (ESTRACE) 0.01 % (0.1 MG/GRAM) VAGINAL CREAM    Place 1 g vaginally twice a week. Use 1/2 gram nightly for 1st week    FLOVENT  MCG/ACTUATION INHALER    1 puff 2 (two) times daily.    FLU VACC [id number] 6MOS UP,PF, (FLUARIX QUAD 2545-9348, PF,) 60 MCG (15 MCG X 4)/0.5 ML SYRG    Inject 0.5ml by Rph    FLUTICASONE PROPIONATE (FLONASE) 50 MCG/ACTUATION NASAL SPRAY    2 sprays daily by Nasal route    GABAPENTIN (NEURONTIN) 300 MG CAPSULE    Take 1 capsule (300 mg total) by mouth 3 (three) times daily as needed.    HYDROCODONE-ACETAMINOPHEN (NORCO) 5-325 MG PER TABLET    Take 1 tablet by mouth every 6 (six) hours as needed.    HYDROCORTISONE 2.5 % CREAM    Apply topically 2 (two) times daily. for 14 days    LIDOCAINE (LIDODERM) 5 %    Place 1 patch onto the skin once daily. Remove & Discard patch within 12 hours or as directed by MD    LORATADINE (CLARITIN) 10 MG TABLET        MECLIZINE (ANTIVERT) 12.5 MG TABLET    Take 1 tablet (12.5 mg total) by mouth 2 (two) times daily as needed for Dizziness.    METHYLPREDNISOLONE (MEDROL DOSEPACK) 4 MG TABLET        MONTELUKAST (SINGULAIR) 10 MG TABLET    Take 1 tablet nightly by mouth    OMEPRAZOLE (PRILOSEC) 40 MG CAPSULE    Take 1 capsule (40 mg total) by mouth once daily.    POLYETHYLENE GLYCOL (GLYCOLAX) 17 GRAM/DOSE POWDER    Take 17 g by mouth once daily.    SUMATRIPTAN (IMITREX) 100 MG TABLET    Take 1 tablet (100 mg total) by mouth every 2 (two) hours as needed for Migraine.   Modified Medications    No medications on file   Discontinued Medications    LINACLOTIDE  (LINZESS) 290 MCG CAP CAPSULE    Take 1 capsule (290 mcg total) by mouth once daily.

## 2023-01-10 ENCOUNTER — HOSPITAL ENCOUNTER (OUTPATIENT)
Dept: RADIOLOGY | Facility: HOSPITAL | Age: 43
Discharge: HOME | End: 2023-01-10
Attending: NURSE PRACTITIONER
Payer: MEDICAID

## 2023-01-10 DIAGNOSIS — R10.84 GENERALIZED ABDOMINAL PAIN: ICD-10-CM

## 2023-01-10 PROCEDURE — 74177 CT ABD & PELVIS W/CONTRAST: CPT | Mod: TC

## 2023-01-10 PROCEDURE — 74177 CT ABD & PELVIS W/CONTRAST: CPT | Mod: 26,,, | Performed by: RADIOLOGY

## 2023-01-10 PROCEDURE — 25500020 PHARM REV CODE 255: Performed by: NURSE PRACTITIONER

## 2023-01-10 PROCEDURE — 74177 CT ABDOMEN PELVIS WITH CONTRAST: ICD-10-PCS | Mod: 26,,, | Performed by: RADIOLOGY

## 2023-01-10 RX ADMIN — IOHEXOL 30 ML: 350 INJECTION, SOLUTION INTRAVENOUS at 01:01

## 2023-01-10 RX ADMIN — IOHEXOL 100 ML: 350 INJECTION, SOLUTION INTRAVENOUS at 03:01

## 2023-01-17 ENCOUNTER — PATIENT MESSAGE (OUTPATIENT)
Dept: GASTROENTEROLOGY | Facility: CLINIC | Age: 43
End: 2023-01-17
Payer: MEDICAID

## 2023-01-17 DIAGNOSIS — K59.04 CHRONIC IDIOPATHIC CONSTIPATION: ICD-10-CM

## 2023-01-17 DIAGNOSIS — K58.1 IRRITABLE BOWEL SYNDROME WITH CONSTIPATION: Primary | ICD-10-CM

## 2023-01-18 RX ORDER — LUBIPROSTONE 8 UG/1
8 CAPSULE ORAL 2 TIMES DAILY WITH MEALS
Qty: 60 CAPSULE | Refills: 2 | Status: SHIPPED | OUTPATIENT
Start: 2023-01-18 | End: 2023-04-26 | Stop reason: SDUPTHER

## 2023-01-30 DIAGNOSIS — K21.9 GASTROESOPHAGEAL REFLUX DISEASE, UNSPECIFIED WHETHER ESOPHAGITIS PRESENT: ICD-10-CM

## 2023-01-31 RX ORDER — OMEPRAZOLE 40 MG/1
40 CAPSULE, DELAYED RELEASE ORAL DAILY
Qty: 30 CAPSULE | Refills: 5 | Status: SHIPPED | OUTPATIENT
Start: 2023-01-31 | End: 2023-10-01 | Stop reason: SDUPTHER

## 2023-02-06 ENCOUNTER — PATIENT MESSAGE (OUTPATIENT)
Dept: FAMILY MEDICINE | Facility: HOSPITAL | Age: 43
End: 2023-02-06
Payer: MEDICAID

## 2023-02-08 DIAGNOSIS — F41.9 ANXIETY: Primary | ICD-10-CM

## 2023-02-08 RX ORDER — DULOXETIN HYDROCHLORIDE 60 MG/1
60 CAPSULE, DELAYED RELEASE ORAL DAILY
Qty: 30 CAPSULE | Refills: 2 | Status: SHIPPED | OUTPATIENT
Start: 2023-02-08 | End: 2023-06-11 | Stop reason: SDUPTHER

## 2023-02-20 ENCOUNTER — PATIENT OUTREACH (OUTPATIENT)
Dept: EMERGENCY MEDICINE | Facility: OTHER | Age: 43
End: 2023-02-20
Payer: MEDICAID

## 2023-02-20 NOTE — PROGRESS NOTES
Spoke to patient and she stated she was doing well and had no additional needs at this time. Instructed pt to call with any future needs/concerns and she verbalized understanding.

## 2023-03-06 ENCOUNTER — OFFICE VISIT (OUTPATIENT)
Dept: FAMILY MEDICINE | Facility: HOSPITAL | Age: 43
End: 2023-03-06
Payer: MEDICAID

## 2023-03-06 VITALS
DIASTOLIC BLOOD PRESSURE: 76 MMHG | BODY MASS INDEX: 29.77 KG/M2 | HEIGHT: 68 IN | WEIGHT: 196.44 LBS | HEART RATE: 87 BPM | SYSTOLIC BLOOD PRESSURE: 113 MMHG

## 2023-03-06 DIAGNOSIS — M25.561 RIGHT ANTERIOR KNEE PAIN: Primary | ICD-10-CM

## 2023-03-06 PROCEDURE — 99215 OFFICE O/P EST HI 40 MIN: CPT

## 2023-03-06 RX ORDER — LIDOCAINE 50 MG/G
1 PATCH TOPICAL DAILY
Qty: 15 PATCH | Refills: 0 | Status: SHIPPED | OUTPATIENT
Start: 2023-03-06 | End: 2023-03-06 | Stop reason: SDUPTHER

## 2023-03-06 RX ORDER — LIDOCAINE 50 MG/G
1 PATCH TOPICAL DAILY
Qty: 15 PATCH | Refills: 0 | Status: SHIPPED | OUTPATIENT
Start: 2023-03-06

## 2023-03-06 RX ORDER — LIDOCAINE 50 MG/G
1 PATCH TOPICAL DAILY
Qty: 30 PATCH | Refills: 2 | Status: SHIPPED | OUTPATIENT
Start: 2023-03-06 | End: 2023-05-29

## 2023-03-06 RX ORDER — LIDOCAINE 50 MG/G
1 PATCH TOPICAL DAILY
Qty: 30 PATCH | Refills: 2 | Status: SHIPPED | OUTPATIENT
Start: 2023-03-06 | End: 2023-03-06 | Stop reason: SDUPTHER

## 2023-03-06 NOTE — PROGRESS NOTES
I have reviewed the notes, assessments, and/or procedures performed by Dr. Chapin, I concur with her/his documentation of Wandy Shah. It sounds like patello femoral pain.

## 2023-03-06 NOTE — PROGRESS NOTES
"  [facility]  History & Physical    SUBJECTIVE:     Chief Complaint:   Chief Complaint   Patient presents with    Knee Pain     RIGHT       History of Present Illness:  42 y.o. female who  has a past medical history of Anemia, Back pain, and Depression. presents to clinic today for R knee pain which began Sat 3/4/23, after "twisting"   Her knee. She denies impact trauma. Now states she has pain when trying to sit down/squat.  Patient states there has been no improvement since the incident. She has been taking OTC painkiller.   No other complaints.    Allergies:  Review of patient's allergies indicates:   Allergen Reactions    Raspberry (rubus idaeus)        Home Medications:  Current Outpatient Medications on File Prior to Visit   Medication Sig    acetaminophen (TYLENOL) 500 MG tablet Take 1 tablet (500 mg total) by mouth every 8 (eight) hours as needed for Pain.    ADVAIR DISKUS 100-50 mcg/dose diskus inhaler Inhale 2 puffs into the lungs 2 (two) times daily.    amitriptyline (ELAVIL) 25 MG tablet Take 1 tablet (25 mg total) by mouth every evening.    ascorbic acid, vitamin C, (VITAMIN C) 500 MG tablet Take 500 mg by mouth once daily.    cetirizine (ZYRTEC) 10 MG tablet Take 1 tablet daily by mouth    dicyclomine (BENTYL) 20 mg tablet Take 1 tablet (20 mg total) by mouth 3 (three) times daily as needed (abdominal pain).    DULoxetine (CYMBALTA) 60 MG capsule Take 1 capsule (60 mg total) by mouth once daily.    estradioL (ESTRACE) 0.01 % (0.1 mg/gram) vaginal cream Place 1 g vaginally twice a week. Use 1/2 gram nightly for 1st week    FLOVENT  mcg/actuation inhaler 1 puff 2 (two) times daily.    fluticasone propionate (FLONASE) 50 mcg/actuation nasal spray 2 sprays daily by Nasal route    gabapentin (NEURONTIN) 300 MG capsule Take 1 capsule (300 mg total) by mouth 3 (three) times daily as needed.    loratadine (CLARITIN) 10 mg tablet     lubiprostone (AMITIZA) 8 MCG Cap Take 1 capsule (8 mcg total) " by mouth 2 (two) times daily with meals.    meclizine (ANTIVERT) 12.5 mg tablet Take 1 tablet (12.5 mg total) by mouth 2 (two) times daily as needed for Dizziness.    montelukast (SINGULAIR) 10 mg tablet Take 1 tablet nightly by mouth    omeprazole (PRILOSEC) 40 MG capsule Take 1 capsule (40 mg total) by mouth once daily.    [DISCONTINUED] LIDOcaine (LIDODERM) 5 % Place 1 patch onto the skin once daily. Remove & Discard patch within 12 hours or as directed by MD    albuterol (PROVENTIL/VENTOLIN HFA) 90 mcg/actuation inhaler Inhale 1-2 puffs into the lungs every 6 (six) hours as needed for Wheezing. Rescue    ALPRAZolam (XANAX) 1 MG tablet Take 1 tablet by mouth 2 hours prior to procedure. Take 1/2 tablet 30 minutes prior to procedure only if needed    flu vacc [id number] 6mos up,PF, (FLUARIX QUAD [id number], PF,) 60 mcg (15 mcg x 4)/0.5 mL Syrg Inject 0.5ml by [facility] (Patient not taking: Reported on 2022)    HYDROcodone-acetaminophen (NORCO) 5-325 mg per tablet Take 1 tablet by mouth every 6 (six) hours as needed.    hydrocortisone 2.5 % cream Apply topically 2 (two) times daily. for 14 days    methylPREDNISolone (MEDROL DOSEPACK) 4 mg tablet     polyethylene glycol (GLYCOLAX) 17 gram/dose powder Take 17 g by mouth once daily. (Patient not taking: Reported on 3/6/2023)    predniSONE (DELTASONE) 20 MG tablet Take 2 tablets by mouth daily (Patient not taking: Reported on 3/6/2023)    sumatriptan (IMITREX) 100 MG tablet Take 1 tablet (100 mg total) by mouth every 2 (two) hours as needed for Migraine.     No current facility-administered medications on file prior to visit.       Past Medical History:   Diagnosis Date    Anemia     Back pain     Depression      Past Surgical History:   Procedure Laterality Date     SECTION      COLD KNIFE CONIZATION OF CERVIX  12/10/2021    Procedure: CONE BIOPSY, CERVIX, USING COLD KNIFE;  Surgeon: Modesto Nassar MD;  Location: [facility] OR;  Service: OB/GYN;;    COLONOSCOPY N/A  2021    Procedure: COLONOSCOPY;  Surgeon: Emily Cruz MD;  [facility];  Service: Endoscopy;  Laterality: N/A;    ESOPHAGOGASTRODUODENOSCOPY N/A 2021    Procedure: EGD (ESOPHAGOGASTRODUODENOSCOPY);  Surgeon: Emily Cruz MD;  [facility];  Service: Endoscopy;  Laterality: N/A;    HYSTERECTOMY      ROBOT-ASSISTED LAPAROSCOPIC HYSTERECTOMY N/A 2022    Procedure: ROBOTIC HYSTERECTOMY;  Surgeon: Modesto Nassar MD;  Location: [facility] OR;  Service: OB/GYN;  Laterality: N/A;    ROBOT-ASSISTED SALPINGECTOMY Bilateral 2022    Procedure: ROBOTIC SALPINGECTOMY;  Surgeon: Modesto Nassar MD;  Location: [facility] OR;  Service: OB/GYN;  Laterality: Bilateral;    SINUS SURGERY      TUBAL LIGATION       Family History   Problem Relation Age of Onset    Hypertension Mother     Diabetes Mother     Breast cancer Neg Hx     Colon cancer Neg Hx     Ovarian cancer Neg Hx      Social History     Tobacco Use    Smoking status: Former     Packs/day: 0.00     Types: Cigarettes     Quit date: 3/3/2021     Years since quittin.0    Smokeless tobacco: Never   Substance Use Topics    Alcohol use: No    Drug use: Yes     Types: Marijuana     Comment: daily        Review of Systems   Constitutional:  Negative for fever.   Respiratory:  Negative for cough and hemoptysis.    Cardiovascular:  Negative for chest pain and leg swelling.   Gastrointestinal:  Negative for heartburn, nausea and vomiting.   Genitourinary:  Negative for dysuria.   Musculoskeletal:  Positive for joint pain.   Neurological:  Negative for dizziness and headaches.      OBJECTIVE:     Vital Signs:  Pulse: 87 (23 1121)  BP: 113/76 (23 1121)    Physical Exam  Constitutional:       Appearance: Normal appearance.   HENT:      Head: Normocephalic and atraumatic.   Cardiovascular:      Rate and Rhythm: Normal rate and regular rhythm.      Pulses: Normal pulses.      Heart sounds: Normal heart sounds.   Pulmonary:       "Effort: Pulmonary effort is normal.      Breath sounds: Normal breath sounds.   Abdominal:      General: Abdomen is flat.      Palpations: Abdomen is soft.      Tenderness: There is no abdominal tenderness.   Musculoskeletal:         General: Tenderness and signs of injury present. No swelling or deformity.      Right lower leg: No edema.      Left lower leg: No edema.      Comments: R knee: no swelling, no abrasion, no erythema  Tenderness to patellar tendon, no tenderness eslewhere  Full ROM, no crepitus   Antalgic gait   Skin:     Findings: No bruising, erythema, lesion or rash.   Neurological:      Mental Status: She is oriented to person, place, and time.   Psychiatric:         Mood and Affect: Mood normal.         Behavior: Behavior normal.         Thought Content: Thought content normal.         Judgment: Judgment normal.       A/P:      Right anterior knee pain  "Twisted" knee on Sat, no impact trauma    PLAN:  Heat/ice   Voltaren  Tylenol  Lidocaine patches  Knee wrap  Moderate activity... if patient sees no improvement over next 1-2 weeks, seek eval from PT/OT (referral on file)     DUE AT NEXT/FUTURE VISIT:        No follow-ups on file.      Sanchez Dodd  [facility], PGY-1  03/06/2023    This note was partially created using "[employer]" Voice Recognition software. Typographical and content errors may occur with this process. While efforts are made to detect and correct such errors, in some cases errors will persist. For this reason, wording in this document should be considered in the proper context and not strictly verbatim     The following information is provided to all patients.  This information is to help you find resources for any of the problems found today that may be affecting your health:                Living healthy guide: www.[facility].[place].gov       Understanding Diabetes: www.diabetes.org       Eating healthy: www.cdc.gov/healthyweight      CDC home safety checklist: " www.cdc.gov/steadi/patient.html      Agency on Aging: www.goea.[place].[facility]       Alcoholics anonymous (AA): www.aa.org      Physical Activity: www.lizbeth.nih.gov/ic9vgfj       Tobacco use: www.quitwithusla.org

## 2023-03-07 ENCOUNTER — PATIENT MESSAGE (OUTPATIENT)
Dept: FAMILY MEDICINE | Facility: HOSPITAL | Age: 43
End: 2023-03-07
Payer: MEDICAID

## 2023-03-11 ENCOUNTER — HOSPITAL ENCOUNTER (EMERGENCY)
Facility: HOSPITAL | Age: 43
Discharge: HOME | End: 2023-03-11
Attending: EMERGENCY MEDICINE
Payer: COMMERCIAL

## 2023-03-11 VITALS
HEIGHT: 69 IN | WEIGHT: 186 LBS | SYSTOLIC BLOOD PRESSURE: 120 MMHG | HEART RATE: 92 BPM | OXYGEN SATURATION: 98 % | TEMPERATURE: 98 F | RESPIRATION RATE: 18 BRPM | BODY MASS INDEX: 27.55 KG/M2 | DIASTOLIC BLOOD PRESSURE: 65 MMHG
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
## 2023-03-11 LAB
HCV AB SERPL QL IA: NORMAL
HIV 1+2 AB+HIV1 P24 AG SERPL QL IA: NORMAL

## 2023-03-11 PROCEDURE — 99283 EMERGENCY DEPT VISIT LOW MDM: CPT

## 2023-03-11 PROCEDURE — 99283 EMERGENCY DEPT VISIT LOW MDM: CPT | Mod: ,,, | Performed by: EMERGENCY MEDICINE

## 2023-03-11 PROCEDURE — 86803 HEPATITIS C AB TEST: CPT | Performed by: PHYSICIAN ASSISTANT

## 2023-03-11 PROCEDURE — 87389 HIV-1 AG W/HIV-1&-2 AB AG IA: CPT | Performed by: PHYSICIAN ASSISTANT

## 2023-03-11 PROCEDURE — 99283 PR EMERGENCY DEPT VISIT,LEVEL III: ICD-10-PCS | Mod: ,,, | Performed by: EMERGENCY MEDICINE

## 2023-03-11 RX ORDER — ACETAMINOPHEN 325 MG/1
650 TABLET ORAL EVERY 6 HOURS PRN
Qty: 30 TABLET | Refills: 0 | Status: SHIPPED | OUTPATIENT
Start: 2023-03-11

## 2023-03-11 RX ORDER — KETOROLAC TROMETHAMINE 30 MG/ML
10 INJECTION, SOLUTION INTRAMUSCULAR; INTRAVENOUS
Status: DISCONTINUED | OUTPATIENT
Start: 2023-03-11 | End: 2023-03-11 | Stop reason: HOSPADM

## 2023-03-11 NOTE — ED NOTES
LOC: The patient is awake and alert; oriented x 3 and speaking appropriately.  APPEARANCE: Patient resting comfortably, patient is clean and well groomed  SKIN: warm and dry, normal skin turgor & moist mucus membranes, skin intact, no breakdown noted.  MUSCULOSKELETAL: Patient moving all extremities well, no obvious swelling or deformities noted, c/ o mid back and bilateral knee pain .Denies neck pain .   RESPIRATORY: Airway is open and patent, respirations are spontaneous, normal effort and rate  CARDIAC: Patient has a normal rate, no peripheral edema noted, capillary refill < 3 seconds; No complaints of chest pain   ABDOMEN: Soft and non tender to palpation, no distention noted.
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
## 2023-04-25 DIAGNOSIS — R10.84 GENERALIZED ABDOMINAL PAIN: Primary | ICD-10-CM

## 2023-04-26 RX ORDER — ESTRADIOL 0.1 MG/G
1 CREAM VAGINAL
Qty: 42.5 G | Refills: 1 | Status: SHIPPED | OUTPATIENT
Start: 2023-04-27 | End: 2024-02-19

## 2023-04-26 NOTE — TELEPHONE ENCOUNTER
Patient last office visit 8/23/2022, Patient last refill 5/12/2022, 1 refill 42.5 g       WILLIAM Bright

## 2023-05-01 ENCOUNTER — HOSPITAL ENCOUNTER (OUTPATIENT)
Dept: RADIOLOGY | Facility: HOSPITAL | Age: 43
Discharge: HOME | End: 2023-05-01
Attending: NURSE PRACTITIONER
Payer: MEDICAID

## 2023-05-01 DIAGNOSIS — R10.84 GENERALIZED ABDOMINAL PAIN: ICD-10-CM

## 2023-05-01 PROCEDURE — 78264 GASTRIC EMPTYING IMG STUDY: CPT | Mod: TC

## 2023-05-01 PROCEDURE — 78264 GASTRIC EMPTYING IMG STUDY: CPT | Mod: 26,,, | Performed by: STUDENT IN AN ORGANIZED HEALTH CARE EDUCATION/TRAINING PROGRAM

## 2023-05-01 PROCEDURE — 78264 NM GASTRIC EMPTYING: ICD-10-PCS | Mod: 26,,, | Performed by: STUDENT IN AN ORGANIZED HEALTH CARE EDUCATION/TRAINING PROGRAM

## 2023-05-02 ENCOUNTER — PATIENT MESSAGE (OUTPATIENT)
Dept: GASTROENTEROLOGY | Facility: CLINIC | Age: 43
End: 2023-05-02
Payer: MEDICAID

## 2023-05-29 ENCOUNTER — OFFICE VISIT (OUTPATIENT)
Dept: FAMILY MEDICINE | Facility: HOSPITAL | Age: 43
End: 2023-05-29
Payer: MEDICAID

## 2023-05-29 VITALS
HEART RATE: 75 BPM | BODY MASS INDEX: 28.87 KG/M2 | HEIGHT: 68 IN | DIASTOLIC BLOOD PRESSURE: 73 MMHG | SYSTOLIC BLOOD PRESSURE: 110 MMHG | WEIGHT: 190.5 LBS

## 2023-05-29 DIAGNOSIS — G56.01 CARPAL TUNNEL SYNDROME OF RIGHT WRIST: Primary | ICD-10-CM

## 2023-05-29 PROCEDURE — 99215 OFFICE O/P EST HI 40 MIN: CPT | Performed by: STUDENT IN AN ORGANIZED HEALTH CARE EDUCATION/TRAINING PROGRAM
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
## 2023-06-01 ENCOUNTER — OFFICE VISIT (OUTPATIENT)
Dept: GASTROENTEROLOGY | Facility: CLINIC | Age: 43
End: 2023-06-01
Payer: MEDICAID

## 2023-06-01 VITALS
BODY MASS INDEX: 29.04 KG/M2 | DIASTOLIC BLOOD PRESSURE: 76 MMHG | SYSTOLIC BLOOD PRESSURE: 118 MMHG | HEART RATE: 77 BPM | OXYGEN SATURATION: 98 % | HEIGHT: 68 IN | WEIGHT: 191.63 LBS

## 2023-06-01 DIAGNOSIS — K31.84 GASTROPARESIS: Primary | ICD-10-CM

## 2023-06-01 DIAGNOSIS — K21.9 GERD WITHOUT ESOPHAGITIS: ICD-10-CM

## 2023-06-01 DIAGNOSIS — K59.04 CHRONIC IDIOPATHIC CONSTIPATION: ICD-10-CM

## 2023-06-01 DIAGNOSIS — R11.0 NAUSEA: ICD-10-CM

## 2023-06-01 PROCEDURE — 3008F PR BODY MASS INDEX (BMI) DOCUMENTED: ICD-10-PCS | Mod: CPTII,,, | Performed by: NURSE PRACTITIONER

## 2023-06-01 PROCEDURE — 3078F DIAST BP <80 MM HG: CPT | Mod: CPTII,,, | Performed by: NURSE PRACTITIONER

## 2023-06-01 PROCEDURE — 3074F SYST BP LT 130 MM HG: CPT | Mod: CPTII,,, | Performed by: NURSE PRACTITIONER

## 2023-06-01 PROCEDURE — 1159F MED LIST DOCD IN RCRD: CPT | Mod: CPTII,,, | Performed by: NURSE PRACTITIONER

## 2023-06-01 PROCEDURE — 1160F RVW MEDS BY RX/DR IN RCRD: CPT | Mod: CPTII,,, | Performed by: NURSE PRACTITIONER

## 2023-06-01 PROCEDURE — 99214 PR OFFICE/OUTPT VISIT, EST, LEVL IV, 30-39 MIN: ICD-10-PCS | Mod: S$PBB,,, | Performed by: NURSE PRACTITIONER

## 2023-06-01 PROCEDURE — 99214 OFFICE O/P EST MOD 30 MIN: CPT | Mod: S$PBB,,, | Performed by: NURSE PRACTITIONER

## 2023-06-01 PROCEDURE — 1159F PR MEDICATION LIST DOCUMENTED IN MEDICAL RECORD: ICD-10-PCS | Mod: CPTII,,, | Performed by: NURSE PRACTITIONER

## 2023-06-01 PROCEDURE — 1160F PR REVIEW ALL MEDS BY PRESCRIBER/CLIN PHARMACIST DOCUMENTED: ICD-10-PCS | Mod: CPTII,,, | Performed by: NURSE PRACTITIONER

## 2023-06-01 PROCEDURE — 3078F PR MOST RECENT DIASTOLIC BLOOD PRESSURE < 80 MM HG: ICD-10-PCS | Mod: CPTII,,, | Performed by: NURSE PRACTITIONER

## 2023-06-01 PROCEDURE — 3008F BODY MASS INDEX DOCD: CPT | Mod: CPTII,,, | Performed by: NURSE PRACTITIONER

## 2023-06-01 PROCEDURE — 99999 PR PBB SHADOW E&M-EST. PATIENT-LVL V: ICD-10-PCS | Mod: PBBFAC,,, | Performed by: NURSE PRACTITIONER

## 2023-06-01 PROCEDURE — 3074F PR MOST RECENT SYSTOLIC BLOOD PRESSURE < 130 MM HG: ICD-10-PCS | Mod: CPTII,,, | Performed by: NURSE PRACTITIONER

## 2023-06-01 PROCEDURE — 99999 PR PBB SHADOW E&M-EST. PATIENT-LVL V: CPT | Mod: PBBFAC,,, | Performed by: NURSE PRACTITIONER

## 2023-06-01 PROCEDURE — 99215 OFFICE O/P EST HI 40 MIN: CPT | Mod: PBBFAC,PO | Performed by: NURSE PRACTITIONER

## 2023-06-01 RX ORDER — ONDANSETRON 4 MG/1
4 TABLET, ORALLY DISINTEGRATING ORAL
Qty: 120 TABLET | Refills: 5 | Status: SHIPPED | OUTPATIENT
Start: 2023-06-01 | End: 2023-12-04 | Stop reason: SDUPTHER

## 2023-06-01 NOTE — PATIENT INSTRUCTIONS
- Gastroparesis diet  - Omeprazole 40 mg every morning on an empty stomach 30-45 minutes before food or any other medications  - Dicyclomine 20 mg three times per day as needed  - Zofran before meals and bedtime  - Amitiza  8 mcg twice daily with Miralax every 2-3 days

## 2023-06-01 NOTE — PROGRESS NOTES
Subjective:       Patient ID: Wandy Shah is a 43 y.o. female.    Chief Complaint: Follow-up    41 y/o female with GERD and chronic constipation presents to clinic with c/o abdominal pain and nausea. Recent GES with delayed gastric emptying time at 4 hour(s) the percentage of retention is 36 % (normal retention at 4 hours is 10% and lower). Patient reports better symptom control as she is gradually avoiding foods that trigger symptoms like fried foods. Occasional nausea without vomiting after meals. Constipation has improved on Amitiza. BM daily or EOD.     Endoscopy History  - 2021 Colonoscopy: The examined portion of the ileum was normal. The entire examined colon is normal. Biopsied. Stool in the entire examined colon, adequately   removed. Internal hemorrhoids. Redundant colon making the procecure difficult; this is a result of her constipation.  - 2021 EGD: Normal esophagus. Erythematous mucosa in the antrum. Biopsied. Normal examined duodenum. Biopsied.Biopsy (-) HP; (-) Celiac    Past Medical History:   Diagnosis Date    Anemia     Back pain     Depression        Past Surgical History:   Procedure Laterality Date     SECTION      COLD KNIFE CONIZATION OF CERVIX  12/10/2021    Procedure: CONE BIOPSY, CERVIX, USING COLD KNIFE;  Surgeon: Modesto Nassar MD;  Location: [facility] OR;  Service: OB/GYN;;    COLONOSCOPY N/A 2021    Procedure: COLONOSCOPY;  Surgeon: Emily Cruz MD;  Location: [facility] ENDO;  Service: Endoscopy;  Laterality: N/A;    ESOPHAGOGASTRODUODENOSCOPY N/A 2021    Procedure: EGD (ESOPHAGOGASTRODUODENOSCOPY);  Surgeon: Emily Cruz MD;  Location: [facility] ENDO;  Service: Endoscopy;  Laterality: N/A;    HYSTERECTOMY      ROBOT-ASSISTED LAPAROSCOPIC HYSTERECTOMY N/A 2022    Procedure: ROBOTIC HYSTERECTOMY;  Surgeon: Modesto Nassar MD;  Location: [facility] OR;  Service: OB/GYN;  Laterality: N/A;    ROBOT-ASSISTED SALPINGECTOMY Bilateral 2022     Procedure: ROBOTIC SALPINGECTOMY;  Surgeon: Modesto Nassar MD;  Location: [facility];  Service: OB/GYN;  Laterality: Bilateral;    SINUS SURGERY      TUBAL LIGATION         Family History   Problem Relation Age of Onset    Hypertension Mother     Diabetes Mother     Breast cancer Neg Hx     Colon cancer Neg Hx     Ovarian cancer Neg Hx        Social History     Socioeconomic History    Marital status:    Tobacco Use    Smoking status: Former     Packs/day: 0.00     Types: Cigarettes     Quit date: 3/3/2021     Years since quittin.2    Smokeless tobacco: Never   Substance and Sexual Activity    Alcohol use: No    Drug use: Yes     Types: Marijuana     Comment: daily    Sexual activity: Yes     Partners: Male     Birth control/protection: See Surgical Hx     Social Determinants of Health     Financial Resource Strain: Low Risk     Difficulty of Paying Living Expenses: Not hard at all   Food Insecurity: No Food Insecurity    Worried About Running Out of Food in the Last Year: Never true    Ran Out of Food in the Last Year: Never true   Transportation Needs: No Transportation Needs    Lack of Transportation (Medical): No    Lack of Transportation (Non-Medical): No   Stress: Stress Concern Present    Feeling of Stress : To some extent   Social Connections: Unknown    Marital Status:    Housing Stability: Unknown    Unable to Pay for Housing in the Last Year: No    Unstable Housing in the Last Year: No       Review of Systems   Constitutional:  Negative for appetite change and unexpected weight change.   HENT:  Negative for trouble swallowing.    Respiratory:  Negative for shortness of breath.    Gastrointestinal:  Positive for abdominal pain, constipation and nausea. Negative for diarrhea and rectal pain.   Neurological:  Negative for dizziness.   Hematological:  Negative for adenopathy. Does not bruise/bleed easily.   Psychiatric/Behavioral:  Negative for dysphoric mood.        Objective:  "    Vitals:    06/01/23 1525   BP: 118/76   BP Location: Right arm   Patient Position: Sitting   BP Method: Medium (Manual)   Pulse: 77   SpO2: 98%   Weight: 86.9 kg (191 lb 9.6 oz)   Height: 5' 8" (1.727 m)          Physical Exam  Constitutional:       General: She is not in acute distress.  HENT:      Head: Normocephalic.   Eyes:      Conjunctiva/sclera: Conjunctivae normal.   Pulmonary:      Effort: Pulmonary effort is normal. No respiratory distress.   Musculoskeletal:         General: Normal range of motion.      Cervical back: Normal range of motion.   Skin:     General: Skin is warm and dry.   Neurological:      Mental Status: She is alert and oriented to person, place, and time.   Psychiatric:         Mood and Affect: Mood normal.         Behavior: Behavior normal.             Assessment:         ICD-10-CM ICD-9-CM   1. Gastroparesis  K31.84 536.3   2. Nausea  R11.0 787.02   3. GERD without esophagitis  K21.9 530.81   4. Chronic idiopathic constipation  K59.04 564.00       Plan:       Gastroparesis    Nausea  -     ondansetron (ZOFRAN-ODT) 4 MG TbDL; Dissolve 1 tablet (4 mg total) by mouth 4 (four) times daily before meals and nightly.  Dispense: 120 tablet; Refill: 5    GERD without esophagitis    Chronic idiopathic constipation    - Gastroparesis diet  - Omeprazole 40 mg every morning on an empty stomach 30-45 minutes before food or any other medications  - Dicyclomine 20 mg three times per day as needed  - Zofran before meals and bedtime  - Amitiza  8 mcg twice daily with Miralax every 2-3 days  Follow up in 6 months (on 12/1/2023) for medication management, If symptoms worsen or fail to improve.     Patient's Medications   New Prescriptions    ONDANSETRON (ZOFRAN-ODT) 4 MG TBDL    Dissolve 1 tablet (4 mg total) by mouth 4 (four) times daily before meals and nightly.   Previous Medications    ACETAMINOPHEN (TYLENOL) 325 MG TABLET    Take 2 tablets (650 mg total) by mouth every 6 (six) hours as needed for " Pain.    ADVAIR DISKUS 100-50 MCG/DOSE DISKUS INHALER    Inhale 2 puffs into the lungs 2 (two) times daily.    ALBUTEROL (PROVENTIL/VENTOLIN HFA) 90 MCG/ACTUATION INHALER    Inhale 1-2 puffs into the lungs every 6 (six) hours as needed for Wheezing. Rescue    AMITRIPTYLINE (ELAVIL) 25 MG TABLET    Take 1 tablet (25 mg total) by mouth every evening.    ASCORBIC ACID, VITAMIN C, (VITAMIN C) 500 MG TABLET    Take 500 mg by mouth once daily.    BUDESONIDE (PULMICORT) 0.25 MG/2 ML NEBULIZER SOLUTION    Mix 2mL of budesonide into each saline rinse bottle, shake well, use daily    CETIRIZINE (ZYRTEC) 10 MG TABLET    Take 1 tablet daily by mouth    DICYCLOMINE (BENTYL) 20 MG TABLET    Take 1 tablet (20 mg total) by mouth 3 (three) times daily as needed (abdominal pain).    DULOXETINE (CYMBALTA) 60 MG CAPSULE    Take 1 capsule (60 mg total) by mouth once daily.    ESTRADIOL (ESTRACE) 0.01 % (0.1 MG/GRAM) VAGINAL CREAM    Place 1 g vaginally twice a week. Use 1/2 gram nightly for 1st week    FLOVENT  MCG/ACTUATION INHALER    1 puff 2 (two) times daily.    FLU VACC [id number] 6MOS UP,PF, (FLUARIX QUAD 0416-5642, PF,) 60 MCG (15 MCG X 4)/0.5 ML SYRG    Inject 0.5ml by [facility]    FLUTICASONE-SALMETEROL DISKUS INHALER 100-50 MCG    Inhale 2 puffs by mouth twice a day    GABAPENTIN (NEURONTIN) 300 MG CAPSULE    Take 1 capsule (300 mg total) by mouth 3 (three) times daily as needed.    HYDROCORTISONE 2.5 % CREAM    Apply topically 2 (two) times daily. for 14 days    LIDOCAINE (LIDODERM) 5 %    Place 1 patch onto the skin once daily. Remove & Discard patch within 12 hours or as directed by MD    LORATADINE (CLARITIN) 10 MG TABLET        LUBIPROSTONE (AMITIZA) 8 MCG CAP    Take 1 capsule (8 mcg total) by mouth 2 (two) times daily with meals.    MECLIZINE (ANTIVERT) 12.5 MG TABLET    Take 1 tablet (12.5 mg total) by mouth 2 (two) times daily as needed for Dizziness.    METHYLPREDNISOLONE (MEDROL DOSEPACK) 4 MG TABLET         MONTELUKAST (SINGULAIR) 10 MG TABLET    Take 1 tablet nightly by mouth    OMEPRAZOLE (PRILOSEC) 40 MG CAPSULE    Take 1 capsule (40 mg total) by mouth once daily.    POLYETHYLENE GLYCOL (GLYCOLAX) 17 GRAM/DOSE POWDER    Take 17 g by mouth once daily.    PREDNISONE (DELTASONE) 20 MG TABLET    Take 2 tablets by mouth daily    SUMATRIPTAN (IMITREX) 100 MG TABLET    Take 1 tablet (100 mg total) by mouth every 2 (two) hours as needed for Migraine.    TIOTROPIUM (SPIRIVA WITH HANDIHALER) 18 MCG INHALATION CAPSULE    Inhale 1 capsule into the lungs daily   Modified Medications    No medications on file   Discontinued Medications    No medications on file

## 2023-06-11 DIAGNOSIS — F41.9 ANXIETY: ICD-10-CM

## 2023-06-12 ENCOUNTER — PATIENT MESSAGE (OUTPATIENT)
Dept: FAMILY MEDICINE | Facility: HOSPITAL | Age: 43
End: 2023-06-12
Payer: MEDICAID

## 2023-06-12 RX ORDER — DULOXETIN HYDROCHLORIDE 60 MG/1
60 CAPSULE, DELAYED RELEASE ORAL DAILY
Qty: 30 CAPSULE | Refills: 2 | Status: SHIPPED | OUTPATIENT
Start: 2023-06-12 | End: 2023-10-13 | Stop reason: SDUPTHER

## 2023-06-15 ENCOUNTER — TELEPHONE (OUTPATIENT)
Dept: FAMILY MEDICINE | Facility: HOSPITAL | Age: 43
End: 2023-06-15
Payer: MEDICAID

## 2023-06-15 ENCOUNTER — PATIENT MESSAGE (OUTPATIENT)
Dept: GASTROENTEROLOGY | Facility: CLINIC | Age: 43
End: 2023-06-15
Payer: MEDICAID

## 2023-06-15 NOTE — TELEPHONE ENCOUNTER
Spoke with patient via telephone regarding concerns. Review gastroparesis diet precautions and medication regimen. Patient verbalized understanding and will follow up if symptoms fail to improve.

## 2023-06-15 NOTE — TELEPHONE ENCOUNTER
----- Message from Du Pineda MD sent at 6/15/2023  2:22 PM CDT -----  Hello  Can you please schedule this patient a procedure (carpal tunnel injection) with Haritha Louie or Isaias?    Thank you  Du Pineda MD

## 2023-06-16 RX ORDER — DICLOFENAC SODIUM 10 MG/G
2 GEL TOPICAL 4 TIMES DAILY PRN
Qty: 200 G | Refills: 0 | Status: SHIPPED | OUTPATIENT
Start: 2023-06-16 | End: 2023-09-12

## 2023-06-28 ENCOUNTER — OFFICE VISIT (OUTPATIENT)
Dept: FAMILY MEDICINE | Facility: HOSPITAL | Age: 43
End: 2023-06-28
Payer: MEDICAID

## 2023-06-28 VITALS
BODY MASS INDEX: 29.27 KG/M2 | WEIGHT: 193.13 LBS | SYSTOLIC BLOOD PRESSURE: 115 MMHG | HEART RATE: 86 BPM | DIASTOLIC BLOOD PRESSURE: 72 MMHG | HEIGHT: 68 IN

## 2023-06-28 DIAGNOSIS — G56.01 CARPAL TUNNEL SYNDROME OF RIGHT WRIST: Primary | ICD-10-CM

## 2023-06-28 PROCEDURE — 99215 OFFICE O/P EST HI 40 MIN: CPT | Performed by: STUDENT IN AN ORGANIZED HEALTH CARE EDUCATION/TRAINING PROGRAM

## 2023-06-28 RX ORDER — TRIAMCINOLONE ACETONIDE 40 MG/ML
40 INJECTION, SUSPENSION INTRA-ARTICULAR; INTRAMUSCULAR
Status: DISCONTINUED | OUTPATIENT
Start: 2023-06-28 | End: 2023-06-28 | Stop reason: HOSPADM

## 2023-06-28 RX ADMIN — TRIAMCINOLONE ACETONIDE 40 MG: 40 INJECTION, SUSPENSION INTRA-ARTICULAR; INTRAMUSCULAR at 01:06

## 2023-06-28 NOTE — PROGRESS NOTES
PROGRESS NOTE  \[facility] [place] [facility]\"" FAMILY MEDICINE    Subjective:       Patient ID: Wandy Shah is a 43 y.o. female.    Chief Complaint: Procedure (RIGHT HAND INJECTION)      43-year-old female here for carpal tunnel injection.  Was seen previously in his clinic and diagnosed with carpal tunnel syndrome.  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  Endorses some tingling at the tips of fingers, weakness with holding.    Review of Systems   Constitutional:  Negative for chills, fever and unexpected weight change.   HENT:  Negative for congestion, rhinorrhea, sneezing and sore throat.    Eyes:  Negative for redness and visual disturbance.   Respiratory:  Negative for cough, shortness of breath and wheezing.    Cardiovascular:  Negative for chest pain and leg swelling.   Gastrointestinal:  Negative for abdominal pain, blood in stool, constipation and diarrhea.   Genitourinary:  Negative for dysuria and hematuria.   Musculoskeletal:  Positive for arthralgias. Negative for joint swelling.   Skin:  Negative for color change and pallor.   Neurological:  Negative for light-headedness and headaches.   Psychiatric/Behavioral:  Negative for agitation and confusion.      Objective:      Vitals:    06/28/23 0906   BP: 115/72   Pulse: 86     Body mass index is 29.36 kg/m².  Physical Exam  Vitals and nursing note reviewed.   Constitutional:       Appearance: Normal appearance.   HENT:      Head: Normocephalic and atraumatic.      Right Ear: Tympanic membrane normal.      Left Ear: Tympanic membrane normal.      Mouth/Throat:      Mouth: Mucous membranes are moist.      Pharynx: Oropharynx is clear.   Eyes:      Extraocular Movements: Extraocular movements intact.      Pupils: Pupils are equal, round, and reactive to light.   Cardiovascular:      Rate and Rhythm: Normal rate and regular rhythm.      Pulses: Normal pulses.      Heart  sounds: Normal heart sounds.   Pulmonary:      Effort: Pulmonary effort is normal.      Breath sounds: Normal breath sounds.   Abdominal:      General: Abdomen is flat.      Palpations: Abdomen is soft.   Musculoskeletal:         General: Normal range of motion.      Cervical back: Normal range of motion.      Comments: Positive Tinel and Phalen sign on right wrist   Skin:     General: Skin is warm.   Neurological:      General: No focal deficit present.      Mental Status: She is alert and oriented to person, place, and time.   Psychiatric:         Mood and Affect: Mood normal.       Intermediate Joint Aspiration/Injection: R intercarpal    Date/Time: 6/28/2023 1:41 PM  Performed by: Jr Esparza MD  Authorized by: Jr Esparza MD     Consent Done?:  Yes (Written)  Indications:  Pain  Site marked: The procedure site was marked    Timeout: Prior to procedure the correct patient, procedure, and site was verified      Location:  Wrist  Site:  R intercarpal  Ultrasonic Guidance for needle placement: No  Needle gauge: 30 g.  Approach:  Anteromedial  Medications:  40 mg triamcinolone acetonide 40 mg/mL  Patient tolerance:  Patient tolerated the procedure well with no immediate complications     Assessment:       1. Carpal tunnel syndrome of right wrist        Plan:       Performed steroid injection of right carpal tunnel without complication  Carpal tunnel syndrome of right wrist  -     Intermediate Joint Aspiration/Injection: R intercarpal        Follow up in: 1 month        Jr Esparza MD, MPH  [facility], PGY-2    This note was partially created using [employer] Voice Recognition software. Typographical and content errors may occur with this process. While efforts are made to detect and correct such errors, in some cases errors will persist. For this reason, wording in this document should be considered in the proper context and not strictly verbatim.

## 2023-07-06 NOTE — PROGRESS NOTES
I assume primary medical responsibility for this patient. I have reviewed the history, physical, and assessement & treatment plan with the resident and agree that the care is reasonable and necessary. This service has been performed by a resident with the presence of a teaching physician for the key parts of the history/exam. If necessary, an addendum of additional findings or evaluation beyond the resident documentation will be noted below.     Starla Alanis MD

## 2023-07-27 DIAGNOSIS — G43.009 MIGRAINE WITHOUT AURA AND WITHOUT STATUS MIGRAINOSUS, NOT INTRACTABLE: ICD-10-CM

## 2023-07-27 RX ORDER — AMITRIPTYLINE HYDROCHLORIDE 25 MG/1
25 TABLET, FILM COATED ORAL NIGHTLY
Qty: 30 TABLET | Refills: 2 | Status: CANCELLED | OUTPATIENT
Start: 2023-07-24 | End: 2024-07-23

## 2023-08-04 DIAGNOSIS — G43.009 MIGRAINE WITHOUT AURA AND WITHOUT STATUS MIGRAINOSUS, NOT INTRACTABLE: ICD-10-CM

## 2023-08-04 RX ORDER — AMITRIPTYLINE HYDROCHLORIDE 25 MG/1
25 TABLET, FILM COATED ORAL NIGHTLY
Qty: 30 TABLET | Refills: 2 | Status: CANCELLED | OUTPATIENT
Start: 2023-07-24 | End: 2024-07-23

## 2023-08-09 DIAGNOSIS — G43.009 MIGRAINE WITHOUT AURA AND WITHOUT STATUS MIGRAINOSUS, NOT INTRACTABLE: ICD-10-CM

## 2023-08-09 RX ORDER — AMITRIPTYLINE HYDROCHLORIDE 25 MG/1
25 TABLET, FILM COATED ORAL NIGHTLY
Qty: 30 TABLET | Refills: 2 | Status: CANCELLED | OUTPATIENT
Start: 2023-08-09 | End: 2024-08-08

## 2023-08-10 DIAGNOSIS — G43.009 MIGRAINE WITHOUT AURA AND WITHOUT STATUS MIGRAINOSUS, NOT INTRACTABLE: ICD-10-CM

## 2023-08-10 RX ORDER — AMITRIPTYLINE HYDROCHLORIDE 25 MG/1
25 TABLET, FILM COATED ORAL NIGHTLY
Qty: 30 TABLET | Refills: 2 | Status: CANCELLED | OUTPATIENT
Start: 2023-08-09 | End: 2024-08-08

## 2023-08-11 DIAGNOSIS — K58.1 IRRITABLE BOWEL SYNDROME WITH CONSTIPATION: ICD-10-CM

## 2023-08-11 DIAGNOSIS — K59.04 CHRONIC IDIOPATHIC CONSTIPATION: ICD-10-CM

## 2023-08-11 RX ORDER — LUBIPROSTONE 8 UG/1
8 CAPSULE ORAL 2 TIMES DAILY WITH MEALS
Qty: 60 CAPSULE | Refills: 2 | Status: SHIPPED | OUTPATIENT
Start: 2023-08-11 | End: 2023-12-20 | Stop reason: SDUPTHER

## 2023-08-31 NOTE — ED NOTES
Patient seen by EMIR Navarro   Patient ID: Manisha Fair is a 17 y.o. male Date of Birth 2006       Chief Complaint   Patient presents with   • Ingrown Toenail     Left foot follow up             Diagnosis:  1. Ingrowing left great toenail      1. Pedal examination with socks and shoes removed BL. 2.  Left lateral nail border is well-healed, discontinue all soaks and dressings, resume all activities as tolerated. 3. Patient And parent advised on how to cut toenails straight across, avoid partially cutting and then tearing the toenail, avoid peeling the toenail, do not cut it too short. 4.  They are advised if they notice a recurrent ingrown toenail to call office immediately for partial nail avulsion and matricectomy. 5.  They understand and agree with the plan and will follow-up as needed. Subjective:   Jaime Paz presents today for follow-up status post partial left lateral hallux  nail border avulsion on 7/14/2023. The following portions of the patient's history were reviewed and updated as appropriate: allergies, current medications, past family history, past medical history, past social history, past surgical history and problem list.        Objective:  /72 (BP Location: Left arm, Patient Position: Sitting, Cuff Size: Adult)   Pulse 83   Ht 5' 5" (1.651 m) Comment: verbal  Wt 78.9 kg (174 lb)   BMI 28.96 kg/m²     Review of Systems   Constitutional: Negative for chills and fever. HENT: Negative for ear pain and sore throat. Eyes: Negative for pain and visual disturbance. Respiratory: Negative for cough and shortness of breath. Cardiovascular: Negative for chest pain and palpitations. Gastrointestinal: Negative for abdominal pain and vomiting. Genitourinary: Negative for dysuria and hematuria. Musculoskeletal: Negative for arthralgias and back pain. Skin: Negative for color change and rash. Left great toe ingrown nail   Neurological: Negative for seizures and syncope.    All other systems reviewed and are negative. Physical Exam  Constitutional:       Appearance: Normal appearance. He is normal weight. HENT:      Head: Normocephalic and atraumatic. Right Ear: External ear normal.      Left Ear: External ear normal.      Nose: Nose normal.      Mouth/Throat:      Mouth: Mucous membranes are moist.      Pharynx: Oropharynx is clear. Eyes:      Conjunctiva/sclera: Conjunctivae normal.   Cardiovascular:      Pulses: Normal pulses. Dorsalis pedis pulses are 2+ on the right side and 2+ on the left side. Posterior tibial pulses are 2+ on the right side and 2+ on the left side. Pulmonary:      Effort: Pulmonary effort is normal.   Musculoskeletal:      Cervical back: Normal range of motion. Right lower leg: No edema. Left lower leg: No edema. Feet:      Right foot:      Protective Sensation: 10 sites tested. 10 sites sensed. Skin integrity: Skin integrity normal.      Toenail Condition: Right toenails are normal.      Left foot:      Protective Sensation: 10 sites tested. 10 sites sensed. Skin integrity: Skin integrity normal.      Toenail Condition: Left toenails are normal.      Comments: Left great toe lateral nail border, no pain on palpation, no edema, erythema, signs of infection, no nail spicule is noted. Neurological:      Mental Status: He is alert. Mental status is at baseline. Psychiatric:         Mood and Affect: Mood normal.         Behavior: Behavior normal.            No pertinent results found. Cornelio Yusuf, HALEY, DPM, FACFAS    Portions of the record may have been created with voice recognition software. Occasional wrong word or "sound a like" substitutions may have occurred due to the inherent limitations of voice recognition software. Read the chart carefully and recognize, using context, where substitutions have occurred.

## 2023-09-09 DIAGNOSIS — R10.84 GENERALIZED ABDOMINAL PAIN: ICD-10-CM

## 2023-09-09 DIAGNOSIS — K58.1 IRRITABLE BOWEL SYNDROME WITH CONSTIPATION: ICD-10-CM

## 2023-09-11 DIAGNOSIS — G43.009 MIGRAINE WITHOUT AURA AND WITHOUT STATUS MIGRAINOSUS, NOT INTRACTABLE: ICD-10-CM

## 2023-09-11 RX ORDER — DICYCLOMINE HYDROCHLORIDE 20 MG/1
20 TABLET ORAL 3 TIMES DAILY PRN
Qty: 60 TABLET | Refills: 5 | Status: SHIPPED | OUTPATIENT
Start: 2023-09-11 | End: 2024-02-17 | Stop reason: SDUPTHER

## 2023-09-11 RX ORDER — AMITRIPTYLINE HYDROCHLORIDE 25 MG/1
25 TABLET, FILM COATED ORAL NIGHTLY
Qty: 30 TABLET | Refills: 2 | Status: CANCELLED | OUTPATIENT
Start: 2023-09-09 | End: 2024-09-08

## 2023-09-12 DIAGNOSIS — G43.009 MIGRAINE WITHOUT AURA AND WITHOUT STATUS MIGRAINOSUS, NOT INTRACTABLE: ICD-10-CM

## 2023-09-12 RX ORDER — AMITRIPTYLINE HYDROCHLORIDE 25 MG/1
25 TABLET, FILM COATED ORAL NIGHTLY
Qty: 30 TABLET | Refills: 2 | Status: CANCELLED | OUTPATIENT
Start: 2023-09-09 | End: 2024-09-08

## 2023-09-14 DIAGNOSIS — G43.009 MIGRAINE WITHOUT AURA AND WITHOUT STATUS MIGRAINOSUS, NOT INTRACTABLE: ICD-10-CM

## 2023-09-14 RX ORDER — AMITRIPTYLINE HYDROCHLORIDE 25 MG/1
25 TABLET, FILM COATED ORAL NIGHTLY
Qty: 30 TABLET | Refills: 2 | Status: CANCELLED | OUTPATIENT
Start: 2023-09-09 | End: 2024-09-08

## 2023-09-25 PROBLEM — J82.83 EOSINOPHILIC ASTHMA: Status: ACTIVE | Noted: 2023-09-25

## 2023-10-01 DIAGNOSIS — K21.9 GASTROESOPHAGEAL REFLUX DISEASE, UNSPECIFIED WHETHER ESOPHAGITIS PRESENT: ICD-10-CM

## 2023-10-02 RX ORDER — OMEPRAZOLE 40 MG/1
40 CAPSULE, DELAYED RELEASE ORAL DAILY
Qty: 30 CAPSULE | Refills: 5 | Status: SHIPPED | OUTPATIENT
Start: 2023-10-02 | End: 2024-03-30 | Stop reason: SDUPTHER

## 2023-10-12 ENCOUNTER — PATIENT MESSAGE (OUTPATIENT)
Dept: ADMINISTRATIVE | Facility: OTHER | Age: 43
End: 2023-10-12
Payer: MEDICAID

## 2023-10-13 DIAGNOSIS — R23.3 SKIN SPOTS, RED: ICD-10-CM

## 2023-10-13 DIAGNOSIS — F41.9 ANXIETY: ICD-10-CM

## 2023-10-13 RX ORDER — DULOXETIN HYDROCHLORIDE 60 MG/1
60 CAPSULE, DELAYED RELEASE ORAL DAILY
Qty: 30 CAPSULE | Refills: 2 | Status: SHIPPED | OUTPATIENT
Start: 2023-10-13 | End: 2023-11-29 | Stop reason: SDUPTHER

## 2023-10-13 RX ORDER — HYDROCORTISONE 25 MG/G
CREAM TOPICAL 2 TIMES DAILY
Qty: 30 G | Refills: 0 | Status: SHIPPED | OUTPATIENT
Start: 2023-10-13 | End: 2023-11-15

## 2023-11-04 ENCOUNTER — HOSPITAL ENCOUNTER (EMERGENCY)
Facility: HOSPITAL | Age: 43
Discharge: HOME | End: 2023-11-04
Attending: EMERGENCY MEDICINE
Payer: MEDICAID

## 2023-11-04 VITALS
OXYGEN SATURATION: 98 % | TEMPERATURE: 98 F | WEIGHT: 190 LBS | SYSTOLIC BLOOD PRESSURE: 131 MMHG | BODY MASS INDEX: 28.79 KG/M2 | HEIGHT: 68 IN | HEART RATE: 84 BPM | DIASTOLIC BLOOD PRESSURE: 79 MMHG | RESPIRATION RATE: 17 BRPM

## 2023-11-04 DIAGNOSIS — J06.9 UPPER RESPIRATORY TRACT INFECTION, UNSPECIFIED TYPE: Primary | ICD-10-CM

## 2023-11-04 PROCEDURE — 99281 EMR DPT VST MAYX REQ PHY/QHP: CPT

## 2023-11-04 NOTE — DISCHARGE INSTRUCTIONS
As discussed, you can use a sinus flush.  Be sure to use bottled water when using any kind of nasal or sinus flush.  Continue using your daytime antihistamines, steroid nasal spray.  For throat discomfort, you can gargle with warm salt water.     Follow-up with your pulmonologist on Monday as scheduled.      Return to the ER if you develop fever greater than 100.4°, severe facial pain, severe headache, weakness or any other worrisome symptoms.

## 2023-11-04 NOTE — ED PROVIDER NOTES
"Encounter Date: 2023       History     Chief Complaint   Patient presents with    Cough     Cough x over a year; on a "asthma shot"; throat burning and ear pain. "Maybe its my sinuses".      43-year-old female with chronic sinusitis s/p sinus surgery , cough variant asthma presents to the ED with URI symptoms.  Patient reports 1 week of head congestion, nasal congestion, throat discomfort, burning pain in both of her ears.  She has attempted treatment with DayQuil, Claritin, Robitussin, Flonase without significant relief in her symptoms.  She denies fever, body aches, significant odynophagia.  She does have a chronic cough and feels that it is slightly more frequent than normal.  No other acute complaints.      Review of patient's allergies indicates:   Allergen Reactions    Raspberry (rubus idaeus)      Past Medical History:   Diagnosis Date    Anemia     Back pain     Depression      Past Surgical History:   Procedure Laterality Date     SECTION      COLD KNIFE CONIZATION OF CERVIX  12/10/2021    Procedure: CONE BIOPSY, CERVIX, USING COLD KNIFE;  Surgeon: Modesto Nassar MD;  Location: [facility] OR;  Service: OB/GYN;;    COLONOSCOPY N/A 2021    Procedure: COLONOSCOPY;  Surgeon: Emily Cruz MD;  Location: [facility] ENDO;  Service: Endoscopy;  Laterality: N/A;    ESOPHAGOGASTRODUODENOSCOPY N/A 2021    Procedure: EGD (ESOPHAGOGASTRODUODENOSCOPY);  Surgeon: Emily Cruz MD;  Location: [facility] ENDO;  Service: Endoscopy;  Laterality: N/A;    HYSTERECTOMY      ROBOT-ASSISTED LAPAROSCOPIC HYSTERECTOMY N/A 2022    Procedure: ROBOTIC HYSTERECTOMY;  Surgeon: Modesto Nassar MD;  Location: [facility] OR;  Service: OB/GYN;  Laterality: N/A;    ROBOT-ASSISTED SALPINGECTOMY Bilateral 2022    Procedure: ROBOTIC SALPINGECTOMY;  Surgeon: Modesto Nassar MD;  Location: [facility] OR;  Service: OB/GYN;  Laterality: Bilateral;    SINUS SURGERY      TUBAL LIGATION       Family History   Problem " Relation Age of Onset    Hypertension Mother     Diabetes Mother     Breast cancer Neg Hx     Colon cancer Neg Hx     Ovarian cancer Neg Hx      Social History     Tobacco Use    Smoking status: Former     Current packs/day: 0.00     Types: Cigarettes     Quit date: 3/3/2021     Years since quittin.6    Smokeless tobacco: Never   Substance Use Topics    Alcohol use: No    Drug use: Yes     Types: Marijuana     Comment: daily     Review of Systems   Constitutional:  Negative for fever.   HENT:  Positive for ear pain and sore throat.    Respiratory:  Positive for cough.        Physical Exam     Initial Vitals [23 0747]   BP Pulse Resp Temp SpO2   131/79 84 17 97.7 °F (36.5 °C) 98 %      MAP       --         Physical Exam    Nursing note and vitals reviewed.  Constitutional: She appears well-developed and well-nourished. She is not diaphoretic.  Non-toxic appearance. She does not appear ill. No distress.   HENT:   Head: Normocephalic and atraumatic.   Right Ear: Tympanic membrane is not injected. A middle ear effusion is present.   Left Ear: Tympanic membrane is not injected.  No middle ear effusion.   Nose: Mucosal edema present. Right sinus exhibits no maxillary sinus tenderness and no frontal sinus tenderness. Left sinus exhibits no maxillary sinus tenderness and no frontal sinus tenderness.   Mouth/Throat: No trismus in the jaw. Posterior oropharyngeal erythema (mild) present. No oropharyngeal exudate.   Right serous effusion   Neck: Neck supple.   Cardiovascular:  Normal rate and regular rhythm.     Exam reveals no gallop and no friction rub.       No murmur heard.  Pulmonary/Chest: Effort normal and breath sounds normal. No accessory muscle usage. No tachypnea. No respiratory distress. She has no decreased breath sounds. She has no wheezes. She has no rhonchi. She has no rales.   Abdominal: She exhibits no distension.   Musculoskeletal:      Cervical back: Neck supple.     Neurological: She is alert.    Skin: No rash noted.   Psychiatric: She has a normal mood and affect. Her behavior is normal.         ED Course   Procedures  Labs Reviewed - No data to display       Imaging Results    None          Medications - No data to display  Medical Decision Making  43-year-old female presents to the ED with 1 week of bilateral ear discomfort, head congestion, nasal congestion and cough.  No complaints of fevers at home.  Afebrile in the ED. nontoxic appearing.  Vitals within normal limits.  See full physical exam above.  Differentials include URI, sinusitis, otitis media, rhinitis, viral syndrome.  I think symptoms are viral in nature and will treat patient symptomatically.  Based on history and exam, I do not feel that emergent workup or further evaluation is indicated at this time.  She is currently using an intranasal steroid, daytime antihistamine as well as other OTC medications for treatment.  Patient counseled on using saline nasal flushes.  She has an appointment with her pulmonologist on Monday.  ED return precautions given.  Patient voiced understanding.                                Clinical Impression:   Final diagnoses:  [J06.9] Upper respiratory tract infection, unspecified type (Primary)        ED Disposition Condition    Discharge Stable          ED Prescriptions    None       Follow-up Information    None          Gladis Lazo PA-C  11/04/23 0904

## 2023-11-08 DIAGNOSIS — G43.009 MIGRAINE WITHOUT AURA AND WITHOUT STATUS MIGRAINOSUS, NOT INTRACTABLE: ICD-10-CM

## 2023-11-08 RX ORDER — AMITRIPTYLINE HYDROCHLORIDE 25 MG/1
25 TABLET, FILM COATED ORAL NIGHTLY
Qty: 30 TABLET | Refills: 2 | Status: CANCELLED | OUTPATIENT
Start: 2023-11-07 | End: 2024-11-06

## 2023-11-15 ENCOUNTER — PATIENT MESSAGE (OUTPATIENT)
Dept: FAMILY MEDICINE | Facility: CLINIC | Age: 43
End: 2023-11-15
Payer: MEDICAID

## 2023-11-17 ENCOUNTER — PATIENT MESSAGE (OUTPATIENT)
Dept: FAMILY MEDICINE | Facility: HOSPITAL | Age: 43
End: 2023-11-17
Payer: MEDICAID

## 2023-11-18 ENCOUNTER — HOSPITAL ENCOUNTER (EMERGENCY)
Facility: HOSPITAL | Age: 43
Discharge: HOME | End: 2023-11-18
Attending: EMERGENCY MEDICINE
Payer: MEDICAID

## 2023-11-18 VITALS
DIASTOLIC BLOOD PRESSURE: 66 MMHG | TEMPERATURE: 98 F | SYSTOLIC BLOOD PRESSURE: 114 MMHG | RESPIRATION RATE: 16 BRPM | HEART RATE: 80 BPM | OXYGEN SATURATION: 98 %

## 2023-11-18 DIAGNOSIS — J06.9 VIRAL URI WITH COUGH: Primary | ICD-10-CM

## 2023-11-18 DIAGNOSIS — R05.9 COUGH: ICD-10-CM

## 2023-11-18 LAB
INFLUENZA A, MOLECULAR: NOT DETECTED
INFLUENZA B, MOLECULAR: NOT DETECTED
RSV AG BY MOLECULAR METHOD: NOT DETECTED
SARS-COV-2 RNA RESP QL NAA+PROBE: NOT DETECTED

## 2023-11-18 PROCEDURE — 99283 EMERGENCY DEPT VISIT LOW MDM: CPT | Mod: 25

## 2023-11-18 PROCEDURE — 25000003 PHARM REV CODE 250: Performed by: PHYSICIAN ASSISTANT

## 2023-11-18 PROCEDURE — 0241U SARS-COV2 (COVID) WITH FLU/RSV BY PCR: CPT | Performed by: PHYSICIAN ASSISTANT

## 2023-11-18 RX ORDER — BENZONATATE 100 MG/1
100 CAPSULE ORAL
Status: COMPLETED | OUTPATIENT
Start: 2023-11-18 | End: 2023-11-18

## 2023-11-18 RX ORDER — PROMETHAZINE HYDROCHLORIDE AND DEXTROMETHORPHAN HYDROBROMIDE 6.25; 15 MG/5ML; MG/5ML
5 SYRUP ORAL EVERY 4 HOURS PRN
Qty: 118 ML | Refills: 0 | Status: SHIPPED | OUTPATIENT
Start: 2023-11-18 | End: 2023-11-28

## 2023-11-18 RX ADMIN — BENZONATATE 100 MG: 100 CAPSULE ORAL at 10:11

## 2023-11-19 NOTE — ED PROVIDER NOTES
Encounter Date: 2023       History     Chief Complaint   Patient presents with    Multiple Complaints     Patient reports that she woke up this morning with headache, cough, congestion.      The history is provided by the patient and medical records. No  was used.     Wandy Shah is a 43 y.o. female with medical history of asthma, former smoker, chronic rhinosinusitis presenting to the ED with multiple complaints.     Awoke today with frontal headache, sinus congestion, postnasal drip, brown productive cough. No fever. No recent sick contacts. No sore throat, neck pain, chest pain, SOB, abdominal pain, vomiting, urinary or bowel movement changes. Reports using her asthma inhaler as normal. Needs refill for Flonase. No recent sick contacts. +marijuana use.    Review of patient's allergies indicates:   Allergen Reactions    Raspberry (rubus idaeus)      Past Medical History:   Diagnosis Date    Anemia     Back pain     Depression      Past Surgical History:   Procedure Laterality Date     SECTION      COLD KNIFE CONIZATION OF CERVIX  12/10/2021    Procedure: CONE BIOPSY, CERVIX, USING COLD KNIFE;  Surgeon: Modesto Nassar MD;  Location: [facility] OR;  Service: OB/GYN;;    COLONOSCOPY N/A 2021    Procedure: COLONOSCOPY;  Surgeon: Emily Cruz MD;  Location: [facility];  Service: Endoscopy;  Laterality: N/A;    ESOPHAGOGASTRODUODENOSCOPY N/A 2021    Procedure: EGD (ESOPHAGOGASTRODUODENOSCOPY);  Surgeon: Emily Cruz MD;  Location: [facility] ENDO;  Service: Endoscopy;  Laterality: N/A;    HYSTERECTOMY      ROBOT-ASSISTED LAPAROSCOPIC HYSTERECTOMY N/A 2022    Procedure: ROBOTIC HYSTERECTOMY;  Surgeon: Modesto Nassar MD;  Location: [facility] OR;  Service: OB/GYN;  Laterality: N/A;    ROBOT-ASSISTED SALPINGECTOMY Bilateral 2022    Procedure: ROBOTIC SALPINGECTOMY;  Surgeon: Modesto Nassar MD;  Location: [facility] OR;  Service: OB/GYN;  Laterality: Bilateral;     SINUS SURGERY      TUBAL LIGATION       Family History   Problem Relation Age of Onset    Hypertension Mother     Diabetes Mother     Breast cancer Neg Hx     Colon cancer Neg Hx     Ovarian cancer Neg Hx      Social History     Tobacco Use    Smoking status: Former     Current packs/day: 0.00     Types: Cigarettes     Quit date: 3/3/2021     Years since quittin.7    Smokeless tobacco: Never   Substance Use Topics    Alcohol use: No    Drug use: Yes     Types: Marijuana     Comment: daily     Review of Systems   Respiratory:  Positive for cough.        Physical Exam     Initial Vitals [23 1937]   BP Pulse Resp Temp SpO2   111/69 83 18 98.5 °F (36.9 °C) 97 %      MAP       --         Physical Exam    Constitutional: She appears well-developed and well-nourished. She is not diaphoretic. No distress.   HENT:   Head: Normocephalic and atraumatic.   Nose: Mucosal edema present.   Eyes: EOM are normal. Pupils are equal, round, and reactive to light.   Neck: Neck supple.   Normal range of motion.  Cardiovascular:  Normal rate and regular rhythm.           Pulmonary/Chest: No respiratory distress. She has no wheezes.   Speaking full sentences without difficulty. No accessory muscle use.   Musculoskeletal:         General: Normal range of motion.      Cervical back: Normal range of motion and neck supple.     Neurological: She is alert and oriented to person, place, and time.   Skin: Skin is warm and dry. No rash noted.         ED Course   Procedures  Labs Reviewed   SARS-COV2 (COVID) WITH FLU/RSV BY PCR          Imaging Results              X-Ray Chest PA And Lateral (Final result)  Result time 23 23:02:20      Final result by Solo Posadas MD (23 23:02:20)                   Impression:      No acute cardiopulmonary process.      Electronically signed by: Solo Posadas MD  Date:    2023  Time:    23:02               Narrative:    EXAMINATION:  XR CHEST PA AND LATERAL    CLINICAL  HISTORY:  Cough, unspecified    TECHNIQUE:  PA and lateral views of the chest were performed.    COMPARISON:  04/25/2021.    FINDINGS:  There is no consolidation, effusion, or pneumothorax.    Cardiomediastinal silhouette is unremarkable.    Regional osseous structures are unremarkable.                                       Medications   benzonatate capsule 100 mg (100 mg Oral Given 11/18/23 2200)     Medical Decision Making  43 y.o. female with medical history of asthma, former smoker, chronic rhinosinusitis presenting to the ED c/o frontal headache, sinus congestion, postnasal drip, brown productive cough.     DDx includes but not limited to viral syndrome, COVID-19, pneumonia, reactive airway disease.    Amount and/or Complexity of Data Reviewed  Labs: ordered. Decision-making details documented in ED Course.  Radiology: ordered and independent interpretation performed. Decision-making details documented in ED Course.    Risk  Prescription drug management.               ED Course as of 11/19/23 0006   Sat Nov 18, 2023   2352 CXR without large consolidation, pleural effusion, or pneumothorax on my read. [BA]   2352 Viral panel negative. [BA]   2352 Okay for outpatient management. Do not feel ABx are warranted at this time. RX for promethazine cough syrup provided. A [BA]      ED Course User Index  [BA] Jordan Yip PA-C                        Clinical Impression:  Final diagnoses:  [R05.9] Cough  [J06.9] Viral URI with cough (Primary)          ED Disposition Condition    Discharge Stable          ED Prescriptions       Medication Sig Dispense Start Date End Date Auth. Provider    promethazine-dextromethorphan (PROMETHAZINE-DM) 6.25-15 mg/5 mL Syrp Take 5 mLs by mouth every 4 (four) hours as needed. 118 mL 11/18/2023 11/28/2023 Jordan Yip PA-C          Follow-up Information       Follow up With Specialties Details Why Contact Info Additional Information    [facility]   [address]  [facility] [address] [phone] [facility] Please park in surface lot and check in at central registration desk             Jordan Yip PA-C  11/19/23 0006

## 2023-11-19 NOTE — ED NOTES
LOC: The patient is awake, alert, and oriented to self, place, time, and situation. Pt is calm and cooperative. Affect is appropriate.  Speech is appropriate and clear.     APPEARANCE: Patient resting comfortably in no acute distress.  Patient is clean and well groomed.    SKIN: The skin is warm and dry; color consistent with ethnicity.  Patient has normal skin turgor and moist mucus membranes.  Skin intact; no breakdown or bruising noted.     MUSCULOSKELETAL: Patient moving upper and lower extremities without difficulty; denies pain in the extremities or back.  Denies weakness.     ENT: Sounds congested. + rhinorrhea. Reports sore throat, no posterior oropharyngeal erythema, no exudate.    RESPIRATORY: Breath sounds auscultated posteriorly were clear, possibly diminished right upper lobe. Airway is open and patent. Respirations spontaneous, even, easy, and non-labored.  No audible wheeze. Patient has a normal effort and rate.  No accessory muscle use noted.     CARDIAC:  Normal rate noted.  No peripheral edema noted. 2+ radial pulses bilaterally. No complaints of chest pain.      ABDOMEN: Supple. No distention noted. Pt denies acute abdominal pain; denies nausea, vomiting, diarrhea, or constipation.    NEUROLOGIC: Eyes open spontaneously.  Behavior appropriate to situation.  Follows commands; facial expression symmetrical.  Purposeful motor response noted; normal sensation in all extremities. Pt denies headache; denies lightheadedness or dizziness; denies visual disturbances; denies loss of balance; denies unilateral weakness.

## 2023-11-19 NOTE — ED TRIAGE NOTES
Wandy Shah, a 43 y.o. female presents to the ED w/ complaint of rhinorrhea and sore throat today. Reports she was feeling well yesterday, but awoke today with headache, sinus pressure, rhinorrhea, sore throat, and cough. Reports shortness of breath as well. Reports OTC cough remedies and her inhalers at home are not helping. + subjective fevers. Denies GI upset.    Triage note:  Chief Complaint   Patient presents with    Multiple Complaints     Patient reports that she woke up this morning with headache, cough, congestion.      Review of patient's allergies indicates:   Allergen Reactions    Raspberry (rubus idaeus)      Past Medical History:   Diagnosis Date    Anemia     Back pain     Depression

## 2023-11-19 NOTE — DISCHARGE INSTRUCTIONS
You have pending COVID and influenza testing. You may use MyChart to monitor your results. Continue using your asthma medications as previously directed. Use the prescribed cough syrup as needed for your cough.     Return to the emergency room for new, worsening, or concerning symptoms.     Future Appointments   Date Time Provider Department Center   11/27/2023  3:00 PM Alondra Echeverria FNP [facility] [place]   11/29/2023 10:40 AM Sanchez Dodd MD [facility] [place] [facility]

## 2023-11-27 ENCOUNTER — OFFICE VISIT (OUTPATIENT)
Dept: GASTROENTEROLOGY | Facility: CLINIC | Age: 43
End: 2023-11-27
Payer: MEDICAID

## 2023-11-27 DIAGNOSIS — K31.84 GASTROPARESIS: Primary | ICD-10-CM

## 2023-11-27 PROCEDURE — 1159F MED LIST DOCD IN RCRD: CPT | Mod: CPTII,95,, | Performed by: NURSE PRACTITIONER

## 2023-11-27 PROCEDURE — 99214 OFFICE O/P EST MOD 30 MIN: CPT | Mod: 95,,, | Performed by: NURSE PRACTITIONER

## 2023-11-27 PROCEDURE — 1160F RVW MEDS BY RX/DR IN RCRD: CPT | Mod: CPTII,95,, | Performed by: NURSE PRACTITIONER

## 2023-11-27 PROCEDURE — 99214 PR OFFICE/OUTPT VISIT, EST, LEVL IV, 30-39 MIN: ICD-10-PCS | Mod: 95,,, | Performed by: NURSE PRACTITIONER

## 2023-11-27 PROCEDURE — 1160F PR REVIEW ALL MEDS BY PRESCRIBER/CLIN PHARMACIST DOCUMENTED: ICD-10-PCS | Mod: CPTII,95,, | Performed by: NURSE PRACTITIONER

## 2023-11-27 PROCEDURE — 1159F PR MEDICATION LIST DOCUMENTED IN MEDICAL RECORD: ICD-10-PCS | Mod: CPTII,95,, | Performed by: NURSE PRACTITIONER

## 2023-11-27 RX ORDER — METOCLOPRAMIDE 5 MG/1
5 TABLET ORAL
Qty: 90 TABLET | Refills: 1 | Status: SHIPPED | OUTPATIENT
Start: 2023-11-27

## 2023-11-27 NOTE — PROGRESS NOTES
Subjective:       Patient ID: Wandy Shah is a 43 y.o. female.    Chief Complaint: Abdominal Pain    The patient location is: [place], [place]  The chief complaint leading to consultation is: abdominal pain    Visit type: audiovisual    Face to Face time with patient: 20 minutes  45 minutes of total time spent on the encounter, which includes face to face time and non-face to face time preparing to see the patient (eg, review of tests), Obtaining and/or reviewing separately obtained history, Documenting clinical information in the electronic or other health record, Independently interpreting results (not separately reported) and communicating results to the patient/family/caregiver, or Care coordination (not separately reported).         Each patient to whom he or she provides medical services by telemedicine is:  (1) informed of the relationship between the physician and patient and the respective role of any other health care provider with respect to management of the patient; and (2) notified that he or she may decline to receive medical services by telemedicine and may withdraw from such care at any time.    Notes:     44 y/o female with GERD, chronic constipation, and gastroparesis presents for virtual follow up with c/o abdominal pain. States symptoms no longer controlled with gastroparesis diet. Admits she does not follow diet exclusively. Has abdominal pain and nausea after most meals. Zofran and dicyclomine provide minimal relief. Constipation controlled with Amitiza. Has BM daily or EOD.       GI workup  - 5/11/2021 Colonoscopy: The examined portion of the ileum was normal. The entire examined colon is normal. Biopsied. Stool in the entire examined colon, adequately   removed. Internal hemorrhoids. Redundant colon making the procecure difficult; this is a result of her constipation.  - 6/11/2021 EGD: Normal esophagus. Erythematous mucosa in the antrum. Biopsied. Normal examined duodenum. Biopsied.Biopsy  (-) HP; (-) Celiac  - 1/10/2023 CT abd: No evident etiology to explain patient's abdominal pain.   - 2023 GES: Delayed gastric emptying time. At 4 hour(s) the percentage of retention is 36 % (normal retention at 4 hours is 10% and lower).        Past Medical History:   Diagnosis Date    Anemia     Back pain     Depression        Past Surgical History:   Procedure Laterality Date     SECTION      COLD KNIFE CONIZATION OF CERVIX  12/10/2021    Procedure: CONE BIOPSY, CERVIX, USING COLD KNIFE;  Surgeon: Modesto Nassar MD;  Location: [facility] OR;  Service: OB/GYN;;    COLONOSCOPY N/A 2021    Procedure: COLONOSCOPY;  Surgeon: Emily Cruz MD;  Location: [facility] ENDO;  Service: Endoscopy;  Laterality: N/A;    ESOPHAGOGASTRODUODENOSCOPY N/A 2021    Procedure: EGD (ESOPHAGOGASTRODUODENOSCOPY);  Surgeon: Emily Cruz MD;  Location: [facility] ENDO;  Service: Endoscopy;  Laterality: N/A;    HYSTERECTOMY      ROBOT-ASSISTED LAPAROSCOPIC HYSTERECTOMY N/A 2022    Procedure: ROBOTIC HYSTERECTOMY;  Surgeon: Modesto Nassar MD;  Location: [facility] OR;  Service: OB/GYN;  Laterality: N/A;    ROBOT-ASSISTED SALPINGECTOMY Bilateral 2022    Procedure: ROBOTIC SALPINGECTOMY;  Surgeon: Modesto Nassar MD;  Location: [facility] OR;  Service: OB/GYN;  Laterality: Bilateral;    SINUS SURGERY      TUBAL LIGATION         Family History   Problem Relation Age of Onset    Hypertension Mother     Diabetes Mother     Breast cancer Neg Hx     Colon cancer Neg Hx     Ovarian cancer Neg Hx        Social History     Socioeconomic History    Marital status:    Tobacco Use    Smoking status: Former     Current packs/day: 0.00     Types: Cigarettes     Quit date: 3/3/2021     Years since quittin.7    Smokeless tobacco: Never   Substance and Sexual Activity    Alcohol use: No    Drug use: Yes     Types: Marijuana     Comment: daily    Sexual activity: Yes     Partners: Male     Birth control/protection: See  Surgical Hx     Social Determinants of Health     Financial Resource Strain: Low Risk  (11/22/2023)    Overall Financial Resource Strain (CARDIA)     Difficulty of Paying Living Expenses: Not hard at all   Food Insecurity: No Food Insecurity (11/22/2023)    Hunger Vital Sign     Worried About Running Out of Food in the Last Year: Never true     Ran Out of Food in the Last Year: Never true   Transportation Needs: No Transportation Needs (11/22/2023)    PRAPARE - Transportation     Lack of Transportation (Medical): No     Lack of Transportation (Non-Medical): No   Physical Activity: Insufficiently Active (11/22/2023)    Exercise Vital Sign     Days of Exercise per Week: 2 days     Minutes of Exercise per Session: 10 min   Stress: Stress Concern Present (11/22/2023)    [employer] [employer] - Occupational Stress Questionnaire     Feeling of Stress : Rather much   Social Connections: Unknown (11/22/2023)    Social Connection and Isolation Panel [NHANES]     Frequency of Communication with Friends and Family: Twice a week     Frequency of Social Gatherings with Friends and Family: Once a week     Active Member of Clubs or Organizations: No     Attends Club or Organization Meetings: Never     Marital Status:    Housing Stability: Low Risk  (11/22/2023)    Housing Stability Vital Sign     Unable to Pay for Housing in the Last Year: No     Number of Places Lived in the Last Year: 1     Unstable Housing in the Last Year: No       Review of Systems   Constitutional:  Negative for appetite change and unexpected weight change.   HENT:  Negative for trouble swallowing.    Respiratory:  Negative for shortness of breath.    Gastrointestinal:  Positive for abdominal pain and nausea. Negative for constipation, diarrhea and rectal pain.   Neurological:  Negative for dizziness.   Hematological:  Negative for adenopathy. Does not bruise/bleed easily.   Psychiatric/Behavioral:  Negative for dysphoric mood.           Objective:     There were no vitals filed for this visit.       Physical Exam  Constitutional:       General: She is not in acute distress.     Appearance: Normal appearance. She is not ill-appearing.   HENT:      Head: Normocephalic.   Eyes:      Conjunctiva/sclera: Conjunctivae normal.   Pulmonary:      Effort: Pulmonary effort is normal. No respiratory distress.   Skin:     Coloration: Skin is not jaundiced or pale.   Neurological:      Mental Status: She is alert and oriented to person, place, and time.   Psychiatric:         Mood and Affect: Mood normal.         Behavior: Behavior normal.               Assessment:         ICD-10-CM ICD-9-CM   1. Gastroparesis  K31.84 536.3       Plan:       Gastroparesis  -     Strict gastroparesis diet. Reglan before meals  -     metoclopramide HCl (REGLAN) 5 MG tablet; Take 1 tablet (5 mg total) by mouth 3 (three) times daily before meals.  Dispense: 90 tablet; Refill: 1      Follow up in about 1 month (around 12/27/2023) for medication management, If symptoms worsen or fail to improve.     Patient's Medications   New Prescriptions    METOCLOPRAMIDE HCL (REGLAN) 5 MG TABLET    Take 1 tablet (5 mg total) by mouth 3 (three) times daily before meals.   Previous Medications    ACETAMINOPHEN (TYLENOL) 325 MG TABLET    Take 2 tablets (650 mg total) by mouth every 6 (six) hours as needed for Pain.    ADVAIR DISKUS 100-50 MCG/DOSE DISKUS INHALER    Inhale 2 puffs into the lungs 2 (two) times daily.    ALBUTEROL (PROVENTIL/VENTOLIN HFA) 90 MCG/ACTUATION INHALER    Inhale 2 puffs into the lungs every 4 (four) hours as needed for Wheezing    AMITRIPTYLINE (ELAVIL) 25 MG TABLET    Take 1 tablet (25 mg total) by mouth every evening.    ASCORBIC ACID, VITAMIN C, (VITAMIN C) 500 MG TABLET    Take 500 mg by mouth once daily.    CETIRIZINE (ZYRTEC) 10 MG TABLET    Take 1 tablet (10 mg total) by mouth once daily.    DICLOFENAC SODIUM (VOLTAREN) 1 % GEL    Apply 2 g topically 4 (four)  times daily as needed.    DICYCLOMINE (BENTYL) 20 MG TABLET    Take 1 tablet (20 mg total) by mouth 3 (three) times daily as needed (abdominal pain).    DULOXETINE (CYMBALTA) 60 MG CAPSULE    Take 1 capsule (60 mg total) by mouth once daily.    DUPILUMAB (DUPIXENT PEN) 200 MG/1.14 ML PNIJ    Inject 200 mg into the skin every 14 (fourteen) days.    ESTRADIOL (ESTRACE) 0.01 % (0.1 MG/GRAM) VAGINAL CREAM    Place 1 g vaginally twice a week. Use 1/2 gram nightly for 1st week    FLOVENT  MCG/ACTUATION INHALER    1 puff 2 (two) times daily.    FLUTICASONE PROPIONATE (FLONASE) 50 MCG/ACTUATION NASAL SPRAY    2 sprays by Nasal route daily    FLUTICASONE-SALMETEROL DISKUS INHALER 100-50 MCG    Inhale 1 puff into the lungs 2 (two) times daily    GABAPENTIN (NEURONTIN) 300 MG CAPSULE    Take 1 capsule (300 mg total) by mouth 3 (three) times daily as needed.    HYDROCORTISONE 2.5 % CREAM    Apply topically 2 (two) times daily. for 14 days    LIDOCAINE (LIDODERM) 5 %    Place 1 patch onto the skin once daily. Remove & Discard patch within 12 hours or as directed by MD    LORATADINE (CLARITIN) 10 MG TABLET        LUBIPROSTONE (AMITIZA) 8 MCG CAP    Take 1 capsule (8 mcg total) by mouth 2 (two) times daily with meals.    MECLIZINE (ANTIVERT) 12.5 MG TABLET    Take 1 tablet (12.5 mg total) by mouth 2 (two) times daily as needed for Dizziness.    METHYLPREDNISOLONE (MEDROL DOSEPACK) 4 MG TABLET        OMEPRAZOLE (PRILOSEC) 40 MG CAPSULE    Take 1 capsule (40 mg total) by mouth once daily.    ONDANSETRON (ZOFRAN-ODT) 4 MG TBDL    Dissolve 1 tablet (4 mg total) by mouth 4 (four) times daily before meals and nightly.    POLYETHYLENE GLYCOL (GLYCOLAX) 17 GRAM/DOSE POWDER    Take 17 g by mouth once daily.    PROMETHAZINE-DEXTROMETHORPHAN (PROMETHAZINE-DM) 6.25-15 MG/5 ML SYRP    Take 5 mLs by mouth every 4 (four) hours as needed.    SUMATRIPTAN (IMITREX) 100 MG TABLET    Take 1 tablet (100 mg total) by mouth every 2 (two) hours as  needed for Migraine.    TIOTROPIUM (SPIRIVA WITH HANDIHALER) 18 MCG INHALATION CAPSULE    Inhale 1 capsule into the lungs daily    TIOTROPIUM (SPIRIVA WITH HANDIHALER) 18 MCG INHALATION CAPSULE    Inhale 1 capsule into the lungs daily   Modified Medications    No medications on file   Discontinued Medications    No medications on file

## 2023-11-29 ENCOUNTER — OFFICE VISIT (OUTPATIENT)
Dept: FAMILY MEDICINE | Facility: HOSPITAL | Age: 43
End: 2023-11-29
Payer: MEDICAID

## 2023-11-29 VITALS
WEIGHT: 188.5 LBS | DIASTOLIC BLOOD PRESSURE: 74 MMHG | HEART RATE: 79 BPM | BODY MASS INDEX: 28.57 KG/M2 | SYSTOLIC BLOOD PRESSURE: 111 MMHG | HEIGHT: 68 IN

## 2023-11-29 DIAGNOSIS — G56.01 CARPAL TUNNEL SYNDROME OF RIGHT WRIST: ICD-10-CM

## 2023-11-29 DIAGNOSIS — R94.31 LONG QT INTERVAL: Primary | ICD-10-CM

## 2023-11-29 DIAGNOSIS — F41.9 ANXIETY: ICD-10-CM

## 2023-11-29 PROCEDURE — 99213 OFFICE O/P EST LOW 20 MIN: CPT

## 2023-11-29 RX ORDER — DULOXETIN HYDROCHLORIDE 60 MG/1
60 CAPSULE, DELAYED RELEASE ORAL DAILY
Qty: 30 CAPSULE | Refills: 2 | Status: SHIPPED | OUTPATIENT
Start: 2023-11-29 | End: 2024-02-17 | Stop reason: SDUPTHER

## 2023-11-29 RX ORDER — GABAPENTIN 100 MG/1
100 CAPSULE ORAL 3 TIMES DAILY PRN
Qty: 30 CAPSULE | Refills: 6 | Status: SHIPPED | OUTPATIENT
Start: 2023-11-29

## 2023-11-29 NOTE — PROGRESS NOTES
[facility]  History & Physical    SUBJECTIVE:     Chief Complaint:   Chief Complaint   Patient presents with    Hand Pain       History of Present Illness:  43 y.o. female who  has a past medical history of Anemia, Back pain, and Depression. presents to clinic today for R wrist pain, previously diagnosed and received injections in our clinic.   Patient advised of other therapies for carpal tunnel. She has not been able to see OT. She uses brace, Tylenol, Voltaren. 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   She also requests refill of Cymbalta, 100mg tabs of gabapentin (300mg causes drowsiness.)  She otherwise feels well.      Allergies:  Review of patient's allergies indicates:   Allergen Reactions    Raspberry (rubus idaeus)        Home Medications:  Current Outpatient Medications on File Prior to Visit   Medication Sig    acetaminophen (TYLENOL) 325 MG tablet Take 2 tablets (650 mg total) by mouth every 6 (six) hours as needed for Pain.    ADVAIR DISKUS 100-50 mcg/dose diskus inhaler Inhale 2 puffs into the lungs 2 (two) times daily.    albuterol (PROVENTIL/VENTOLIN HFA) 90 mcg/actuation inhaler Inhale 2 puffs into the lungs every 4 (four) hours as needed for Wheezing    amoxicillin-clavulanate 875-125mg (AUGMENTIN) 875-125 mg per tablet Take 1 tablet by mouth 2 (two) times daily for 14 days    ascorbic acid, vitamin C, (VITAMIN C) 500 MG tablet Take 500 mg by mouth once daily.    cetirizine (ZYRTEC) 10 MG tablet Take 1 tablet (10 mg total) by mouth once daily.    dicyclomine (BENTYL) 20 mg tablet Take 1 tablet (20 mg total) by mouth 3 (three) times daily as needed (abdominal pain).    dupilumab (DUPIXENT PEN) 200 mg/1.14 mL PnIj Inject 200 mg into the skin every 14 (fourteen) days.    estradioL (ESTRACE) 0.01 % (0.1 mg/gram) vaginal cream Place 1 g vaginally twice a week. Use 1/2 gram nightly for 1st week    FLOVENT  mcg/actuation inhaler 1 puff 2  (two) times daily.    fluticasone propionate (FLONASE) 50 mcg/actuation nasal spray 2 sprays by Nasal route daily    fluticasone-salmeterol diskus inhaler 100-50 mcg Inhale 1 puff into the lungs 2 (two) times daily    LIDOcaine (LIDODERM) 5 % Place 1 patch onto the skin once daily. Remove & Discard patch within 12 hours or as directed by MD    loratadine (CLARITIN) 10 mg tablet     lubiprostone (AMITIZA) 8 MCG Cap Take 1 capsule (8 mcg total) by mouth 2 (two) times daily with meals.    meclizine (ANTIVERT) 12.5 mg tablet Take 1 tablet (12.5 mg total) by mouth 2 (two) times daily as needed for Dizziness.    metoclopramide HCl (REGLAN) 5 MG tablet Take 1 tablet (5 mg total) by mouth 3 (three) times daily before meals.    omeprazole (PRILOSEC) 40 MG capsule Take 1 capsule (40 mg total) by mouth once daily.    ondansetron (ZOFRAN-ODT) 4 MG TbDL Dissolve 1 tablet (4 mg total) by mouth 4 (four) times daily before meals and nightly.    polyethylene glycol (GLYCOLAX) 17 gram/dose powder Take 17 g by mouth once daily.    tiotropium (SPIRIVA WITH HANDIHALER) 18 mcg inhalation capsule Inhale 1 capsule into the lungs daily    tiotropium (SPIRIVA WITH HANDIHALER) 18 mcg inhalation capsule Inhale 1 capsule into the lungs daily    [DISCONTINUED] DULoxetine (CYMBALTA) 60 MG capsule Take 1 capsule (60 mg total) by mouth once daily.    [DISCONTINUED] gabapentin (NEURONTIN) 300 MG capsule Take 1 capsule (300 mg total) by mouth 3 (three) times daily as needed.    amitriptyline (ELAVIL) 25 MG tablet Take 1 tablet (25 mg total) by mouth every evening.    diclofenac sodium (VOLTAREN) 1 % Gel Apply 2 g topically 4 (four) times daily as needed.    hydrocortisone 2.5 % cream Apply topically 2 (two) times daily. for 14 days    methylPREDNISolone (MEDROL DOSEPACK) 4 mg tablet     [] promethazine-dextromethorphan (PROMETHAZINE-DM) 6.25-15 mg/5 mL Syrp Take 5 mLs by mouth every 4 (four) hours as needed.    sumatriptan (IMITREX) 100 MG  tablet Take 1 tablet (100 mg total) by mouth every 2 (two) hours as needed for Migraine.    [DISCONTINUED] budesonide (PULMICORT) 0.25 mg/2 mL nebulizer solution Mix 2mL of budesonide into each saline rinse bottle, shake well, use daily     No current facility-administered medications on file prior to visit.       Past Medical History:   Diagnosis Date    Anemia     Back pain     Depression      Past Surgical History:   Procedure Laterality Date     SECTION      COLD KNIFE CONIZATION OF CERVIX  12/10/2021    Procedure: CONE BIOPSY, CERVIX, USING COLD KNIFE;  Surgeon: Modesto Nassar MD;  Location: [facility] OR;  Service: OB/GYN;;    COLONOSCOPY N/A 2021    Procedure: COLONOSCOPY;  Surgeon: Emily Cruz MD;  Location: [facility];  Service: Endoscopy;  Laterality: N/A;    ESOPHAGOGASTRODUODENOSCOPY N/A 2021    Procedure: EGD (ESOPHAGOGASTRODUODENOSCOPY);  Surgeon: Emily Cruz MD;  Location: [facility];  Service: Endoscopy;  Laterality: N/A;    HYSTERECTOMY      ROBOT-ASSISTED LAPAROSCOPIC HYSTERECTOMY N/A 2022    Procedure: ROBOTIC HYSTERECTOMY;  Surgeon: Modesto Nassar MD;  Location: [facility] OR;  Service: OB/GYN;  Laterality: N/A;    ROBOT-ASSISTED SALPINGECTOMY Bilateral 2022    Procedure: ROBOTIC SALPINGECTOMY;  Surgeon: Modesto Nassar MD;  Location: [facility] OR;  Service: OB/GYN;  Laterality: Bilateral;    SINUS SURGERY      TUBAL LIGATION       Family History   Problem Relation Age of Onset    Hypertension Mother     Diabetes Mother     Breast cancer Neg Hx     Colon cancer Neg Hx     Ovarian cancer Neg Hx      Social History     Tobacco Use    Smoking status: Former     Current packs/day: 0.00     Types: Cigarettes     Quit date: 3/3/2021     Years since quittin.7    Smokeless tobacco: Never   Substance Use Topics    Alcohol use: No    Drug use: Yes     Types: Marijuana     Comment: daily        Review of Systems   Constitutional:  Negative for fever and  malaise/fatigue.   Respiratory:  Negative for cough and shortness of breath.    Cardiovascular:  Negative for chest pain and leg swelling.   Gastrointestinal:  Negative for abdominal pain, heartburn, nausea and vomiting.   Genitourinary:  Negative for dysuria.   Musculoskeletal:  Positive for joint pain. Negative for myalgias.        R wrist   Neurological:  Negative for dizziness and headaches.   Psychiatric/Behavioral:  The patient is nervous/anxious.         OBJECTIVE:     Vital Signs:  Pulse: 79 (11/29/23 1044)  BP: 111/74 (11/29/23 1044)    Physical Exam  Constitutional:       Appearance: Normal appearance.   HENT:      Head: Normocephalic and atraumatic.   Cardiovascular:      Rate and Rhythm: Normal rate and regular rhythm.      Pulses: Normal pulses.      Heart sounds: Normal heart sounds. No murmur heard.  Pulmonary:      Effort: Pulmonary effort is normal. No respiratory distress.      Breath sounds: Normal breath sounds. No wheezing, rhonchi or rales.   Abdominal:      Palpations: Abdomen is soft.      Tenderness: There is no abdominal tenderness.   Musculoskeletal:         General: Tenderness present.      Right lower leg: No edema.      Left lower leg: No edema.      Comments: POS Tinel, [place] to R wrist  No sign of trauma or infection   Skin:     Findings: No erythema, lesion or rash.   Neurological:      General: No focal deficit present.      Mental Status: She is alert and oriented to person, place, and time.      Sensory: No sensory deficit.      Motor: No weakness.   Psychiatric:         Mood and Affect: Mood normal.         Behavior: Behavior normal.         Thought Content: Thought content normal.         Judgment: Judgment normal.         A/P:  Wandy was seen today for hand pain.    Diagnoses and all orders for this visit:    Long QT interval  -     EKG 12-lead; Future    Anxiety  -     DULoxetine (CYMBALTA) 60 MG capsule; Take 1 capsule (60 mg total) by mouth once daily.    Carpal tunnel  syndrome of right wrist  -     Ambulatory referral/consult to Physical/Occupational Therapy; Future  PROCEDURE NOTE  Patient was consented for repeat R wrist steroid, lidocaine injection for Carpal Tunnel Synd pain. She denies allergies to medications. Patient does not have diagnosis of diabetes. Time out accomplished in presence of Dr. Tiff Antonio MD. Injection site marked, then sterilized with chlorhexadine. Freeze spray applied to reduce pain. Site resterilized with alcohol. 1mL of combination 2% lidocaine w/o epi (Lot/Exp [id number] 10/26) and 40mL kenalog (Lot/Exp [id number] 3/25). Minimal bleeding was noted. The patient did not note significant pain. The procedure was well tolerated. The patient waited and experienced no ill-after effects. She was cautioned to return to Emergency if she experienced numbness, bleeding, redness, fevers or other symptoms.      Other orders  -     gabapentin (NEURONTIN) 100 MG capsule; Take 1 capsule (100 mg total) by mouth 3 (three) times daily as needed.          DUE AT NEXT/FUTURE VISIT:  Patient interested in surgery, but has not yet completed course of OT... patient aware.      [facility], PGY-2  11/29/2023    This note was partially created using [employer] Voice Recognition software. Typographical and content errors may occur with this process. While efforts are made to detect and correct such errors, in some cases errors will persist. For this reason, wording in this document should be considered in the proper context and not strictly verbatim     The following information is provided to all patients.  This information is to help you find resources for any of the problems found today that may be affecting your health:                Living healthy guide: www.[facility].[place].gov       Understanding Diabetes: www.diabetes.org       Eating healthy: www.cdc.gov/healthyweight      CDC home safety checklist: www.cdc.gov/steadi/patient.html      Agency  on Aging: www.goea.[place].[facility]       Alcoholics anonymous (AA): www.aa.org      Physical Activity: www.[place].nih.gov/aw5lvaf       Tobacco use: www.quitwithus[place].org

## 2023-12-03 ENCOUNTER — PATIENT MESSAGE (OUTPATIENT)
Dept: FAMILY MEDICINE | Facility: HOSPITAL | Age: 43
End: 2023-12-03
Payer: MEDICAID

## 2023-12-20 ENCOUNTER — PATIENT MESSAGE (OUTPATIENT)
Dept: FAMILY MEDICINE | Facility: HOSPITAL | Age: 43
End: 2023-12-20
Payer: MEDICAID

## 2023-12-20 DIAGNOSIS — K59.04 CHRONIC IDIOPATHIC CONSTIPATION: ICD-10-CM

## 2023-12-20 DIAGNOSIS — K58.1 IRRITABLE BOWEL SYNDROME WITH CONSTIPATION: ICD-10-CM

## 2023-12-21 RX ORDER — LUBIPROSTONE 8 UG/1
8 CAPSULE ORAL 2 TIMES DAILY WITH MEALS
Qty: 60 CAPSULE | Refills: 2 | Status: SHIPPED | OUTPATIENT
Start: 2023-12-21 | End: 2024-01-10 | Stop reason: DRUGHIGH

## 2024-01-04 ENCOUNTER — PATIENT MESSAGE (OUTPATIENT)
Dept: ADMINISTRATIVE | Facility: OTHER | Age: 44
End: 2024-01-04
Payer: MEDICAID

## 2024-01-10 ENCOUNTER — OFFICE VISIT (OUTPATIENT)
Dept: GASTROENTEROLOGY | Facility: CLINIC | Age: 44
End: 2024-01-10
Payer: MEDICAID

## 2024-01-10 DIAGNOSIS — K59.04 CHRONIC IDIOPATHIC CONSTIPATION: Primary | ICD-10-CM

## 2024-01-10 DIAGNOSIS — K31.84 GASTROPARESIS: ICD-10-CM

## 2024-01-10 PROBLEM — R93.3 ABNORMAL FINDING ON GI TRACT IMAGING: Status: RESOLVED | Noted: 2021-05-11 | Resolved: 2024-01-10

## 2024-01-10 PROBLEM — R93.5 ABNORMAL CT OF THE ABDOMEN: Status: RESOLVED | Noted: 2021-04-28 | Resolved: 2024-01-10

## 2024-01-10 PROCEDURE — 1160F RVW MEDS BY RX/DR IN RCRD: CPT | Mod: CPTII,95,, | Performed by: NURSE PRACTITIONER

## 2024-01-10 PROCEDURE — 99214 OFFICE O/P EST MOD 30 MIN: CPT | Mod: 95,,, | Performed by: NURSE PRACTITIONER

## 2024-01-10 PROCEDURE — 1159F MED LIST DOCD IN RCRD: CPT | Mod: CPTII,95,, | Performed by: NURSE PRACTITIONER

## 2024-01-10 RX ORDER — POLYETHYLENE GLYCOL 3350 17 G/17G
17 POWDER, FOR SOLUTION ORAL DAILY
Qty: 510 G | Refills: 11 | Status: SHIPPED | OUTPATIENT
Start: 2024-01-10

## 2024-01-10 RX ORDER — LUBIPROSTONE 24 UG/1
24 CAPSULE ORAL 2 TIMES DAILY WITH MEALS
Qty: 60 CAPSULE | Refills: 2 | Status: SHIPPED | OUTPATIENT
Start: 2024-01-10

## 2024-01-10 NOTE — PROGRESS NOTES
Subjective:       Patient ID: Wandy Shah is a 43 y.o. female.    Chief Complaint: Constipation, Abdominal Pain, Nausea, and Emesis    The patient location is: [place], [place]  The chief complaint leading to consultation is: constipation and abdominal pain    Visit type: audiovisual    Face to Face time with patient: 20 minutes  40 minutes of total time spent on the encounter, which includes face to face time and non-face to face time preparing to see the patient (eg, review of tests), Obtaining and/or reviewing separately obtained history, Documenting clinical information in the electronic or other health record, Independently interpreting results (not separately reported) and communicating results to the patient/family/caregiver, or Care coordination (not separately reported).         Each patient to whom he or she provides medical services by telemedicine is:  (1) informed of the relationship between the physician and patient and the respective role of any other health care provider with respect to management of the patient; and (2) notified that he or she may decline to receive medical services by telemedicine and may withdraw from such care at any time.    Notes:       42 y/o female with GERD, chronic constipation, and gastroparesis presents for virtual follow up with c/o abdominal pain. States Amitiza no longer effective. Has BM 1-2x/week. Feels constantly bloating. Increased nausea and vomiting after meals. Reglan has not improved early satiety or ability to tolerate meals. Zofran and dicyclomine provide minimal relief. States she is following gastroparesis diet.         GI workup  - 5/11/2021 Colonoscopy: The examined portion of the ileum was normal. The entire examined colon is normal. Biopsied. Stool in the entire examined colon, adequately   removed. Internal hemorrhoids. Redundant colon making the procecure difficult; this is a result of her constipation.  - 6/11/2021 EGD: Normal esophagus. Erythematous  mucosa in the antrum. Biopsied. Normal examined duodenum. Biopsied.Biopsy (-) HP; (-) Celiac  - 1/10/2023 CT abd: No evident etiology to explain patient's abdominal pain.   - 2023 GES: Delayed gastric emptying time. At 4 hour(s) the percentage of retention is 36 % (normal retention at 4 hours is 10% and lower).     Past Medical History:   Diagnosis Date    Anemia     Back pain     Depression        Past Surgical History:   Procedure Laterality Date     SECTION      COLD KNIFE CONIZATION OF CERVIX  12/10/2021    Procedure: CONE BIOPSY, CERVIX, USING COLD KNIFE;  Surgeon: Modesto Nassar MD;  [facility];  Service: OB/GYN;;    COLONOSCOPY N/A 2021    Procedure: COLONOSCOPY;  Surgeon: Emily Cruz MD;  [facility]: [facility] ENDO;  Service: Endoscopy;  Laterality: N/A;    ESOPHAGOGASTRODUODENOSCOPY N/A 2021    Procedure: EGD (ESOPHAGOGASTRODUODENOSCOPY);  Surgeon: Emily Cruz MD;  [facility]: [facility] ENDO;  Service: Endoscopy;  Laterality: N/A;    HYSTERECTOMY      ROBOT-ASSISTED LAPAROSCOPIC HYSTERECTOMY N/A 2022    Procedure: ROBOTIC HYSTERECTOMY;  Surgeon: Modesto Nassar MD;  Location: [facility] OR;  Service: OB/GYN;  Laterality: N/A;    ROBOT-ASSISTED SALPINGECTOMY Bilateral 2022    Procedure: ROBOTIC SALPINGECTOMY;  Surgeon: Modesto Nassar MD;  Location: [facility] OR;  Service: OB/GYN;  Laterality: Bilateral;    SINUS SURGERY      TUBAL LIGATION         Family History   Problem Relation Age of Onset    Hypertension Mother     Diabetes Mother     Breast cancer Neg Hx     Colon cancer Neg Hx     Ovarian cancer Neg Hx        Social History     Socioeconomic History    Marital status:    Tobacco Use    Smoking status: Former     Current packs/day: 0.00     Types: Cigarettes     Quit date: 3/3/2021     Years since quittin.8    Smokeless tobacco: Never   Substance and Sexual Activity    Alcohol use: No    Drug use: Yes     Types: Marijuana     Comment: daily     Sexual activity: Yes     Partners: Male     Birth control/protection: See Surgical Hx     Social Determinants of Health     Financial Resource Strain: Low Risk  (11/22/2023)    Overall Financial Resource Strain (CARDIA)     Difficulty of Paying Living Expenses: Not hard at all   Food Insecurity: No Food Insecurity (11/22/2023)    Hunger Vital Sign     Worried About Running Out of Food in the Last Year: Never true     Ran Out of Food in the Last Year: Never true   Transportation Needs: No Transportation Needs (11/22/2023)    PRAPARE - Transportation     Lack of Transportation (Medical): No     Lack of Transportation (Non-Medical): No   Physical Activity: Insufficiently Active (11/22/2023)    Exercise Vital Sign     Days of Exercise per Week: 2 days     Minutes of Exercise per Session: 10 min   Stress: Stress Concern Present (11/22/2023)    [employer] [place] [employer] - Occupational Stress Questionnaire     Feeling of Stress : Rather much   Social Connections: Unknown (11/22/2023)    Social Connection and Isolation Panel [NHANES]     Frequency of Communication with Friends and Family: Twice a week     Frequency of Social Gatherings with Friends and Family: Once a week     Active Member of Clubs or Organizations: No     Attends Club or Organization Meetings: Never     Marital Status:    Housing Stability: Low Risk  (11/22/2023)    Housing Stability Vital Sign     Unable to Pay for Housing in the Last Year: No     Number of Places Lived in the Last Year: 1     Unstable Housing in the Last Year: No       Review of Systems   Constitutional:  Negative for appetite change and unexpected weight change.   HENT:  Negative for trouble swallowing.    Respiratory:  Negative for shortness of breath.    Gastrointestinal:  Positive for abdominal pain, constipation and nausea. Negative for diarrhea and rectal pain.   Neurological:  Negative for dizziness.   Hematological:  Negative for adenopathy. Does not  bruise/bleed easily.   Psychiatric/Behavioral:  Negative for dysphoric mood.          Objective:     There were no vitals filed for this visit.       Physical Exam  Constitutional:       General: She is not in acute distress.     Appearance: Normal appearance. She is not ill-appearing.   HENT:      Head: Normocephalic.   Eyes:      Conjunctiva/sclera: Conjunctivae normal.   Pulmonary:      Effort: Pulmonary effort is normal. No respiratory distress.   Skin:     Coloration: Skin is not jaundiced or pale.   Neurological:      Mental Status: She is alert and oriented to person, place, and time.   Psychiatric:         Mood and Affect: Mood normal.         Behavior: Behavior normal.               Assessment:         ICD-10-CM ICD-9-CM   1. Chronic idiopathic constipation  K59.04 564.00   2. Gastroparesis  K31.84 536.3       Plan:       Chronic idiopathic constipation  -     Increase Amitiza from 8 mcg to 24 mcg bid and start Miralax daily  -     lubiprostone (AMITIZA) 24 MCG Cap; Take 1 capsule (24 mcg total) by mouth 2 (two) times daily with meals.  Dispense: 60 capsule; Refill: 2  -     polyethylene glycol (GLYCOLAX) 17 gram/dose powder; Take 17 g by mouth once daily.  Dispense: 510 g; Refill: 11    Gastroparesis  -     Ambulatory referral/consult to General Surgery; Future; Expected date: 01/17/2024      Follow up in about 3 months (around 4/10/2024) for medication management, If symptoms worsen or fail to improve.     Patient's Medications   New Prescriptions    LUBIPROSTONE (AMITIZA) 24 MCG CAP    Take 1 capsule (24 mcg total) by mouth 2 (two) times daily with meals.   Previous Medications    ACETAMINOPHEN (TYLENOL) 325 MG TABLET    Take 2 tablets (650 mg total) by mouth every 6 (six) hours as needed for Pain.    ADVAIR DISKUS 100-50 MCG/DOSE DISKUS INHALER    Inhale 2 puffs into the lungs 2 (two) times daily.    ALBUTEROL (PROVENTIL/VENTOLIN HFA) 90 MCG/ACTUATION INHALER    Inhale 2 puffs into the lungs every 4  (four) hours as needed for Wheezing    AMITRIPTYLINE (ELAVIL) 25 MG TABLET    Take 1 tablet (25 mg total) by mouth every evening.    ASCORBIC ACID, VITAMIN C, (VITAMIN C) 500 MG TABLET    Take 500 mg by mouth once daily.    CETIRIZINE (ZYRTEC) 10 MG TABLET    Take 1 tablet (10 mg total) by mouth once daily.    DICLOFENAC SODIUM (VOLTAREN) 1 % GEL    Apply 2 g topically 4 (four) times daily as needed.    DICYCLOMINE (BENTYL) 20 MG TABLET    Take 1 tablet (20 mg total) by mouth 3 (three) times daily as needed (abdominal pain).    DULOXETINE (CYMBALTA) 60 MG CAPSULE    Take 1 capsule (60 mg total) by mouth once daily.    DUPILUMAB (DUPIXENT PEN) 300 MG/2 ML PNIJ    Inject 300 mg into the skin every 14 (fourteen) days    ESTRADIOL (ESTRACE) 0.01 % (0.1 MG/GRAM) VAGINAL CREAM    Place 1 g vaginally twice a week. Use 1/2 gram nightly for 1st week    FLOVENT  MCG/ACTUATION INHALER    1 puff 2 (two) times daily.    FLUTICASONE PROPIONATE (FLONASE) 50 MCG/ACTUATION NASAL SPRAY    2 sprays by Nasal route daily    FLUTICASONE-UMECLIDIN-VILANTER (TRELEGY ELLIPTA) 200-62.5-25 MCG INHALER    Inhale 1 puff into the lungs daily    GABAPENTIN (NEURONTIN) 100 MG CAPSULE    Take 1 capsule (100 mg total) by mouth 3 (three) times daily as needed.    HYDROCORTISONE 2.5 % CREAM    Apply topically 2 (two) times daily. for 14 days    LIDOCAINE (LIDODERM) 5 %    Place 1 patch onto the skin once daily. Remove & Discard patch within 12 hours or as directed by MD    LORATADINE (CLARITIN) 10 MG TABLET        MECLIZINE (ANTIVERT) 12.5 MG TABLET    Take 1 tablet (12.5 mg total) by mouth 2 (two) times daily as needed for Dizziness.    METHYLPREDNISOLONE (MEDROL DOSEPACK) 4 MG TABLET        METOCLOPRAMIDE HCL (REGLAN) 5 MG TABLET    Take 1 tablet (5 mg total) by mouth 3 (three) times daily before meals.    OMEPRAZOLE (PRILOSEC) 40 MG CAPSULE    Take 1 capsule (40 mg total) by mouth once daily.    ONDANSETRON (ZOFRAN-ODT) 4 MG TBDL    Dissolve  1 tablet (4 mg total) by mouth 4 (four) times daily before meals and nightly.    SUMATRIPTAN (IMITREX) 100 MG TABLET    Take 1 tablet (100 mg total) by mouth every 2 (two) hours as needed for Migraine.    TIOTROPIUM (SPIRIVA WITH HANDIHALER) 18 MCG INHALATION CAPSULE    Inhale 1 capsule into the lungs daily    TIOTROPIUM (SPIRIVA WITH HANDIHALER) 18 MCG INHALATION CAPSULE    Inhale 1 capsule into the lungs daily   Modified Medications    Modified Medication Previous Medication    POLYETHYLENE GLYCOL (GLYCOLAX) 17 GRAM/DOSE POWDER polyethylene glycol (GLYCOLAX) 17 gram/dose powder       Take 17 g by mouth once daily.    Take 17 g by mouth once daily.   Discontinued Medications    LUBIPROSTONE (AMITIZA) 8 MCG CAP    Take 1 capsule (8 mcg total) by mouth 2 (two) times daily with meals.

## 2024-01-10 NOTE — PATIENT INSTRUCTIONS
- MOM colon cleanse: I would like for you to buy a bottle of liquid Pablo's Milk of Magnesia (do not use the pills).  When you have time to stay home near the toilet take drink 90 mL of milk of magnesia. Drink another 90 mL 2-3 hours later.     After that is complete, start Amitiza 24 mcg twice daily and Miralax 1 capful in 6-8 ounces of water or juice daily.    - Gastroparesis diet. Continue Reglan before meals.

## 2024-01-30 ENCOUNTER — PATIENT MESSAGE (OUTPATIENT)
Dept: ADMINISTRATIVE | Facility: OTHER | Age: 44
End: 2024-01-30
Payer: MEDICAID

## 2024-02-01 ENCOUNTER — PATIENT MESSAGE (OUTPATIENT)
Dept: FAMILY MEDICINE | Facility: HOSPITAL | Age: 44
End: 2024-02-01
Payer: MEDICAID

## 2024-02-02 ENCOUNTER — HOSPITAL ENCOUNTER (EMERGENCY)
Facility: HOSPITAL | Age: 44
Discharge: HOME | End: 2024-02-02
Attending: EMERGENCY MEDICINE
Payer: MEDICAID

## 2024-02-02 VITALS
HEIGHT: 68 IN | BODY MASS INDEX: 28.57 KG/M2 | TEMPERATURE: 99 F | SYSTOLIC BLOOD PRESSURE: 127 MMHG | WEIGHT: 188.5 LBS | OXYGEN SATURATION: 97 % | RESPIRATION RATE: 18 BRPM | DIASTOLIC BLOOD PRESSURE: 60 MMHG | HEART RATE: 67 BPM

## 2024-02-02 DIAGNOSIS — R05.1 ACUTE COUGH: ICD-10-CM

## 2024-02-02 DIAGNOSIS — J45.909 ASTHMA, UNSPECIFIED ASTHMA SEVERITY, UNSPECIFIED WHETHER COMPLICATED, UNSPECIFIED WHETHER PERSISTENT: ICD-10-CM

## 2024-02-02 DIAGNOSIS — U07.1 COVID: Primary | ICD-10-CM

## 2024-02-02 LAB
CTP QC/QA: YES
CTP QC/QA: YES
POC MOLECULAR INFLUENZA A AGN: NEGATIVE
POC MOLECULAR INFLUENZA B AGN: NEGATIVE
SARS-COV-2 RDRP RESP QL NAA+PROBE: POSITIVE

## 2024-02-02 PROCEDURE — 94761 N-INVAS EAR/PLS OXIMETRY MLT: CPT

## 2024-02-02 PROCEDURE — 63600175 PHARM REV CODE 636 W HCPCS

## 2024-02-02 PROCEDURE — 87502 INFLUENZA DNA AMP PROBE: CPT

## 2024-02-02 PROCEDURE — 25000242 PHARM REV CODE 250 ALT 637 W/ HCPCS

## 2024-02-02 PROCEDURE — 94640 AIRWAY INHALATION TREATMENT: CPT | Mod: XB

## 2024-02-02 PROCEDURE — 99285 EMERGENCY DEPT VISIT HI MDM: CPT | Mod: 25

## 2024-02-02 PROCEDURE — 87635 SARS-COV-2 COVID-19 AMP PRB: CPT | Performed by: EMERGENCY MEDICINE

## 2024-02-02 PROCEDURE — 96372 THER/PROPH/DIAG INJ SC/IM: CPT

## 2024-02-02 RX ORDER — IPRATROPIUM BROMIDE AND ALBUTEROL SULFATE 2.5; .5 MG/3ML; MG/3ML
3 SOLUTION RESPIRATORY (INHALATION)
Status: COMPLETED | OUTPATIENT
Start: 2024-02-02 | End: 2024-02-02

## 2024-02-02 RX ORDER — DEXAMETHASONE SODIUM PHOSPHATE 4 MG/ML
8 INJECTION, SOLUTION INTRA-ARTICULAR; INTRALESIONAL; INTRAMUSCULAR; INTRAVENOUS; SOFT TISSUE
Status: COMPLETED | OUTPATIENT
Start: 2024-02-02 | End: 2024-02-02

## 2024-02-02 RX ADMIN — IPRATROPIUM BROMIDE AND ALBUTEROL SULFATE 3 ML: .5; 3 SOLUTION RESPIRATORY (INHALATION) at 12:02

## 2024-02-02 RX ADMIN — DEXAMETHASONE SODIUM PHOSPHATE 8 MG: 4 INJECTION INTRA-ARTICULAR; INTRALESIONAL; INTRAMUSCULAR; INTRAVENOUS; SOFT TISSUE at 01:02

## 2024-02-02 NOTE — ED PROVIDER NOTES
Encounter Date: 2024       History     Chief Complaint   Patient presents with    Cough     congestion     43-year-old female with history of anemia, depression, and asthma presents to the ED regarding URI symptoms onset 3 days. Complains of malaise, mild headache, chills, nausea, vomiting, and cough.  Denies chest pain, difficulty breathing, abdominal pain, or other complaints.  States her cough has aggravated her asthma and her albuterol inhaler has not been working as well.    The history is provided by the patient and medical records.     Review of patient's allergies indicates:   Allergen Reactions    Raspberry (rubus idaeus)      Past Medical History:   Diagnosis Date    Anemia     Back pain     Depression      Past Surgical History:   Procedure Laterality Date     SECTION      COLD KNIFE CONIZATION OF CERVIX  12/10/2021    Procedure: CONE BIOPSY, CERVIX, USING COLD KNIFE;  Surgeon: Modesto Nassar MD;  [facility];  Service: OB/GYN;;    COLONOSCOPY N/A 2021    Procedure: COLONOSCOPY;  Surgeon: Emily Cruz MD;  Location: [facility];  Service: Endoscopy;  Laterality: N/A;    ESOPHAGOGASTRODUODENOSCOPY N/A 2021    Procedure: EGD (ESOPHAGOGASTRODUODENOSCOPY);  Surgeon: Emily Cruz MD;  Location: [facility];  Service: Endoscopy;  Laterality: N/A;    HYSTERECTOMY      ROBOT-ASSISTED LAPAROSCOPIC HYSTERECTOMY N/A 2022    Procedure: ROBOTIC HYSTERECTOMY;  Surgeon: Modesto Nassar MD;  Location: [facility] OR;  Service: OB/GYN;  Laterality: N/A;    ROBOT-ASSISTED SALPINGECTOMY Bilateral 2022    Procedure: ROBOTIC SALPINGECTOMY;  Surgeon: Modesto Nassar MD;  Location: [facility] OR;  Service: OB/GYN;  Laterality: Bilateral;    SINUS SURGERY      TUBAL LIGATION       Family History   Problem Relation Age of Onset    Hypertension Mother     Diabetes Mother     Breast cancer Neg Hx     Colon cancer Neg Hx     Ovarian cancer Neg Hx      Social History     Tobacco Use     Smoking status: Former     Current packs/day: 0.00     Types: Cigarettes     Quit date: 3/3/2021     Years since quittin.9    Smokeless tobacco: Never   Substance Use Topics    Alcohol use: No    Drug use: Yes     Types: Marijuana     Comment: daily     Review of Systems    Physical Exam     Initial Vitals [24 1041]   BP Pulse Resp Temp SpO2   127/60 69 20 98.5 °F (36.9 °C) 97 %      MAP       --         Physical Exam    Vitals reviewed.  Constitutional: She appears well-developed and well-nourished. She is not diaphoretic. No distress.   Coughing   HENT:   Head: Normocephalic and atraumatic.   Neck: Neck supple.   Cardiovascular:  Normal rate, regular rhythm and normal heart sounds.     Exam reveals no gallop and no friction rub.       No murmur heard.  Pulmonary/Chest: No respiratory distress. She has wheezes. She has no rhonchi. She has no rales.   Diffuse expiratory wheezing   Abdominal: Abdomen is soft. There is no abdominal tenderness.   Musculoskeletal:      Cervical back: Neck supple.     Neurological: She is alert and oriented to person, place, and time. No cranial nerve deficit.   Psychiatric: She has a normal mood and affect.         ED Course   Procedures  Labs Reviewed   SARS-COV-2 RDRP GENE - Abnormal; Notable for the following components:       Result Value    POC Rapid COVID Positive (*)     All other components within normal limits   POCT INFLUENZA A/B MOLECULAR          Imaging Results              X-Ray Chest AP Portable (Final result)  Result time 24 12:32:36      Final result by Modesto Weir MD (24 12:32:36)                   Impression:      See above      Electronically signed by: Modesto Weir MD  Date:    2024  Time:    12:32               Narrative:    EXAMINATION:  XR CHEST AP PORTABLE    CLINICAL HISTORY:  cough;    TECHNIQUE:  Single frontal view of the chest was performed.    COMPARISON:  N 2023 one    FINDINGS:  Heart size normal.  The lungs are  clear.  No pleural effusion                                       Medications   albuterol-ipratropium 2.5 mg-0.5 mg/3 mL nebulizer solution 3 mL (3 mLs Nebulization Given 2/2/24 1245)   dexAMETHasone injection 8 mg (8 mg Intramuscular Given 2/2/24 1337)     Medical Decision Making  Amount and/or Complexity of Data Reviewed  Labs: ordered. Decision-making details documented in ED Course.  Radiology: ordered. Decision-making details documented in ED Course.    Risk  Prescription drug management.         APC / Resident Notes:    Emergent evaluation a 43-year-old female with viral syndrome. No chest pain or difficulty breathing.  Vitals within normal limits.  Nontoxic appearing.  In no acute distress.  See physical exam findings above.  Will obtain viral swabs and chest x-ray due to cough. Will give albuterol and reassess.    My differential diagnoses include but are not limited to:   COVID, influenza, viral URI with cough, asthma exacerbation, pneumonia    See ED course.  Patient educated on findings. Wheezing still faintly present with auscultation after albuterol treatments though significant improvement.  Patient denies any difficulty breathing or chest pain. Okay to follow up outpatient.  Offered Tessalon Perles the patient states they did not work in the past.  Educated to continue taking Robitussin as it has been helping. Advised to follow up with PCP and strict ED return precautions given with all questions answered.   Patient verbalized understanding and agreed to plan. Vitals are stable and safe for discharge.   I have reviewed the patient's records and discussed with my supervising physician.          ED Course as of 02/02/24 1504   Fri Feb 02, 2024   1137 SARS-CoV-2 RNA, Amplification, Qual(!): Positive [KB]   1137 POC Molecular Influenza A Ag: Negative [KB]   1137 POC Molecular Influenza B Ag: Negative [KB]   1250 X-Ray Chest AP Portable  Per my independent interpretation, Cardiac silhouette within normal  limits. No consolidation, pleural effusion, or pneumothorax noted. No bony abnormalities.  [KB]      ED Course User Index  [KB] Emily Corral PA-C                           Clinical Impression:  Final diagnoses:  [U07.1] COVID (Primary)  [R05.1] Acute cough  [J45.909] Asthma, unspecified asthma severity, unspecified whether complicated, unspecified whether persistent          ED Disposition Condition    Discharge Stable          ED Prescriptions    None       Follow-up Information       Follow up With Specialties Details Why Contact Info    [facility] - Emergency Dept Emergency Medicine Go to  If symptoms worsen [id number]  [facility] [phone]             Emily Corral PA-C  02/02/24 1501

## 2024-02-02 NOTE — ED NOTES
LOC: The patient is awake, alert and aware of environment with an appropriate affect, the patient is oriented x 3 and speaking appropriate.  APPEARANCE: Patient resting comfortably and in no acute distress, patient is clean and well groomed, patient's clothing is properly fastened.  SKIN: The skin is warm and dry, patient has normal skin turgor and moist mucus membranes.  RESPIRATORY: Airway is open and patent, respirations are spontaneous, patient has a normal effort and rate.  ABDOMEN: Soft and non tender to palpation, no distention noted.  NEUROLOGIC: PERRL, facial expression is symmetrical, patient moving all extremities, normal sensation in all extremities when touched with a finger.

## 2024-02-05 ENCOUNTER — PATIENT MESSAGE (OUTPATIENT)
Dept: FAMILY MEDICINE | Facility: HOSPITAL | Age: 44
End: 2024-02-05
Payer: MEDICAID

## 2024-02-05 ENCOUNTER — PATIENT MESSAGE (OUTPATIENT)
Dept: RESEARCH | Facility: HOSPITAL | Age: 44
End: 2024-02-05
Payer: MEDICAID

## 2024-02-17 DIAGNOSIS — F41.9 ANXIETY: ICD-10-CM

## 2024-02-17 DIAGNOSIS — K58.1 IRRITABLE BOWEL SYNDROME WITH CONSTIPATION: ICD-10-CM

## 2024-02-17 DIAGNOSIS — R10.84 GENERALIZED ABDOMINAL PAIN: ICD-10-CM

## 2024-02-17 RX ORDER — DULOXETIN HYDROCHLORIDE 60 MG/1
60 CAPSULE, DELAYED RELEASE ORAL DAILY
Qty: 30 CAPSULE | Refills: 2 | Status: CANCELLED | OUTPATIENT
Start: 2024-02-17 | End: 2024-05-17

## 2024-02-19 DIAGNOSIS — F41.9 ANXIETY: ICD-10-CM

## 2024-02-19 RX ORDER — DICYCLOMINE HYDROCHLORIDE 20 MG/1
20 TABLET ORAL 3 TIMES DAILY PRN
Qty: 60 TABLET | Refills: 5 | Status: SHIPPED | OUTPATIENT
Start: 2024-02-19

## 2024-02-20 ENCOUNTER — PATIENT MESSAGE (OUTPATIENT)
Dept: GASTROENTEROLOGY | Facility: CLINIC | Age: 44
End: 2024-02-20
Payer: MEDICAID

## 2024-02-21 DIAGNOSIS — M25.531 RIGHT WRIST PAIN: Primary | ICD-10-CM

## 2024-02-21 RX ORDER — DULOXETIN HYDROCHLORIDE 60 MG/1
60 CAPSULE, DELAYED RELEASE ORAL DAILY
Qty: 30 CAPSULE | Refills: 2 | Status: SHIPPED | OUTPATIENT
Start: 2024-02-21 | End: 2024-05-26 | Stop reason: SDUPTHER

## 2024-03-11 ENCOUNTER — PATIENT MESSAGE (OUTPATIENT)
Dept: FAMILY MEDICINE | Facility: HOSPITAL | Age: 44
End: 2024-03-11
Payer: MEDICAID

## 2024-03-12 ENCOUNTER — PATIENT MESSAGE (OUTPATIENT)
Dept: GASTROENTEROLOGY | Facility: CLINIC | Age: 44
End: 2024-03-12
Payer: MEDICAID

## 2024-03-13 ENCOUNTER — TELEPHONE (OUTPATIENT)
Dept: SURGERY | Facility: CLINIC | Age: 44
End: 2024-03-13
Payer: MEDICAID

## 2024-03-13 NOTE — TELEPHONE ENCOUNTER
Spoke with patient  Appt scheduled for 4/23 at 8:00 am  She is aware of location  Pt verbalized understanding to all and has no further questions at this time.

## 2024-03-13 NOTE — TELEPHONE ENCOUNTER
----- Message from Annel Kelly sent at 3/13/2024  4:02 PM CDT -----  Type:  Sooner Appointment Request    Caller is requesting a sooner appointment.  Caller declined first available appointment listed below.  Caller will not accept being placed on the waitlist and is requesting a message be sent to doctor.  Name of Caller:pt   When is the first available appointment?08/06  Symptoms:K31.84 (ICD-10-CM) - Gastroparesis  Would the patient rather a call back or a [facility]? Call   Best Call Back Number: [phone]  Additional Information:

## 2024-03-27 ENCOUNTER — PATIENT MESSAGE (OUTPATIENT)
Dept: ADMINISTRATIVE | Facility: OTHER | Age: 44
End: 2024-03-27
Payer: MEDICAID

## 2024-03-30 DIAGNOSIS — K21.9 GASTROESOPHAGEAL REFLUX DISEASE, UNSPECIFIED WHETHER ESOPHAGITIS PRESENT: ICD-10-CM

## 2024-04-01 RX ORDER — OMEPRAZOLE 40 MG/1
40 CAPSULE, DELAYED RELEASE ORAL DAILY
Qty: 30 CAPSULE | Refills: 5 | Status: SHIPPED | OUTPATIENT
Start: 2024-04-01 | End: 2025-04-01

## 2024-04-10 ENCOUNTER — PATIENT MESSAGE (OUTPATIENT)
Dept: FAMILY MEDICINE | Facility: HOSPITAL | Age: 44
End: 2024-04-10
Payer: MEDICAID

## 2024-04-10 ENCOUNTER — HOSPITAL ENCOUNTER (EMERGENCY)
Facility: HOSPITAL | Age: 44
Discharge: HOME | End: 2024-04-10
Attending: STUDENT IN AN ORGANIZED HEALTH CARE EDUCATION/TRAINING PROGRAM
Payer: MEDICAID

## 2024-04-10 VITALS
HEIGHT: 68 IN | TEMPERATURE: 98 F | RESPIRATION RATE: 16 BRPM | OXYGEN SATURATION: 99 % | BODY MASS INDEX: 28.04 KG/M2 | DIASTOLIC BLOOD PRESSURE: 76 MMHG | SYSTOLIC BLOOD PRESSURE: 110 MMHG | HEART RATE: 80 BPM | WEIGHT: 185 LBS

## 2024-04-10 DIAGNOSIS — M54.50 ACUTE MIDLINE LOW BACK PAIN WITHOUT SCIATICA: Primary | ICD-10-CM

## 2024-04-10 PROCEDURE — 63600175 PHARM REV CODE 636 W HCPCS: Performed by: STUDENT IN AN ORGANIZED HEALTH CARE EDUCATION/TRAINING PROGRAM

## 2024-04-10 PROCEDURE — 25000003 PHARM REV CODE 250: Performed by: STUDENT IN AN ORGANIZED HEALTH CARE EDUCATION/TRAINING PROGRAM

## 2024-04-10 PROCEDURE — 96372 THER/PROPH/DIAG INJ SC/IM: CPT | Performed by: STUDENT IN AN ORGANIZED HEALTH CARE EDUCATION/TRAINING PROGRAM

## 2024-04-10 PROCEDURE — 99284 EMERGENCY DEPT VISIT MOD MDM: CPT | Mod: 25

## 2024-04-10 RX ORDER — DEXAMETHASONE SODIUM PHOSPHATE 4 MG/ML
8 INJECTION, SOLUTION INTRA-ARTICULAR; INTRALESIONAL; INTRAMUSCULAR; INTRAVENOUS; SOFT TISSUE
Status: COMPLETED | OUTPATIENT
Start: 2024-04-10 | End: 2024-04-10

## 2024-04-10 RX ORDER — METHOCARBAMOL 500 MG/1
1000 TABLET, FILM COATED ORAL 3 TIMES DAILY PRN
Qty: 30 TABLET | Refills: 0 | Status: SHIPPED | OUTPATIENT
Start: 2024-04-10 | End: 2024-04-15

## 2024-04-10 RX ORDER — METHOCARBAMOL 500 MG/1
1000 TABLET, FILM COATED ORAL
Status: DISCONTINUED | OUTPATIENT
Start: 2024-04-10 | End: 2024-04-10 | Stop reason: HOSPADM

## 2024-04-10 RX ORDER — LIDOCAINE 50 MG/G
1 PATCH TOPICAL
Status: DISCONTINUED | OUTPATIENT
Start: 2024-04-10 | End: 2024-04-10 | Stop reason: HOSPADM

## 2024-04-10 RX ORDER — KETOROLAC TROMETHAMINE 30 MG/ML
15 INJECTION, SOLUTION INTRAMUSCULAR; INTRAVENOUS
Status: COMPLETED | OUTPATIENT
Start: 2024-04-10 | End: 2024-04-10

## 2024-04-10 RX ORDER — LIDOCAINE 50 MG/G
1 PATCH TOPICAL DAILY
Qty: 14 PATCH | Refills: 0 | Status: SHIPPED | OUTPATIENT
Start: 2024-04-10 | End: 2024-04-24

## 2024-04-10 RX ADMIN — KETOROLAC TROMETHAMINE 15 MG: 30 INJECTION, SOLUTION INTRAMUSCULAR; INTRAVENOUS at 06:04

## 2024-04-10 RX ADMIN — DEXAMETHASONE SODIUM PHOSPHATE 8 MG: 4 INJECTION INTRA-ARTICULAR; INTRALESIONAL; INTRAMUSCULAR; INTRAVENOUS; SOFT TISSUE at 06:04

## 2024-04-10 RX ADMIN — LIDOCAINE 5% 1 PATCH: 700 PATCH TOPICAL at 06:04

## 2024-04-10 NOTE — ED NOTES
Pt identifiers Wandy Shah were checked and are correct  LOC: The patient is awake, alert, aware of environment with an appropriate affect. Oriented x4, speaking appropriately  APPEARANCE: Pt resting comfortably, in no acute distress, pt is clean and well groomed, clothing properly fastened  SKIN: Skin warm, dry and intact, normal skin turgor, moist mucus membranes  RESPIRATORY: Airway is open and patent, respirations are spontaneous, even and unlabored, normal effort and rate Congestion auscultated to lower lung fields   CARDIAC: Normal rate and rhythm, no peripheral edema noted, capillary refill < 3 seconds, bilateral radial pulses 2+  ABDOMEN: Soft, nontender, nondistended. Bowel sounds present   NEUROLOGIC: PERRL, facial expression is symmetrical, patient moving all extremities spontaneously, normal sensation in all extremities when touched with a finger.  Follows all commands appropriately  MUSCULOSKELETAL: No obvious deformities.

## 2024-04-10 NOTE — ED PROVIDER NOTES
"Encounter Date: 4/10/2024       History     Chief Complaint   Patient presents with    Back Pain     Recent covid. +cough. Reports back pain and states "I think I aggravated that disc."      43 y.o. female with depression, history of anemia, history of radicular back pain presents for acute low back pain that came on when coughing earlier today.  Patient reports she was coughing severely when she felt a sudden pain in the mid low back.  She denies any falls.  The pain does not radiate.  She has a history of similar pain in the same location which is attributed to a bulging disc.  Denies any lower extremity weakness, numbness, fevers.  She does report a current cough which has been present since she had COVID several weeks ago.      The history is provided by the patient and medical records.     Review of patient's allergies indicates:   Allergen Reactions    Raspberry (rubus idaeus)      Past Medical History:   Diagnosis Date    Anemia     Back pain     Depression      Past Surgical History:   Procedure Laterality Date     SECTION      COLD KNIFE CONIZATION OF CERVIX  12/10/2021    Procedure: CONE BIOPSY, CERVIX, USING COLD KNIFE;  Surgeon: Modesto Nassar MD;  Location: [facility] OR;  Service: OB/GYN;;    COLONOSCOPY N/A 2021    Procedure: COLONOSCOPY;  Surgeon: Emily Cruz MD;  Location: [facility];  Service: Endoscopy;  Laterality: N/A;    ESOPHAGOGASTRODUODENOSCOPY N/A 2021    Procedure: EGD (ESOPHAGOGASTRODUODENOSCOPY);  Surgeon: Emily Cruz MD;  Location: [facility];  Service: Endoscopy;  Laterality: N/A;    HYSTERECTOMY      ROBOT-ASSISTED LAPAROSCOPIC HYSTERECTOMY N/A 2022    Procedure: ROBOTIC HYSTERECTOMY;  Surgeon: Modesto Nassar MD;  Location: [facility] OR;  Service: OB/GYN;  Laterality: N/A;    ROBOT-ASSISTED SALPINGECTOMY Bilateral 2022    Procedure: ROBOTIC SALPINGECTOMY;  Surgeon: Modesto Nassar MD;  Location: [facility] OR;  Service: OB/GYN;  Laterality: " Bilateral;    SINUS SURGERY      TUBAL LIGATION       Family History   Problem Relation Age of Onset    Hypertension Mother     Diabetes Mother     Breast cancer Neg Hx     Colon cancer Neg Hx     Ovarian cancer Neg Hx      Social History     Tobacco Use    Smoking status: Former     Current packs/day: 0.00     Types: Cigarettes     Quit date: 3/3/2021     Years since quitting: 3.1    Smokeless tobacco: Never   Substance Use Topics    Alcohol use: No    Drug use: Yes     Types: Marijuana     Comment: daily     Review of Systems   Reason unable to perform ROS: See HPI for relevant ROS.       Physical Exam     Initial Vitals [04/10/24 1715]   BP Pulse Resp Temp SpO2   106/71 88 16 97.8 °F (36.6 °C) 96 %      MAP       --         Physical Exam    Nursing note and vitals reviewed.  Constitutional:   Alert, normal work of breathing, no acute distress   Eyes: Conjunctivae are normal. No scleral icterus.   Cardiovascular:  Normal rate, regular rhythm and intact distal pulses.           Pulmonary/Chest: Breath sounds normal. No stridor. No respiratory distress.   Abdominal: She exhibits no distension.   Musculoskeletal:         General: No edema.      Comments: Mild thoracolumbar paraspinal tenderness  Negative straight leg Test bilaterally     Neurological: She is alert and oriented to person, place, and time. She has normal strength. No sensory deficit.   Skin: Skin is warm and dry.         ED Course   Procedures  Labs Reviewed - No data to display       Imaging Results    None          Medications   ketorolac injection 15 mg (15 mg Intramuscular Given 4/10/24 1858)   dexAMETHasone injection 8 mg (8 mg Intramuscular Given 4/10/24 1858)     Medical Decision Making  43 y.o. female with depression, history of anemia, history of radicular back pain presents for acute low back pain   Patient reports her pain came on while coughing.  She had a recent COVID infection and has a persistent cough.  No worsening/severe respiratory  symptoms besides her cough, no hypoxia or respiratory distress.  On exam, she has paraspinal tenderness.  Reports pain in the area of a known herniated disc.  No significant radicular symptoms, no lower extremity weakness/neuro deficits, doubt spinal cord compression.  No urinary symptoms  Symptomatic treatment was initiated, recommended PCP follow-up, return precautions given    Risk  Prescription drug management.                                      Clinical Impression:  Final diagnoses:  [M54.50] Acute midline low back pain without sciatica (Primary)          ED Disposition Condition    Discharge Stable          ED Prescriptions       Medication Sig Dispense Start Date End Date Auth. Provider    methocarbamoL (ROBAXIN) 500 MG Tab Take 2 tablets (1,000 mg total) by mouth 3 (three) times daily as needed (Back pain). 30 tablet 4/10/2024 4/15/2024 Lawrence Burton MD    LIDOcaine (LIDODERM) 5 % Place 1 patch onto the skin once daily. Remove & Discard patch within 12 hours or as directed by MD for 14 days 14 patch 4/10/2024 4/24/2024 Lawrence Burton MD          Follow-up Information       Follow up With Specialties Details Why Contact Info    Your primary care doctor  Schedule an appointment as soon as possible for a visit       [facility] Emergency Medicine  As needed, Leg weakness, difficulty urinating, groin numbness, or for any other concerning symptoms [id number]  [facility] [id number] [phone]             Lawrence Burton MD  04/11/24 2017

## 2024-04-15 ENCOUNTER — PATIENT MESSAGE (OUTPATIENT)
Dept: GASTROENTEROLOGY | Facility: CLINIC | Age: 44
End: 2024-04-15
Payer: MEDICAID

## 2024-04-23 ENCOUNTER — TELEPHONE (OUTPATIENT)
Dept: FAMILY MEDICINE | Facility: HOSPITAL | Age: 44
End: 2024-04-23
Payer: MEDICAID

## 2024-04-23 ENCOUNTER — LAB VISIT (OUTPATIENT)
Dept: LAB | Facility: HOSPITAL | Age: 44
End: 2024-04-23
Attending: SURGERY
Payer: MEDICAID

## 2024-04-23 ENCOUNTER — OFFICE VISIT (OUTPATIENT)
Dept: SURGERY | Facility: CLINIC | Age: 44
End: 2024-04-23
Payer: MEDICAID

## 2024-04-23 VITALS
DIASTOLIC BLOOD PRESSURE: 78 MMHG | SYSTOLIC BLOOD PRESSURE: 125 MMHG | OXYGEN SATURATION: 97 % | HEART RATE: 89 BPM | WEIGHT: 180.56 LBS | BODY MASS INDEX: 27.36 KG/M2 | HEIGHT: 68 IN

## 2024-04-23 DIAGNOSIS — K31.84 GASTROPARESIS: Primary | ICD-10-CM

## 2024-04-23 DIAGNOSIS — Z01.818 PRE-OP TESTING: ICD-10-CM

## 2024-04-23 LAB
ANION GAP SERPL CALC-SCNC: 6 MMOL/L (ref 8–16)
BASOPHILS # BLD AUTO: 0.06 K/UL (ref 0–0.2)
BASOPHILS NFR BLD: 0.5 % (ref 0–1.9)
BUN SERPL-MCNC: 11 MG/DL (ref 6–20)
CALCIUM SERPL-MCNC: 9.2 MG/DL (ref 8.7–10.5)
CHLORIDE SERPL-SCNC: 109 MMOL/L (ref 95–110)
CO2 SERPL-SCNC: 26 MMOL/L (ref 23–29)
CREAT SERPL-MCNC: 0.7 MG/DL (ref 0.5–1.4)
DIFFERENTIAL METHOD BLD: ABNORMAL
EOSINOPHIL # BLD AUTO: 0.2 K/UL (ref 0–0.5)
EOSINOPHIL NFR BLD: 1.6 % (ref 0–8)
ERYTHROCYTE [DISTWIDTH] IN BLOOD BY AUTOMATED COUNT: 13.2 % (ref 11.5–14.5)
EST. GFR  (NO RACE VARIABLE): >60 ML/MIN/1.73 M^2
GLUCOSE SERPL-MCNC: 90 MG/DL (ref 70–110)
HCT VFR BLD AUTO: 44.8 % (ref 37–48.5)
HGB BLD-MCNC: 14.4 G/DL (ref 12–16)
IMM GRANULOCYTES # BLD AUTO: 0.08 K/UL (ref 0–0.04)
IMM GRANULOCYTES NFR BLD AUTO: 0.7 % (ref 0–0.5)
LYMPHOCYTES # BLD AUTO: 2.5 K/UL (ref 1–4.8)
LYMPHOCYTES NFR BLD: 20.3 % (ref 18–48)
MCH RBC QN AUTO: 31.7 PG (ref 27–31)
MCHC RBC AUTO-ENTMCNC: 32.1 G/DL (ref 32–36)
MCV RBC AUTO: 99 FL (ref 82–98)
MONOCYTES # BLD AUTO: 0.8 K/UL (ref 0.3–1)
MONOCYTES NFR BLD: 6.2 % (ref 4–15)
NEUTROPHILS # BLD AUTO: 8.7 K/UL (ref 1.8–7.7)
NEUTROPHILS NFR BLD: 70.7 % (ref 38–73)
NRBC BLD-RTO: 0 /100 WBC
PLATELET # BLD AUTO: 215 K/UL (ref 150–450)
PMV BLD AUTO: 10.2 FL (ref 9.2–12.9)
POTASSIUM SERPL-SCNC: 4.1 MMOL/L (ref 3.5–5.1)
RBC # BLD AUTO: 4.54 M/UL (ref 4–5.4)
SODIUM SERPL-SCNC: 141 MMOL/L (ref 136–145)
WBC # BLD AUTO: 12.3 K/UL (ref 3.9–12.7)

## 2024-04-23 PROCEDURE — 1160F RVW MEDS BY RX/DR IN RCRD: CPT | Mod: CPTII,,, | Performed by: SURGERY

## 2024-04-23 PROCEDURE — 99215 OFFICE O/P EST HI 40 MIN: CPT | Mod: PBBFAC | Performed by: SURGERY

## 2024-04-23 PROCEDURE — 85025 COMPLETE CBC W/AUTO DIFF WBC: CPT | Performed by: SURGERY

## 2024-04-23 PROCEDURE — 3008F BODY MASS INDEX DOCD: CPT | Mod: CPTII,,, | Performed by: SURGERY

## 2024-04-23 PROCEDURE — 99999 PR PBB SHADOW E&M-EST. PATIENT-LVL V: CPT | Mod: PBBFAC,,, | Performed by: SURGERY

## 2024-04-23 PROCEDURE — 3078F DIAST BP <80 MM HG: CPT | Mod: CPTII,,, | Performed by: SURGERY

## 2024-04-23 PROCEDURE — 99204 OFFICE O/P NEW MOD 45 MIN: CPT | Mod: S$PBB,,, | Performed by: SURGERY

## 2024-04-23 PROCEDURE — 3074F SYST BP LT 130 MM HG: CPT | Mod: CPTII,,, | Performed by: SURGERY

## 2024-04-23 PROCEDURE — 80048 BASIC METABOLIC PNL TOTAL CA: CPT | Performed by: SURGERY

## 2024-04-23 PROCEDURE — 36415 COLL VENOUS BLD VENIPUNCTURE: CPT | Performed by: SURGERY

## 2024-04-23 PROCEDURE — 1159F MED LIST DOCD IN RCRD: CPT | Mod: CPTII,,, | Performed by: SURGERY

## 2024-04-23 NOTE — TELEPHONE ENCOUNTER
----- Message from Yen Bell RN sent at 4/23/2024 10:18 AM CDT -----  Regarding: RE: Medical Clearance Needed for Surgery on 6/7/2024  No.  Just noted in the chart that she is cleared/optimized for surgery.  If you could route the note to me as well, that would be nice, but not a have to.  I will look for it.    Thanks,  DEMETRA Barlow  ----- Message -----  From: Lizette Montes LPN  Sent: 4/23/2024  10:08 AM CDT  To: Yen Bell RN  Subject: RE: Medical Clearance Needed for Surgery on #    Good morning, she is scheduled for Monday morning.   Is there any form that needs to be completed for this?  ----- Message -----  From: Yen Bell RN  Sent: 4/23/2024  10:00 AM CDT  To: Syed BOND [facility] [place] Staff; #  Subject: Medical Clearance Needed for Surgery on 6/7/#    Good morning,     We are request a medical clearance for the procedure below    Date of Procedure:  June 7, 2024  Anesthesia:  General/Regional  Procedure:  Robotic Pyloroplasty v pyloromyotomy    We are requesting a medical clearance for this patient.  Please contact patient to schedule an appointment.  Dr. Lynch's last office visit note and most recent labs are in EPIC.  Please let me know if you need anything further from me.  Thank you,  DEMETRA Barlow

## 2024-04-23 NOTE — PROGRESS NOTES
Chief Complaint   Patient presents with    Well Child    Immunization/Injection     1. Have you been to the ER, urgent care clinic since your last visit? Hospitalized since your last visit? No    2. Have you seen or consulted any other health care providers outside of the [address] [place] [address] since your last visit? Include any pap smears or colon screening.  No I have seen the patient, reviewed the Student's history and physical, assessment and plan. I have personally interviewed and examined the patient at bedside and: agree with the findings.     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  She has had symptoms for many years and symptoms are worsening.  She has severe epigastric pain, extremely severe nausea and moderate to severe vomiting.  She is not sure what she takes but takes amiteza for constipation.  She states she is taking zofran 2-3 times a day, bentyl about bid, and has been prescribed reglan but isn't taking it due to risk of side effects.  She is on ppi daily.  She has lost 20 lb since February (covid) and 40lb over the last year.    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    Cbc, cmp reviewed, elevated liver enzymes of unknown significance  Ges 2023 reviewed, 36% gastric retention at 4 h with increase in the last hour (gastroparesis and possible duodenal gastric reflux)  Ct 2023 reviewed, films viewed, no significant abnormality  Egd 2021 reviewed, mild gastritis on path  EKG 2021 reviewed    -Normal sinus rhythm with sinus arrhythmia     -Biatrial enlargement     Severe gastroparesis with gerd and constipation.  We discussed pyloric procedures, gstim and gastrectomy.  Since she is a medicaid patient we will proceed with robotic pyloric procedure with egd.  Pcp clearance.  Will give diet sheet.

## 2024-04-26 ENCOUNTER — PATIENT MESSAGE (OUTPATIENT)
Dept: FAMILY MEDICINE | Facility: HOSPITAL | Age: 44
End: 2024-04-26

## 2024-04-26 ENCOUNTER — OFFICE VISIT (OUTPATIENT)
Dept: FAMILY MEDICINE | Facility: HOSPITAL | Age: 44
End: 2024-04-26
Payer: MEDICAID

## 2024-04-26 VITALS
OXYGEN SATURATION: 99 % | HEIGHT: 68 IN | HEART RATE: 83 BPM | SYSTOLIC BLOOD PRESSURE: 103 MMHG | WEIGHT: 185.44 LBS | BODY MASS INDEX: 28.1 KG/M2 | DIASTOLIC BLOOD PRESSURE: 66 MMHG

## 2024-04-26 DIAGNOSIS — S56.811A STRAIN OF OTHER MUSCLES, FASCIA AND TENDONS AT FOREARM LEVEL, RIGHT ARM, INITIAL ENCOUNTER: Primary | ICD-10-CM

## 2024-04-26 DIAGNOSIS — G56.01 CARPAL TUNNEL SYNDROME OF RIGHT WRIST: ICD-10-CM

## 2024-04-26 DIAGNOSIS — E66.3 OVERWEIGHT (BMI 25.0-29.9): ICD-10-CM

## 2024-04-26 DIAGNOSIS — M25.531 RIGHT WRIST PAIN: ICD-10-CM

## 2024-04-26 PROCEDURE — 99215 OFFICE O/P EST HI 40 MIN: CPT | Performed by: STUDENT IN AN ORGANIZED HEALTH CARE EDUCATION/TRAINING PROGRAM

## 2024-04-26 RX ORDER — IBUPROFEN 600 MG/1
600 TABLET ORAL 2 TIMES DAILY PRN
Qty: 60 TABLET | Refills: 0 | Status: SHIPPED | OUTPATIENT
Start: 2024-04-26 | End: 2024-05-26

## 2024-04-26 RX ORDER — ACETAMINOPHEN 500 MG
500 TABLET ORAL 2 TIMES DAILY PRN
Qty: 60 TABLET | Refills: 0 | Status: SHIPPED | OUTPATIENT
Start: 2024-04-26 | End: 2024-05-26

## 2024-04-26 RX ORDER — DICLOFENAC SODIUM 10 MG/G
2 GEL TOPICAL 4 TIMES DAILY PRN
Qty: 200 G | Refills: 0 | Status: SHIPPED | OUTPATIENT
Start: 2024-04-26 | End: 2024-05-26

## 2024-04-30 ENCOUNTER — OFFICE VISIT (OUTPATIENT)
Dept: ORTHOPEDICS | Facility: CLINIC | Age: 44
End: 2024-04-30
Payer: MEDICAID

## 2024-04-30 DIAGNOSIS — M25.531 RIGHT WRIST PAIN: ICD-10-CM

## 2024-04-30 PROCEDURE — 99999 PR PBB SHADOW E&M-EST. PATIENT-LVL IV: CPT | Mod: PBBFAC,,,

## 2024-04-30 PROCEDURE — 99214 OFFICE O/P EST MOD 30 MIN: CPT | Mod: PBBFAC,PN

## 2024-04-30 PROCEDURE — 99204 OFFICE O/P NEW MOD 45 MIN: CPT | Mod: S$PBB,,,

## 2024-04-30 PROCEDURE — 1160F RVW MEDS BY RX/DR IN RCRD: CPT | Mod: CPTII,,,

## 2024-04-30 PROCEDURE — 1159F MED LIST DOCD IN RCRD: CPT | Mod: CPTII,,,

## 2024-04-30 RX ORDER — MELOXICAM 15 MG/1
15 TABLET ORAL DAILY
Qty: 30 TABLET | Refills: 2 | Status: SHIPPED | OUTPATIENT
Start: 2024-04-30

## 2024-04-30 NOTE — PROGRESS NOTES
Subjective:      Patient ID: Wandy Shah is a 43 y.o. female.    Chief Complaint: Pain of the Right Elbow      HPI: Wandy Shah is a 43 y.o. right hand dominant female who presents to clinic for constant right hand pain. Patient denies known SPIKE. The pain started 1 year ago and is becoming progressively worse.  Pain is located over (points to)  dorsal  MCP joints. She reports that the pain is a 7 /10 throbbing pain today. Pain is 9/10 at its worst.  The pain is aggravated by use of the hand, particularly cutting/prepping foods.  Reports numbness and tingling on the dorsal 1st-3rd digits. Associated symptoms include weakness and symptoms worse at night. The pain is affecting ADLs and limiting desired level of activity. There is not a history of previous injury or surgery to the hand and wrist.      Previous treatments include Voltaren Gel and corticosteroid injections (in carpal tunnel) x2 (most recent injection approximately 3 months ago) which have provided minimal relief. Patient reports most recent injection provided relief for only 1 week.     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  History of DVT/PE: Negative    PAST MEDICAL HISTORY:    Past Medical History:   Diagnosis Date    Anemia     Back pain     Depression      PAST SURGICAL HISTORY:    Past Surgical History:   Procedure Laterality Date     SECTION      COLD KNIFE CONIZATION OF CERVIX  12/10/2021    Procedure: CONE BIOPSY, CERVIX, USING COLD KNIFE;  Surgeon: Modesto Nassar MD;  Location: [facility] OR;  Service: OB/GYN;;    COLONOSCOPY N/A 2021    Procedure: COLONOSCOPY;  Surgeon: Emily Cruz MD;  Location: [facility] ENDO;  Service: Endoscopy;  Laterality: N/A;    ESOPHAGOGASTRODUODENOSCOPY N/A 2021    Procedure: EGD (ESOPHAGOGASTRODUODENOSCOPY);  Surgeon: Emily Cruz MD;  Location: [facility] ENDO;  Service: Endoscopy;   Laterality: N/A;    HYSTERECTOMY      ROBOT-ASSISTED LAPAROSCOPIC HYSTERECTOMY N/A 03/16/2022    Procedure: ROBOTIC HYSTERECTOMY;  Surgeon: Modesto Nassar MD;  Location: [facility] OR;  Service: OB/GYN;  Laterality: N/A;    ROBOT-ASSISTED SALPINGECTOMY Bilateral 03/16/2022    Procedure: ROBOTIC SALPINGECTOMY;  Surgeon: Modesto Nassar MD;  Location: [facility] OR;  Service: OB/GYN;  Laterality: Bilateral;    SINUS SURGERY      TUBAL LIGATION       FAMILY HISTORY:    Family History   Problem Relation Name Age of Onset    Hypertension Mother      Diabetes Mother      Breast cancer Neg Hx      Colon cancer Neg Hx      Ovarian cancer Neg Hx       SOCIAL HISTORY:    Social History     Occupational History    Not on file   Tobacco Use    Smoking status: Former     Current packs/day: 0.00     Types: Cigarettes     Quit date: 3/3/2021     Years since quitting: 3.1    Smokeless tobacco: Never   Substance and Sexual Activity    Alcohol use: No    Drug use: Yes     Types: Marijuana     Comment: daily    Sexual activity: Yes     Partners: Male     Birth control/protection: See Surgical Hx      MEDICATIONS:   Current Outpatient Medications:     acetaminophen (TYLENOL) 500 MG tablet, Take 1 tablet (500 mg total) by mouth 2 (two) times daily as needed for Pain., Disp: 60 tablet, Rfl: 0    ADVAIR DISKUS 100-50 mcg/dose diskus inhaler, Inhale 2 puffs into the lungs 2 (two) times daily., Disp: , Rfl:     albuterol (PROVENTIL/VENTOLIN HFA) 90 mcg/actuation inhaler, Inhale 2 puffs into the lungs every 4 (four) hours as needed for Wheezing, Disp: 8.5 g, Rfl: 11    albuterol (PROVENTIL/VENTOLIN HFA) 90 mcg/actuation inhaler, Inhale 2 puffs into the lungs every 4 (four) hours as needed for Wheezing, Disp: 8.5 g, Rfl: 11    ascorbic acid, vitamin C, (VITAMIN C) 500 MG tablet, Take 500 mg by mouth once daily., Disp: , Rfl:     ashwagandha root extract 500 mg Cap, Take 2 capsules orally as needed., Disp: 180 capsule, Rfl: 4    benzonatate  (TESSALON) 100 MG capsule, Take 2 capsules by mouth 3 (three) times daily as needed for Cough for up to 7 days, Disp: 20 capsule, Rfl: 0    cetirizine (ZYRTEC) 10 MG tablet, Take 1 tablet (10 mg total) by mouth once daily., Disp: 30 tablet, Rfl: 11    diclofenac sodium (VOLTAREN) 1 % Gel, Apply 2 g topically 4 (four) times daily as needed., Disp: 200 g, Rfl: 0    dicyclomine (BENTYL) 20 mg tablet, Take 1 tablet (20 mg total) by mouth 3 (three) times daily as needed (abdominal pain)., Disp: 60 tablet, Rfl: 5    DULoxetine (CYMBALTA) 60 MG capsule, Take 1 capsule (60 mg total) by mouth once daily., Disp: 30 capsule, Rfl: 2    dupilumab (DUPIXENT PEN) 300 mg/2 mL PnIj, Inject 300 mg into the skin every 14 (fourteen) days, Disp: 4 mL, Rfl: 5    FLOVENT  mcg/actuation inhaler, 1 puff 2 (two) times daily., Disp: , Rfl:     fluticasone propionate (FLONASE) 50 mcg/actuation nasal spray, 2 sprays by Nasal route daily, Disp: 16 g, Rfl: 5    fluticasone-umeclidin-vilanter (TRELEGY ELLIPTA) 200-62.5-25 mcg inhaler, Inhale 1 puff into the lungs daily, Disp: 60 each, Rfl: 3    gabapentin (NEURONTIN) 100 MG capsule, Take 1 capsule (100 mg total) by mouth 3 (three) times daily as needed., Disp: 30 capsule, Rfl: 6    ibuprofen (ADVIL,MOTRIN) 600 MG tablet, Take 1 tablet (600 mg total) by mouth 2 (two) times daily as needed for Pain., Disp: 60 tablet, Rfl: 0    loratadine (CLARITIN) 10 mg tablet, , Disp: , Rfl:     lubiprostone (AMITIZA) 24 MCG Cap, Take 1 capsule (24 mcg total) by mouth 2 (two) times daily with meals., Disp: 60 capsule, Rfl: 2    meclizine (ANTIVERT) 12.5 mg tablet, Take 1 tablet (12.5 mg total) by mouth 2 (two) times daily as needed for Dizziness., Disp: 28 tablet, Rfl: 0    methylPREDNISolone (MEDROL DOSEPACK) 4 mg tablet, , Disp: , Rfl:     metoclopramide HCl (REGLAN) 5 MG tablet, Take 1 tablet (5 mg total) by mouth 3 (three) times daily before meals., Disp: 90 tablet, Rfl: 1    nirmatrelvir-ritonavir  (PAXLOVID) 300 mg (150 mg x 2)-100 mg copackaged tablets (EUA), Take 3 tablets by mouth 2 (two) times daily for 5 days, Disp: 30 tablet, Rfl: 0    omeprazole (PRILOSEC) 40 MG capsule, Take 1 capsule (40 mg total) by mouth once daily., Disp: 30 capsule, Rfl: 5    ondansetron (ZOFRAN-ODT) 4 MG TbDL, Dissolve 1 tablet (4 mg total) by mouth 4 (four) times daily before meals and nightly., Disp: 120 tablet, Rfl: 2    polyethylene glycol (GLYCOLAX) 17 gram/dose powder, Take 17 g by mouth once daily., Disp: 510 g, Rfl: 11    predniSONE (DELTASONE) 10 MG tablet, Take 3 by mouth every morning x 2 days then 2 tablets  x 2 days then 1 tablet  x 2 days then stop, Disp: 12 tablet, Rfl: 0    predniSONE (DELTASONE) 20 MG tablet, Take 1 tablet by mouth daily for 5 days, Disp: 5 tablet, Rfl: 0    promethazine-dextromethorphan (PROMETHAZINE-DM) 6.25-15 mg/5 mL Syrp, Take 5 mLs by mouth 4 (four) times daily as needed for Cough for up to 7 days, Disp: 118 mL, Rfl: 0    tiotropium (SPIRIVA WITH HANDIHALER) 18 mcg inhalation capsule, Inhale 1 capsule into the lungs daily, Disp: 30 capsule, Rfl: 12    tiotropium (SPIRIVA WITH HANDIHALER) 18 mcg inhalation capsule, Inhale 1 capsule into the lungs daily, Disp: 30 capsule, Rfl: 12    amitriptyline (ELAVIL) 25 MG tablet, Take 1 tablet (25 mg total) by mouth every evening., Disp: 30 tablet, Rfl: 2    estradioL (ESTRACE) 0.01 % (0.1 mg/gram) vaginal cream, Place 1 g vaginally twice a week. Use 1/2 gram nightly for 1st week, Disp: 42.5 g, Rfl: 1    hydrocortisone 2.5 % cream, Apply topically 2 (two) times daily. for 14 days, Disp: 30 g, Rfl: 0    meloxicam (MOBIC) 15 MG tablet, Take 1 tablet (15 mg total) by mouth once daily. With food., Disp: 30 tablet, Rfl: 2    sumatriptan (IMITREX) 100 MG tablet, Take 1 tablet (100 mg total) by mouth every 2 (two) hours as needed for Migraine., Disp: 10 tablet, Rfl: 3    ALLERGIES:   Review of patient's allergies indicates:   Allergen Reactions    Raspberry  (rubus idaeus)        Review of Systems:  Constitution: Negative for chills, fever and night sweats.   HENT: Negative for congestion and headaches.    Eyes: Negative for blurred vision or vision loss.  Cardiovascular: Negative for chest pain and syncope.   Respiratory: Negative for cough and shortness of breath.    Endocrine: Negative for polydipsia, polyphagia and polyuria.   Hematologic/Lymphatic: Negative for bleeding problem. Does not bruise/bleed easily.   Skin: Negative for dry skin, itching and rash.   Musculoskeletal: See HPI.   Gastrointestinal: Negative for abdominal pain and bowel incontinence.   Genitourinary: Negative for bladder incontinence and nocturia.   Neurological: Negative for disturbances in coordination, loss of balance and seizures.   Psychiatric/Behavioral: Negative for depression. The patient does not have insomnia.    Allergic/Immunologic: Negative for hives and persistent infections.          Objective:      There were no vitals filed for this visit.    PHYSICAL EXAM:  General: Alert & oriented x3, well-developed and well-nourished, in no acute distress, sitting comfortably in the exam room.  Skin: Warm and dry. Capillary refill less than 2 seconds.   Head: Normocephalic and atraumatic.   Eyes: Sclera appear normal.   Nose: No deformities seen.   Ears: No deformities seen.   Neck: No tracheal deviation present.   Pulmonary/Chest: Breathing unlabored.   Neurological: Alert and oriented to person, place, and time.   Psychiatric: Mood is pleasant and affect appropriate.     RIGHT HAND/WRIST:        Observation/Inspection:    No evidence of visible deformity, swelling, discoloration, scars, or atrophy.         Palpation:   Tenderness to palpation to the 3rd and 4th MCP joints.  Otherwise, negative tenderness to palpation to bony prominences and soft tissues throughout.        ROM (active and passive):  Wrist: Full to wrist flexion, extension, radial, and ulnar deviation.  Digits: Full to  digit MCP, PIP, and DIP flexion and extension with pain and difficulty.         Strength:   strength slightly decreased compared to the left.         Special Tests:  Finkelstein's Test  Negative  WHAT Test   Negative  Tinel's Test - Carpal Tunnel Negative  Tinel's Test - Cubital Tunnel Negative  CMC Grind   Negative        Neurovascular Exam:  Digits warm and well perfused, brisk capillary refill <3 seconds throughout  NVI motor/LTS to median, radial, and ulnar nerves, radial pulse 2+    Imaging:  None today.         Assessment:       1. Right wrist pain        Plan:       Orders Placed This Encounter    X-Ray Hand Complete Right    EMG W/ ULTRASOUND AND NERVE CONDUCTION TEST 2 Extremities    meloxicam (MOBIC) 15 MG tablet     I explained the nature of the problem to the patient.     I discussed at length with the patient all the different treatment options available for her right hand and wrist including anti-inflammatories, acetaminophen, rest, ice/heat, bracing, occupational therapy, and corticosteroid injections. I explained the potential role of surgery in the treatment of this condition. The patient understands that if non-surgical measures do not adequately control symptoms, surgery will be considered in the future.     Medications:  Prescription for Mobic 15 mg once daily provided today for PRN pain management. Patient understands to take with food and/or OTC prilosec to decrease GI side effects. Advised patient to refrain from taking other anti-inflammatory medications while taking this medication, however she may alternate with acetaminophen as needed.  Procedures:  None today. Consider CSI if symptoms worsen.  DME:  Continue use of wrist brace at night, advised patient begin to wear wrist brace at work with activities.   Pain Management: Ice compress to the affected area 2-3x a day for 15-20 minutes as needed for pain management.  Orders:  Bilateral upper extremity EMG study ordered to evaluate carpal  tunnel symptoms.    Follow-Up: 2-3 months with Jenifer Luna PA-C for virtual follow-up.    All of the patient's questions were answered and the patient will contact us if they have any questions or concerns in the interim.      Jenifer Luna PA-C  [facility]  Orthopedic Surgery    Medical Dictation software was used during the dictation of portions or the entirety of this medical record.  Phonetic or grammatic errors may exist due to the use of this software. For clarification, refer to the author of the document.

## 2024-05-03 ENCOUNTER — OFFICE VISIT (OUTPATIENT)
Dept: FAMILY MEDICINE | Facility: HOSPITAL | Age: 44
End: 2024-05-03
Payer: MEDICAID

## 2024-05-03 VITALS
BODY MASS INDEX: 28.13 KG/M2 | OXYGEN SATURATION: 97 % | HEART RATE: 76 BPM | WEIGHT: 185.63 LBS | SYSTOLIC BLOOD PRESSURE: 109 MMHG | HEIGHT: 68 IN | DIASTOLIC BLOOD PRESSURE: 68 MMHG

## 2024-05-03 DIAGNOSIS — Z01.818 PREOP EXAMINATION: Primary | ICD-10-CM

## 2024-05-03 PROCEDURE — 99215 OFFICE O/P EST HI 40 MIN: CPT | Performed by: STUDENT IN AN ORGANIZED HEALTH CARE EDUCATION/TRAINING PROGRAM

## 2024-05-03 NOTE — PROGRESS NOTES
"Subjective:      Patient ID: Wandy Shah is a 43 y.o. female.    Chief Complaint: Elbow Injury    Pt, Wandy Shah, is a 42yo F w pmh of anemia, back pain, and R wrist pain, possibly carpal tunnel syndrome, for which she has had joint injections and takes Gabapentin, Cymbalta, and NSAIDs. Pt seen in clinic today for new onset R forearm pain for one day. 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 She says she felt a sudden, sharp pain distal to her elbow and then had a burning sensation that went away, but the away remains sore and tender and there is pain with pronation and palpation. Pt wanted to "get check out and make sure nothing is broken." No other complaints noted at this time. Pt reports this visit is not part of a workman's comp claim.     Elbow Injury  This is a new problem. The current episode started yesterday. The problem has been gradually improving. Associated symptoms include arthralgias (R elbow), myalgias (R forearm) and weakness. Pertinent negatives include no chills, fatigue, fever or numbness. The symptoms are aggravated by twisting and exertion. She has tried immobilization, acetaminophen and NSAIDs for the symptoms. The treatment provided mild relief.       Review of Systems   Constitutional:  Positive for activity change. Negative for chills, fatigue, fever and unexpected weight change.   HENT: Negative.     Respiratory: Negative.     Cardiovascular: Negative.    Gastrointestinal: Negative.    Genitourinary: Negative.    Musculoskeletal:  Positive for arthralgias (R elbow) and myalgias (R forearm).   Skin: Negative.    Neurological:  Positive for weakness. Negative for numbness.   Psychiatric/Behavioral: Negative.            Past Medical History:   Diagnosis Date    Anemia     Back pain     Depression        Past Surgical History:   Procedure Laterality Date     SECTION      COLD KNIFE CONIZATION OF CERVIX  " 12/10/2021    Procedure: CONE BIOPSY, CERVIX, USING COLD KNIFE;  Surgeon: Modesto Nassar MD;  Location: [facility] OR;  Service: OB/GYN;;    COLONOSCOPY N/A 05/11/2021    Procedure: COLONOSCOPY;  Surgeon: Emily Cruz MD;  Location: [facility] ENDO;  Service: Endoscopy;  Laterality: N/A;    ESOPHAGOGASTRODUODENOSCOPY N/A 06/11/2021    Procedure: EGD (ESOPHAGOGASTRODUODENOSCOPY);  Surgeon: Emily Cruz MD;  Location: [facility] ENDO;  Service: Endoscopy;  Laterality: N/A;    HYSTERECTOMY      ROBOT-ASSISTED LAPAROSCOPIC HYSTERECTOMY N/A 03/16/2022    Procedure: ROBOTIC HYSTERECTOMY;  Surgeon: Modesto Nassar MD;  Location: [facility] OR;  Service: OB/GYN;  Laterality: N/A;    ROBOT-ASSISTED SALPINGECTOMY Bilateral 03/16/2022    Procedure: ROBOTIC SALPINGECTOMY;  Surgeon: Modesto Nassar MD;  Location: [facility] OR;  Service: OB/GYN;  Laterality: Bilateral;    SINUS SURGERY      TUBAL LIGATION         Family History   Problem Relation Name Age of Onset    Hypertension Mother      Diabetes Mother      Breast cancer Neg Hx      Colon cancer Neg Hx      Ovarian cancer Neg Hx         Social History     Socioeconomic History    Marital status:    Tobacco Use    Smoking status: Former     Current packs/day: 0.00     Types: Cigarettes     Quit date: 3/3/2021     Years since quitting: 3.1    Smokeless tobacco: Never   Substance and Sexual Activity    Alcohol use: No    Drug use: Yes     Types: Marijuana     Comment: daily    Sexual activity: Yes     Partners: Male     Birth control/protection: See Surgical Hx     Social Determinants of Health     Financial Resource Strain: Low Risk  (12/5/2023)    [place] [facility]    Overall Financial Resource Strain (CARDIA)     Difficulty of Paying Living Expenses: Not hard at all   Food Insecurity: No Food Insecurity (12/5/2023)    [place] [facility] [facility]    Hunger Vital Sign     Worried About Running Out of Food in the Last Year: Never true     Ran  Out of Food in the Last Year: Never true   Transportation Needs: No Transportation Needs (12/5/2023)    [place] [facility] [employer]    PRAPARE - Transportation     Lack of Transportation (Medical): No     Lack of Transportation (Non-Medical): No   Physical Activity: Insufficiently Active (12/5/2023)    [place] [facility]    Exercise Vital Sign     Days of Exercise per Week: 5 days     Minutes of Exercise per Session: 10 min   Stress: Stress Concern Present (12/5/2023)    [place] [facility]    [employer] [place] [employer] - Occupational Stress Questionnaire     Feeling of Stress : Rather much   Housing Stability: Low Risk  (12/5/2023)    [place] [facility]    Housing Stability Vital Sign     Unable to Pay for Housing in the Last Year: No     Number of Places Lived in the Last Year: 1     In the last 12 months, was there a time when you did not have a steady place to sleep or slept in a shelter (including now)?: No       Current Outpatient Medications   Medication Sig Dispense Refill    ADVAIR DISKUS 100-50 mcg/dose diskus inhaler Inhale 2 puffs into the lungs 2 (two) times daily.      albuterol (PROVENTIL/VENTOLIN HFA) 90 mcg/actuation inhaler Inhale 2 puffs into the lungs every 4 (four) hours as needed for Wheezing 8.5 g 11    albuterol (PROVENTIL/VENTOLIN HFA) 90 mcg/actuation inhaler Inhale 2 puffs into the lungs every 4 (four) hours as needed for Wheezing 8.5 g 11    ascorbic acid, vitamin C, (VITAMIN C) 500 MG tablet Take 500 mg by mouth once daily.      benzonatate (TESSALON) 100 MG capsule Take 2 capsules by mouth 3 (three) times daily as needed for Cough for up to 7 days 20 capsule 0    cetirizine (ZYRTEC) 10 MG tablet Take 1 tablet (10 mg total) by mouth once daily. 30 tablet 11    dicyclomine (BENTYL) 20 mg tablet Take 1 tablet (20 mg total) by mouth 3 (three) times daily as needed (abdominal pain). 60 tablet 5    DULoxetine  (CYMBALTA) 60 MG capsule Take 1 capsule (60 mg total) by mouth once daily. 30 capsule 2    dupilumab (DUPIXENT PEN) 300 mg/2 mL PnIj Inject 300 mg into the skin every 14 (fourteen) days 4 mL 5    FLOVENT  mcg/actuation inhaler 1 puff 2 (two) times daily.      fluticasone propionate (FLONASE) 50 mcg/actuation nasal spray 2 sprays by Nasal route daily 16 g 5    fluticasone-umeclidin-vilanter (TRELEGY ELLIPTA) 200-62.5-25 mcg inhaler Inhale 1 puff into the lungs daily 60 each 3    gabapentin (NEURONTIN) 100 MG capsule Take 1 capsule (100 mg total) by mouth 3 (three) times daily as needed. 30 capsule 6    loratadine (CLARITIN) 10 mg tablet       lubiprostone (AMITIZA) 24 MCG Cap Take 1 capsule (24 mcg total) by mouth 2 (two) times daily with meals. 60 capsule 2    meclizine (ANTIVERT) 12.5 mg tablet Take 1 tablet (12.5 mg total) by mouth 2 (two) times daily as needed for Dizziness. 28 tablet 0    metoclopramide HCl (REGLAN) 5 MG tablet Take 1 tablet (5 mg total) by mouth 3 (three) times daily before meals. (Patient not taking: Reported on 5/3/2024) 90 tablet 1    omeprazole (PRILOSEC) 40 MG capsule Take 1 capsule (40 mg total) by mouth once daily. 30 capsule 5    ondansetron (ZOFRAN-ODT) 4 MG TbDL Dissolve 1 tablet (4 mg total) by mouth 4 (four) times daily before meals and nightly. 120 tablet 2    polyethylene glycol (GLYCOLAX) 17 gram/dose powder Take 17 g by mouth once daily. 510 g 11    tiotropium (SPIRIVA WITH HANDIHALER) 18 mcg inhalation capsule Inhale 1 capsule into the lungs daily (Patient not taking: Reported on 5/3/2024) 30 capsule 12    tiotropium (SPIRIVA WITH HANDIHALER) 18 mcg inhalation capsule Inhale 1 capsule into the lungs daily 30 capsule 12    acetaminophen (TYLENOL) 500 MG tablet Take 1 tablet (500 mg total) by mouth 2 (two) times daily as needed for Pain. 60 tablet 0    amitriptyline (ELAVIL) 25 MG tablet Take 1 tablet (25 mg total) by mouth every evening. 30 tablet 2    ashwagandha root  extract 500 mg Cap Take 2 capsules orally as needed. 180 capsule 4    diclofenac sodium (VOLTAREN) 1 % Gel Apply 2 g topically 4 (four) times daily as needed. 200 g 0    estradioL (ESTRACE) 0.01 % (0.1 mg/gram) vaginal cream Place 1 g vaginally twice a week. Use 1/2 gram nightly for 1st week 42.5 g 1    hydrocortisone 2.5 % cream Apply topically 2 (two) times daily. for 14 days 30 g 0    ibuprofen (ADVIL,MOTRIN) 600 MG tablet Take 1 tablet (600 mg total) by mouth 2 (two) times daily as needed for Pain. 60 tablet 0    meloxicam (MOBIC) 15 MG tablet Take 1 tablet (15 mg total) by mouth once daily. With food. 30 tablet 2    methylPREDNISolone (MEDROL DOSEPACK) 4 mg tablet  (Patient not taking: Reported on 5/3/2024)      nirmatrelvir-ritonavir (PAXLOVID) 300 mg (150 mg x 2)-100 mg copackaged tablets (EUA) Take 3 tablets by mouth 2 (two) times daily for 5 days (Patient not taking: Reported on 5/3/2024) 30 tablet 0    predniSONE (DELTASONE) 10 MG tablet Take 3 by mouth every morning x 2 days then 2 tablets  x 2 days then 1 tablet  x 2 days then stop (Patient not taking: Reported on 5/3/2024) 12 tablet 0    predniSONE (DELTASONE) 20 MG tablet Take 1 tablet by mouth daily for 5 days (Patient not taking: Reported on 5/3/2024) 5 tablet 0    promethazine-dextromethorphan (PROMETHAZINE-DM) 6.25-15 mg/5 mL Syrp Take 5 mLs by mouth 4 (four) times daily as needed for Cough for up to 7 days (Patient not taking: Reported on 5/3/2024) 118 mL 0    sumatriptan (IMITREX) 100 MG tablet Take 1 tablet (100 mg total) by mouth every 2 (two) hours as needed for Migraine. 10 tablet 3     No current facility-administered medications for this visit.       Review of patient's allergies indicates:   Allergen Reactions    Raspberry (rubus idaeus)          Objective:     Vitals:    04/26/24 1006   BP: 103/66   Pulse: 83     Physical Exam  Vitals and nursing note reviewed.   Constitutional:       General: She is not in acute distress.     Appearance:  Normal appearance. She is not ill-appearing.   HENT:      Head: Normocephalic and atraumatic.   Eyes:      Extraocular Movements: Extraocular movements intact.      Pupils: Pupils are equal, round, and reactive to light.   Cardiovascular:      Rate and Rhythm: Normal rate and regular rhythm.      Pulses: Normal pulses.      Heart sounds: Normal heart sounds.   Pulmonary:      Effort: Pulmonary effort is normal. No respiratory distress.      Breath sounds: Normal breath sounds.   Abdominal:      General: Bowel sounds are normal. There is no distension.      Palpations: Abdomen is soft.      Tenderness: There is no abdominal tenderness.   Musculoskeletal:         General: Tenderness present. No swelling, deformity or signs of injury. Normal range of motion.      Right elbow: No swelling or deformity. Normal range of motion. Tenderness present.      Left elbow: Normal.      Right forearm: Tenderness present. No deformity or bony tenderness.      Left forearm: Normal.        Arms:       Cervical back: Normal range of motion.      Comments: Negative valgus stress test. Pain with pronation.   Skin:     General: Skin is warm and dry.      Capillary Refill: Capillary refill takes less than 2 seconds.      Findings: No bruising.   Neurological:      General: No focal deficit present.      Mental Status: She is alert and oriented to person, place, and time. Mental status is at baseline.   Psychiatric:         Mood and Affect: Mood normal.         Behavior: Behavior normal.         Assessment:      1. Strain of other muscles, fascia and tendons at forearm level, right arm, initial encounter    2. Overweight (BMI 25.0-29.9)    3. Carpal tunnel syndrome of right wrist    4. Right wrist pain      Plan:     Pain likely due to strain in the pronator teres muscles, as that was the motion occurring when the injury was noticed. There is no visible or palpable injury or deformity. No signs of fracture or soft tissue swelling. Appears  to be improving already per patient. Will continue to monitor for one week of conservative therapy, including oral pain management with ibuprofen and tylenol, voltaren gel, alternating heat and ice packs, and rest to let the injured tissue heal. If symptoms persist or worsen at next visit in 1 week, then will reassess and consider other etiologies    Strain of other muscles, fascia and tendons at forearm level, right arm, initial encounter  -     ibuprofen (ADVIL,MOTRIN) 600 MG tablet; Take 1 tablet (600 mg total) by mouth 2 (two) times daily as needed for Pain.  Dispense: 60 tablet; Refill: 0  -     acetaminophen (TYLENOL) 500 MG tablet; Take 1 tablet (500 mg total) by mouth 2 (two) times daily as needed for Pain.  Dispense: 60 tablet; Refill: 0  -     diclofenac sodium (VOLTAREN) 1 % Gel; Apply 2 g topically 4 (four) times daily as needed.  Dispense: 200 g; Refill: 0    Overweight (BMI 25.0-29.9)    Carpal tunnel syndrome of right wrist    Right wrist pain  -     ibuprofen (ADVIL,MOTRIN) 600 MG tablet; Take 1 tablet (600 mg total) by mouth 2 (two) times daily as needed for Pain.  Dispense: 60 tablet; Refill: 0  -     acetaminophen (TYLENOL) 500 MG tablet; Take 1 tablet (500 mg total) by mouth 2 (two) times daily as needed for Pain.  Dispense: 60 tablet; Refill: 0  -     diclofenac sodium (VOLTAREN) 1 % Gel; Apply 2 g topically 4 (four) times daily as needed.  Dispense: 200 g; Refill: 0      Follow up in about 1 week (around 5/3/2024).

## 2024-05-03 NOTE — PROGRESS NOTES
Progress Note   Family Medicine      Chief Complaint: pre-op exam, medical Optimization    Pt, Wandy Shah, is a 43 yo F w pmh of Moderate-Persistent asthma, anemia, back pain, GERD, Migrain, and R wrist pain, possibly carpal tunnel syndrome, for which she has had joint injections and takes Gabapentin, Cymbalta, and NSAIDs. Pt seen in clinic /today preop evaluation for an upcoming elective Pyloroplasty procedure scheduled on 2024 with Dr.William Syed MD. Pt seen in clinic recently for new onset R forearm pain after a work related injury with lifting an object. No other complaints noted at this time.  Active Cardiac Conditions: History is not suggestive of unstable coronary syndrome, decompensated heart failure, significant arrhythmias, severe valvular disease.    Exercise Tolerance: Pt can undertake activities such as cooking, cleaning, vacuuming, showering, dressing, shopping, walking 1-2 blocks, golfing, and mowing without chest pain or shortness of breath making her exercise tolerance more than 4 METs. DASI score 36.7.    The pt states she has pain and fatigue when climbing stairs or running, but denies chest pain upon exertion, denies dyspnea, denies nausea, denies vomiting, denies diaphoresis, denies syncope, denies radiation to the arm/jaw/back, denies ripping or tearing sensation, denies pleuritic chest pain, denies unilateral leg swelling, and denies leg pain.      Current Anticoagulants and Antiplatelet Therapy: None    Anesthesia: No personal or family history of complications with anesthesia.    The following portions of the patient's history were reviewed and updated as appropriate: allergies, current medications, past family history, past medical history, past social history, past surgical history and problem list.    Past Medical History:   Diagnosis Date    Anemia     Back pain     Depression        Past Surgical History:   Procedure Laterality Date     SECTION      COLD KNIFE  CONIZATION OF CERVIX  12/10/2021    Procedure: CONE BIOPSY, CERVIX, USING COLD KNIFE;  Surgeon: Modesto Nassar MD;  Location: [facility] OR;  Service: OB/GYN;;    COLONOSCOPY N/A 05/11/2021    Procedure: COLONOSCOPY;  Surgeon: Emily Cruz MD;  Location: [facility] ENDO;  Service: Endoscopy;  Laterality: N/A;    ESOPHAGOGASTRODUODENOSCOPY N/A 06/11/2021    Procedure: EGD (ESOPHAGOGASTRODUODENOSCOPY);  Surgeon: Emily Cruz MD;  Location: [facility] ENDO;  Service: Endoscopy;  Laterality: N/A;    HYSTERECTOMY      ROBOT-ASSISTED LAPAROSCOPIC HYSTERECTOMY N/A 03/16/2022    Procedure: ROBOTIC HYSTERECTOMY;  Surgeon: Modesto Nassar MD;  Location: [facility] OR;  Service: OB/GYN;  Laterality: N/A;    ROBOT-ASSISTED SALPINGECTOMY Bilateral 03/16/2022    Procedure: ROBOTIC SALPINGECTOMY;  Surgeon: Modesto Nassar MD;  Location: [facility] OR;  Service: OB/GYN;  Laterality: Bilateral;    SINUS SURGERY      TUBAL LIGATION         Social History  Social History     Tobacco Use    Smoking status: Former     Current packs/day: 0.00     Types: Cigarettes     Quit date: 3/3/2021     Years since quitting: 3.2    Smokeless tobacco: Never   Substance Use Topics    Alcohol use: No    Drug use: Yes     Types: Marijuana     Comment: daily       Family History   Problem Relation Name Age of Onset    Hypertension Mother      Diabetes Mother      Breast cancer Neg Hx      Colon cancer Neg Hx      Ovarian cancer Neg Hx       Review of patient's allergies indicates:   Allergen Reactions    Raspberry (rubus idaeus)      Review of Systems   Constitutional:  Negative for malaise/fatigue and weight loss.   HENT: Negative.     Respiratory:  Positive for shortness of breath.         Asthma requiring rescue KEISHA ~3x/week   Cardiovascular: Negative.    Gastrointestinal:  Positive for constipation and heartburn. Negative for blood in stool.   Genitourinary: Negative.    Musculoskeletal:  Positive for joint pain.        Right arm near medial elbow  R  "wrist   Skin:  Negative for itching.   Neurological:  Positive for tingling and focal weakness.   Psychiatric/Behavioral:  Positive for depression. The patient is nervous/anxious.        OBJECTIVE:    Physical Exam  Vitals and nursing note reviewed.   Constitutional:       General: She is not in acute distress.     Appearance: Normal appearance. She is not ill-appearing.   HENT:      Head: Normocephalic and atraumatic.   Eyes:      Extraocular Movements: Extraocular movements intact.      Pupils: Pupils are equal, round, and reactive to light.   Cardiovascular:      Rate and Rhythm: Normal rate and regular rhythm.      Pulses: Normal pulses.      Heart sounds: Normal heart sounds.   Pulmonary:      Effort: Pulmonary effort is normal. No respiratory distress.      Breath sounds: Normal breath sounds.   Abdominal:      General: Bowel sounds are normal. There is no distension.      Palpations: Abdomen is soft.      Tenderness: There is no abdominal tenderness. There is no guarding or rebound.   Musculoskeletal:         General: Tenderness present. No swelling, deformity or signs of injury. Normal range of motion.      Right elbow: No swelling or deformity. Normal range of motion. Tenderness present.      Left elbow: Normal.      Right forearm: Tenderness present. No deformity or bony tenderness.      Left forearm: Normal.        Arms:       Cervical back: Normal range of motion.      Comments: Negative valgus stress test. Pain with pronation.   Skin:     General: Skin is warm and dry.      Capillary Refill: Capillary refill takes less than 2 seconds.      Findings: No bruising.   Neurological:      General: No focal deficit present.      Mental Status: She is alert and oriented to person, place, and time. Mental status is at baseline.   Psychiatric:         Mood and Affect: Mood normal.         Behavior: Behavior normal.       /68   Pulse 76   Ht 5' 8" (1.727 m)   Wt 84.2 kg (185 lb 10 oz)   LMP 03/16/2022 " "(Exact Date)   SpO2 97%   BMI 28.22 kg/m²   Wt Readings from Last 3 Encounters:   24 84.2 kg (185 lb 10 oz)   24 84.1 kg (185 lb 6.5 oz)   24 81.9 kg (180 lb 8.9 oz)     BP Readings from Last 3 Encounters:   24 109/68   24 103/66   24 125/78     Estimated body mass index is 28.22 kg/m² as calculated from the following:    Height as of this encounter: 5' 8" (1.727 m).    Weight as of this encounter: 84.2 kg (185 lb 10 oz).       Data reviewed    Previous medical records reviewed and summarized above in HPI.     Laboratory    I have reviewed old labs below:    Lab Results   Component Value Date    WBC 12.30 2024    HGB 14.4 2024    HCT 44.8 2024     2024    CHOL 174 02/15/2022    TRIG 110 02/15/2022    HDL 38 (L) 02/15/2022    ALT 61 (H) 10/29/2022    AST 43 (H) 10/29/2022     2024    K 4.1 2024     2024    CREATININE 0.7 2024    BUN 11 2024    CO2 26 2024    TSH 1.057 2022    HGBA1C 4.8 2022       Lab reviewed by me: Labs show no abnomal values. Previous history of elevated AST/ALT in 10/2022. Need repeat CMP. Last Lipid 2022 with LDL value of 114 and low HDL (38). No obvious lab abnormalities noted.    Imaging/Tracing: I have reviewed the pertinent results/findings and my personal findings are below:     EK2021: "Vent. Rate : 062 BPM     Atrial Rate : 062 BPM      P-R Int : 156 ms          QRS Dur : 088 ms       QT Int : 410 ms       P-R-T Axes : 077 083 073 degrees      QTc Int : 416 ms     Normal sinus rhythm with sinus arrhythmia   Biatrial enlargement   Abnormal ECG   When compared with ECG of 01-OCT-2008 12:33,   T wave amplitude has increased in Inferior leads   T wave inversion less evident in Anterior leads "    Chest X-Ray: 2024: "Heart size normal. The lungs are clear. No pleural effusion "    Assessment/Plan    Wandy Shah is a 43 y.o. female who presents to " clinic with: Need for pre-operative evaluation and pmh of Moderate-Persistent asthma, anemia, back pain, GERD, Migrain, and R wrist pain, possibly carpal tunnel syndrome.    1. Preop examination       Pre-operative evaluation with risk assessment              -           Scheduled for elective Pyloroplasty on 06/07/2024 by Dr. Clifford Lynch MD  -           Per patient self-report of activity, DASI score: 36.7 w/ Functional Capacity in METS: 7.25 (>4) with a revised cardiac risk index of 3.9%, O points (Class I Risk).    - Pt is NOT on antiplatelets/anticoagulation.   - PT does NOT have hx of blood dyscrasias or previous reaction to anesthesia   - Pt is a former smoker.  - Pt has NO prior h/o HLD/CAD w/ either MI or CVA. ASCVD: 0.7%. Last lipid panel from 2/15/2022. Updated collection pending.  - Pt has NO prior h/o DM. Last HgbA1C:~4.8% 11/02/2022  - Pt has NO prior h/o thyroid disease. TSH: 1.057 11/02/2022  - Pt has NO prior h/o HTN. BP: 109/68.   - PT DOES have prior h/o COPD/Asthma/RAH/OHS. Does NOT use CPAP. Pt has Asthma with current classification of Persistent Moderate based on her symptoms and the EPR-3 classification guidelines.  - Pt has NO prior h/o HF. Echo: Never performed  - BMI: 28.22kg/msq              -          The patient's current medical condition does not require further optimization to proceed. She presents a low to moderate risk for a low to moderate risk surgery. Pt still has some pending labs to determine most accurate and up to date current risk factors.   - Please call our office for any additional questions or concerns.    Diagnosis plan remarks     Evaluating Risk in Surgery is a combination of patients risk factors and surgical risk factors. Patient is being evaluated for medical optimization and not surgical clearance. MACE is the major adverse cardiovascular event risk, and can be assessed based on the Revised Cardiac Risk Index score.    RCRI: O Points. Class I  Risk.    Per ACC/AHA petra-operative guidelines, moderate risk surgery and METS >=4 is low risk for MACE and no further cardiac testing is required.     Pt has no active cardiac condition (ACS/USA, decompenstated CHF, ventricular arrhythmias or severe valvular disease) and can easily achieve > 4 METS.  Pt does not require further cardiac evaluation prior to undergoing surgical procedure. These recommendations follow the 2014 ACC/AHA Guideline on Perioperative Cardiovascular Evaluation and Management of Patients Undergoing Noncardiac Surgery. (JACC Vol. 64 No. 22, Dec 9, 2014, pp h61-g822).    Per evaluation, patient is low to moderate risk for a low to moderate risk surgery. Patient is medically optimized for surgery at this time. Instructed as above on petra-operative medication recommendations and further risk reduction. All questions answered.       Moderate Persistent Asthma:    Assessment:stable, borderline controlled, and needs improvement.   Plan:  Will continue to monitor and adjust medications in outpatient primary care clinic .    Medication Monitoring: In today's visit, monitoring for drug toxicity was accomplished. Proper use of medications was discussed.     Counseling: Counseling included discussion regarding imaging findings, diagnosis, possibilities, treatment options, medications, risks, and benefits. She had many questions regarding the options and long-term effects. All questions were answered. She expressed understanding after counseling regarding the diagnosis and recommendations. She was capable and demonstrated competence with understanding of these options. Shared decision making was performed resulting in her choosing the current treatment plan.     She was counseled about the importance of healthy dietary habits as well as routine physical activity and exercise for better health outcomes. I also discussed the importance of cancer screening.     I also discussed the importance of close follow  up to discuss labs, change or modify her medications if needed, monitor side effects, and further evaluation of medical problems.     Additional workup planned: see labs ordered below.    See below for labs and meds ordered with associated diagnosis    1. Preop examination  - CBC Auto Differential; Future  - Comprehensive Metabolic Panel; Future  - Hemoglobin A1C; Future  - Lipid Panel; Future  - TSH; Future  - EKG RHYTHM STRIP (to Muse); Future       Medication List with Changes/Refills   Current Medications    ACETAMINOPHEN (TYLENOL) 500 MG TABLET    Take 1 tablet (500 mg total) by mouth 2 (two) times daily as needed for Pain.    ADVAIR DISKUS 100-50 MCG/DOSE DISKUS INHALER    Inhale 2 puffs into the lungs 2 (two) times daily.    ALBUTEROL (PROVENTIL/VENTOLIN HFA) 90 MCG/ACTUATION INHALER    Inhale 2 puffs into the lungs every 4 (four) hours as needed for Wheezing    ALBUTEROL (PROVENTIL/VENTOLIN HFA) 90 MCG/ACTUATION INHALER    Inhale 2 puffs into the lungs every 4 (four) hours as needed for Wheezing    AMITRIPTYLINE (ELAVIL) 25 MG TABLET    Take 1 tablet (25 mg total) by mouth every evening.    ASCORBIC ACID, VITAMIN C, (VITAMIN C) 500 MG TABLET    Take 500 mg by mouth once daily.    ASHWAGANDHA ROOT EXTRACT 500 MG CAP    Take 2 capsules orally as needed.    BENZONATATE (TESSALON) 100 MG CAPSULE    Take 2 capsules by mouth 3 (three) times daily as needed for Cough for up to 7 days    CETIRIZINE (ZYRTEC) 10 MG TABLET    Take 1 tablet (10 mg total) by mouth once daily.    DICLOFENAC SODIUM (VOLTAREN) 1 % GEL    Apply 2 g topically 4 (four) times daily as needed.    DICYCLOMINE (BENTYL) 20 MG TABLET    Take 1 tablet (20 mg total) by mouth 3 (three) times daily as needed (abdominal pain).    DULOXETINE (CYMBALTA) 60 MG CAPSULE    Take 1 capsule (60 mg total) by mouth once daily.    DUPILUMAB (DUPIXENT PEN) 300 MG/2 ML PNIJ    Inject 300 mg into the skin every 14 (fourteen) days    ESTRADIOL (ESTRACE) 0.01 % (0.1  MG/GRAM) VAGINAL CREAM    Place 1 g vaginally twice a week. Use 1/2 gram nightly for 1st week    FLOVENT  MCG/ACTUATION INHALER    1 puff 2 (two) times daily.    FLUTICASONE PROPIONATE (FLONASE) 50 MCG/ACTUATION NASAL SPRAY    2 sprays by Nasal route daily    FLUTICASONE-UMECLIDIN-VILANTER (TRELEGY ELLIPTA) 200-62.5-25 MCG INHALER    Inhale 1 puff into the lungs daily    GABAPENTIN (NEURONTIN) 100 MG CAPSULE    Take 1 capsule (100 mg total) by mouth 3 (three) times daily as needed.    HYDROCORTISONE 2.5 % CREAM    Apply topically 2 (two) times daily. for 14 days    IBUPROFEN (ADVIL,MOTRIN) 600 MG TABLET    Take 1 tablet (600 mg total) by mouth 2 (two) times daily as needed for Pain.    LORATADINE (CLARITIN) 10 MG TABLET        MECLIZINE (ANTIVERT) 12.5 MG TABLET    Take 1 tablet (12.5 mg total) by mouth 2 (two) times daily as needed for Dizziness.    MELOXICAM (MOBIC) 15 MG TABLET    Take 1 tablet (15 mg total) by mouth once daily. With food.    METHYLPREDNISOLONE (MEDROL DOSEPACK) 4 MG TABLET        METOCLOPRAMIDE HCL (REGLAN) 5 MG TABLET    Take 1 tablet (5 mg total) by mouth 3 (three) times daily before meals.    NIRMATRELVIR-RITONAVIR (PAXLOVID) 300 MG (150 MG X 2)-100 MG COPACKAGED TABLETS (EUA)    Take 3 tablets by mouth 2 (two) times daily for 5 days    OMEPRAZOLE (PRILOSEC) 40 MG CAPSULE    Take 1 capsule (40 mg total) by mouth once daily.    ONDANSETRON (ZOFRAN-ODT) 4 MG TBDL    Dissolve 1 tablet (4 mg total) by mouth 4 (four) times daily before meals and nightly.    POLYETHYLENE GLYCOL (GLYCOLAX) 17 GRAM/DOSE POWDER    Take 17 g by mouth once daily.    PREDNISONE (DELTASONE) 10 MG TABLET    Take 3 by mouth every morning x 2 days then 2 tablets  x 2 days then 1 tablet  x 2 days then stop    PREDNISONE (DELTASONE) 20 MG TABLET    Take 1 tablet by mouth daily for 5 days    PROMETHAZINE-DEXTROMETHORPHAN (PROMETHAZINE-DM) 6.25-15 MG/5 ML SYRP    Take 5 mLs by mouth 4 (four) times daily as needed for  "Cough for up to 7 days    SUMATRIPTAN (IMITREX) 100 MG TABLET    Take 1 tablet (100 mg total) by mouth every 2 (two) hours as needed for Migraine.    TIOTROPIUM (SPIRIVA WITH HANDIHALER) 18 MCG INHALATION CAPSULE    Inhale 1 capsule into the lungs daily    TIOTROPIUM (SPIRIVA WITH HANDIHALER) 18 MCG INHALATION CAPSULE    Inhale 1 capsule into the lungs daily   Changed and/or Refilled Medications    Modified Medication Previous Medication    LUBIPROSTONE (AMITIZA) 24 MCG CAP lubiprostone (AMITIZA) 24 MCG Cap       Take 1 capsule (24 mcg total) by mouth 2 (two) times daily with meals.    Take 1 capsule (24 mcg total) by mouth 2 (two) times daily with meals.     Modified Medications    Modified Medication Previous Medication    LUBIPROSTONE (AMITIZA) 24 MCG CAP lubiprostone (AMITIZA) 24 MCG Cap       Take 1 capsule (24 mcg total) by mouth 2 (two) times daily with meals.    Take 1 capsule (24 mcg total) by mouth 2 (two) times daily with meals.       No follow-ups on file. for further workup and reassessment if labs and tests obtained are stable or sooner as needed. She was instructed to call the clinic or go to the emergency department if her symptoms do not improve, worsens, or if new symptoms develop. Shared decision making occurred and she verbalized understanding in agreement with this plan.     Documentation entered by me for this encounter may have been done in part using speech-recognition technology. Although I have made an effort to ensure accuracy, "sound like" errors may exist and should be interpreted in context.     Rene Orr MD, MPH  [facility] [place]  PGY-1  05/15/2024        "

## 2024-05-08 ENCOUNTER — PATIENT MESSAGE (OUTPATIENT)
Dept: FAMILY MEDICINE | Facility: HOSPITAL | Age: 44
End: 2024-05-08
Payer: MEDICAID

## 2024-05-09 ENCOUNTER — PATIENT MESSAGE (OUTPATIENT)
Dept: ORTHOPEDICS | Facility: CLINIC | Age: 44
End: 2024-05-09
Payer: MEDICAID

## 2024-05-09 DIAGNOSIS — K59.04 CHRONIC IDIOPATHIC CONSTIPATION: ICD-10-CM

## 2024-05-09 RX ORDER — LUBIPROSTONE 24 UG/1
24 CAPSULE ORAL 2 TIMES DAILY WITH MEALS
Qty: 60 CAPSULE | Refills: 2 | Status: SHIPPED | OUTPATIENT
Start: 2024-05-09

## 2024-05-09 NOTE — PROGRESS NOTES
I assume primary medical responsibility for this patient. I have reviewed the history, physical, and assessment & treatment plan with the resident and agree that the care is reasonable and necessary. This service has been performed by a resident without the presence of a teaching physician under the primary care exception. If necessary, an addendum of additional findings or evaluation beyond the resident documentation will be noted below.        Audi Beltrán Jr., DO    [facility]

## 2024-05-14 NOTE — PROGRESS NOTES
Past Medical History:   Diagnosis Date    Anemia     Back pain     Depression        Past Surgical History:   Procedure Laterality Date     SECTION      COLD KNIFE CONIZATION OF CERVIX  12/10/2021    Procedure: CONE BIOPSY, CERVIX, USING COLD KNIFE;  Surgeon: Modesto Nassar MD;  Location: [facility] OR;  Service: OB/GYN;;    COLONOSCOPY N/A 2021    Procedure: COLONOSCOPY;  Surgeon: Emily Cruz MD;  Location: [facility] ENDO;  Service: Endoscopy;  Laterality: N/A;    ESOPHAGOGASTRODUODENOSCOPY N/A 2021    Procedure: EGD (ESOPHAGOGASTRODUODENOSCOPY);  Surgeon: Emily Cruz MD;  Location: [facility] ENDO;  Service: Endoscopy;  Laterality: N/A;    HYSTERECTOMY      ROBOT-ASSISTED LAPAROSCOPIC HYSTERECTOMY N/A 2022    Procedure: ROBOTIC HYSTERECTOMY;  Surgeon: Modesto Nassar MD;  Location: [facility] OR;  Service: OB/GYN;  Laterality: N/A;    ROBOT-ASSISTED SALPINGECTOMY Bilateral 2022    Procedure: ROBOTIC SALPINGECTOMY;  Surgeon: Modesto Nassar MD;  Location: [facility] OR;  Service: OB/GYN;  Laterality: Bilateral;    SINUS SURGERY      TUBAL LIGATION         Family History   Problem Relation Name Age of Onset    Hypertension Mother      Diabetes Mother      Breast cancer Neg Hx      Colon cancer Neg Hx      Ovarian cancer Neg Hx         Social History     Socioeconomic History    Marital status:    Tobacco Use    Smoking status: Former     Current packs/day: 0.00     Types: Cigarettes     Quit date: 3/3/2021     Years since quitting: 3.2    Smokeless tobacco: Never   Substance and Sexual Activity    Alcohol use: No    Drug use: Yes     Types: Marijuana     Comment: daily    Sexual activity: Yes     Partners: Male     Birth control/protection: See Surgical Hx     Social Determinants of Health     Financial Resource Strain: Low Risk  (2023)    Received from [facility], [facility]    Overall Financial Resource Strain (CARDIA)     Difficulty of Paying Living Expenses: Not hard  at all   Food Insecurity: No Food Insecurity (12/5/2023)    Received from [facility]    Hunger Vital Sign     Worried About Running Out of Food in the Last Year: Never true     Ran Out of Food in the Last Year: Never true   Transportation Needs: No Transportation Needs (12/5/2023)    Received from [facility]    PRAPARE - Transportation     Lack of Transportation (Medical): No     Lack of Transportation (Non-Medical): No   Physical Activity: Insufficiently Active (12/5/2023)    Received from [facility]    Exercise Vital Sign     Days of Exercise per Week: 5 days     Minutes of Exercise per Session: 10 min   Stress: Stress Concern Present (12/5/2023)    Received from [facility]    Citizen of [place] of Occupational [employer] - Occupational Stress Questionnaire     Feeling of Stress : Rather much   Housing Stability: Low Risk  (12/5/2023)    Received from [facility]    Housing Stability Vital Sign     Unable to Pay for Housing in the Last Year: No     Number of Places Lived in the Last Year: 1     In the last 12 months, was there a time when you did not have a steady place to sleep or slept in a shelter (including now)?: No       Current Outpatient Medications   Medication Sig Dispense Refill    acetaminophen (TYLENOL) 500 MG tablet Take 1 tablet (500 mg total) by mouth 2 (two) times daily as needed for Pain. 60 tablet 0    ADVAIR DISKUS 100-50 mcg/dose diskus inhaler Inhale 2 puffs into the lungs 2 (two) times daily.      albuterol (PROVENTIL/VENTOLIN HFA) 90 mcg/actuation inhaler Inhale 2 puffs into the lungs every 4 (four) hours as needed for Wheezing 8.5 g 11    albuterol (PROVENTIL/VENTOLIN HFA) 90 mcg/actuation inhaler Inhale 2 puffs into the lungs every 4 (four) hours as needed for Wheezing 8.5 g 11    ascorbic acid, vitamin C, (VITAMIN C) 500 MG tablet Take 500 mg by mouth once daily.      ashwagandha root extract 500 mg Cap Take 2 capsules  orally as needed. 180 capsule 4    benzonatate (TESSALON) 100 MG capsule Take 2 capsules by mouth 3 (three) times daily as needed for Cough for up to 7 days 20 capsule 0    cetirizine (ZYRTEC) 10 MG tablet Take 1 tablet (10 mg total) by mouth once daily. 30 tablet 11    diclofenac sodium (VOLTAREN) 1 % Gel Apply 2 g topically 4 (four) times daily as needed. 200 g 0    dicyclomine (BENTYL) 20 mg tablet Take 1 tablet (20 mg total) by mouth 3 (three) times daily as needed (abdominal pain). 60 tablet 5    DULoxetine (CYMBALTA) 60 MG capsule Take 1 capsule (60 mg total) by mouth once daily. 30 capsule 2    dupilumab (DUPIXENT PEN) 300 mg/2 mL PnIj Inject 300 mg into the skin every 14 (fourteen) days 4 mL 5    FLOVENT  mcg/actuation inhaler 1 puff 2 (two) times daily.      fluticasone propionate (FLONASE) 50 mcg/actuation nasal spray 2 sprays by Nasal route daily 16 g 5    fluticasone-umeclidin-vilanter (TRELEGY ELLIPTA) 200-62.5-25 mcg inhaler Inhale 1 puff into the lungs daily 60 each 3    gabapentin (NEURONTIN) 100 MG capsule Take 1 capsule (100 mg total) by mouth 3 (three) times daily as needed. 30 capsule 6    ibuprofen (ADVIL,MOTRIN) 600 MG tablet Take 1 tablet (600 mg total) by mouth 2 (two) times daily as needed for Pain. 60 tablet 0    loratadine (CLARITIN) 10 mg tablet       meclizine (ANTIVERT) 12.5 mg tablet Take 1 tablet (12.5 mg total) by mouth 2 (two) times daily as needed for Dizziness. 28 tablet 0    meloxicam (MOBIC) 15 MG tablet Take 1 tablet (15 mg total) by mouth once daily. With food. 30 tablet 2    omeprazole (PRILOSEC) 40 MG capsule Take 1 capsule (40 mg total) by mouth once daily. 30 capsule 5    ondansetron (ZOFRAN-ODT) 4 MG TbDL Dissolve 1 tablet (4 mg total) by mouth 4 (four) times daily before meals and nightly. 120 tablet 2    polyethylene glycol (GLYCOLAX) 17 gram/dose powder Take 17 g by mouth once daily. 510 g 11    tiotropium (SPIRIVA WITH HANDIHALER) 18 mcg inhalation capsule  Inhale 1 capsule into the lungs daily 30 capsule 12    amitriptyline (ELAVIL) 25 MG tablet Take 1 tablet (25 mg total) by mouth every evening. 30 tablet 2    estradioL (ESTRACE) 0.01 % (0.1 mg/gram) vaginal cream Place 1 g vaginally twice a week. Use 1/2 gram nightly for 1st week 42.5 g 1    hydrocortisone 2.5 % cream Apply topically 2 (two) times daily. for 14 days 30 g 0    lubiprostone (AMITIZA) 24 MCG Cap Take 1 capsule (24 mcg total) by mouth 2 (two) times daily with meals. 60 capsule 2    methylPREDNISolone (MEDROL DOSEPACK) 4 mg tablet  (Patient not taking: Reported on 5/3/2024)      metoclopramide HCl (REGLAN) 5 MG tablet Take 1 tablet (5 mg total) by mouth 3 (three) times daily before meals. (Patient not taking: Reported on 5/3/2024) 90 tablet 1    nirmatrelvir-ritonavir (PAXLOVID) 300 mg (150 mg x 2)-100 mg copackaged tablets (EUA) Take 3 tablets by mouth 2 (two) times daily for 5 days (Patient not taking: Reported on 5/3/2024) 30 tablet 0    predniSONE (DELTASONE) 10 MG tablet Take 3 by mouth every morning x 2 days then 2 tablets  x 2 days then 1 tablet  x 2 days then stop (Patient not taking: Reported on 5/3/2024) 12 tablet 0    predniSONE (DELTASONE) 20 MG tablet Take 1 tablet by mouth daily for 5 days (Patient not taking: Reported on 5/3/2024) 5 tablet 0    promethazine-dextromethorphan (PROMETHAZINE-DM) 6.25-15 mg/5 mL Syrp Take 5 mLs by mouth 4 (four) times daily as needed for Cough for up to 7 days (Patient not taking: Reported on 5/3/2024) 118 mL 0    sumatriptan (IMITREX) 100 MG tablet Take 1 tablet (100 mg total) by mouth every 2 (two) hours as needed for Migraine. 10 tablet 3    tiotropium (SPIRIVA WITH HANDIHALER) 18 mcg inhalation capsule Inhale 1 capsule into the lungs daily 30 capsule 12     No current facility-administered medications for this visit.       Review of patient's allergies indicates:   Allergen Reactions    Raspberry (rubus idaeus)          Pre-operative evaluation with risk  assessment              -           Scheduled for Pyloroplasty on 06/07/2024 by Dr. Clifford Lynch MD  -           Per patient self-report of activity, DASI score: 36.7 w/ Functional Capacity in METS: 7.25 (>4) with a revised cardiac index   risk of O points (Class I Risk).    - Pt is NOT on antiplatelets/anticoagulation.   - PT does NOT have hx of blood dyscrasias or previous reaction to anesthesia   - Pt is a former smoker.  - Pt has NO prior h/o HLD/CAD w/ either MI or CVA. ASCVD: 0.7%. Last lipid panel from 2/15/2022. Updated collection pending.  - Pt has NO prior h/o DM. Last HgbA1C:~4.8% 11/02/2022  - Pt has NO prior h/o thyroid disease. TSH: 1.057 11/02/2022  - Pt has NO prior h/o HTN. BP: 109/68.   - PT DOES have prior h/o COPD/Asthma/RAH/OHS. Does NOT use CPAP. Pt has Asthma with current classification of Persistent Moderate based on her symptoms and the EPR-3 classification guidelines.   - Pt has NO prior h/o HF. Echo: Never performed  - BMI: 28.22kg/msq              -           The patient is medically optimized to proceed with a low to moderate risk for a low to moderate risk surgery. Pt still has some pending labs to determine most accurate current risk factors.   - Please call our office for any additional questions or concerns.      Rene Orr MD, MPH  [facility] [place]  PGY-1  05/14/2024

## 2024-05-15 ENCOUNTER — PATIENT MESSAGE (OUTPATIENT)
Dept: FAMILY MEDICINE | Facility: HOSPITAL | Age: 44
End: 2024-05-15
Payer: MEDICAID

## 2024-05-15 PROBLEM — Z01.818 PREOP EXAMINATION: Status: ACTIVE | Noted: 2024-05-15

## 2024-05-18 ENCOUNTER — HOSPITAL ENCOUNTER (EMERGENCY)
Facility: HOSPITAL | Age: 44
Discharge: HOME | End: 2024-05-18
Attending: EMERGENCY MEDICINE
Payer: MEDICAID

## 2024-05-18 VITALS
WEIGHT: 175 LBS | BODY MASS INDEX: 26.52 KG/M2 | SYSTOLIC BLOOD PRESSURE: 144 MMHG | DIASTOLIC BLOOD PRESSURE: 84 MMHG | RESPIRATION RATE: 20 BRPM | HEART RATE: 82 BPM | OXYGEN SATURATION: 98 % | HEIGHT: 68 IN | TEMPERATURE: 98 F

## 2024-05-18 DIAGNOSIS — M77.10 LATERAL EPICONDYLITIS, UNSPECIFIED LATERALITY: ICD-10-CM

## 2024-05-18 DIAGNOSIS — M77.00 MEDIAL EPICONDYLITIS OF ELBOW, UNSPECIFIED LATERALITY: Primary | ICD-10-CM

## 2024-05-18 DIAGNOSIS — M25.529 ELBOW PAIN: ICD-10-CM

## 2024-05-18 PROCEDURE — 99283 EMERGENCY DEPT VISIT LOW MDM: CPT | Mod: 25

## 2024-05-18 PROCEDURE — 63600175 PHARM REV CODE 636 W HCPCS: Performed by: EMERGENCY MEDICINE

## 2024-05-18 PROCEDURE — 25000003 PHARM REV CODE 250: Performed by: EMERGENCY MEDICINE

## 2024-05-18 RX ORDER — DEXAMETHASONE 4 MG/1
12 TABLET ORAL
Status: COMPLETED | OUTPATIENT
Start: 2024-05-18 | End: 2024-05-18

## 2024-05-18 RX ORDER — ACETAMINOPHEN 500 MG
1000 TABLET ORAL
Status: COMPLETED | OUTPATIENT
Start: 2024-05-18 | End: 2024-05-18

## 2024-05-18 RX ADMIN — ACETAMINOPHEN 1000 MG: 500 TABLET ORAL at 10:05

## 2024-05-18 RX ADMIN — DEXAMETHASONE 12 MG: 4 TABLET ORAL at 10:05

## 2024-05-18 NOTE — ED PROVIDER NOTES
Encounter Date: 2024       History     Chief Complaint   Patient presents with    Elbow Pain     Dx with tennis elbow a few weeks ago but states pain getting worse     HPI  44-year-old female with past medical history as noted below coming in with continued elbow pain since .  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  She went to her primary care doctor who diagnosed her with epicondylitis and prescribed her Tylenol, Motrin and Voltaren.  She has been using ice, Tylenol, Motrin but is not been helping her right elbow pain in fact she feels like it has gotten worse.    However she does note that she has had to work daily she works 12 hour days 5 days a weeks in the kitchen in his right handed and while she was trying to avoid using her right upper extremity she still has had to use it.  She denies any falls or trauma or other direct blow to the elbow.    Feels like she is some redness to her elbow and bruising but has also been using tape and feels like the tape may have also been irritating her skin.    Review of patient's allergies indicates:   Allergen Reactions    Raspberry (rubus idaeus)      Past Medical History:   Diagnosis Date    Anemia     Back pain     Depression      Past Surgical History:   Procedure Laterality Date     SECTION      COLD KNIFE CONIZATION OF CERVIX  12/10/2021    Procedure: CONE BIOPSY, CERVIX, USING COLD KNIFE;  Surgeon: Modesto Nassar MD;  Location: [facility] OR;  Service: OB/GYN;;    COLONOSCOPY N/A 2021    Procedure: COLONOSCOPY;  Surgeon: Emily Cruz MD;  Location: [facility] ENDO;  Service: Endoscopy;  Laterality: N/A;    ESOPHAGOGASTRODUODENOSCOPY N/A 2021    Procedure: EGD (ESOPHAGOGASTRODUODENOSCOPY);  Surgeon: Emily Cruz MD;  Location: [facility] ENDO;  Service: Endoscopy;  Laterality: N/A;    HYSTERECTOMY      ROBOT-ASSISTED LAPAROSCOPIC HYSTERECTOMY N/A 2022    Procedure:  ROBOTIC HYSTERECTOMY;  Surgeon: Modesto Nassar MD;  Location: [facility] OR;  Service: OB/GYN;  Laterality: N/A;    ROBOT-ASSISTED SALPINGECTOMY Bilateral 03/16/2022    Procedure: ROBOTIC SALPINGECTOMY;  Surgeon: Modesto Nassar MD;  Location: [facility] OR;  Service: OB/GYN;  Laterality: Bilateral;    SINUS SURGERY      TUBAL LIGATION       Family History   Problem Relation Name Age of Onset    Hypertension Mother      Diabetes Mother      Breast cancer Neg Hx      Colon cancer Neg Hx      Ovarian cancer Neg Hx       Social History     Tobacco Use    Smoking status: Former     Current packs/day: 0.00     Types: Cigarettes     Quit date: 3/3/2021     Years since quitting: 3.2    Smokeless tobacco: Never   Substance Use Topics    Alcohol use: No    Drug use: Yes     Types: Marijuana     Comment: daily     Review of Systems    Physical Exam     Initial Vitals [05/18/24 0919]   BP Pulse Resp Temp SpO2   (!) 144/84 82 20 97.8 °F (36.6 °C) 98 %      MAP       --         Physical Exam    Physical Exam:  CONSTITUTIONAL: Well developed, well nourished, in no acute distress, calm  HENT: Normocephalic, atraumatic    EYES: Sclerae anicteric   NECK: Supple  RESPIRATORY: Breathing comfortably. Speaking in full sentences   NEUROLOGIC: Alert, interacting normally. No facial droop.   MSK:  Right elbow with nonspecific discomfort to palpation both the lateral and medial epicondyle, normal active and passive range motion, no signs of bursitis, no signs of effusion, distally she is neurovascularly intact in the hand.  There is no tenderness of the shoulder, humerus, forearm, wrist or and.  No visible deformities.   SKIN:  Mild area of erythema to the medial elbow at the location where tape was previously located.  No visible rash on exposed areas of skin.    PSYCH: Mood and affect normal.       ED Course   Procedures  Labs Reviewed - No data to display         Imaging Results              X-Ray Elbow Complete Right (Final result)  Result  time 05/18/24 10:42:49      Final result by Bri Stevens MD (05/18/24 10:42:49)                   Impression:      No acute process seen      Electronically signed by: Bri Stevens MD  Date:    05/18/2024  Time:    10:42               Narrative:    EXAMINATION:  XR ELBOW COMPLETE 3 VIEW RIGHT    CLINICAL HISTORY:  . Pain in unspecified elbow    TECHNIQUE:  AP, lateral, and oblique views of the right elbow were performed.    COMPARISON:  None    FINDINGS:  Normal alignment, normal mineralization.  No acute fracture, no osseous lesions.  No significant degenerative change.  No joint effusion.  The soft tissues appear normal.                                       Medications   acetaminophen tablet 1,000 mg (1,000 mg Oral Given 5/18/24 1057)   dexAMETHasone tablet 12 mg (12 mg Oral Given 5/18/24 1057)     Medical Decision Making  Amount and/or Complexity of Data Reviewed  External Data Reviewed: notes.     Details: Seen on 04/26:  Pain likely due to strain in the pronator teres muscles, as that was the motion occurring when the injury was noticed. There is no visible or palpable injury or deformity. No signs of fracture or soft tissue swelling. Appears to be improving already per patient. Will continue to monitor for one week of conservative therapy, including oral pain management with ibuprofen and tylenol, voltaren gel, alternating heat and ice packs, and rest to let the injured tissue heal. If symptoms persist or worsen at next visit in 1 week, then will reassess and consider other etiologies     Strain of other muscles, fascia and tendons at forearm level, right arm, initial encounter  -     ibuprofen (ADVIL,MOTRIN) 600 MG tablet; Take 1 tablet (600 mg total) by mouth 2 (two) times daily as needed for Pain.  Dispense: 60 tablet; Refill: 0  -     acetaminophen (TYLENOL) 500 MG tablet; Take 1 tablet (500 mg total) by mouth 2 (two) times daily as needed for Pain.  Dispense: 60 tablet; Refill: 0  -      diclofenac sodium (VOLTAREN) 1 % Gel; Apply 2 g topically 4 (four) times daily as needed.  Dispense: 200 g; Refill: 0    Radiology: ordered.    Risk  OTC drugs.  Prescription drug management.      44-year-old female with past medical history as noted coming in with continued right elbow pain in the setting of multiple long days of work in the kitchen.    Suspect continued both lateral and medial epicondylitis that has been exacerbated by continued work despite Tylenol ibuprofen.    Very low suspicion for fractures but will obtain x-ray to assess bones.  More range of motion, no infectious symptoms, no significant erythema or effusion, not consistent with septic arthritis at this time.  Not consistent with gout.    Not consistent with dangerous nerve entrapment as she is distally in her early vascularly intact.    Will give dose of Tylenol and dose of dexamethasone for symptom control here in the emergency department.    If ED workup is unremarkable discharge home with continued outpatient workup, supportive care.  She will most likely have to take some time and rest her right elbow/right upper extremity otherwise I suspect these symptoms will not improve.    Update:  In the ED she remains well-appearing.  X-rays unremarkable.      Continue to suspect epicondylitis, advised to avoid strenuous activity with the right arm, she was given a sling which she was instructed to only wear while working to prevent her self from using that right elbow but otherwise not wear it continuously to prevent stiffness.  Tylenol, Motrin, outpatient follow-up referred to sports Medicine.    Findings of ED work up and return precautions verbally explained to patient. Patient agrees with discharge plan, return instructions and verbalizes understanding of return precautions.                                       Clinical Impression:  Final diagnoses:  [M25.529] Elbow pain  [M77.00] Medial epicondylitis of elbow, unspecified laterality  (Primary)  [M77.10] Lateral epicondylitis, unspecified laterality          ED Disposition Condition    Discharge Stable          ED Prescriptions    None       Follow-up Information    None          Anibal Esquivel MD  05/19/24 1667

## 2024-05-18 NOTE — DISCHARGE INSTRUCTIONS
History was negative for concerning bone issues.  It is likely that you have epicondylitis which is an inflammation of your elbow secondary to repetitive use.    You can continue take Tylenol and ibuprofen in over-the-counter doses.  Hopefully the steroids will kick in an improved your symptoms.    It is recommended that you wear a counter for some brace which can be purchased in pharmacies or online.    If you continue to use your elbow significantly fo please follow-up with your r many hours a day the symptoms will likely not resolve in possibly worsened.  Please attempt to not use your right upper extremity for any strenuous or repetitive activities.    A referral was placed for sports medicine.  Please follow-up with your doctor regardless within 1 week for continued evaluation.

## 2024-05-18 NOTE — ED NOTES
"R elbow pain, seen few weeks ago for "tennis elbow." Given pain meds but no relief. Pain is increasing, more painful with movement.     Patient identifiers for Wandy Shah 44 y.o. female checked and correct.  Chief Complaint   Patient presents with    Elbow Pain     Dx with tennis elbow a few weeks ago but states pain getting worse     Past Medical History:   Diagnosis Date    Anemia     Back pain     Depression      Allergies reported:   Review of patient's allergies indicates:   Allergen Reactions    Raspberry (rubus idaeus)        Appearance: Pt awake, alert & oriented to person, place & time. Pt in no acute distress at present time. Pt is clean and well groomed with clothes appropriately fastened.   Skin: Skin warm, dry & intact. Color consistent with ethnicity. Mucous membranes moist. No breakdown or brusing noted.   Musculoskeletal: No obvious swelling or deformities noted. ROM decreased to R elbow r/t pain  Respiratory: Respirations spontaneous, even, and non-labored. Visible chest rise noted. Airway is open and patent. No accessory muscle use noted.   Neurologic: Sensation is intact. Speech is clear and appropriate. Eyes open spontaneously, behavior appropriate to situation, follows commands, facial expression symmetrical, bilateral hand grasp equal and even, purposeful motor response noted.  Cardiac: All peripheral pulses present. No Bilateral lower extremity edema. Cap refill is <3 seconds.  Abdomen: Abdomen soft, non distended, non tender to palpation.   : Pt voids independently, denies dysuria, hematuria, frequency.      "

## 2024-05-21 ENCOUNTER — PATIENT MESSAGE (OUTPATIENT)
Dept: FAMILY MEDICINE | Facility: HOSPITAL | Age: 44
End: 2024-05-21
Payer: MEDICAID

## 2024-05-22 ENCOUNTER — PATIENT MESSAGE (OUTPATIENT)
Dept: FAMILY MEDICINE | Facility: HOSPITAL | Age: 44
End: 2024-05-22
Payer: MEDICAID

## 2024-05-23 ENCOUNTER — LAB VISIT (OUTPATIENT)
Dept: LAB | Facility: HOSPITAL | Age: 44
End: 2024-05-23
Attending: STUDENT IN AN ORGANIZED HEALTH CARE EDUCATION/TRAINING PROGRAM
Payer: MEDICAID

## 2024-05-23 DIAGNOSIS — Z01.818 PREOP EXAMINATION: ICD-10-CM

## 2024-05-23 LAB
ALBUMIN SERPL BCP-MCNC: 3.2 G/DL (ref 3.5–5.2)
ALP SERPL-CCNC: 48 U/L (ref 55–135)
ALT SERPL W/O P-5'-P-CCNC: 10 U/L (ref 10–44)
ANION GAP SERPL CALC-SCNC: 7 MMOL/L (ref 8–16)
AST SERPL-CCNC: 10 U/L (ref 10–40)
BASOPHILS # BLD AUTO: 0.05 K/UL (ref 0–0.2)
BASOPHILS NFR BLD: 0.4 % (ref 0–1.9)
BILIRUB SERPL-MCNC: 0.3 MG/DL (ref 0.1–1)
BUN SERPL-MCNC: 19 MG/DL (ref 6–20)
CALCIUM SERPL-MCNC: 8.5 MG/DL (ref 8.7–10.5)
CHLORIDE SERPL-SCNC: 109 MMOL/L (ref 95–110)
CHOLEST SERPL-MCNC: 196 MG/DL (ref 120–199)
CHOLEST/HDLC SERPL: 6.1 {RATIO} (ref 2–5)
CO2 SERPL-SCNC: 22 MMOL/L (ref 23–29)
CREAT SERPL-MCNC: 0.8 MG/DL (ref 0.5–1.4)
DIFFERENTIAL METHOD BLD: ABNORMAL
EOSINOPHIL # BLD AUTO: 0.3 K/UL (ref 0–0.5)
EOSINOPHIL NFR BLD: 2.8 % (ref 0–8)
ERYTHROCYTE [DISTWIDTH] IN BLOOD BY AUTOMATED COUNT: 13.7 % (ref 11.5–14.5)
EST. GFR  (NO RACE VARIABLE): >60 ML/MIN/1.73 M^2
ESTIMATED AVG GLUCOSE: 91 MG/DL (ref 68–131)
GLUCOSE SERPL-MCNC: 122 MG/DL (ref 70–110)
HBA1C MFR BLD: 4.8 % (ref 4–5.6)
HCT VFR BLD AUTO: 41.1 % (ref 37–48.5)
HDLC SERPL-MCNC: 32 MG/DL (ref 40–75)
HDLC SERPL: 16.3 % (ref 20–50)
HGB BLD-MCNC: 13.5 G/DL (ref 12–16)
IMM GRANULOCYTES # BLD AUTO: 0.07 K/UL (ref 0–0.04)
IMM GRANULOCYTES NFR BLD AUTO: 0.6 % (ref 0–0.5)
LDLC SERPL CALC-MCNC: 135.8 MG/DL (ref 63–159)
LYMPHOCYTES # BLD AUTO: 2.1 K/UL (ref 1–4.8)
LYMPHOCYTES NFR BLD: 17.5 % (ref 18–48)
MCH RBC QN AUTO: 31.6 PG (ref 27–31)
MCHC RBC AUTO-ENTMCNC: 32.8 G/DL (ref 32–36)
MCV RBC AUTO: 96 FL (ref 82–98)
MONOCYTES # BLD AUTO: 0.9 K/UL (ref 0.3–1)
MONOCYTES NFR BLD: 7.3 % (ref 4–15)
NEUTROPHILS # BLD AUTO: 8.4 K/UL (ref 1.8–7.7)
NEUTROPHILS NFR BLD: 71.4 % (ref 38–73)
NONHDLC SERPL-MCNC: 164 MG/DL
NRBC BLD-RTO: 0 /100 WBC
PLATELET # BLD AUTO: 193 K/UL (ref 150–450)
PMV BLD AUTO: 10.4 FL (ref 9.2–12.9)
POTASSIUM SERPL-SCNC: 4.2 MMOL/L (ref 3.5–5.1)
PROT SERPL-MCNC: 6.5 G/DL (ref 6–8.4)
RBC # BLD AUTO: 4.27 M/UL (ref 4–5.4)
SODIUM SERPL-SCNC: 138 MMOL/L (ref 136–145)
TRIGL SERPL-MCNC: 141 MG/DL (ref 30–150)
TSH SERPL DL<=0.005 MIU/L-ACNC: 3.2 UIU/ML (ref 0.4–4)
WBC # BLD AUTO: 11.74 K/UL (ref 3.9–12.7)

## 2024-05-23 PROCEDURE — 80061 LIPID PANEL: CPT | Performed by: STUDENT IN AN ORGANIZED HEALTH CARE EDUCATION/TRAINING PROGRAM

## 2024-05-23 PROCEDURE — 36415 COLL VENOUS BLD VENIPUNCTURE: CPT | Performed by: STUDENT IN AN ORGANIZED HEALTH CARE EDUCATION/TRAINING PROGRAM

## 2024-05-23 PROCEDURE — 84443 ASSAY THYROID STIM HORMONE: CPT | Performed by: STUDENT IN AN ORGANIZED HEALTH CARE EDUCATION/TRAINING PROGRAM

## 2024-05-23 PROCEDURE — 80053 COMPREHEN METABOLIC PANEL: CPT | Performed by: STUDENT IN AN ORGANIZED HEALTH CARE EDUCATION/TRAINING PROGRAM

## 2024-05-23 PROCEDURE — 83036 HEMOGLOBIN GLYCOSYLATED A1C: CPT | Performed by: STUDENT IN AN ORGANIZED HEALTH CARE EDUCATION/TRAINING PROGRAM

## 2024-05-23 PROCEDURE — 85025 COMPLETE CBC W/AUTO DIFF WBC: CPT | Performed by: STUDENT IN AN ORGANIZED HEALTH CARE EDUCATION/TRAINING PROGRAM

## 2024-05-26 DIAGNOSIS — F41.9 ANXIETY: ICD-10-CM

## 2024-05-26 RX ORDER — DULOXETIN HYDROCHLORIDE 60 MG/1
60 CAPSULE, DELAYED RELEASE ORAL DAILY
Qty: 30 CAPSULE | Refills: 2 | Status: CANCELLED | OUTPATIENT
Start: 2024-05-26 | End: 2024-08-24

## 2024-05-27 DIAGNOSIS — F41.9 ANXIETY: ICD-10-CM

## 2024-05-27 RX ORDER — DULOXETIN HYDROCHLORIDE 60 MG/1
60 CAPSULE, DELAYED RELEASE ORAL DAILY
Qty: 30 CAPSULE | Refills: 2 | Status: SHIPPED | OUTPATIENT
Start: 2024-05-27 | End: 2024-08-25

## 2024-05-30 ENCOUNTER — OFFICE VISIT (OUTPATIENT)
Dept: ORTHOPEDICS | Facility: CLINIC | Age: 44
End: 2024-05-30
Payer: MEDICAID

## 2024-05-30 DIAGNOSIS — M77.11 LATERAL EPICONDYLITIS OF RIGHT ELBOW: Primary | ICD-10-CM

## 2024-05-30 PROCEDURE — 1160F RVW MEDS BY RX/DR IN RCRD: CPT | Mod: CPTII,,,

## 2024-05-30 PROCEDURE — 99999 PR PBB SHADOW E&M-EST. PATIENT-LVL IV: CPT | Mod: PBBFAC,,,

## 2024-05-30 PROCEDURE — 1159F MED LIST DOCD IN RCRD: CPT | Mod: CPTII,,,

## 2024-05-30 PROCEDURE — 3044F HG A1C LEVEL LT 7.0%: CPT | Mod: CPTII,,,

## 2024-05-30 PROCEDURE — 20551 NJX 1 TENDON ORIGIN/INSJ: CPT | Mod: PBBFAC,PN

## 2024-05-30 PROCEDURE — 99214 OFFICE O/P EST MOD 30 MIN: CPT | Mod: S$PBB,25,,

## 2024-05-30 PROCEDURE — 20551 NJX 1 TENDON ORIGIN/INSJ: CPT | Mod: S$PBB,RT,,

## 2024-05-30 PROCEDURE — 99214 OFFICE O/P EST MOD 30 MIN: CPT | Mod: PBBFAC,PN,25

## 2024-05-30 PROCEDURE — 97110 THERAPEUTIC EXERCISES: CPT | Mod: GP,,,

## 2024-05-30 PROCEDURE — 99999PBSHW PR PBB SHADOW TECHNICAL ONLY FILED TO HB: Mod: PBBFAC,,,

## 2024-05-30 RX ADMIN — TRIAMCINOLONE ACETONIDE 20 MG: 40 INJECTION, SUSPENSION INTRA-ARTICULAR; INTRAMUSCULAR at 01:05

## 2024-05-30 NOTE — PROGRESS NOTES
Subjective:      Patient ID: Wandy Shah is a 44 y.o. female.    Chief Complaint: Pain of the Right Elbow      HPI: Wandy Shah is a 44 y.o. right hand dominant female who presents to clinic for follow up of right elbow pain. Patient was last seen by myself on 04/30/2024 for right wrist pain. She denies a history of trauma. She works as a cook at a restaurant, and states she believes this has aggravated her symptoms. The pain is aggravated by repetitive use. Patient has recently been taking Tylenol and previously prescribed Mobic, applying Voltaren gel, and applying ice without much relief. The pain is affecting ADLs and limiting desired level of activity.     Patient was recently seen (on 04/30/2024) for her right hand. She denies a history of trauma, but states the pain has been pretty constant for the past year. The pain is aggravated by use of the hand, particularly cutting/prepping foods.  Reports numbness and tingling on both the dorsal and ventral 1st-3rd digits. Associated symptoms include weakness and symptoms worse at night.  Patient has recently tried Voltaren Gel and carpal tunnel corticosteroid injections x2 which have provided minimal relief. Last visit, an EMG was ordered to evaluate carpal tunnel symptoms.       Occupation: Cook at [employer] in [place]    Ambulating: unassisted  Diabetic:  No  Smoking:  She quit smoking approximately 2 years ago.  History of DVT/PE: Negative    PAST MEDICAL HISTORY:    Past Medical History:   Diagnosis Date    Anemia     Back pain     Depression      MEDICATIONS:   Current Outpatient Medications:     ADVAIR DISKUS 100-50 mcg/dose diskus inhaler, Inhale 2 puffs into the lungs 2 (two) times daily., Disp: , Rfl:     albuterol (PROVENTIL/VENTOLIN HFA) 90 mcg/actuation inhaler, Inhale 2 puffs into the lungs every 4 (four) hours as needed for Wheezing, Disp: 8.5 g, Rfl: 11    albuterol (PROVENTIL/VENTOLIN HFA) 90 mcg/actuation inhaler, Inhale 2 puffs into the  lungs every 4 (four) hours as needed for Wheezing, Disp: 8.5 g, Rfl: 11    amitriptyline (ELAVIL) 25 MG tablet, Take 1 tablet (25 mg total) by mouth every evening., Disp: 30 tablet, Rfl: 2    ascorbic acid, vitamin C, (VITAMIN C) 500 MG tablet, Take 500 mg by mouth once daily., Disp: , Rfl:     ashwagandha root extract 500 mg Cap, Take 2 capsules orally as needed., Disp: 180 capsule, Rfl: 4    benzonatate (TESSALON) 100 MG capsule, Take 2 capsules by mouth 3 (three) times daily as needed for Cough for up to 7 days, Disp: 20 capsule, Rfl: 0    cetirizine (ZYRTEC) 10 MG tablet, Take 1 tablet (10 mg total) by mouth once daily., Disp: 30 tablet, Rfl: 11    diclofenac sodium (VOLTAREN) 1 % Gel, Apply 2 g topically 4 (four) times daily as needed., Disp: 200 g, Rfl: 0    dicyclomine (BENTYL) 20 mg tablet, Take 1 tablet (20 mg total) by mouth 3 (three) times daily as needed (abdominal pain)., Disp: 60 tablet, Rfl: 5    DULoxetine (CYMBALTA) 60 MG capsule, Take 1 capsule (60 mg total) by mouth once daily., Disp: 30 capsule, Rfl: 2    dupilumab (DUPIXENT PEN) 300 mg/2 mL PnIj, Inject 300 mg into the skin every 14 (fourteen) days, Disp: 4 mL, Rfl: 5    estradioL (ESTRACE) 0.01 % (0.1 mg/gram) vaginal cream, Place 1 g vaginally twice a week. Use 1/2 gram nightly for 1st week, Disp: 42.5 g, Rfl: 1    FLOVENT  mcg/actuation inhaler, 1 puff 2 (two) times daily., Disp: , Rfl:     fluticasone propionate (FLONASE) 50 mcg/actuation nasal spray, 2 sprays by Nasal route daily, Disp: 16 g, Rfl: 5    fluticasone-umeclidin-vilanter (TRELEGY ELLIPTA) 200-62.5-25 mcg inhaler, Inhale 1 puff into the lungs daily, Disp: 60 each, Rfl: 3    gabapentin (NEURONTIN) 100 MG capsule, Take 1 capsule (100 mg total) by mouth 3 (three) times daily as needed., Disp: 30 capsule, Rfl: 6    hydrocortisone 2.5 % cream, Apply topically 2 (two) times daily. for 14 days, Disp: 30 g, Rfl: 0    loratadine (CLARITIN) 10 mg tablet, , Disp: , Rfl:      lubiprostone (AMITIZA) 24 MCG Cap, Take 1 capsule (24 mcg total) by mouth 2 (two) times daily with meals., Disp: 60 capsule, Rfl: 2    meclizine (ANTIVERT) 12.5 mg tablet, Take 1 tablet (12.5 mg total) by mouth 2 (two) times daily as needed for Dizziness., Disp: 28 tablet, Rfl: 0    meloxicam (MOBIC) 15 MG tablet, Take 1 tablet (15 mg total) by mouth once daily. With food., Disp: 30 tablet, Rfl: 2    methylPREDNISolone (MEDROL DOSEPACK) 4 mg tablet, , Disp: , Rfl:     metoclopramide HCl (REGLAN) 5 MG tablet, Take 1 tablet (5 mg total) by mouth 3 (three) times daily before meals. (Patient not taking: Reported on 5/3/2024), Disp: 90 tablet, Rfl: 1    nirmatrelvir-ritonavir (PAXLOVID) 300 mg (150 mg x 2)-100 mg copackaged tablets (EUA), Take 3 tablets by mouth 2 (two) times daily for 5 days (Patient not taking: Reported on 5/3/2024), Disp: 30 tablet, Rfl: 0    omeprazole (PRILOSEC) 40 MG capsule, Take 1 capsule (40 mg total) by mouth once daily., Disp: 30 capsule, Rfl: 5    ondansetron (ZOFRAN-ODT) 4 MG TbDL, Dissolve 1 tablet (4 mg total) by mouth 4 (four) times daily before meals and nightly., Disp: 120 tablet, Rfl: 2    polyethylene glycol (GLYCOLAX) 17 gram/dose powder, Take 17 g by mouth once daily., Disp: 510 g, Rfl: 11    predniSONE (DELTASONE) 10 MG tablet, Take 3 by mouth every morning x 2 days then 2 tablets  x 2 days then 1 tablet  x 2 days then stop (Patient not taking: Reported on 5/3/2024), Disp: 12 tablet, Rfl: 0    predniSONE (DELTASONE) 20 MG tablet, Take 1 tablet by mouth daily for 5 days (Patient not taking: Reported on 5/3/2024), Disp: 5 tablet, Rfl: 0    promethazine-dextromethorphan (PROMETHAZINE-DM) 6.25-15 mg/5 mL Syrp, Take 5 mLs by mouth 4 (four) times daily as needed for Cough for up to 7 days (Patient not taking: Reported on 5/3/2024), Disp: 118 mL, Rfl: 0    rizatriptan (MAXALT) 10 MG tablet, take 1/2 - 1 at onset of migraine; may repeat in 2 hours if needed; maximum 2 in 24 hours; maximum  3 days per week, Disp: 10 tablet, Rfl: 2    sumatriptan (IMITREX) 100 MG tablet, Take 1 tablet (100 mg total) by mouth every 2 (two) hours as needed for Migraine., Disp: 10 tablet, Rfl: 3    tiotropium (SPIRIVA WITH HANDIHALER) 18 mcg inhalation capsule, Inhale 1 capsule into the lungs daily, Disp: 30 capsule, Rfl: 12    tiotropium (SPIRIVA WITH HANDIHALER) 18 mcg inhalation capsule, Inhale 1 capsule into the lungs daily, Disp: 30 capsule, Rfl: 12    topiramate (TOPAMAX) 25 MG tablet, Take 1 tablet by mouth daily; increase by 1 per week as needed for migraine up to 2 tablets twice a day, Disp: 120 tablet, Rfl: 3    ALLERGIES:   Review of patient's allergies indicates:   Allergen Reactions    Raspberry (rubus idaeus)        Review of Systems:  Constitution: Negative for chills, fever and night sweats.   HENT: Negative for congestion and headaches.    Eyes: Negative for blurred vision or vision loss.  Cardiovascular: Negative for chest pain and syncope.   Respiratory: Negative for cough and shortness of breath.    Endocrine: Negative for polydipsia, polyphagia and polyuria.   Hematologic/Lymphatic: Negative for bleeding problem. Does not bruise/bleed easily.   Skin: Negative for dry skin, itching and rash.   Musculoskeletal: See HPI.   Gastrointestinal: Negative for abdominal pain and bowel incontinence.   Genitourinary: Negative for bladder incontinence and nocturia.   Neurological: Negative for disturbances in coordination, loss of balance and seizures.   Psychiatric/Behavioral: Negative for depression. The patient does not have insomnia.    Allergic/Immunologic: Negative for hives and persistent infections.          Objective:      There were no vitals filed for this visit.    PHYSICAL EXAM:  General: Alert & oriented x3, well-developed and well-nourished, in no acute distress, sitting comfortably in the exam room.  Skin: Warm and dry. Capillary refill less than 2 seconds.   Head: Normocephalic and atraumatic.    Eyes: Sclera appear normal.   Nose: No deformities seen.   Ears: No deformities seen.   Neck: No tracheal deviation present.   Pulmonary/Chest: Breathing unlabored.   Neurological: Alert and oriented to person, place, and time.   Psychiatric: Mood is pleasant and affect appropriate.     RIGHT ELBOW:         Observation/Inspection:    No evidence of lesions, erythema, swelling, or visible deformity.        Palpation:   Tenderness:  lateral epicondyle and lateral musculature.  Otherwise, no tenderness to palpation to medial epicondyle, radial head, olecranon, or surrounding soft tissues.        Range of Motion:  Full to flexion, extension, supination, and pronation.  Lateral pain with flexion.         Strength:   Normal 5/5 strength in all tested muscle groups.        Special Tests:  Tinel's Test - Cubital Tunnel (Elbow)  Negative  Resisted wrist extension (Tennis Elbow)  Positive  Resisted wrist flexion (Golfer's Elbow)   Negative        Neurovascular Exam:  Digits warm and well perfused, brisk capillary refill <3 seconds throughout  NVI motor/LTS to median, radial, and ulnar nerves, radial pulse 2+      Imaging:   X-Rays: 3 views of right elbow dated 05/18/2024 , and independently reviewed, show: Normal alignment, normal mineralization. No acute fracture, no osseous lesions. No significant degenerative change. No joint effusion. The soft tissues appear normal.         Assessment:       1. Lateral epicondylitis of right elbow        Plan:       Orders Placed This Encounter    Tendon Origin: R elbow     I explained the nature of the problem to the patient.     I discussed at length with the patient all the different treatment options available for her right elbow, hand, and wrist including anti-inflammatories, acetaminophen, rest, ice, heat, physical/occupational therapy, and corticosteroid injections. I explained the potential role of surgery in the treatment of this condition. The patient understands that if  non-surgical measures do not adequately control symptoms, surgery will be considered in the future.     Medications:  Continue with previously prescribed Mobic 15 mg once daily. Continue with Voltaren Gel TID to the affected areas.   HEP:  62195 - Jenifer Luna PA-C instructed and demonstrated a lateral epicondylitis stretching HEP. The patient then demonstrated understanding of exercises and proper technique. This program was performed for 15 minutes.   Physical Therapy:  None at this time. Consider referral to physical therapy should symptoms not improve with injection and HEP.  Procedures:  Corticosteroid injection requested and performed today to the right elbow. See procedure note. Standard precautions provided.  DME:  Advised patient to obtain a tennis elbow strap over the counter. Educated patient on proper use. Continue use of wrist brace at night and at work with activities for carpal tunnel symptoms.   Pain Management: Ice compress to the affected area 2-3x a day for 15-20 minutes as needed for pain management.  Other:  Continue with previously scheduled bilateral upper extremity EMG to evaluate carpal tunnel symptoms. EMG scheduled for 07/30/2024.    Follow-Up:  2 months with Jenifer Luna PA-C for follow-up and to review EMG results.     All of the patient's questions were answered and the patient will contact us if they have any questions or concerns in the interim.    Jenifer Luna PA-C  [facility]  Orthopedic Surgery    Medical Dictation software was used during the dictation of portions or the entirety of this medical record.  Phonetic or grammatic errors may exist due to the use of this software. For clarification, refer to the author of the document.

## 2024-05-30 NOTE — PROCEDURES
Tendon Origin: R elbow    Date/Time: 5/30/2024 1:30 PM    Performed by: Jenifer Luna PA-C  Authorized by: Jenifer Luna PA-C    Consent Done?:  Yes (Verbal)  Timeout: prior to procedure the correct patient, procedure, and site was verified    Indications:  Pain  Site marked: the procedure site was marked    Timeout: prior to procedure the correct patient, procedure, and site was verified    Location:  Elbow  Site:  R elbow  Ultrasonic Guidance for Needle Placement?: No    Needle size:  25 G  Approach: lateral.  Medications:  20 mg triamcinolone acetonide 40 mg/mL  Patient tolerance:  Patient tolerated the procedure well with no immediate complications    Injection Procedure Note   Prior to procedure the correct patient, procedure, and site was verified. Allergies were reviewed and verbal consent was obtained. The procedure site was marked.    After time out was performed, the patient was prepped aseptically with betadine, the area was sprayed with local topical anesthetic, and then cleaned with alcohol. A therapeutic injection of combination of 0.5cc 1% Xylocaine and 20mg Triamcinolone was given under sterile technique using a 25-gauge x 5/8 needle in the right conjoined tendon wad to the lateral epicondyle using a lateral approach. Sterile dressing applied.     Patient tolerated the procedure well. Pain decreased. She had no adverse reactions to the medication.     The patient is cautioned that immediate relief of pain is secondary to the local anesthetic and will be temporary. After the anesthetic wears off there may be a increase in pain that may last for a few hours or a few days. Advised patient to avoid strenuous activities for the next 24-48 hours and to apply ice for 20 minutes to help alleviate this this pain. She was warned of possible blood sugar and/or blood pressure changes following injection. She was reminded to call the clinic immediately for any adverse side effects as explained in  clinic today.

## 2024-05-31 RX ORDER — TRIAMCINOLONE ACETONIDE 40 MG/ML
20 INJECTION, SUSPENSION INTRA-ARTICULAR; INTRAMUSCULAR
Status: DISCONTINUED | OUTPATIENT
Start: 2024-05-30 | End: 2024-05-31 | Stop reason: HOSPADM

## 2024-06-05 NOTE — PRE-PROCEDURE INSTRUCTIONS
PreOp Instructions given:   - Verbal medication information (what to hold and what to take)   - NPO guidelines - given/[facility]  - Arrival place directions given; time to be given the day before procedure by the   Surgeon's Office [facility]  - Bathing with antibacterial soap   - Don't wear any jewelry or bring any valuables AM of surgery   - No makeup or moisturizer to face   - No perfume/cologne, powder, lotions or aftershave   Pt. verbalized understanding.   Pt denies any h/o Anesthesia/Sedation complications or side effects.  Patient does not know arrival time.  Explained that this information comes from the surgeon's office and if they haven't heard from them by 2 or 3 pm to call the office.  Patient stated an understanding.

## 2024-06-06 ENCOUNTER — TELEPHONE (OUTPATIENT)
Dept: SURGERY | Facility: CLINIC | Age: 44
End: 2024-06-06
Payer: MEDICAID

## 2024-06-06 ENCOUNTER — ANESTHESIA EVENT (OUTPATIENT)
Dept: SURGERY | Facility: HOSPITAL | Age: 44
End: 2024-06-06
Payer: MEDICAID

## 2024-06-06 RX ORDER — HYDROMORPHONE HYDROCHLORIDE 1 MG/ML
0.2 INJECTION, SOLUTION INTRAMUSCULAR; INTRAVENOUS; SUBCUTANEOUS EVERY 5 MIN PRN
OUTPATIENT
Start: 2024-06-06

## 2024-06-06 NOTE — ANESTHESIA PREPROCEDURE EVALUATION
[facility]  Anesthesia Pre-Operative Evaluation         Patient Name: Wandy Shah  YOB: 1980  MRN: [id number]    SUBJECTIVE:     Pre-operative evaluation for Procedure(s) (LRB):  XI ROBOTIC PYLOROPLASTY v myotomy (N/A)     06/06/2024    Wandy Shah is a 44 y.o. female w/ a significant PMHx of eosinophilic asthma (not hospitalized in the last year, uses rescue inhaler 2-3 times a week), covid 2/2024 (has residual cough and loss of taste), anxiety, gerd and gastroparesis.  She has had symptoms for many years and symptoms are worsening.  She has severe epigastric pain, extremely severe nausea and moderate to severe vomiting.     Patient now presents for the above procedure(s).    Prev airway: ; Method of Intubation: Direct laryngoscopy; Inserted by: CRNA; Airway Device: Endotracheal Tube; Mask Ventilation: Easy; Intubated: Postinduction; Style: Cuffed; Cuff Inflation: Minimal occlusive pressure; Placement Verified By: Auscultation, Capnometry; Grade: Grade I; Complicating Factors: None;     Patient Active Problem List   Diagnosis    Condyloma acuminatum    Hypertrophy of both inferior nasal turbinates    GERD without esophagitis    Chronic anterior ethmoidal sinusitis    Anxiety state    Chronic sphenoidal sinusitis    Generalized abdominal pain    Chronic idiopathic constipation    GERD (gastroesophageal reflux disease)    Migraine without aura, not intractable    Cherry angioma    ENZO II (cervical intraepithelial neoplasia II)    Acute bilateral low back pain with right-sided sciatica    Eosinophilic asthma    Gastroparesis    Overweight (BMI 25.0-29.9)    Preop examination       Review of patient's allergies indicates:   Allergen Reactions    Raspberry (rubus idaeus)        Current Inpatient Medications:      No current facility-administered medications on file prior to encounter.     Current Outpatient Medications on File Prior to Encounter   Medication Sig Dispense Refill     amitriptyline (ELAVIL) 25 MG tablet Take 1 tablet (25 mg total) by mouth every evening. 30 tablet 2    cetirizine (ZYRTEC) 10 MG tablet Take 1 tablet (10 mg total) by mouth once daily. (Patient taking differently: Take 10 mg by mouth every evening.) 30 tablet 11    loratadine (CLARITIN) 10 mg tablet       ADVAIR DISKUS 100-50 mcg/dose diskus inhaler Inhale 2 puffs into the lungs 2 (two) times daily.      albuterol (PROVENTIL/VENTOLIN HFA) 90 mcg/actuation inhaler Inhale 2 puffs into the lungs every 4 (four) hours as needed for Wheezing 8.5 g 11    ascorbic acid, vitamin C, (VITAMIN C) 500 MG tablet Take 500 mg by mouth once daily.      ashwagandha root extract 500 mg Cap Take 2 capsules orally as needed. 180 capsule 4    benzonatate (TESSALON) 100 MG capsule Take 2 capsules by mouth 3 (three) times daily as needed for Cough for up to 7 days (Patient not taking: Reported on 6/5/2024) 20 capsule 0    dicyclomine (BENTYL) 20 mg tablet Take 1 tablet (20 mg total) by mouth 3 (three) times daily as needed (abdominal pain). 60 tablet 5    dupilumab (DUPIXENT PEN) 300 mg/2 mL PnIj Inject 300 mg into the skin every 14 (fourteen) days 4 mL 5    estradioL (ESTRACE) 0.01 % (0.1 mg/gram) vaginal cream Place 1 g vaginally twice a week. Use 1/2 gram nightly for 1st week 42.5 g 1    FLOVENT  mcg/actuation inhaler 1 puff 2 (two) times daily.      fluticasone propionate (FLONASE) 50 mcg/actuation nasal spray 2 sprays by Nasal route daily 16 g 5    fluticasone-umeclidin-vilanter (TRELEGY ELLIPTA) 200-62.5-25 mcg inhaler Inhale 1 puff into the lungs daily (Patient taking differently: Inhale 1 puff into the lungs every evening.) 60 each 3    gabapentin (NEURONTIN) 100 MG capsule Take 1 capsule (100 mg total) by mouth 3 (three) times daily as needed. 30 capsule 6    hydrocortisone 2.5 % cream Apply topically 2 (two) times daily. for 14 days 30 g 0    meclizine (ANTIVERT) 12.5 mg tablet Take 1 tablet (12.5 mg total) by mouth 2  (two) times daily as needed for Dizziness. 28 tablet 0    methylPREDNISolone (MEDROL DOSEPACK) 4 mg tablet  (Patient not taking: Reported on 5/3/2024)      metoclopramide HCl (REGLAN) 5 MG tablet Take 1 tablet (5 mg total) by mouth 3 (three) times daily before meals. (Patient not taking: Reported on 5/3/2024) 90 tablet 1    nirmatrelvir-ritonavir (PAXLOVID) 300 mg (150 mg x 2)-100 mg copackaged tablets (EUA) Take 3 tablets by mouth 2 (two) times daily for 5 days (Patient not taking: Reported on 5/3/2024) 30 tablet 0    omeprazole (PRILOSEC) 40 MG capsule Take 1 capsule (40 mg total) by mouth once daily. 30 capsule 5    ondansetron (ZOFRAN-ODT) 4 MG TbDL Dissolve 1 tablet (4 mg total) by mouth 4 (four) times daily before meals and nightly. 120 tablet 2    polyethylene glycol (GLYCOLAX) 17 gram/dose powder Take 17 g by mouth once daily. 510 g 11    predniSONE (DELTASONE) 10 MG tablet Take 3 by mouth every morning x 2 days then 2 tablets  x 2 days then 1 tablet  x 2 days then stop (Patient not taking: Reported on 5/3/2024) 12 tablet 0    predniSONE (DELTASONE) 20 MG tablet Take 1 tablet by mouth daily for 5 days (Patient not taking: Reported on 5/3/2024) 5 tablet 0    promethazine-dextromethorphan (PROMETHAZINE-DM) 6.25-15 mg/5 mL Syrp Take 5 mLs by mouth 4 (four) times daily as needed for Cough for up to 7 days (Patient not taking: Reported on 5/3/2024) 118 mL 0    sumatriptan (IMITREX) 100 MG tablet Take 1 tablet (100 mg total) by mouth every 2 (two) hours as needed for Migraine. 10 tablet 3    tiotropium (SPIRIVA WITH HANDIHALER) 18 mcg inhalation capsule Inhale 1 capsule into the lungs daily 30 capsule 12    tiotropium (SPIRIVA WITH HANDIHALER) 18 mcg inhalation capsule Inhale 1 capsule into the lungs daily 30 capsule 12       Past Surgical History:   Procedure Laterality Date     SECTION      COLD KNIFE CONIZATION OF CERVIX  12/10/2021    Procedure: CONE BIOPSY, CERVIX, USING COLD KNIFE;  Surgeon: Modesto MORRIS  MD Cesario;  Location: [facility] OR;  Service: OB/GYN;;    COLONOSCOPY N/A 05/11/2021    Procedure: COLONOSCOPY;  Surgeon: Emily Cruz MD;  Location: [facility] ENDO;  Service: Endoscopy;  Laterality: N/A;    ESOPHAGOGASTRODUODENOSCOPY N/A 06/11/2021    Procedure: EGD (ESOPHAGOGASTRODUODENOSCOPY);  Surgeon: Emily Cruz MD;  Location: [facility] ENDO;  Service: Endoscopy;  Laterality: N/A;    HYSTERECTOMY      ROBOT-ASSISTED LAPAROSCOPIC HYSTERECTOMY N/A 03/16/2022    Procedure: ROBOTIC HYSTERECTOMY;  Surgeon: Modesto Nassar MD;  Location: [facility] OR;  Service: OB/GYN;  Laterality: N/A;    ROBOT-ASSISTED SALPINGECTOMY Bilateral 03/16/2022    Procedure: ROBOTIC SALPINGECTOMY;  Surgeon: Modesto Nassar MD;  Location: [facility] OR;  Service: OB/GYN;  Laterality: Bilateral;    SINUS SURGERY      TUBAL LIGATION         Social History     Socioeconomic History    Marital status:    Tobacco Use    Smoking status: Former     Current packs/day: 0.00     Types: Cigarettes     Quit date: 3/3/2021     Years since quitting: 3.2    Smokeless tobacco: Never   Substance and Sexual Activity    Alcohol use: No    Drug use: Yes     Types: Marijuana     Comment: daily    Sexual activity: Yes     Partners: Male     Birth control/protection: See Surgical Hx     Social Determinants of Health     Financial Resource Strain: Medium Risk (5/18/2024)    Received from [facility]    Overall Financial Resource Strain (CARDIA)     Difficulty of Paying Living Expenses: Somewhat hard   Food Insecurity: Food Insecurity Present (5/18/2024)    Received from [facility]    Hunger Vital Sign     Worried About Running Out of Food in the Last Year: Sometimes true     Ran Out of Food in the Last Year: Sometimes true   Transportation Needs: No Transportation Needs (5/18/2024)    Received from [facility]    PRAPARE - Transportation     Lack of Transportation (Medical): No     Lack of Transportation (Non-Medical): No   Physical Activity:  Insufficiently Active (5/18/2024)    Received from [facility]    Exercise Vital Sign     Days of Exercise per Week: 3 days     Minutes of Exercise per Session: 30 min   Stress: Stress Concern Present (5/18/2024)    Received from [facility]    [employer] - Occupational Stress Questionnaire     Feeling of Stress : Very much   Housing Stability: Low Risk  (12/5/2023)    [facility]    Housing Stability Vital Sign     Unable to Pay for Housing in the Last Year: No     Number of Places Lived in the Last Year: 1     In the last 12 months, was there a time when you did not have a steady place to sleep or slept in a shelter (including now)?: No       OBJECTIVE:     Vital Signs Range (Last 24H):         Significant Labs:  Lab Results   Component Value Date    WBC 11.74 05/23/2024    HGB 13.5 05/23/2024    HCT 41.1 05/23/2024     05/23/2024    CHOL 196 05/23/2024    TRIG 141 05/23/2024    HDL 32 (L) 05/23/2024    ALT 10 05/23/2024    AST 10 05/23/2024     05/23/2024    K 4.2 05/23/2024     05/23/2024    CREATININE 0.8 05/23/2024    BUN 19 05/23/2024    CO2 22 (L) 05/23/2024    TSH 3.203 05/23/2024    HGBA1C 4.8 05/23/2024       Diagnostic Studies: No relevant studies.    EKG:   Results for orders placed or performed during the hospital encounter of 04/25/21   EKG 12-lead    Collection Time: 04/25/21  7:29 PM    Narrative    Test Reason : AB PAIN    Vent. Rate : 062 BPM     Atrial Rate : 062 BPM     P-R Int : 156 ms          QRS Dur : 088 ms      QT Int : 410 ms       P-R-T Axes : 077 083 073 degrees     QTc Int : 416 ms    Normal sinus rhythm with sinus arrhythmia  Biatrial enlargement  Abnormal ECG  When compared with ECG of 01-OCT-2008 12:33,  T wave amplitude has increased in Inferior leads  T wave inversion less evident in Anterior leads  Confirmed by Nyla Schroeder MD ([id number]) on 4/26/2021 5:58:50 PM    Referred By: ARNAV   SELF            Confirmed By:Nyla Schroeder MD       2D ECHO:  TTE:  No results found for this or any previous visit.    IAN:  No results found for this or any previous visit.    ASSESSMENT/PLAN:           Pre-op Assessment    I have reviewed the Patient Summary Reports.     I have reviewed the Nursing Notes. I have reviewed the NPO Status.   I have reviewed the Medications.     Review of Systems  Anesthesia Hx:  No problems with previous Anesthesia   History of prior surgery of interest to airway management or planning:          Denies Family Hx of Anesthesia complications.    Denies Personal Hx of Anesthesia complications.                    Social:  Non-Smoker, No Alcohol Use       Hematology/Oncology:  Hematology Normal   Oncology Normal                                   EENT/Dental:  EENT/Dental Normal           Cardiovascular:  Exercise tolerance: good       Denies CAD.     Denies Dysrhythmias.                                    Pulmonary:    Denies COPD. Asthma     Denies Sleep Apnea.                Hepatic/GI:     GERD             Neurological:    Denies Neuromuscular Disease.                                   Endocrine:  Denies Diabetes.           Psych:  Denies Psychiatric History. anxiety                 Physical Exam  General: Well nourished, Cooperative, Alert and Oriented    Airway:  Mallampati: II   Mouth Opening: Normal  TM Distance: Normal  Tongue: Normal  Neck ROM: Normal ROM    Dental:  Intact    Chest/Lungs:  Clear to auscultation, Normal Respiratory Rate    Heart:  Rate: Normal  Rhythm: Regular Rhythm  Sounds: Normal    Abdomen:  Nontender, Soft, Normal        Anesthesia Plan  Type of Anesthesia, risks & benefits discussed:    Anesthesia Type: Gen ETT  Intra-op Monitoring Plan: Standard ASA Monitors  Post Op Pain Control Plan: multimodal analgesia  Induction:  IV  Airway Plan: Direct, Post-Induction  Informed Consent: Informed consent signed with the Patient and all parties understand the risks and agree with  anesthesia plan.  All questions answered.   ASA Score: 3  Day of Surgery Review of History & Physical: H&P Update referred to the surgeon/provider.    Ready For Surgery From Anesthesia Perspective.     .

## 2024-06-07 ENCOUNTER — ANESTHESIA (OUTPATIENT)
Dept: SURGERY | Facility: HOSPITAL | Age: 44
End: 2024-06-07
Payer: MEDICAID

## 2024-06-07 ENCOUNTER — HOSPITAL ENCOUNTER (OUTPATIENT)
Facility: HOSPITAL | Age: 44
Discharge: HOME | End: 2024-06-08
Attending: SURGERY | Admitting: SURGERY
Payer: MEDICAID

## 2024-06-07 DIAGNOSIS — K31.84 GASTROPARESIS: ICD-10-CM

## 2024-06-07 PROBLEM — J82.83 EOSINOPHILIC ASTHMA: Chronic | Status: ACTIVE | Noted: 2023-09-25

## 2024-06-07 PROBLEM — G43.009 MIGRAINE WITHOUT AURA, NOT INTRACTABLE: Chronic | Status: ACTIVE | Noted: 2021-09-22

## 2024-06-07 PROBLEM — F41.1 ANXIETY STATE: Chronic | Status: ACTIVE | Noted: 2021-04-24

## 2024-06-07 PROCEDURE — 25000003 PHARM REV CODE 250

## 2024-06-07 PROCEDURE — 63600175 PHARM REV CODE 636 W HCPCS: Performed by: STUDENT IN AN ORGANIZED HEALTH CARE EDUCATION/TRAINING PROGRAM

## 2024-06-07 PROCEDURE — 71000016 HC POSTOP RECOV ADDL HR: Performed by: SURGERY

## 2024-06-07 PROCEDURE — 63600175 PHARM REV CODE 636 W HCPCS

## 2024-06-07 PROCEDURE — 36000710: Performed by: SURGERY

## 2024-06-07 PROCEDURE — 25000003 PHARM REV CODE 250: Performed by: STUDENT IN AN ORGANIZED HEALTH CARE EDUCATION/TRAINING PROGRAM

## 2024-06-07 PROCEDURE — C9113 INJ PANTOPRAZOLE SODIUM, VIA: HCPCS

## 2024-06-07 PROCEDURE — 94761 N-INVAS EAR/PLS OXIMETRY MLT: CPT

## 2024-06-07 PROCEDURE — 43659 UNLISTED LAPS PX STOMACH: CPT | Mod: ,,, | Performed by: SURGERY

## 2024-06-07 PROCEDURE — 71000033 HC RECOVERY, INTIAL HOUR: Performed by: SURGERY

## 2024-06-07 PROCEDURE — 37000008 HC ANESTHESIA 1ST 15 MINUTES: Performed by: SURGERY

## 2024-06-07 PROCEDURE — 71000015 HC POSTOP RECOV 1ST HR: Performed by: SURGERY

## 2024-06-07 PROCEDURE — 27000221 HC OXYGEN, UP TO 24 HOURS

## 2024-06-07 PROCEDURE — 63600175 PHARM REV CODE 636 W HCPCS: Mod: JZ,JG | Performed by: ANESTHESIOLOGY

## 2024-06-07 PROCEDURE — 76942 ECHO GUIDE FOR BIOPSY: CPT | Performed by: STUDENT IN AN ORGANIZED HEALTH CARE EDUCATION/TRAINING PROGRAM

## 2024-06-07 PROCEDURE — 36000711: Performed by: SURGERY

## 2024-06-07 PROCEDURE — D9220A PRA ANESTHESIA: Mod: ,,, | Performed by: ANESTHESIOLOGY

## 2024-06-07 PROCEDURE — 64461 PVB THORACIC SINGLE INJ SITE: CPT | Performed by: STUDENT IN AN ORGANIZED HEALTH CARE EDUCATION/TRAINING PROGRAM

## 2024-06-07 PROCEDURE — 37000009 HC ANESTHESIA EA ADD 15 MINS: Performed by: SURGERY

## 2024-06-07 PROCEDURE — 76942 ECHO GUIDE FOR BIOPSY: CPT | Mod: 26,,, | Performed by: ANESTHESIOLOGY

## 2024-06-07 PROCEDURE — 64999 UNLISTED PX NERVOUS SYSTEM: CPT | Mod: ,,, | Performed by: ANESTHESIOLOGY

## 2024-06-07 RX ORDER — PROCHLORPERAZINE EDISYLATE 5 MG/ML
5 INJECTION INTRAMUSCULAR; INTRAVENOUS EVERY 30 MIN PRN
Status: DISCONTINUED | OUTPATIENT
Start: 2024-06-07 | End: 2024-06-07 | Stop reason: HOSPADM

## 2024-06-07 RX ORDER — ACETAMINOPHEN 500 MG
1000 TABLET ORAL
Status: COMPLETED | OUTPATIENT
Start: 2024-06-07 | End: 2024-06-07

## 2024-06-07 RX ORDER — CELECOXIB 200 MG/1
400 CAPSULE ORAL
Status: COMPLETED | OUTPATIENT
Start: 2024-06-07 | End: 2024-06-07

## 2024-06-07 RX ORDER — KETAMINE HCL IN 0.9 % NACL 50 MG/5 ML
SYRINGE (ML) INTRAVENOUS
Status: DISCONTINUED | OUTPATIENT
Start: 2024-06-07 | End: 2024-06-07

## 2024-06-07 RX ORDER — BUPIVACAINE HYDROCHLORIDE 7.5 MG/ML
INJECTION, SOLUTION EPIDURAL; RETROBULBAR
Status: COMPLETED | OUTPATIENT
Start: 2024-06-07 | End: 2024-06-07

## 2024-06-07 RX ORDER — LIDOCAINE HYDROCHLORIDE 20 MG/ML
INJECTION, SOLUTION EPIDURAL; INFILTRATION; INTRACAUDAL; PERINEURAL
Status: DISCONTINUED | OUTPATIENT
Start: 2024-06-07 | End: 2024-06-07

## 2024-06-07 RX ORDER — ACETAMINOPHEN 500 MG
1000 TABLET ORAL
Status: DISCONTINUED | OUTPATIENT
Start: 2024-06-07 | End: 2024-06-07

## 2024-06-07 RX ORDER — GABAPENTIN 250 MG/5ML
125 SOLUTION ORAL EVERY 8 HOURS
Status: DISCONTINUED | OUTPATIENT
Start: 2024-06-07 | End: 2024-06-08 | Stop reason: HOSPADM

## 2024-06-07 RX ORDER — ALBUTEROL SULFATE 90 UG/1
2 AEROSOL, METERED RESPIRATORY (INHALATION) EVERY 4 HOURS PRN
Status: DISCONTINUED | OUTPATIENT
Start: 2024-06-07 | End: 2024-06-08 | Stop reason: HOSPADM

## 2024-06-07 RX ORDER — SUCCINYLCHOLINE CHLORIDE 20 MG/ML
INJECTION INTRAMUSCULAR; INTRAVENOUS
Status: DISCONTINUED | OUTPATIENT
Start: 2024-06-07 | End: 2024-06-07

## 2024-06-07 RX ORDER — ONDANSETRON HYDROCHLORIDE 2 MG/ML
INJECTION, SOLUTION INTRAVENOUS
Status: DISCONTINUED | OUTPATIENT
Start: 2024-06-07 | End: 2024-06-07

## 2024-06-07 RX ORDER — AMITRIPTYLINE HYDROCHLORIDE 25 MG/1
25 TABLET, FILM COATED ORAL NIGHTLY
Status: DISCONTINUED | OUTPATIENT
Start: 2024-06-07 | End: 2024-06-08 | Stop reason: HOSPADM

## 2024-06-07 RX ORDER — SODIUM CHLORIDE 9 MG/ML
INJECTION, SOLUTION INTRAVENOUS CONTINUOUS
Status: DISCONTINUED | OUTPATIENT
Start: 2024-06-07 | End: 2024-06-07

## 2024-06-07 RX ORDER — LIDOCAINE HYDROCHLORIDE 10 MG/ML
INJECTION, SOLUTION EPIDURAL; INFILTRATION; INTRACAUDAL; PERINEURAL
Status: COMPLETED
Start: 2024-06-07 | End: 2024-06-07

## 2024-06-07 RX ORDER — DEXAMETHASONE SODIUM PHOSPHATE 4 MG/ML
INJECTION, SOLUTION INTRA-ARTICULAR; INTRALESIONAL; INTRAMUSCULAR; INTRAVENOUS; SOFT TISSUE
Status: DISCONTINUED | OUTPATIENT
Start: 2024-06-07 | End: 2024-06-07

## 2024-06-07 RX ORDER — CEFAZOLIN SODIUM 1 G/3ML
INJECTION, POWDER, FOR SOLUTION INTRAMUSCULAR; INTRAVENOUS
Status: DISCONTINUED | OUTPATIENT
Start: 2024-06-07 | End: 2024-06-07

## 2024-06-07 RX ORDER — PANTOPRAZOLE SODIUM 40 MG/10ML
40 INJECTION, POWDER, LYOPHILIZED, FOR SOLUTION INTRAVENOUS DAILY
Status: DISCONTINUED | OUTPATIENT
Start: 2024-06-07 | End: 2024-06-08 | Stop reason: HOSPADM

## 2024-06-07 RX ORDER — KETOROLAC TROMETHAMINE 15 MG/ML
15 INJECTION, SOLUTION INTRAMUSCULAR; INTRAVENOUS EVERY 6 HOURS
Status: DISCONTINUED | OUTPATIENT
Start: 2024-06-07 | End: 2024-06-07

## 2024-06-07 RX ORDER — ONDANSETRON HYDROCHLORIDE 2 MG/ML
4 INJECTION, SOLUTION INTRAVENOUS EVERY 12 HOURS PRN
Status: DISCONTINUED | OUTPATIENT
Start: 2024-06-07 | End: 2024-06-08 | Stop reason: HOSPADM

## 2024-06-07 RX ORDER — PROPOFOL 10 MG/ML
VIAL (ML) INTRAVENOUS
Status: DISCONTINUED | OUTPATIENT
Start: 2024-06-07 | End: 2024-06-07

## 2024-06-07 RX ORDER — KETOROLAC TROMETHAMINE 30 MG/ML
30 INJECTION, SOLUTION INTRAMUSCULAR; INTRAVENOUS EVERY 6 HOURS
Status: DISCONTINUED | OUTPATIENT
Start: 2024-06-07 | End: 2024-06-08 | Stop reason: HOSPADM

## 2024-06-07 RX ORDER — PROCHLORPERAZINE EDISYLATE 5 MG/ML
5 INJECTION INTRAMUSCULAR; INTRAVENOUS EVERY 6 HOURS PRN
Status: DISCONTINUED | OUTPATIENT
Start: 2024-06-07 | End: 2024-06-08 | Stop reason: HOSPADM

## 2024-06-07 RX ORDER — SODIUM CHLORIDE, SODIUM LACTATE, POTASSIUM CHLORIDE, CALCIUM CHLORIDE 600; 310; 30; 20 MG/100ML; MG/100ML; MG/100ML; MG/100ML
INJECTION, SOLUTION INTRAVENOUS CONTINUOUS
Status: DISCONTINUED | OUTPATIENT
Start: 2024-06-07 | End: 2024-06-08 | Stop reason: HOSPADM

## 2024-06-07 RX ORDER — ROCURONIUM BROMIDE 10 MG/ML
INJECTION, SOLUTION INTRAVENOUS
Status: DISCONTINUED | OUTPATIENT
Start: 2024-06-07 | End: 2024-06-07

## 2024-06-07 RX ORDER — ACETAMINOPHEN 10 MG/ML
1000 INJECTION, SOLUTION INTRAVENOUS EVERY 8 HOURS
Status: COMPLETED | OUTPATIENT
Start: 2024-06-07 | End: 2024-06-08

## 2024-06-07 RX ORDER — DULOXETIN HYDROCHLORIDE 60 MG/1
60 CAPSULE, DELAYED RELEASE ORAL DAILY
Status: DISCONTINUED | OUTPATIENT
Start: 2024-06-08 | End: 2024-06-08 | Stop reason: HOSPADM

## 2024-06-07 RX ORDER — TOPIRAMATE 25 MG/1
75 TABLET ORAL NIGHTLY
Status: DISCONTINUED | OUTPATIENT
Start: 2024-06-07 | End: 2024-06-08 | Stop reason: HOSPADM

## 2024-06-07 RX ORDER — MIDAZOLAM HYDROCHLORIDE 1 MG/ML
.5-4 INJECTION, SOLUTION INTRAMUSCULAR; INTRAVENOUS
Status: DISCONTINUED | OUTPATIENT
Start: 2024-06-07 | End: 2024-06-07 | Stop reason: HOSPADM

## 2024-06-07 RX ADMIN — KETOROLAC TROMETHAMINE 30 MG: 30 INJECTION, SOLUTION INTRAMUSCULAR at 11:06

## 2024-06-07 RX ADMIN — METHOCARBAMOL 500 MG: 100 INJECTION, SOLUTION INTRAMUSCULAR; INTRAVENOUS at 03:06

## 2024-06-07 RX ADMIN — TOPIRAMATE 75 MG: 25 TABLET, FILM COATED ORAL at 09:06

## 2024-06-07 RX ADMIN — ACETAMINOPHEN 1000 MG: 10 INJECTION, SOLUTION INTRAVENOUS at 09:06

## 2024-06-07 RX ADMIN — LIDOCAINE HYDROCHLORIDE: 10 INJECTION, SOLUTION EPIDURAL; INFILTRATION; INTRACAUDAL; PERINEURAL at 05:06

## 2024-06-07 RX ADMIN — BUPIVACAINE HYDROCHLORIDE 20 ML: 7.5 INJECTION, SOLUTION EPIDURAL; RETROBULBAR at 07:06

## 2024-06-07 RX ADMIN — DEXAMETHASONE SODIUM PHOSPHATE 4 MG: 4 INJECTION, SOLUTION INTRAMUSCULAR; INTRAVENOUS at 07:06

## 2024-06-07 RX ADMIN — ACETAMINOPHEN 1000 MG: 500 TABLET ORAL at 05:06

## 2024-06-07 RX ADMIN — CEFAZOLIN 2 G: 330 INJECTION, POWDER, FOR SOLUTION INTRAMUSCULAR; INTRAVENOUS at 07:06

## 2024-06-07 RX ADMIN — CELECOXIB 400 MG: 200 CAPSULE ORAL at 06:06

## 2024-06-07 RX ADMIN — PROPOFOL 200 MG: 10 INJECTION, EMULSION INTRAVENOUS at 07:06

## 2024-06-07 RX ADMIN — SODIUM CHLORIDE: 0.9 INJECTION, SOLUTION INTRAVENOUS at 06:06

## 2024-06-07 RX ADMIN — MIDAZOLAM 2 MG: 1 INJECTION INTRAMUSCULAR; INTRAVENOUS at 06:06

## 2024-06-07 RX ADMIN — Medication 10 MG: at 08:06

## 2024-06-07 RX ADMIN — PANTOPRAZOLE SODIUM 40 MG: 40 INJECTION, POWDER, LYOPHILIZED, FOR SOLUTION INTRAVENOUS at 12:06

## 2024-06-07 RX ADMIN — AMITRIPTYLINE HYDROCHLORIDE 25 MG: 25 TABLET, FILM COATED ORAL at 09:06

## 2024-06-07 RX ADMIN — LIDOCAINE HYDROCHLORIDE 100 MG: 20 INJECTION, SOLUTION EPIDURAL; INFILTRATION; INTRACAUDAL; PERINEURAL at 07:06

## 2024-06-07 RX ADMIN — ACETAMINOPHEN 1000 MG: 10 INJECTION, SOLUTION INTRAVENOUS at 02:06

## 2024-06-07 RX ADMIN — GABAPENTIN 125 MG: 250 SOLUTION ORAL at 02:06

## 2024-06-07 RX ADMIN — GABAPENTIN 125 MG: 250 SOLUTION ORAL at 09:06

## 2024-06-07 RX ADMIN — KETOROLAC TROMETHAMINE 30 MG: 30 INJECTION, SOLUTION INTRAMUSCULAR at 05:06

## 2024-06-07 RX ADMIN — KETOROLAC TROMETHAMINE 15 MG: 15 INJECTION, SOLUTION INTRAMUSCULAR; INTRAVENOUS at 12:06

## 2024-06-07 RX ADMIN — METHOCARBAMOL 500 MG: 100 INJECTION, SOLUTION INTRAMUSCULAR; INTRAVENOUS at 09:06

## 2024-06-07 RX ADMIN — ONDANSETRON 4 MG: 2 INJECTION INTRAMUSCULAR; INTRAVENOUS at 08:06

## 2024-06-07 RX ADMIN — ROCURONIUM BROMIDE 10 MG: 10 INJECTION, SOLUTION INTRAVENOUS at 08:06

## 2024-06-07 RX ADMIN — SUGAMMADEX 400 MG: 100 INJECTION, SOLUTION INTRAVENOUS at 08:06

## 2024-06-07 RX ADMIN — SODIUM CHLORIDE, POTASSIUM CHLORIDE, SODIUM LACTATE AND CALCIUM CHLORIDE: 600; 310; 30; 20 INJECTION, SOLUTION INTRAVENOUS at 08:06

## 2024-06-07 RX ADMIN — ROCURONIUM BROMIDE 40 MG: 10 INJECTION, SOLUTION INTRAVENOUS at 07:06

## 2024-06-07 RX ADMIN — Medication 20 MG: at 07:06

## 2024-06-07 RX ADMIN — SUCCINYLCHOLINE CHLORIDE 200 MG: 20 INJECTION, SOLUTION INTRAMUSCULAR; INTRAVENOUS at 07:06

## 2024-06-07 RX ADMIN — SODIUM CHLORIDE: 9 INJECTION, SOLUTION INTRAVENOUS at 07:06

## 2024-06-07 NOTE — PROGRESS NOTES
Nurses Note -- 4 Eyes      6/7/2024   10:46 AM      Skin assessed during: Admit      [x] No Altered Skin Integrity Present    []Prevention Measures Documented      [] Yes- Altered Skin Integrity Present or Discovered   [] LDA Added if Not in Epic (Describe Wound)   [] New Altered Skin Integrity was Present on Admit and Documented in LDA   [] Wound Image Taken    Wound Care Consulted? No    Attending Nurse:  Mihai Cobos RN/Staff Member:   Jenni

## 2024-06-07 NOTE — NURSING TRANSFER
Nursing Transfer Note      6/7/2024   10:39 AM    Reason patient is being transferred: Post procedure    Transfer To: 542    Transfer via stretcher    Transported by PCT    Order for Tele Monitor? Yes    Medicines sent: N/A    Any special needs or follow-up needed: Routine    Patient belongings transferred with patient: Yes    Chart send with patient: Yes    Notified: daughter    Patient reassessed at: 06/07/2024 @ 1000

## 2024-06-07 NOTE — OP NOTE
DATE OF PROCEDURE: 6/7/2024    PRE OP DIAGNOSIS: Gastroparesis [K31.84]    POST OP DIAGNOSIS: Gastroparesis [K31.84]    PROCEDURE: Procedure(s) (LRB):  XI ROBOTIC PYLOROMYOTOMY WITH EGD (N/A)    Surgeons and Role:     * Clifford Lynch MD - Primary     * Elena Horn MD - Resident - Assisting    ANESTHESIA: General.     PROCEDURE IN DETAIL:  The patient was placed under general anesthesia.  The   abdomen was prepped and draped in the usual manner.  Trochar sites were measured out 18 cm below the xyphoid and just to the left of midline and 7cm apart from each trochar with 2 to the left and one to the right.  Access to peritoneum was   gained through the right tochar site using the Optiview trocar under direct vision.    Pneumoperitoneum to 15 mmHg with CO2 gas was obtained.  The robotic trocars   were placed under direct vision.  We used one   cautiers on the patient's far left side and a hook cautery on the left and debakey's to the right to start our initial   dissection of the pylorus and take largely the serosa.  The hook was exchanged for the [place] to divided the pylorus   muscle.  Once this was completely divided and the mucosa was seen, we inspected   the area for hemostasis.  Endoscopy was performed and the endoscope easily traversed the pharynx, esophagus, stomach and into the duodenal.  The pyloromyotomy appeared wide opened and there were no perforation seen either endoscopically or with the robot.  The instruments were then removed from the abdomen and the robot was un-docked.    The trocars were removed under direct vision.  Prior to removing the   last trocar, pneumoperitoneum was allowed to escape.  The fascia at the naval   was closed with 0 Vicryl.  The skin incisions were closed with 4-0 plain catgut   and reinforced with skin glue.  The patient tolerated the procedure well and was   brought to Recovery Room in stable condition.  Sponge and needle counts were   correct at the end of the  case.    BLOOD LOSS:  Minimal.    COMPLICATIONS:  None.    PATHOLOGY:  None.

## 2024-06-07 NOTE — BRIEF OP NOTE
[facility] - Surgery ([facility])  Brief Operative Note    SUMMARY     Surgery Date: 6/7/2024     Surgeons and Role:     * Clifford Lynch MD - Primary     * Elena Horn MD - Resident - Assisting        Pre-op Diagnosis:  Gastroparesis [K31.84]    Post-op Diagnosis:  Post-Op Diagnosis Codes:     * Gastroparesis [K31.84]    Procedure(s) (LRB):  XI ROBOTIC PYLOROMYOTOMY WITH EGD (N/A)    Anesthesia: General/Regional    Implants:  * No implants in log *    Operative Findings:   Robotic pyloromyotomy performed without apparent complication. Intra-op EGD without violation of the gastric mucosa.     Estimated Blood Loss:   <25cc    Estimated Blood Loss has been documented.         Specimens:   Specimen (24h ago, onward)      None            [id number]

## 2024-06-07 NOTE — ANESTHESIA POSTPROCEDURE EVALUATION
Anesthesia Post Evaluation    Patient: Wandy Shah    Procedure(s) Performed: Procedure(s) (LRB):  XI ROBOTIC PYLOROMYOTOMY WITH EGD (N/A)    Final Anesthesia Type: general      Patient location during evaluation: PACU  Patient participation: Yes- Able to Participate  Level of consciousness: awake and alert  Post-procedure vital signs: reviewed and stable  Pain management: adequate  Airway patency: patent    PONV status at discharge: No PONV  Anesthetic complications: no      Cardiovascular status: blood pressure returned to baseline  Respiratory status: unassisted  Hydration status: euvolemic  Follow-up not needed.              Vitals Value Taken Time   /67 06/07/24 0917   Temp 36.2 °C (97.2 °F) 06/07/24 0841   Pulse 59 06/07/24 0918   Resp 19 06/07/24 0918   SpO2 97 % 06/07/24 0918   Vitals shown include unfiled device data.      No case tracking events are documented in the log.      Pain/Ildefonso Score: Pain Rating Prior to Med Admin: 0 (6/7/2024  9:00 AM)  Pain Rating Post Med Admin: 0 (6/7/2024  9:00 AM)  Ildefonso Score: 7 (6/7/2024  9:00 AM)

## 2024-06-07 NOTE — TRANSFER OF CARE
"Anesthesia Transfer of Care Note    Patient: Wandy Shah    Procedure(s) Performed: Procedure(s) (LRB):  XI ROBOTIC PYLOROMYOTOMY WITH EGD (N/A)    Patient location: PACU    Anesthesia Type: general    Transport from OR: Transported from OR on 6-10 L/min O2 by face mask with adequate spontaneous ventilation    Post pain: adequate analgesia    Post assessment: no apparent anesthetic complications    Post vital signs: stable    Level of consciousness: awake    Complications: none    Transfer of care protocol was followed      Last vitals: Visit Vitals  BP (!) 106/55 (BP Location: Left arm, Patient Position: Lying)   Pulse 70   Temp 36.6 °C (97.9 °F) (Temporal)   Resp 20   Ht 5' 8" (1.727 m)   Wt 77.7 kg (171 lb 4.8 oz)   LMP 03/16/2022 (Exact Date)   SpO2 99%   Breastfeeding No   BMI 26.05 kg/m²     "

## 2024-06-07 NOTE — ANESTHESIA PROCEDURE NOTES
Intubation    Date/Time: 6/7/2024 7:08 AM    Performed by: Cherry Lomax MD  Authorized by: Yesenia Smith MD    Intubation:     Induction:  Rapid sequence induction    Intubated:  Postinduction    Mask Ventilation:  Not attempted    Attempts:  1    Attempted By:  Resident anesthesiologist    Method of Intubation:  Video laryngoscopy    Blade:  Rueda 3    Laryngeal View Grade: Grade I - full view of cords      Difficult Airway Encountered?: No      Complications:  None    Airway Device:  Oral endotracheal tube    Airway Device Size:  7.0    Style/Cuff Inflation:  Cuffed    Secured at:  The lips    Placement Verified By:  Capnometry    Complicating Factors:  Small mouth    Findings Post-Intubation:  BS equal bilateral and atraumatic/condition of teeth unchanged

## 2024-06-07 NOTE — ANESTHESIA PROCEDURE NOTES
Bilateral RON ss    Patient location during procedure: pre-op   Block not for primary anesthetic.  Reason for block: at surgeon's request and post-op pain management   Post-op Pain Location: Bilateral abdominal pain   Start time: 6/7/2024 7:31 AM  Timeout: 6/7/2024 7:30 AM   End time: 6/7/2024 7:40 AM    Staffing  Authorizing Provider: Mack Murphy MD  Performing Provider: Apolinar Hope MD    Staffing  Performed by: Apolinar Hope MD  Authorized by: Mack Murphy MD    Preanesthetic Checklist  Completed: patient identified, IV checked, site marked, risks and benefits discussed, surgical consent, monitors and equipment checked, pre-op evaluation and timeout performed  Peripheral Block  Patient position: sitting  Prep: ChloraPrep  Patient monitoring: heart rate, cardiac monitor, continuous pulse ox, continuous capnometry and frequent blood pressure checks  Block type: erector spinae plane  Laterality: bilateral  Injection technique: single shot  Interspace: T7-8    Needle  Needle type: Echogenic   Needle gauge: 20 G  Needle length: 4 in  Needle localization: anatomical landmarks and ultrasound guidance   -ultrasound image captured on disc.  Assessment  Injection assessment: negative aspiration, negative parasthesia and local visualized surrounding nerve  Paresthesia pain: none  Heart rate change: no  Slow fractionated injection: yes  Pain Tolerance: no complaints and comfortable throughout block  Medications:    Medications: bupivacaine (pf) (MARCAINE) injection 0.75% - Perineural   20 mL - 6/7/2024 7:40:00 AM    Additional Notes  Patient tolerated well.  See [facility] RN record for vitals.

## 2024-06-07 NOTE — PLAN OF CARE
Problem: Adult Inpatient Plan of Care  Goal: Plan of Care Review  Outcome: Progressing  Goal: Patient-Specific Goal (Individualized)  Outcome: Progressing  Goal: Absence of Hospital-Acquired Illness or Injury  Outcome: Progressing  Goal: Optimal Comfort and Wellbeing  Outcome: Progressing  Goal: Readiness for Transition of Care  Outcome: Progressing     Problem: Wound  Goal: Optimal Coping  Outcome: Progressing  Goal: Optimal Functional Ability  Outcome: Progressing  Goal: Absence of Infection Signs and Symptoms  Outcome: Progressing  Goal: Improved Oral Intake  Outcome: Progressing  Goal: Optimal Pain Control and Function  Outcome: Progressing  Goal: Skin Health and Integrity  Outcome: Progressing  Goal: Optimal Wound Healing  Outcome: Progressing   She has had a good afternoon.   No sign of distress noted.   Incision sites look good.   Pain is well controled with present routine.  Bed is in the lowest position and call light is within reach.

## 2024-06-07 NOTE — ADDENDUM NOTE
Addendum  created 06/07/24 0952 by Mack Murphy MD    Attestation recorded in Intraprocedure, Clinical Note Signed, Diagnosis association updated, Intraprocedure Attestations filed, Intraprocedure Blocks edited

## 2024-06-07 NOTE — H&P
42y/o with bmi 27, eosinophilic asthma (not hospitalized in the last year, uses rescue inhaler 2-3 times a week), covid 2024 (has residual cough and loss of taste), anxiety, gerd and gastroparesis.  She has had symptoms for many years and symptoms are worsening.  She has severe epigastric pain, extremely severe nausea and moderate to severe vomiting.  She is not sure what she takes but takes amiteza for constipation.  She states she is taking zofran 2-3 times a day, bentyl about bid, and has been prescribed reglan but isn't taking it due to risk of side effects.  She is on ppi daily.  She has lost 20 lb since February (covid) and 40lb over the last year.     GERD Questionnaire      daily PPI  has Typical heartburn  has Regurgitation  has Dysphagia solids  has Dysphagia liquids  has Hoarseness  has Sore throat  has Cough  has Asthma  has Chest pain  occasional Water brash  no Globus  has Nausea  has Vomiting    Interval history 24:  She says there are no significant changes in her history.      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    Past Medical History:   Diagnosis Date    Anemia     Back pain     Depression        Past Surgical History:   Procedure Laterality Date     SECTION      COLD KNIFE CONIZATION OF CERVIX  12/10/2021    Procedure: CONE BIOPSY, CERVIX, USING COLD KNIFE;  Surgeon: Modesto Nassar MD;  Location: [facility] OR;  Service: OB/GYN;;    COLONOSCOPY N/A 2021    Procedure: COLONOSCOPY;  Surgeon: Emily Cruz MD;  Location: [facility] ENDO;  Service: Endoscopy;  Laterality: N/A;    ESOPHAGOGASTRODUODENOSCOPY N/A 2021    Procedure: EGD (ESOPHAGOGASTRODUODENOSCOPY);  Surgeon: Emily Cruz MD;  Location: [facility] ENDO;  Service: Endoscopy;  Laterality: N/A;    HYSTERECTOMY      ROBOT-ASSISTED LAPAROSCOPIC HYSTERECTOMY N/A 2022    Procedure: ROBOTIC HYSTERECTOMY;  Surgeon: Modesto Nassar MD;  Location: [facility] OR;  Service:  OB/GYN;  Laterality: N/A;    ROBOT-ASSISTED SALPINGECTOMY Bilateral 03/16/2022    Procedure: ROBOTIC SALPINGECTOMY;  Surgeon: Modesto Nassar MD;  Location: [facility];  Service: OB/GYN;  Laterality: Bilateral;    SINUS SURGERY      TUBAL LIGATION         Family History   Problem Relation Name Age of Onset    Hypertension Mother      Diabetes Mother      Breast cancer Neg Hx      Colon cancer Neg Hx      Ovarian cancer Neg Hx         Social History     Socioeconomic History    Marital status:    Tobacco Use    Smoking status: Former     Current packs/day: 0.00     Types: Cigarettes     Quit date: 3/3/2021     Years since quitting: 3.2    Smokeless tobacco: Never   Substance and Sexual Activity    Alcohol use: No    Drug use: Yes     Types: Marijuana     Comment: daily    Sexual activity: Yes     Partners: Male     Birth control/protection: See Surgical Hx     Social Determinants of Health     Financial Resource Strain: Medium Risk (5/18/2024)    Received from [facility]    Overall Financial Resource Strain (CARDIA)     Difficulty of Paying Living Expenses: Somewhat hard   Food Insecurity: Food Insecurity Present (5/18/2024)    Received from [facility]    Hunger Vital Sign     Worried About Running Out of Food in the Last Year: Sometimes true     Ran Out of Food in the Last Year: Sometimes true   Transportation Needs: No Transportation Needs (5/18/2024)    Received from [facility]    PRAPARE - Transportation     Lack of Transportation (Medical): No     Lack of Transportation (Non-Medical): No   Physical Activity: Insufficiently Active (5/18/2024)    Received from [facility]    Exercise Vital Sign     Days of Exercise per Week: 3 days     Minutes of Exercise per Session: 30 min   Stress: Stress Concern Present (5/18/2024)    Received from [facility]    [employer] - Occupational Stress Questionnaire     Feeling of Stress : Very much   Housing Stability: Low Risk  (12/5/2023)     [facility]    Housing Stability Vital Sign     Unable to Pay for Housing in the Last Year: No     Number of Places Lived in the Last Year: 1     In the last 12 months, was there a time when you did not have a steady place to sleep or slept in a shelter (including now)?: No       Current Facility-Administered Medications   Medication Dose Route Frequency Provider Last Rate Last Admin    0.9%  NaCl infusion   Intravenous Continuous Ana Lopez MD 70 mL/hr at 06/07/24 0600 New Bag at 06/07/24 0600    acetaminophen tablet 1,000 mg  1,000 mg Oral Once Pre-Op Cherry Lomax MD        ceFAZolin 2 g in dextrose 5 % in water (D5W) 50 mL IVPB (MB+)  2 g Intravenous On Call Procedure Ana Lopez MD        midazolam injection 0.5-4 mg  0.5-4 mg Intravenous PRN Lawrence Fonseca MD   2 mg at 06/07/24 0627       Review of patient's allergies indicates:   Allergen Reactions    Raspberry (rubus idaeus) Hives          Cbc, cmp reviewed, elevated liver enzymes of unknown significance  Ges 2023 reviewed, 36% gastric retention at 4 h with increase in the last hour (gastroparesis and possible duodenal gastric reflux)  Ct 2023 reviewed, films viewed, no significant abnormality  Egd 2021 reviewed, mild gastritis on path  EKG 2021 reviewed    -Normal sinus rhythm with sinus arrhythmia     -Biatrial enlargement      Severe gastroparesis with gerd and constipation.  For robotic pyloric procedure with egd.

## 2024-06-07 NOTE — CARE UPDATE
I have reviewed the chart of Wandy Shah and collaborated with Clifford Lynch, * in the care of the patient who is hospitalized for the following:    Active Hospital Problems    Diagnosis    *Gastroparesis    Eosinophilic asthma    Migraine without aura, not intractable    GERD (gastroesophageal reflux disease)    Anxiety state          I have reviewed Wandy Shah with the multidisciplinary team during discharge huddle.       Maria Ines Gonzalez PA-C  Unit Based PATSY

## 2024-06-07 NOTE — NURSING
She is admitted to room 542 now from PACU.  She is aaox4 and pleasant.   Vitals are measured and recorded.   Skin is warm and dry.   Respirations are even and unlabored.   No sign of bleeding from incision sites.   She voided upon arrival to toilet.   Pain is well controled at this time.   Bed is in the lowest position and call light is within reach.

## 2024-06-07 NOTE — BRIEF OP NOTE
Operative Note       Surgery Date: 6/7/2024     Surgeons and Role:     * Clifford Lynch MD - Primary     * Elena Horn MD - Resident - Assisting    Pre-op Diagnosis:  Gastroparesis [K31.84]    Post-op Diagnosis:  Gastroparesis [K31.84]    Procedure(s) (LRB):  XI ROBOTIC PYLOROMYOTOMY WITH EGD (N/A)    Anesthesia: General/Regional    Procedure in Detail/Findings:  Pyloromyotomy without apparent complication.    Estimated Blood Loss: Minimal           Specimens (From admission, onward)      None          Implants: * No implants in log *           Disposition: PACU - hemodynamically stable.           Condition: Good    Attestation:  I was present and scrubbed for the entire procedure.

## 2024-06-08 VITALS
WEIGHT: 171.31 LBS | TEMPERATURE: 98 F | HEIGHT: 68 IN | SYSTOLIC BLOOD PRESSURE: 115 MMHG | DIASTOLIC BLOOD PRESSURE: 70 MMHG | BODY MASS INDEX: 25.96 KG/M2 | RESPIRATION RATE: 18 BRPM | OXYGEN SATURATION: 100 % | HEART RATE: 51 BPM

## 2024-06-08 LAB
ALBUMIN SERPL BCP-MCNC: 2.9 G/DL (ref 3.5–5.2)
ALP SERPL-CCNC: 44 U/L (ref 55–135)
ALT SERPL W/O P-5'-P-CCNC: 7 U/L (ref 10–44)
ANION GAP SERPL CALC-SCNC: 6 MMOL/L (ref 8–16)
AST SERPL-CCNC: 11 U/L (ref 10–40)
BASOPHILS # BLD AUTO: 0.03 K/UL (ref 0–0.2)
BASOPHILS NFR BLD: 0.3 % (ref 0–1.9)
BILIRUB SERPL-MCNC: 0.3 MG/DL (ref 0.1–1)
BUN SERPL-MCNC: 10 MG/DL (ref 6–20)
CALCIUM SERPL-MCNC: 8.2 MG/DL (ref 8.7–10.5)
CHLORIDE SERPL-SCNC: 110 MMOL/L (ref 95–110)
CO2 SERPL-SCNC: 18 MMOL/L (ref 23–29)
CREAT SERPL-MCNC: 0.7 MG/DL (ref 0.5–1.4)
DIFFERENTIAL METHOD BLD: ABNORMAL
EOSINOPHIL # BLD AUTO: 0.1 K/UL (ref 0–0.5)
EOSINOPHIL NFR BLD: 0.4 % (ref 0–8)
ERYTHROCYTE [DISTWIDTH] IN BLOOD BY AUTOMATED COUNT: 12.4 % (ref 11.5–14.5)
EST. GFR  (NO RACE VARIABLE): >60 ML/MIN/1.73 M^2
GLUCOSE SERPL-MCNC: 78 MG/DL (ref 70–110)
HCT VFR BLD AUTO: 36.7 % (ref 37–48.5)
HGB BLD-MCNC: 11.9 G/DL (ref 12–16)
IMM GRANULOCYTES # BLD AUTO: 0.07 K/UL (ref 0–0.04)
IMM GRANULOCYTES NFR BLD AUTO: 0.6 % (ref 0–0.5)
LYMPHOCYTES # BLD AUTO: 2.7 K/UL (ref 1–4.8)
LYMPHOCYTES NFR BLD: 23.4 % (ref 18–48)
MAGNESIUM SERPL-MCNC: 1.8 MG/DL (ref 1.6–2.6)
MCH RBC QN AUTO: 31 PG (ref 27–31)
MCHC RBC AUTO-ENTMCNC: 32.4 G/DL (ref 32–36)
MCV RBC AUTO: 96 FL (ref 82–98)
MONOCYTES # BLD AUTO: 0.7 K/UL (ref 0.3–1)
MONOCYTES NFR BLD: 5.9 % (ref 4–15)
NEUTROPHILS # BLD AUTO: 8 K/UL (ref 1.8–7.7)
NEUTROPHILS NFR BLD: 69.4 % (ref 38–73)
NRBC BLD-RTO: 0 /100 WBC
PHOSPHATE SERPL-MCNC: 2.8 MG/DL (ref 2.7–4.5)
PLATELET # BLD AUTO: 195 K/UL (ref 150–450)
PMV BLD AUTO: 10.6 FL (ref 9.2–12.9)
POTASSIUM SERPL-SCNC: 3.5 MMOL/L (ref 3.5–5.1)
PROT SERPL-MCNC: 5.6 G/DL (ref 6–8.4)
RBC # BLD AUTO: 3.84 M/UL (ref 4–5.4)
SODIUM SERPL-SCNC: 134 MMOL/L (ref 136–145)
WBC # BLD AUTO: 11.54 K/UL (ref 3.9–12.7)

## 2024-06-08 PROCEDURE — 85025 COMPLETE CBC W/AUTO DIFF WBC: CPT

## 2024-06-08 PROCEDURE — 83735 ASSAY OF MAGNESIUM: CPT

## 2024-06-08 PROCEDURE — 36415 COLL VENOUS BLD VENIPUNCTURE: CPT

## 2024-06-08 PROCEDURE — 80053 COMPREHEN METABOLIC PANEL: CPT

## 2024-06-08 PROCEDURE — C9113 INJ PANTOPRAZOLE SODIUM, VIA: HCPCS

## 2024-06-08 PROCEDURE — 84100 ASSAY OF PHOSPHORUS: CPT

## 2024-06-08 PROCEDURE — 63600175 PHARM REV CODE 636 W HCPCS

## 2024-06-08 PROCEDURE — 25000003 PHARM REV CODE 250

## 2024-06-08 RX ORDER — KETOROLAC TROMETHAMINE 10 MG/1
10 TABLET, FILM COATED ORAL EVERY 6 HOURS
Qty: 20 TABLET | Refills: 0 | Status: SHIPPED | OUTPATIENT
Start: 2024-06-08 | End: 2024-06-13

## 2024-06-08 RX ORDER — METHOCARBAMOL 500 MG/1
500 TABLET, FILM COATED ORAL 4 TIMES DAILY
Qty: 40 TABLET | Refills: 0 | Status: SHIPPED | OUTPATIENT
Start: 2024-06-08 | End: 2024-06-18

## 2024-06-08 RX ORDER — ONDANSETRON 4 MG/1
8 TABLET, ORALLY DISINTEGRATING ORAL 2 TIMES DAILY
Qty: 28 TABLET | Refills: 0 | Status: SHIPPED | OUTPATIENT
Start: 2024-06-08 | End: 2024-06-08

## 2024-06-08 RX ORDER — ONDANSETRON 4 MG/1
8 TABLET, ORALLY DISINTEGRATING ORAL 2 TIMES DAILY
Qty: 30 TABLET | Refills: 0 | Status: SHIPPED | OUTPATIENT
Start: 2024-06-08

## 2024-06-08 RX ADMIN — METHOCARBAMOL 500 MG: 100 INJECTION, SOLUTION INTRAMUSCULAR; INTRAVENOUS at 05:06

## 2024-06-08 RX ADMIN — KETOROLAC TROMETHAMINE 30 MG: 30 INJECTION, SOLUTION INTRAMUSCULAR at 01:06

## 2024-06-08 RX ADMIN — DULOXETINE HYDROCHLORIDE 60 MG: 60 CAPSULE, DELAYED RELEASE ORAL at 10:06

## 2024-06-08 RX ADMIN — PANTOPRAZOLE SODIUM 40 MG: 40 INJECTION, POWDER, LYOPHILIZED, FOR SOLUTION INTRAVENOUS at 10:06

## 2024-06-08 RX ADMIN — ACETAMINOPHEN 1000 MG: 10 INJECTION, SOLUTION INTRAVENOUS at 06:06

## 2024-06-08 RX ADMIN — KETOROLAC TROMETHAMINE 30 MG: 30 INJECTION, SOLUTION INTRAMUSCULAR at 05:06

## 2024-06-08 RX ADMIN — GABAPENTIN 125 MG: 250 SOLUTION ORAL at 05:06

## 2024-06-08 RX ADMIN — GABAPENTIN 125 MG: 250 SOLUTION ORAL at 01:06

## 2024-06-08 NOTE — ASSESSMENT & PLAN NOTE
44F s/p pyloromyotomy. Doing well.     -- CLD this am  -- decrease mIVF  -- scheduled and prn anti emetics   -- MM pain control   -- home meds restarted as appropriate  -- replete lytes prn  -- daily labs       Dispo: likely discharge home today if tolerating CLD

## 2024-06-08 NOTE — HOSPITAL COURSE
Wandy Shah presented to [facility] on the morning of 6/7/2024 for planned robotic pyloromyotomy . The procedure was performed without complication and she was transferred to the floor for further post op care and monitoring. Her postoperative course was uneventful and she progressed according to plan.  Her pain was controlled with a combination of IV and PO multimodal pain medications. Her diet has been advanced throughout her hospital stay. She is now ambulating without assistance, tolerating a clear liquid diet, pain is well controlled with PO pain medication, and she is voiding appropriately. She now meets all criteria for discharge.

## 2024-06-08 NOTE — PLAN OF CARE
[facility]  Discharge Final Note    Primary Care Provider: No, Primary Doctor    Expected Discharge Date: 6/8/2024    Final Discharge Note (most recent)       Final Note - 06/08/24 1701          Final Note    Assessment Type Final Discharge Note (P)      Anticipated Discharge Disposition Home or Self Care (P)      What phone number can be called within the next 1-3 days to see how you are doing after discharge? [phone] (P)      Hospital Resources/Appts/Education Provided Provided patient/caregiver with written discharge plan information;Provided education on problems/symptoms using teachback;Appointments scheduled and added to AVS (P)         Post-Acute Status    Post-Acute Authorization Other (P)      Other Status No Post-Acute Service Needs (P)      Discharge Delays None known at this time (P)                      Important Message from Medicare             Contact Info       Clifford Lynch MD   Specialty: General Surgery, Bariatrics    [address]  [facility] [address]   Phone: [phone]       Next Steps: Follow up in 2 week(s)          SW noting pt's discharge orders. After review of pt's medical record, no discharge needs identified at this time.     Parish Woodward LMSW

## 2024-06-08 NOTE — PROGRESS NOTES
[facility]  General Surgery  Progress Note    Subjective:     History of Present Illness:  No notes on file    Post-Op Info:  Procedure(s) (LRB):  XI ROBOTIC PYLOROMYOTOMY WITH EGD (N/A)   1 Day Post-Op     Interval History:   OR yesterday for pyloromyotomy. Doing well. No nausea or vomiting this am. Pain controlled. Ambulating without issue.     Medications:  Continuous Infusions:   lactated ringers   Intravenous Continuous   Stopped at 06/07/24 1524     Scheduled Meds:   amitriptyline  25 mg Oral QHS    DULoxetine  60 mg Oral Daily    gabapentin  125 mg Oral Q8H    ketorolac  30 mg Intravenous Q6H    methocarbamol (ROBAXIN) IVPB  500 mg Intravenous Q8H    pantoprazole  40 mg Intravenous Daily    topiramate  75 mg Oral QHS     PRN Meds:  Current Facility-Administered Medications:     albuterol, 2 puff, Inhalation, Q4H PRN    ondansetron, 4 mg, Intravenous, Q12H PRN    prochlorperazine, 5 mg, Intravenous, Q6H PRN     Review of patient's allergies indicates:   Allergen Reactions    Raspberry (rubus idaeus) Hives     Objective:     Vital Signs (Most Recent):  Temp: 97.7 °F (36.5 °C) (06/08/24 0807)  Pulse: (!) 52 (06/08/24 0807)  Resp: 16 (06/08/24 0807)  BP: (!) 102/55 (06/08/24 0807)  SpO2: 99 % (06/08/24 0807) Vital Signs (24h Range):  Temp:  [97 °F (36.1 °C)-98 °F (36.7 °C)] 97.7 °F (36.5 °C)  Pulse:  [52-60] 52  Resp:  [12-18] 16  SpO2:  [98 %-99 %] 99 %  BP: (102-118)/(55-75) 102/55     Weight: 77.7 kg (171 lb 4.8 oz)  Body mass index is 26.05 kg/m².    Intake/Output - Last 3 Shifts         06/06 0700  06/07 0659 06/07 0700  06/08 0659 06/08 0700  06/09 0659    I.V. (mL/kg)  772 (9.9)     IV Piggyback  1097.6     Total Intake(mL/kg)  1869.6 (24.1)     Net  +1869.6            Urine Occurrence  3 x     Stool Occurrence  0 x              Physical Exam  Constitutional:       Appearance: Normal appearance.   HENT:      Head: Normocephalic and atraumatic.      Nose: Nose normal.      Mouth/Throat:      Mouth:  Mucous membranes are moist.      Pharynx: Oropharynx is clear.   Eyes:      Extraocular Movements: Extraocular movements intact.      Conjunctiva/sclera: Conjunctivae normal.   Cardiovascular:      Rate and Rhythm: Normal rate and regular rhythm.   Pulmonary:      Effort: Pulmonary effort is normal.      Breath sounds: Normal breath sounds.   Abdominal:      General: Abdomen is flat.      Palpations: Abdomen is soft.      Tenderness: There is abdominal tenderness (appropriately TTP).      Comments: Lap incisions c/d/I with dermabond overlaying   Skin:     General: Skin is warm and dry.      Capillary Refill: Capillary refill takes less than 2 seconds.   Neurological:      General: No focal deficit present.      Mental Status: She is alert.          Significant Labs:  I have reviewed all pertinent lab results within the past 24 hours.  CBC:   Recent Labs   Lab 06/08/24  0358   WBC 11.54   RBC 3.84*   HGB 11.9*   HCT 36.7*      MCV 96   MCH 31.0   MCHC 32.4     CMP:   Recent Labs   Lab 06/08/24  0358   GLU 78   CALCIUM 8.2*   ALBUMIN 2.9*   PROT 5.6*   *   K 3.5   CO2 18*      BUN 10   CREATININE 0.7   ALKPHOS 44*   ALT 7*   AST 11   BILITOT 0.3       Significant Diagnostics:  I have reviewed all pertinent imaging results/findings within the past 24 hours.  Assessment/Plan:     * Gastroparesis  44F s/p pyloromyotomy. Doing well.     -- CLD this am  -- decrease mIVF  -- scheduled and prn anti emetics   -- MM pain control   -- home meds restarted as appropriate  -- replete lytes prn  -- daily labs       Dispo: likely discharge home today if tolerating CLD        Elena Horn MD  General Surgery  [facility] - Surgery

## 2024-06-08 NOTE — NURSING
Pt received bedside meds and discharge instructions. Pt verbalized understanding. PIV removed, catheter intact. NADN. Pt left unit on foot with family and all belongings.

## 2024-06-08 NOTE — DISCHARGE INSTRUCTIONS
Surgery Post Op Instructions:    You had a robotic pyloromoyotomy performed      Wound care:  Your incisions are covered with Dermabond, a type of surgical super glue. You may shower tomorrow - let soapy water run over the incision but do not scrub the area. You must avoid submerging the incision in pools, bathtubs, etc until it is fully healed in 4-6 weeks.     You need to limit yourself to light duty activities (no lifting/pulling/pushing >10 lbs) for a total of 6 weeks after surgery.        You will go home on a full liquid diet for 1 week. Followed by a soft diet for 1 week and then we will see you in clinic.     Medications:  A narcotic pain medication has been prescribed.  Please start to wean the pain medication over the following few days.  You can take tylenol instead of the pain medication.      Please take miralax 1 capful at night to prevent constipation associated with narcotic pain medications.      Follow up:  Return to clinic in 2 weeks for follow up and wound check.

## 2024-06-08 NOTE — SUBJECTIVE & OBJECTIVE
Interval History:   OR yesterday for pyloromyotomy. Doing well. No nausea or vomiting this am. Pain controlled. Ambulating without issue.     Medications:  Continuous Infusions:   lactated ringers   Intravenous Continuous   Stopped at 06/07/24 1524     Scheduled Meds:   amitriptyline  25 mg Oral QHS    DULoxetine  60 mg Oral Daily    gabapentin  125 mg Oral Q8H    ketorolac  30 mg Intravenous Q6H    methocarbamol (ROBAXIN) IVPB  500 mg Intravenous Q8H    pantoprazole  40 mg Intravenous Daily    topiramate  75 mg Oral QHS     PRN Meds:  Current Facility-Administered Medications:     albuterol, 2 puff, Inhalation, Q4H PRN    ondansetron, 4 mg, Intravenous, Q12H PRN    prochlorperazine, 5 mg, Intravenous, Q6H PRN     Review of patient's allergies indicates:   Allergen Reactions    Raspberry (rubus idaeus) Hives     Objective:     Vital Signs (Most Recent):  Temp: 97.7 °F (36.5 °C) (06/08/24 0807)  Pulse: (!) 52 (06/08/24 0807)  Resp: 16 (06/08/24 0807)  BP: (!) 102/55 (06/08/24 0807)  SpO2: 99 % (06/08/24 0807) Vital Signs (24h Range):  Temp:  [97 °F (36.1 °C)-98 °F (36.7 °C)] 97.7 °F (36.5 °C)  Pulse:  [52-60] 52  Resp:  [12-18] 16  SpO2:  [98 %-99 %] 99 %  BP: (102-118)/(55-75) 102/55     Weight: 77.7 kg (171 lb 4.8 oz)  Body mass index is 26.05 kg/m².    Intake/Output - Last 3 Shifts         06/06 0700 06/07 0659 06/07 0700 06/08 0659 06/08 0700 06/09 0659    I.V. (mL/kg)  772 (9.9)     IV Piggyback  1097.6     Total Intake(mL/kg)  1869.6 (24.1)     Net  +1869.6            Urine Occurrence  3 x     Stool Occurrence  0 x              Physical Exam  Constitutional:       Appearance: Normal appearance.   HENT:      Head: Normocephalic and atraumatic.      Nose: Nose normal.      Mouth/Throat:      Mouth: Mucous membranes are moist.      Pharynx: Oropharynx is clear.   Eyes:      Extraocular Movements: Extraocular movements intact.      Conjunctiva/sclera: Conjunctivae normal.   Cardiovascular:      Rate and Rhythm:  Normal rate and regular rhythm.   Pulmonary:      Effort: Pulmonary effort is normal.      Breath sounds: Normal breath sounds.   Abdominal:      General: Abdomen is flat.      Palpations: Abdomen is soft.      Tenderness: There is abdominal tenderness (appropriately TTP).      Comments: Lap incisions c/d/I with dermabond overlaying   Skin:     General: Skin is warm and dry.      Capillary Refill: Capillary refill takes less than 2 seconds.   Neurological:      General: No focal deficit present.      Mental Status: She is alert.          Significant Labs:  I have reviewed all pertinent lab results within the past 24 hours.  CBC:   Recent Labs   Lab 06/08/24  0358   WBC 11.54   RBC 3.84*   HGB 11.9*   HCT 36.7*      MCV 96   MCH 31.0   MCHC 32.4     CMP:   Recent Labs   Lab 06/08/24  0358   GLU 78   CALCIUM 8.2*   ALBUMIN 2.9*   PROT 5.6*   *   K 3.5   CO2 18*      BUN 10   CREATININE 0.7   ALKPHOS 44*   ALT 7*   AST 11   BILITOT 0.3       Significant Diagnostics:  I have reviewed all pertinent imaging results/findings within the past 24 hours.

## 2024-06-10 ENCOUNTER — PATIENT MESSAGE (OUTPATIENT)
Dept: SURGERY | Facility: CLINIC | Age: 44
End: 2024-06-10
Payer: MEDICAID

## 2024-06-14 ENCOUNTER — PATIENT MESSAGE (OUTPATIENT)
Dept: SURGERY | Facility: CLINIC | Age: 44
End: 2024-06-14
Payer: MEDICAID

## 2024-06-14 NOTE — TELEPHONE ENCOUNTER
Spoke with patient  Patient is having pain under her ribs on the right side  She has been coughing a lot when she uses the incentive spirometer  She is breathing fine and is able to take a deep breath without difficulty  She has been lying around a lot  Is also having diarrhea  Advised she likely needs to move more  Can try her prescribed pain meds for the pain or can try Tylenol as directed on package, as well as either Ice or Heat to the area;  Advised to get a fiber supplement like metamucil to bulk up her stools.  She can also try some pepto-bismol or Kaopectate to help with the diarrhea.  ER Precautions given and she has Nurse on Call telephone number  Pt verbalized understanding to all and has no further questions at this time.

## 2024-06-17 NOTE — PROGRESS NOTES
"General & Bariatric Surgery  Post-op Clinic Note  06/18/2024    Subjective:  Wandy Shah is a 44 y.o. female presents to the clinic status post robotic pyloromyotomy 6/7/24 for post-op follow-up.  She has been improving since surgery. Pain is well controlled, no drainage from incisions. She remains on a soft diet which she is tolerating well. Her nausea has improved, she now takes zofran 1-2x/week which is decreased from pre op. Rare vomiting. Bowel movements have been normal. Her heartburn has improved somewhat as well. Prilosec daily and pepcid as needed.     Gastroparesis scoring 6/18/24:  Vomiting:  Severity (1) /Frequency (1)  Nausea: Severity (1) /Frequency (2)  Early satiety: Severity (1) /Frequency (2)  Bloating: Severity (2) /Frequency (2)  Postprandial fullness: Severity (1) /Frequency (1)  Epigastric pain: Severity (1) / Frequency (1)  Epigastric burning: Severity (3) / Frequency (3)        Review of Systems:  Review of Systems   Constitutional:  Negative for chills and fever.   HENT: Negative.     Eyes: Negative.    Respiratory:  Negative for cough and shortness of breath.    Cardiovascular:  Negative for chest pain.   Gastrointestinal:  Positive for nausea. Negative for abdominal pain and vomiting.   Genitourinary: Negative.    Musculoskeletal: Negative.    Skin: Negative.    Neurological:  Positive for dizziness. Negative for weakness.   Psychiatric/Behavioral: Negative.           Objective:  BP 98/60   Pulse 80   Ht 5' 8" (1.727 m)   Wt 78.4 kg (172 lb 13.5 oz)   LMP 03/16/2022 (Exact Date)   BMI 26.28 kg/m²   General: NAD. Not toxic appearing.   HENT: EOM intact.   Pulmonary: No respiratory distress. Effort normal.  Cardiovascular: Regular rate.   Abdomen: soft, non-tender, non-distended  Skin: abdominal incisions healing well without signs of infection. No erythema, drainage, or increased warmth noted.     Assessment / Plan:  Doing well post-operatively.  We discussed a gradual return to " routine activity and exercise as tolerated.   No lifting restrictions, may return to work.  Complete 1 week soft diet  Advance diet as tolerated after completion of soft  RTC 4 weeks    Irlanda Hollingsworth MD  [facility]  General Surgery PGY-1

## 2024-06-18 ENCOUNTER — OFFICE VISIT (OUTPATIENT)
Dept: SURGERY | Facility: CLINIC | Age: 44
End: 2024-06-18
Payer: MEDICAID

## 2024-06-18 VITALS
WEIGHT: 172.81 LBS | HEART RATE: 80 BPM | BODY MASS INDEX: 26.19 KG/M2 | DIASTOLIC BLOOD PRESSURE: 60 MMHG | HEIGHT: 68 IN | SYSTOLIC BLOOD PRESSURE: 98 MMHG

## 2024-06-18 DIAGNOSIS — K31.84 GASTROPARESIS: Primary | ICD-10-CM

## 2024-06-18 PROCEDURE — 3044F HG A1C LEVEL LT 7.0%: CPT | Mod: CPTII,,, | Performed by: SURGERY

## 2024-06-18 PROCEDURE — 1159F MED LIST DOCD IN RCRD: CPT | Mod: CPTII,,, | Performed by: SURGERY

## 2024-06-18 PROCEDURE — 3074F SYST BP LT 130 MM HG: CPT | Mod: CPTII,,, | Performed by: SURGERY

## 2024-06-18 PROCEDURE — 99999 PR PBB SHADOW E&M-EST. PATIENT-LVL V: CPT | Mod: PBBFAC,,, | Performed by: SURGERY

## 2024-06-18 PROCEDURE — 99215 OFFICE O/P EST HI 40 MIN: CPT | Mod: PBBFAC | Performed by: SURGERY

## 2024-06-18 PROCEDURE — 99024 POSTOP FOLLOW-UP VISIT: CPT | Mod: ,,, | Performed by: SURGERY

## 2024-06-18 PROCEDURE — 3078F DIAST BP <80 MM HG: CPT | Mod: CPTII,,, | Performed by: SURGERY

## 2024-06-18 PROCEDURE — 1160F RVW MEDS BY RX/DR IN RCRD: CPT | Mod: CPTII,,, | Performed by: SURGERY

## 2024-06-18 NOTE — LETTER
June 18, 2024        Alondra Echeverria, RAMOS  [address]  [address]  [facility] [address]             [facility]  [address]  [facility] [address]  Phone: [phone]   Patient: Wandy Shah   MR Number: [id number]   YOB: 1980   Date of Visit: 6/18/2024       Dear Dr. Echeverria:    Thank you for referring Wandy Shah to me for evaluation. Attached you will find relevant portions of my assessment and plan of care.    If you have questions, please do not hesitate to call me. I look forward to following Wandy Shah along with you.    Sincerely,      Clifford Lynch MD            CC  No Recipients    Enclosure

## 2024-06-18 NOTE — PROGRESS NOTES
I have seen the patient, reviewed the Resident's history and physical, assessment and plan. I have personally interviewed and examined the patient at bedside and: agree with the findings.     45y/o with bmi 27, eosinophilic asthma (not hospitalized in the last year, uses rescue inhaler 2-3 times a week), covid 2/2024 (has residual cough and loss of taste), anxiety, gerd and gastroparesis s/p robotic pyloromyotomy 6/7/24.      Preop history: She has had symptoms for many years and symptoms are worsening.  She has severe epigastric pain, extremely severe nausea and moderate to severe vomiting.  She is not sure what she takes but takes amiteza for constipation.  She states she is taking zofran 2-3 times a day, bentyl about bid, and has been prescribed reglan but isn't taking it due to risk of side effects.  She is on ppi daily.  She has lost 20 lb since February (covid) and 40lb over the last year.     GERD Questionnaire      daily PPI  has Typical heartburn  has Regurgitation  has Dysphagia solids  has Dysphagia liquids  has Hoarseness  has Sore throat  has Cough  has Asthma  has Chest pain  occasional Water brash  no Globus  has Nausea  has Vomiting    Interval history 6/18/24, postop: She is doing much better after surgery.  She has mild epigastric pain, mild to moderate nausea and mild vomiting.  She is on a soft diet.  She is only on zofran.  Her reflux is improved also and is taking ppi daily and less pepsid.  She says she feels 10/10 perfect!      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     Preop diagnostic studies  Ges 2023 reviewed, 36% gastric retention at 4 h with increase in the last hour (gastroparesis and possible duodenal gastric reflux)  Ct 2023 reviewed, films viewed, no significant abnormality  Egd 2021 reviewed, mild gastritis on path    Postop diagnostic studies  None    Assessment and plan  Gastroparesis and gerd improved with pyloromyotomy.   Advance diet as tolerated, light duty one more month and follow up one month.  Obtain ges in about 2 months.  She is going to work light duty and part time.  She is having some equilibrium issues and can follow up with her nurse practitioner.

## 2024-06-19 ENCOUNTER — NURSE TRIAGE (OUTPATIENT)
Dept: ADMINISTRATIVE | Facility: CLINIC | Age: 44
End: 2024-06-19
Payer: MEDICAID

## 2024-06-19 ENCOUNTER — HOSPITAL ENCOUNTER (EMERGENCY)
Facility: HOSPITAL | Age: 44
Discharge: HOME | End: 2024-06-19
Attending: EMERGENCY MEDICINE
Payer: MEDICAID

## 2024-06-19 VITALS
RESPIRATION RATE: 16 BRPM | DIASTOLIC BLOOD PRESSURE: 65 MMHG | BODY MASS INDEX: 25.85 KG/M2 | OXYGEN SATURATION: 100 % | HEART RATE: 80 BPM | SYSTOLIC BLOOD PRESSURE: 110 MMHG | WEIGHT: 170 LBS | TEMPERATURE: 98 F

## 2024-06-19 DIAGNOSIS — T81.31XA POSTOPERATIVE DEHISCENCE OF SKIN WOUND, INITIAL ENCOUNTER: Primary | ICD-10-CM

## 2024-06-19 PROCEDURE — 99281 EMR DPT VST MAYX REQ PHY/QHP: CPT

## 2024-06-20 ENCOUNTER — OFFICE VISIT (OUTPATIENT)
Dept: SURGERY | Facility: CLINIC | Age: 44
End: 2024-06-20
Payer: MEDICAID

## 2024-06-20 VITALS
BODY MASS INDEX: 25.76 KG/M2 | SYSTOLIC BLOOD PRESSURE: 104 MMHG | DIASTOLIC BLOOD PRESSURE: 65 MMHG | HEIGHT: 68 IN | WEIGHT: 170 LBS | HEART RATE: 74 BPM

## 2024-06-20 DIAGNOSIS — K31.84 GASTROPARESIS: Primary | ICD-10-CM

## 2024-06-20 PROCEDURE — 99999 PR PBB SHADOW E&M-EST. PATIENT-LVL IV: CPT | Mod: PBBFAC,,, | Performed by: SURGERY

## 2024-06-20 PROCEDURE — 99214 OFFICE O/P EST MOD 30 MIN: CPT | Mod: PBBFAC | Performed by: SURGERY

## 2024-06-20 PROCEDURE — 1160F RVW MEDS BY RX/DR IN RCRD: CPT | Mod: CPTII,,, | Performed by: SURGERY

## 2024-06-20 PROCEDURE — 3078F DIAST BP <80 MM HG: CPT | Mod: CPTII,,, | Performed by: SURGERY

## 2024-06-20 PROCEDURE — 3074F SYST BP LT 130 MM HG: CPT | Mod: CPTII,,, | Performed by: SURGERY

## 2024-06-20 PROCEDURE — 1159F MED LIST DOCD IN RCRD: CPT | Mod: CPTII,,, | Performed by: SURGERY

## 2024-06-20 PROCEDURE — 99024 POSTOP FOLLOW-UP VISIT: CPT | Mod: ,,, | Performed by: SURGERY

## 2024-06-20 PROCEDURE — 3044F HG A1C LEVEL LT 7.0%: CPT | Mod: CPTII,,, | Performed by: SURGERY

## 2024-06-20 NOTE — DISCHARGE INSTRUCTIONS
Just keep the area clean and dry.  Try not to scrub or soak the area.  Continue wound care as directed by your surgeon.  Call your surgeon's office tomorrow for an appointment so that they can see you in clinic.  If you develop pain, severe bleeding, fevers, or any other concerning symptoms please return to the emergency department.

## 2024-06-20 NOTE — TELEPHONE ENCOUNTER
Spoke with patient.  Was seen in ER last evening because her incision was bleeding and looked like it was opening up. Advised to f/u with us  Appt scheduled for 1:45 pm today (June 20, 2024)

## 2024-06-20 NOTE — ED PROVIDER NOTES
Encounter Date: 2024       History     Chief Complaint   Patient presents with    Post-op Problem     Had gastro procedure on  and reports minor bleeding from laparoscopic incision after getting out the shower tonight. Bleeding controlled in triage      HPI  44-year-old female who presents for evaluation of her surgical wound.  On  she had a laparoscopic surgery.  She did have some bruising around 1 of the surgical sites afterward but it was improving.  Tonight she showered and was moving around and wiped the area and noticed some blood.  She called the on-call line and was told to come in for evaluation.  The bleeding stopped at home.  There was no pain.  There was no swelling.  There was no new bruising.  She was no fevers or chills.  She was no abdominal pain.  No nausea or vomiting.  No other bleeding.  Not on blood thinners.  Feels very well otherwise.  No discharge from wound.      Review of patient's allergies indicates:   Allergen Reactions    Raspberry (rubus idaeus) Hives     Past Medical History:   Diagnosis Date    Anemia     Back pain     Depression      Past Surgical History:   Procedure Laterality Date     SECTION      COLD KNIFE CONIZATION OF CERVIX  12/10/2021    Procedure: CONE BIOPSY, CERVIX, USING COLD KNIFE;  Surgeon: Modesto Nassar MD;  Location: [facility] OR;  Service: OB/GYN;;    COLONOSCOPY N/A 2021    Procedure: COLONOSCOPY;  Surgeon: Emily Cruz MD;  Location: [facility] ENDO;  Service: Endoscopy;  Laterality: N/A;    ESOPHAGOGASTRODUODENOSCOPY N/A 2021    Procedure: EGD (ESOPHAGOGASTRODUODENOSCOPY);  Surgeon: Emily Cruz MD;  Location: formerly [facility] ENDO;  Service: Endoscopy;  Laterality: N/A;    HYSTERECTOMY      ROBOT-ASSISTED LAPAROSCOPIC HYSTERECTOMY N/A 2022    Procedure: ROBOTIC HYSTERECTOMY;  Surgeon: Modesto Nassar MD;  Location: [facility] OR;  Service: OB/GYN;  Laterality: N/A;    ROBOT-ASSISTED LAPAROSCOPIC PYLOROPLASTY USING DA JOSE LUIS XI N/A  6/7/2024    Procedure: XI ROBOTIC PYLOROMYOTOMY WITH EGD;  Surgeon: Cilfford Lynch MD;  [facility];  Service: General;  Laterality: N/A;  NO NARCOTICS    ROBOT-ASSISTED SALPINGECTOMY Bilateral 03/16/2022    Procedure: ROBOTIC SALPINGECTOMY;  Surgeon: Modesto Nassar MD;  Location: [facility] OR;  Service: OB/GYN;  Laterality: Bilateral;    SINUS SURGERY      TUBAL LIGATION       Family History   Problem Relation Name Age of Onset    Hypertension Mother      Diabetes Mother      Breast cancer Neg Hx      Colon cancer Neg Hx      Ovarian cancer Neg Hx       Social History     Tobacco Use    Smoking status: Former     Current packs/day: 0.00     Types: Cigarettes     Quit date: 3/3/2021     Years since quitting: 3.2    Smokeless tobacco: Never   Substance Use Topics    Alcohol use: No    Drug use: Yes     Types: Marijuana     Comment: daily     Review of Systems    Physical Exam     Initial Vitals [06/19/24 2157]   BP Pulse Resp Temp SpO2   99/62 86 15 97.8 °F (36.6 °C) 99 %      MAP       --         Physical Exam    Nursing note and vitals reviewed.  Constitutional: She appears well-developed and well-nourished. No distress.   HENT:   Head: Normocephalic and atraumatic.   Eyes: Conjunctivae and EOM are normal. Pupils are equal, round, and reactive to light.   Neck:   Normal range of motion.  Cardiovascular:  Normal rate.           Pulmonary/Chest: Breath sounds normal. No respiratory distress.   Abdominal: Abdomen is soft. She exhibits no distension. There is no abdominal tenderness.   Old bruising over middle laparoscopic scar.  There are for laparoscopic scars.  Three of which still have glue and are healing well.  The 4th is slightly dehisced with only about half a cm of open area.  It shallow.  No surrounding erythema.  No bleeding.  Not deep to peritoneum.  No tenderness.  Again, old bruising around it.  No firmness or warmth. There is no rebound and no guarding.   Musculoskeletal:       Cervical back: Normal range of motion.     Neurological: She is alert and oriented to person, place, and time.   Skin: Skin is warm and dry. Capillary refill takes less than 2 seconds.   Psychiatric: She has a normal mood and affect.         ED Course   Procedures  Labs Reviewed   HIV 1 / 2 ANTIBODY   HEPATITIS C ANTIBODY          Imaging Results    None          Medications - No data to display  Medical Decision Making  44-year-old female who presents for evaluation of bleeding surgical wound.  On 06/07 she had a laparoscopic surgery.  Everything was healing well until tonight when she noticed some bleeding after rubbing 1 of the areas.  Overall she looks very well.  She has no complaints including abdominal pain, nausea or vomiting, fevers.  On exam, she does have 1 surgical site where it looks like that is slightly dehisced but it is very shallow.  No signs of infection.  No signs of underlying hematoma or seroma.  I am not concerned for complication of her surgery.  At this point I think she was stable for ongoing wound care and can follow up in the surgery office for further management and evaluation.                                      Clinical Impression:  Final diagnoses:  [T81.31XA] Postoperative dehiscence of skin wound, initial encounter (Primary)          ED Disposition Condition    Discharge Stable          ED Prescriptions    None       Follow-up Information    None          Savannah Harrison MD  06/19/24 9925

## 2024-06-20 NOTE — ED TRIAGE NOTES
Wandy Shah, a 44 y.o. female presents to the ED w/ complaint of bleeding from laparoscopic incision site after getting in shower tonight, Surgery on 6/7. Bleeding controlled at this time.     Triage note:  Chief Complaint   Patient presents with    Post-op Problem     Had gastro procedure on 6/7 and reports minor bleeding from laparoscopic incision after getting out the shower tonight. Bleeding controlled in triage      Review of patient's allergies indicates:   Allergen Reactions    Raspberry (rubus idaeus) Hives     Past Medical History:   Diagnosis Date    Anemia     Back pain     Depression

## 2024-06-20 NOTE — TELEPHONE ENCOUNTER
Pt reports had abdominal surgery on the 7th, after putting on a shirt, noticed she was having some bleeding from the incision, pt states it looks like the incision is now gaping open in one area, Pt advised to go to the ED now per protocol, pt states she just took her muscle relaxer so she can't drive, wanting to know if she can wait until the morning. Pt advised to get a ride from friend/family/uber/taxi/ or EMS transport but would still need to be seen in the ED tonight. Pt encouraged to call back with any worsening symptoms or questions. She verbalized understanding.    Reason for Disposition   [1] Incision gaping open AND [2] < 48 hours since wound re-opened    Additional Information   Negative: [1] Major abdominal surgical incision AND [2] wound gaping open AND [3] visible internal organs   Negative: Sounds like a life-threatening emergency to the triager   Negative: [1] Bleeding from incision AND [2] won't stop after 10 minutes of direct pressure   Negative: [1] Bleeding (more than a few drops) from incision AND [2] tracheostomy or blood vessel surgery (e.g., carotid endarterectomy, femoral bypass graft, kidney dialysis fistula)   Negative: [1] Widespread rash AND [2] bright red, sunburn-like   Negative: Severe pain in the incision    Protocols used: Post-Op Incision Symptoms and [id number]

## 2024-06-20 NOTE — PROGRESS NOTES
I have seen the patient, reviewed the Resident's history and physical, assessment and plan. I have personally interviewed and examined the patient at bedside and: agree with the findings.       43y/o with bmi 27, eosinophilic asthma (not hospitalized in the last year, uses rescue inhaler 2-3 times a week), covid 2/2024 (has residual cough and loss of taste), anxiety, gerd and gastroparesis s/p robotic pyloromyotomy 6/7/24.       Preop history: She has had symptoms for many years and symptoms are worsening.  She has severe epigastric pain, extremely severe nausea and moderate to severe vomiting.  She is not sure what she takes but takes amiteza for constipation.  She states she is taking zofran 2-3 times a day, bentyl about bid, and has been prescribed reglan but isn't taking it due to risk of side effects.  She is on ppi daily.  She has lost 20 lb since February (covid) and 40lb over the last year.     GERD Questionnaire      daily PPI  has Typical heartburn  has Regurgitation  has Dysphagia solids  has Dysphagia liquids  has Hoarseness  has Sore throat  has Cough  has Asthma  has Chest pain  occasional Water brash  no Globus  has Nausea  has Vomiting     Interval history 6/18/24, postop: She is doing much better after surgery.  She has mild epigastric pain, mild to moderate nausea and mild vomiting.  She is on a soft diet.  She is only on zofran.  Her reflux is improved also and is taking ppi daily and less pepsid.  She says she feels 10/10 perfect!    Interval history 6/20/24:  She is concerned about one of her trocar sites and had it looked at emergently yesterday.  She has mild pain in the area.  She says her gastroparesis symptoms are unchanged from last visit and very improved.  She is on a soft diet.      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    PE Second from right trocar site skin is opened.  This is only skin deep, approx 1 by 1 cm.     Preop diagnostic  studies  Ges 2023 reviewed, 36% gastric retention at 4 h with increase in the last hour (gastroparesis and possible duodenal gastric reflux)  Ct 2023 reviewed, films viewed, no significant abnormality  Egd 2021 reviewed, mild gastritis on path     Postop diagnostic studies  None     Assessment and plan  Gastroparesis and gerd improved with pyloromyotomy.  Advance diet as tolerated, light duty one more month and follow up one month.  Obtain ges in about 2 months.  She is going to work light duty and part time.  She is having some equilibrium issues and these have resolved.     Wound skin dehiscence: Keep dry dressing.  May shower but not bathe until healed.

## 2024-06-20 NOTE — PROGRESS NOTES
General & Bariatric Surgery  Post-op Clinic Note  06/20/2024    Subjective:  Wandy Shah is a 44 y.o. female presents to the clinic status post robotic pyloromyotomy 6/7/24 for post-op follow-up.  She has been improving since surgery. Pain is well controlled, no drainage from incisions. She remains on a soft diet which she is tolerating well. Her nausea has improved, she now takes zofran 1-2x/week which is decreased from pre op. Rare vomiting. Bowel movements have been normal. Her heartburn has improved somewhat as well. Prilosec daily and pepcid as needed.     Interval Update 06/20/2024   She presents to clinic today for wound check after ED visit yesterday. She states that while in the shower yesterday, she noticed scant bleeding from the robotic incision site to the right of her umbilicus. No issues from her other incisions. The bleeding stopped right away, but she called the nursing line and was encouraged to be seen in the ED. She denies any other drainage or purulence coming from the incision. No fevers or chills.       Gastroparesis scoring 6/18/24:  Vomiting:  Severity (1) /Frequency (1)  Nausea: Severity (1) /Frequency (2)  Early satiety: Severity (1) /Frequency (2)  Bloating: Severity (2) /Frequency (2)  Postprandial fullness: Severity (1) /Frequency (1)  Epigastric pain: Severity (1) / Frequency (1)  Epigastric burning: Severity (3) / Frequency (3)        Review of Systems:  Review of Systems   Constitutional:  Negative for chills and fever.   HENT: Negative.     Eyes: Negative.    Respiratory:  Negative for cough and shortness of breath.    Cardiovascular:  Negative for chest pain.   Gastrointestinal:  Positive for nausea. Negative for abdominal pain and vomiting.   Genitourinary: Negative.    Musculoskeletal: Negative.    Skin: Negative.    Neurological:  Positive for dizziness. Negative for weakness.   Psychiatric/Behavioral: Negative.           Objective:  LMP 03/16/2022 (Exact Date)   General: NAD.  Not toxic appearing.   HENT: EOM intact.   Pulmonary: No respiratory distress. Effort normal.  Cardiovascular: Regular rate.   Abdomen: soft, non-tender, non-distended  Skin: abdominal incision to right of umbilicus with ~ 1 cm skin separation. No purulence noted. Mild erythema surrounding wound. Mildly TTP. No bleeding noted on exam.    Assessment / Plan:  S/p pyloromyotomy presenting back to clinic for wound check. Right medial incision site with small amount of separation, no significant drainage from wound.    - continue daily dressings  - advance diet as tolerated  - RTC at regular 1 mo follow up visit from surgery date for wound check and to assess GP symptoms    Irlanda Hollingsworth MD  [facility]  General Surgery PGY-1

## 2024-06-28 ENCOUNTER — PATIENT MESSAGE (OUTPATIENT)
Dept: SURGERY | Facility: CLINIC | Age: 44
End: 2024-06-28
Payer: MEDICAID

## 2024-06-28 DIAGNOSIS — K31.84 GASTROPARESIS: Primary | ICD-10-CM

## 2024-06-28 RX ORDER — ONDANSETRON 4 MG/1
4 TABLET, ORALLY DISINTEGRATING ORAL EVERY 8 HOURS PRN
Qty: 30 TABLET | Refills: 0 | Status: SHIPPED | OUTPATIENT
Start: 2024-06-28 | End: 2024-07-09

## 2024-07-02 ENCOUNTER — OFFICE VISIT (OUTPATIENT)
Dept: SURGERY | Facility: CLINIC | Age: 44
End: 2024-07-02
Payer: MEDICAID

## 2024-07-02 VITALS
BODY MASS INDEX: 25.79 KG/M2 | SYSTOLIC BLOOD PRESSURE: 109 MMHG | WEIGHT: 170.19 LBS | HEIGHT: 68 IN | DIASTOLIC BLOOD PRESSURE: 66 MMHG | HEART RATE: 80 BPM

## 2024-07-02 DIAGNOSIS — Z09 POSTOP CHECK: Primary | ICD-10-CM

## 2024-07-02 PROCEDURE — 3074F SYST BP LT 130 MM HG: CPT | Mod: CPTII,,, | Performed by: SURGERY

## 2024-07-02 PROCEDURE — 99215 OFFICE O/P EST HI 40 MIN: CPT | Mod: PBBFAC | Performed by: SURGERY

## 2024-07-02 PROCEDURE — 99024 POSTOP FOLLOW-UP VISIT: CPT | Mod: ,,, | Performed by: SURGERY

## 2024-07-02 PROCEDURE — 1159F MED LIST DOCD IN RCRD: CPT | Mod: CPTII,,, | Performed by: SURGERY

## 2024-07-02 PROCEDURE — 3078F DIAST BP <80 MM HG: CPT | Mod: CPTII,,, | Performed by: SURGERY

## 2024-07-02 PROCEDURE — 99999 PR PBB SHADOW E&M-EST. PATIENT-LVL V: CPT | Mod: PBBFAC,,, | Performed by: SURGERY

## 2024-07-02 PROCEDURE — 1160F RVW MEDS BY RX/DR IN RCRD: CPT | Mod: CPTII,,, | Performed by: SURGERY

## 2024-07-02 PROCEDURE — 3044F HG A1C LEVEL LT 7.0%: CPT | Mod: CPTII,,, | Performed by: SURGERY

## 2024-07-02 NOTE — PROGRESS NOTES
45y/o with bmi 27, eosinophilic asthma (not hospitalized in the last year, uses rescue inhaler 2-3 times a week), covid 2/2024 (has residual cough and loss of taste), anxiety, gerd and gastroparesis s/p robotic pyloromyotomy 6/7/24.       Preop history: She has had symptoms for many years and symptoms are worsening.  She has severe epigastric pain, extremely severe nausea and moderate to severe vomiting.  She is not sure what she takes but takes amiteza for constipation.  She states she is taking zofran 2-3 times a day, bentyl about bid, and has been prescribed reglan but isn't taking it due to risk of side effects.  She is on ppi daily.  She has lost 20 lb since February (covid) and 40lb over the last year.     GERD Questionnaire      daily PPI  has Typical heartburn  has Regurgitation  has Dysphagia solids  has Dysphagia liquids  has Hoarseness  has Sore throat  has Cough  has Asthma  has Chest pain  occasional Water brash  no Globus  has Nausea  has Vomiting     Interval history 6/18/24, postop: She is doing much better after surgery.  She has mild epigastric pain, mild to moderate nausea and mild vomiting.  She is on a soft diet.  She is only on zofran.  Her reflux is improved also and is taking ppi daily and less pepsid.  She says she feels 10/10 perfect!     Interval history 6/20/24:  She is concerned about one of her trocar sites and had it looked at emergently yesterday.  She has mild pain in the area.  She says her gastroparesis symptoms are unchanged from last visit and very improved.  She is on a soft diet.    Interval history 7/2/24:  She is not feeling quite as well as last visit.  She had extremely severe epigastric pain, severe nausea and moderate vomiting.  She says this worsening is due to her incisional issues.  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     PE Second from  right trocar site skin is opened with 2 cm deep area likely due to hematoma and the area was debrided and packed.     Preop diagnostic studies  Ges 2023 reviewed, 36% gastric retention at 4 h with increase in the last hour (gastroparesis and possible duodenal gastric reflux)  Ct 2023 reviewed, films viewed, no significant abnormality  Egd 2021 reviewed, mild gastritis on path     Postop diagnostic studies  None     Assessment and plan  Gastroparesis and gerd improved with pyloromyotomy.  Obtain ges in about a month.  Rtc one week.  May shower but not bathe until healed.

## 2024-07-10 NOTE — PROGRESS NOTES
Minimally Invasive Surgery  Follow Up Clinic Note    Subjective:   Post-Operative Visit 6/18/24:  Wandy Shah is a 44 y.o. female presents to the clinic status post robotic pyloromyotomy 6/7/24 for post-op follow-up.  She has been improving since surgery. Pain is well controlled, no drainage from incisions. She remains on a soft diet which she is tolerating well. Her nausea has improved, she now takes zofran 1-2x/week which is decreased from pre op. Rare vomiting. Bowel movements have been normal. Her heartburn has improved somewhat as well. Prilosec daily and pepcid as needed.      Gastroparesis scoring 6/18/24:  Vomiting:  Severity (1) /Frequency (1)  Nausea: Severity (1) /Frequency (2)  Early satiety: Severity (1) /Frequency (2)  Bloating: Severity (2) /Frequency (2)  Postprandial fullness: Severity (1) /Frequency (1)  Epigastric pain: Severity (1) / Frequency (1)  Epigastric burning: Severity (3) / Frequency (3)     Interval History 6/20/24:  She presents to clinic today for wound check after ED visit yesterday. She states that while in the shower yesterday, she noticed scant bleeding from the robotic incision site to the right of her umbilicus. No issues from her other incisions. The bleeding stopped right away, but she called the nursing line and was encouraged to be seen in the ED. She denies any other drainage or purulence coming from the incision. No fevers or chills.     Interval history 7/2/24:  She is not feeling quite as well as last visit.  She had extremely severe epigastric pain, severe nausea and moderate vomiting.  She says this worsening is due to her incisional issues.  She is taking zofran about every few days and ppi daily with pepcid daily     Interval History 7/11/24:  Presents to clinic for follow up s/p robotic pyloromyotomy 6/7/24. Since she was last seen she reports that she has been doing well. Symptoms of nausea, vomiting, and epigastric pain continue to improve. She reports that she is  able to eat more than she was before. Incision that has opened up is draining minimal SS fluid. She denies fever or chills. Minimal pain in the area.     Medications:  Promotility: none   Nausea Control: zofran 2x weekly   Acid Control: Pepcid   Antispasmodics: none  Pain Control: none    Gastroparesis scoring:  Vomting:  Severity (0) /Frequency (0)  Nausea: Severity (3) /Frequency (3)  Early satiety: Severity (3) /Frequency (3)  Bloating: Severity (1) /Frequency (2)  Postprandial fullness: Severity (2) /Frequency (1)  Epigastric pain: Severity (0) / Frequency (0)  Epigastric burning: Severity (3) / Frequency (3)          Medications:  Current Outpatient Medications on File Prior to Visit   Medication Sig Dispense Refill    ADVAIR DISKUS 100-50 mcg/dose diskus inhaler Inhale 2 puffs into the lungs 2 (two) times daily.      albuterol (PROVENTIL/VENTOLIN HFA) 90 mcg/actuation inhaler Inhale 2 puffs into the lungs every 4 (four) hours as needed for Wheezing 8.5 g 11    albuterol (PROVENTIL/VENTOLIN HFA) 90 mcg/actuation inhaler Inhale 2 puffs into the lungs every 4 (four) hours as needed for Wheezing 8.5 g 11    ascorbic acid, vitamin C, (VITAMIN C) 500 MG tablet Take 500 mg by mouth once daily.      ashwagandha root extract 500 mg Cap Take 2 capsules orally as needed. 180 capsule 4    benzonatate (TESSALON) 100 MG capsule Take 2 capsules by mouth 3 (three) times daily as needed for Cough for up to 7 days 20 capsule 0    cetirizine (ZYRTEC) 10 MG tablet Take 1 tablet (10 mg total) by mouth once daily. (Patient taking differently: Take 10 mg by mouth every evening.) 30 tablet 11    dicyclomine (BENTYL) 20 mg tablet Take 1 tablet (20 mg total) by mouth 3 (three) times daily as needed (abdominal pain). 60 tablet 5    DULoxetine (CYMBALTA) 60 MG capsule Take 1 capsule (60 mg total) by mouth once daily. 30 capsule 2    dupilumab (DUPIXENT PEN) 300 mg/2 mL PnIj Inject 300 mg into the skin every 14 (fourteen) days 4 mL 5     FLOVENT  mcg/actuation inhaler 1 puff 2 (two) times daily.      fluticasone propionate (FLONASE) 50 mcg/actuation nasal spray 2 sprays by Nasal route daily 16 g 5    fluticasone propionate (FLONASE) 50 mcg/actuation nasal spray 2 sprays by Nasal route daily 16 g 11    fluticasone-umeclidin-vilanter (TRELEGY ELLIPTA) 200-62.5-25 mcg inhaler Inhale 1 puff into the lungs daily (Patient taking differently: Inhale 1 puff into the lungs every evening.) 60 each 3    gabapentin (NEURONTIN) 100 MG capsule Take 1 capsule (100 mg total) by mouth 3 (three) times daily as needed. 30 capsule 6    loratadine (CLARITIN) 10 mg tablet       lubiprostone (AMITIZA) 24 MCG Cap Take 1 capsule (24 mcg total) by mouth 2 (two) times daily with meals. 60 capsule 2    meclizine (ANTIVERT) 12.5 mg tablet Take 1 tablet (12.5 mg total) by mouth 2 (two) times daily as needed for Dizziness. 28 tablet 0    meloxicam (MOBIC) 15 MG tablet Take 1 tablet (15 mg total) by mouth once daily. With food. 30 tablet 2    methylPREDNISolone (MEDROL DOSEPACK) 4 mg tablet       metoclopramide HCl (REGLAN) 5 MG tablet Take 1 tablet (5 mg total) by mouth 3 (three) times daily before meals. 90 tablet 1    nirmatrelvir-ritonavir (PAXLOVID) 300 mg (150 mg x 2)-100 mg copackaged tablets (EUA) Take 3 tablets by mouth 2 (two) times daily for 5 days 30 tablet 0    omeprazole (PRILOSEC) 40 MG capsule Take 1 capsule (40 mg total) by mouth once daily. 30 capsule 5    ondansetron (ZOFRAN-ODT) 4 MG TbDL Take 1 tablet (4 mg total) by mouth every 8 (eight) hours as needed (Nausea). 30 tablet 0    polyethylene glycol (GLYCOLAX) 17 gram/dose powder Take 17 g by mouth once daily. 510 g 11    predniSONE (DELTASONE) 10 MG tablet Take 3 by mouth every morning x 2 days then 2 tablets  x 2 days then 1 tablet  x 2 days then stop 12 tablet 0    predniSONE (DELTASONE) 20 MG tablet Take 1 tablet by mouth daily for 5 days 5 tablet 0    promethazine-dextromethorphan (PROMETHAZINE-DM)  6.25-15 mg/5 mL Syrp Take 5 mLs by mouth 4 (four) times daily as needed for Cough for up to 7 days 118 mL 0    rizatriptan (MAXALT) 10 MG tablet take 1/2 - 1 at onset of migraine; may repeat in 2 hours if needed; maximum 2 in 24 hours; maximum 3 days per week 10 tablet 2    tiotropium (SPIRIVA WITH HANDIHALER) 18 mcg inhalation capsule Inhale 1 capsule into the lungs daily 30 capsule 12    tiotropium (SPIRIVA WITH HANDIHALER) 18 mcg inhalation capsule Inhale 1 capsule into the lungs daily 30 capsule 12    topiramate (TOPAMAX) 25 MG tablet Take 1 tablet by mouth daily; increase by 1 per week as needed for migraine up to 2 tablets twice a day (Patient taking differently: Take 75 mg by mouth every evening.) 120 tablet 3    amitriptyline (ELAVIL) 25 MG tablet Take 1 tablet (25 mg total) by mouth every evening. 30 tablet 2    diclofenac sodium (VOLTAREN) 1 % Gel Apply 2 g topically 4 (four) times daily as needed. 200 g 0    estradioL (ESTRACE) 0.01 % (0.1 mg/gram) vaginal cream Place 1 g vaginally twice a week. Use 1/2 gram nightly for 1st week 42.5 g 1    hydrocortisone 2.5 % cream Apply topically 2 (two) times daily. for 14 days 30 g 0    sumatriptan (IMITREX) 100 MG tablet Take 1 tablet (100 mg total) by mouth every 2 (two) hours as needed for Migraine. 10 tablet 3     No current facility-administered medications on file prior to visit.         Objective:     PHYSICAL EXAM:  Vital Signs (Most Recent)  Pulse: 74 (07/11/24 1606)  BP: (!) 110/57 (07/11/24 1606)    Physical Exam:  Gen: no apparent distress, awake and alert  CV: regular rate/rhythm  Pulm: non-labored breathing, equal and bilateral chest rise  Abd: soft, non-distended, non-tender   Port site second from the right with approx 5mm opening that is 5 mm deep with a bed of healthy granulation tissue and small rim of fibrinous exudate in the inferior portion  Ext: warm and well perfused, no edema  Skin: warm and dry, no lesions appreciated  Neuro: motor and  sensation grossly intact and symmetric       Assessment:     Wandy Shah is a 44 y.o. female presenting to clinic today for follow up s/p robotic pyloromyotomy 6/7/24 with opening of one of the port site incisions.    Plan:     - Continue with local wound care to incision - is healing well  - RTC 2-3 months after GES  - Call with questions or concerns      Jaja Jackson MD   [facility] PGY-3

## 2024-07-11 ENCOUNTER — OFFICE VISIT (OUTPATIENT)
Dept: SURGERY | Facility: CLINIC | Age: 44
End: 2024-07-11
Payer: MEDICAID

## 2024-07-11 VITALS
HEIGHT: 68 IN | DIASTOLIC BLOOD PRESSURE: 57 MMHG | WEIGHT: 170.19 LBS | SYSTOLIC BLOOD PRESSURE: 110 MMHG | HEART RATE: 74 BPM | BODY MASS INDEX: 25.79 KG/M2

## 2024-07-11 DIAGNOSIS — Z09 POSTOP CHECK: Primary | ICD-10-CM

## 2024-07-11 PROCEDURE — 1159F MED LIST DOCD IN RCRD: CPT | Mod: CPTII,,, | Performed by: SURGERY

## 2024-07-11 PROCEDURE — 99215 OFFICE O/P EST HI 40 MIN: CPT | Mod: PBBFAC | Performed by: SURGERY

## 2024-07-11 PROCEDURE — 3078F DIAST BP <80 MM HG: CPT | Mod: CPTII,,, | Performed by: SURGERY

## 2024-07-11 PROCEDURE — 3074F SYST BP LT 130 MM HG: CPT | Mod: CPTII,,, | Performed by: SURGERY

## 2024-07-11 PROCEDURE — 99999 PR PBB SHADOW E&M-EST. PATIENT-LVL V: CPT | Mod: PBBFAC,,, | Performed by: SURGERY

## 2024-07-11 PROCEDURE — 99024 POSTOP FOLLOW-UP VISIT: CPT | Mod: ,,, | Performed by: SURGERY

## 2024-07-11 PROCEDURE — 1160F RVW MEDS BY RX/DR IN RCRD: CPT | Mod: CPTII,,, | Performed by: SURGERY

## 2024-07-11 PROCEDURE — 3044F HG A1C LEVEL LT 7.0%: CPT | Mod: CPTII,,, | Performed by: SURGERY

## 2024-07-11 NOTE — LETTER
July 11, 2024        Alondra Echeverria, RAMOS  [address]  [address]  [facility] [address]             [facility] 2nd [facility]  [address]  [facility] [address]  Phone: [phone]   Patient: Wandy Shah   MR Number: [id number]   YOB: 1980   Date of Visit: 7/11/2024       Dear Dr. Echeverria:    Thank you for referring Wandy Shah to me for evaluation. Attached you will find relevant portions of my assessment and plan of care.    If you have questions, please do not hesitate to call me. I look forward to following Wandy Shah along with you.    Sincerely,      Clifford Lynch MD            CC  No Recipients    Enclosure

## 2024-07-11 NOTE — PROGRESS NOTES
43y/o with bmi 27, eosinophilic asthma (not hospitalized in the last year, uses rescue inhaler 2-3 times a week), covid 2/2024 (has residual cough and loss of taste), anxiety, gerd and gastroparesis s/p robotic pyloromyotomy 6/7/24.       Preop history: She has had symptoms for many years and symptoms are worsening.  She has severe epigastric pain, extremely severe nausea and moderate to severe vomiting.  She is not sure what she takes but takes amiteza for constipation.  She states she is taking zofran 2-3 times a day, bentyl about bid, and has been prescribed reglan but isn't taking it due to risk of side effects.  She is on ppi daily.  She has lost 20 lb since February (covid) and 40lb over the last year.     GERD Questionnaire      daily PPI  has Typical heartburn  has Regurgitation  has Dysphagia solids  has Dysphagia liquids  has Hoarseness  has Sore throat  has Cough  has Asthma  has Chest pain  occasional Water brash  no Globus  has Nausea  has Vomiting     Interval history 6/18/24, postop: She is doing much better after surgery.  She has mild epigastric pain, mild to moderate nausea and mild vomiting.  She is on a soft diet.  She is only on zofran.  Her reflux is improved also and is taking ppi daily and less pepsid.  She says she feels 10/10 perfect!     Interval history 6/20/24:  She is concerned about one of her trocar sites and had it looked at emergently yesterday.  She has mild pain in the area.  She says her gastroparesis symptoms are unchanged from last visit and very improved.  She is on a soft diet.     Interval history 7/2/24:  She is not feeling quite as well as last visit.  She had extremely severe epigastric pain, severe nausea and moderate vomiting.  She says this worsening is due to her incisional issues.  She is taking zofran about every few days and ppi daily with pepcid daily    Interval history 7/11/24:  She is improved from gastroparesis and happy with her result.  Her incision is  still a little open.      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     PE Second from right trocar site skin is opened with 2 cm deep area likely due to hematoma and the area was debrided and packed.     Preop diagnostic studies  Ges 2023 reviewed, 36% gastric retention at 4 h with increase in the last hour (gastroparesis and possible duodenal gastric reflux)  Ct 2023 reviewed, films viewed, no significant abnormality  Egd 2021 reviewed, mild gastritis on path     Postop diagnostic studies  None     Assessment and plan  Gastroparesis and gerd improved with pyloromyotomy.  Obtain ges in about a month.  Rtc 2-3 months.

## 2024-07-22 ENCOUNTER — PATIENT MESSAGE (OUTPATIENT)
Dept: ORTHOPEDICS | Facility: CLINIC | Age: 44
End: 2024-07-22
Payer: MEDICAID

## 2024-07-24 ENCOUNTER — HOSPITAL ENCOUNTER (INPATIENT)
Facility: HOSPITAL | Age: 44
LOS: 1 days | Discharge: HOME | DRG: 309 | End: 2024-07-25
Attending: STUDENT IN AN ORGANIZED HEALTH CARE EDUCATION/TRAINING PROGRAM | Admitting: STUDENT IN AN ORGANIZED HEALTH CARE EDUCATION/TRAINING PROGRAM
Payer: MEDICAID

## 2024-07-24 DIAGNOSIS — M77.11 LATERAL EPICONDYLITIS OF RIGHT ELBOW: Primary | ICD-10-CM

## 2024-07-24 DIAGNOSIS — I47.10 SVT (SUPRAVENTRICULAR TACHYCARDIA): Primary | ICD-10-CM

## 2024-07-24 DIAGNOSIS — R00.2 PALPITATIONS: ICD-10-CM

## 2024-07-24 DIAGNOSIS — R00.0 TACHYCARDIA: ICD-10-CM

## 2024-07-24 DIAGNOSIS — R07.9 CHEST PAIN: ICD-10-CM

## 2024-07-24 DIAGNOSIS — I49.9 ARRHYTHMIA: ICD-10-CM

## 2024-07-24 DIAGNOSIS — I48.91 ATRIAL FIBRILLATION: ICD-10-CM

## 2024-07-24 LAB
ALBUMIN SERPL BCP-MCNC: 3.6 G/DL (ref 3.5–5.2)
ALP SERPL-CCNC: 51 U/L (ref 55–135)
ALT SERPL W/O P-5'-P-CCNC: 7 U/L (ref 10–44)
ANION GAP SERPL CALC-SCNC: 7 MMOL/L (ref 8–16)
ASCENDING AORTA: 2.84 CM
AST SERPL-CCNC: 10 U/L (ref 10–40)
AV INDEX (PROSTH): 1.17
AV MEAN GRADIENT: 2 MMHG
AV PEAK GRADIENT: 4 MMHG
AV VALVE AREA BY VELOCITY RATIO: 3.74 CM²
AV VALVE AREA: 4.12 CM²
AV VELOCITY RATIO: 1.06
B-HCG UR QL: NEGATIVE
BASOPHILS # BLD AUTO: 0.07 K/UL (ref 0–0.2)
BASOPHILS NFR BLD: 0.4 % (ref 0–1.9)
BILIRUB SERPL-MCNC: 0.2 MG/DL (ref 0.1–1)
BILIRUB UR QL STRIP: NEGATIVE
BNP SERPL-MCNC: 374 PG/ML (ref 0–99)
BSA FOR ECHO PROCEDURE: 1.92 M2
BUN SERPL-MCNC: 12 MG/DL (ref 6–20)
CALCIUM SERPL-MCNC: 8.5 MG/DL (ref 8.7–10.5)
CHLORIDE SERPL-SCNC: 115 MMOL/L (ref 95–110)
CLARITY UR REFRACT.AUTO: CLEAR
CO2 SERPL-SCNC: 21 MMOL/L (ref 23–29)
COLOR UR AUTO: YELLOW
CREAT SERPL-MCNC: 0.8 MG/DL (ref 0.5–1.4)
CTP QC/QA: YES
CV ECHO LV RWT: 0.32 CM
DIFFERENTIAL METHOD BLD: ABNORMAL
DOP CALC AO PEAK VEL: 1.02 M/S
DOP CALC AO VTI: 23.01 CM
DOP CALC LVOT AREA: 3.5 CM2
DOP CALC LVOT DIAMETER: 2.12 CM
DOP CALC LVOT PEAK VEL: 1.08 M/S
DOP CALC LVOT STROKE VOLUME: 94.84 CM3
DOP CALCLVOT PEAK VEL VTI: 26.88 CM
E WAVE DECELERATION TIME: 142.03 MSEC
E/A RATIO: 1.83
E/E' RATIO: 7 M/S
ECHO LV POSTERIOR WALL: 0.82 CM (ref 0.6–1.1)
EOSINOPHIL # BLD AUTO: 0.3 K/UL (ref 0–0.5)
EOSINOPHIL NFR BLD: 1.4 % (ref 0–8)
ERYTHROCYTE [DISTWIDTH] IN BLOOD BY AUTOMATED COUNT: 13.7 % (ref 11.5–14.5)
EST. GFR  (NO RACE VARIABLE): >60 ML/MIN/1.73 M^2
FRACTIONAL SHORTENING: 37 % (ref 28–44)
GLUCOSE SERPL-MCNC: 108 MG/DL (ref 70–110)
GLUCOSE UR QL STRIP: NEGATIVE
HCT VFR BLD AUTO: 43.8 % (ref 37–48.5)
HCV AB SERPL QL IA: NORMAL
HGB BLD-MCNC: 13.8 G/DL (ref 12–16)
HGB UR QL STRIP: NEGATIVE
HIV 1+2 AB+HIV1 P24 AG SERPL QL IA: NORMAL
IMM GRANULOCYTES # BLD AUTO: 0.18 K/UL (ref 0–0.04)
IMM GRANULOCYTES NFR BLD AUTO: 1 % (ref 0–0.5)
INFLUENZA A, MOLECULAR: NOT DETECTED
INFLUENZA B, MOLECULAR: NOT DETECTED
INTERVENTRICULAR SEPTUM: 0.76 CM (ref 0.6–1.1)
KETONES UR QL STRIP: NEGATIVE
LA MAJOR: 4.95 CM
LA MINOR: 4.65 CM
LA WIDTH: 3.26 CM
LEFT ATRIUM SIZE: 3.14 CM
LEFT ATRIUM VOLUME INDEX MOD: 20.9 ML/M2
LEFT ATRIUM VOLUME INDEX: 21.8 ML/M2
LEFT ATRIUM VOLUME MOD: 39.85 CM3
LEFT ATRIUM VOLUME: 41.72 CM3
LEFT INTERNAL DIMENSION IN SYSTOLE: 3.18 CM (ref 2.1–4)
LEFT VENTRICLE DIASTOLIC VOLUME INDEX: 63.77 ML/M2
LEFT VENTRICLE DIASTOLIC VOLUME: 121.8 ML
LEFT VENTRICLE MASS INDEX: 71 G/M2
LEFT VENTRICLE SYSTOLIC VOLUME INDEX: 21.1 ML/M2
LEFT VENTRICLE SYSTOLIC VOLUME: 40.28 ML
LEFT VENTRICULAR INTERNAL DIMENSION IN DIASTOLE: 5.06 CM (ref 3.5–6)
LEFT VENTRICULAR MASS: 136.38 G
LEUKOCYTE ESTERASE UR QL STRIP: NEGATIVE
LV LATERAL E/E' RATIO: 6 M/S
LV SEPTAL E/E' RATIO: 8.4 M/S
LYMPHOCYTES # BLD AUTO: 4.8 K/UL (ref 1–4.8)
LYMPHOCYTES NFR BLD: 26 % (ref 18–48)
MAGNESIUM SERPL-MCNC: 1.9 MG/DL (ref 1.6–2.6)
MCH RBC QN AUTO: 31 PG (ref 27–31)
MCHC RBC AUTO-ENTMCNC: 31.5 G/DL (ref 32–36)
MCV RBC AUTO: 98 FL (ref 82–98)
MONOCYTES # BLD AUTO: 1.3 K/UL (ref 0.3–1)
MONOCYTES NFR BLD: 7.1 % (ref 4–15)
MV A" WAVE DURATION": 6.85 MSEC
MV PEAK A VEL: 0.46 M/S
MV PEAK E VEL: 0.84 M/S
MV STENOSIS PRESSURE HALF TIME: 41.19 MS
MV VALVE AREA P 1/2 METHOD: 5.34 CM2
NEUTROPHILS # BLD AUTO: 11.8 K/UL (ref 1.8–7.7)
NEUTROPHILS NFR BLD: 64.1 % (ref 38–73)
NITRITE UR QL STRIP: NEGATIVE
NRBC BLD-RTO: 0 /100 WBC
OHS QRS DURATION: 116 MS
OHS QRS DURATION: 126 MS
OHS QRS DURATION: 88 MS
OHS QTC CALCULATION: 455 MS
OHS QTC CALCULATION: 464 MS
OHS QTC CALCULATION: 491 MS
PH UR STRIP: 7 [PH] (ref 5–8)
PLATELET # BLD AUTO: 276 K/UL (ref 150–450)
PMV BLD AUTO: 9.9 FL (ref 9.2–12.9)
POTASSIUM SERPL-SCNC: 3.6 MMOL/L (ref 3.5–5.1)
PROT SERPL-MCNC: 7 G/DL (ref 6–8.4)
PROT UR QL STRIP: ABNORMAL
PULM VEIN S/D RATIO: 1.27
PV PEAK D VEL: 0.37 M/S
PV PEAK S VEL: 0.47 M/S
RA MAJOR: 4.77 CM
RA PRESSURE ESTIMATED: 8 MMHG
RA WIDTH: 3.59 CM
RBC # BLD AUTO: 4.45 M/UL (ref 4–5.4)
RIGHT VENTRICLE DIASTOLIC BASEL DIMENSION: 2.8 CM
RSV AG BY MOLECULAR METHOD: NOT DETECTED
SARS-COV-2 RNA RESP QL NAA+PROBE: NOT DETECTED
SINUS: 3.64 CM
SODIUM SERPL-SCNC: 143 MMOL/L (ref 136–145)
SP GR UR STRIP: 1.02 (ref 1–1.03)
STJ: 2.78 CM
TDI LATERAL: 0.14 M/S
TDI SEPTAL: 0.1 M/S
TDI: 0.12 M/S
TRICUSPID ANNULAR PLANE SYSTOLIC EXCURSION: 1.82 CM
TROPONIN I SERPL DL<=0.01 NG/ML-MCNC: <0.006 NG/ML (ref 0–0.03)
TSH SERPL DL<=0.005 MIU/L-ACNC: 3.33 UIU/ML (ref 0.4–4)
URN SPEC COLLECT METH UR: ABNORMAL
WBC # BLD AUTO: 18.41 K/UL (ref 3.9–12.7)
Z-SCORE OF LEFT VENTRICULAR DIMENSION IN END DIASTOLE: -0.6
Z-SCORE OF LEFT VENTRICULAR DIMENSION IN END SYSTOLE: -0.32

## 2024-07-24 PROCEDURE — 25000003 PHARM REV CODE 250: Performed by: STUDENT IN AN ORGANIZED HEALTH CARE EDUCATION/TRAINING PROGRAM

## 2024-07-24 PROCEDURE — 83880 ASSAY OF NATRIURETIC PEPTIDE: CPT | Performed by: STUDENT IN AN ORGANIZED HEALTH CARE EDUCATION/TRAINING PROGRAM

## 2024-07-24 PROCEDURE — 80053 COMPREHEN METABOLIC PANEL: CPT | Performed by: STUDENT IN AN ORGANIZED HEALTH CARE EDUCATION/TRAINING PROGRAM

## 2024-07-24 PROCEDURE — 96361 HYDRATE IV INFUSION ADD-ON: CPT

## 2024-07-24 PROCEDURE — 20600001 HC STEP DOWN PRIVATE ROOM

## 2024-07-24 PROCEDURE — 93005 ELECTROCARDIOGRAM TRACING: CPT

## 2024-07-24 PROCEDURE — 86803 HEPATITIS C AB TEST: CPT | Performed by: PHYSICIAN ASSISTANT

## 2024-07-24 PROCEDURE — 81025 URINE PREGNANCY TEST: CPT | Performed by: STUDENT IN AN ORGANIZED HEALTH CARE EDUCATION/TRAINING PROGRAM

## 2024-07-24 PROCEDURE — 84443 ASSAY THYROID STIM HORMONE: CPT | Performed by: PHYSICIAN ASSISTANT

## 2024-07-24 PROCEDURE — 81003 URINALYSIS AUTO W/O SCOPE: CPT | Performed by: STUDENT IN AN ORGANIZED HEALTH CARE EDUCATION/TRAINING PROGRAM

## 2024-07-24 PROCEDURE — 99291 CRITICAL CARE FIRST HOUR: CPT

## 2024-07-24 PROCEDURE — 87389 HIV-1 AG W/HIV-1&-2 AB AG IA: CPT | Performed by: PHYSICIAN ASSISTANT

## 2024-07-24 PROCEDURE — 63600175 PHARM REV CODE 636 W HCPCS: Performed by: STUDENT IN AN ORGANIZED HEALTH CARE EDUCATION/TRAINING PROGRAM

## 2024-07-24 PROCEDURE — 85025 COMPLETE CBC W/AUTO DIFF WBC: CPT | Performed by: STUDENT IN AN ORGANIZED HEALTH CARE EDUCATION/TRAINING PROGRAM

## 2024-07-24 PROCEDURE — 83735 ASSAY OF MAGNESIUM: CPT | Performed by: STUDENT IN AN ORGANIZED HEALTH CARE EDUCATION/TRAINING PROGRAM

## 2024-07-24 PROCEDURE — 25000242 PHARM REV CODE 250 ALT 637 W/ HCPCS: Performed by: STUDENT IN AN ORGANIZED HEALTH CARE EDUCATION/TRAINING PROGRAM

## 2024-07-24 PROCEDURE — 0241U SARS-COV2 (COVID) WITH FLU/RSV BY PCR: CPT | Performed by: STUDENT IN AN ORGANIZED HEALTH CARE EDUCATION/TRAINING PROGRAM

## 2024-07-24 PROCEDURE — 84484 ASSAY OF TROPONIN QUANT: CPT | Performed by: STUDENT IN AN ORGANIZED HEALTH CARE EDUCATION/TRAINING PROGRAM

## 2024-07-24 PROCEDURE — 96374 THER/PROPH/DIAG INJ IV PUSH: CPT

## 2024-07-24 PROCEDURE — 93010 ELECTROCARDIOGRAM REPORT: CPT | Mod: 59,,, | Performed by: INTERNAL MEDICINE

## 2024-07-24 RX ORDER — SUMATRIPTAN 50 MG/1
100 TABLET, FILM COATED ORAL 2 TIMES DAILY PRN
Status: DISCONTINUED | OUTPATIENT
Start: 2024-07-24 | End: 2024-07-25 | Stop reason: HOSPADM

## 2024-07-24 RX ORDER — PREDNISONE 10 MG/1
10 TABLET ORAL DAILY
Status: DISCONTINUED | OUTPATIENT
Start: 2024-07-26 | End: 2024-07-24

## 2024-07-24 RX ORDER — IPRATROPIUM BROMIDE AND ALBUTEROL SULFATE 2.5; .5 MG/3ML; MG/3ML
3 SOLUTION RESPIRATORY (INHALATION) EVERY 6 HOURS PRN
Status: DISCONTINUED | OUTPATIENT
Start: 2024-07-24 | End: 2024-07-24

## 2024-07-24 RX ORDER — ONDANSETRON 4 MG/1
8 TABLET, ORALLY DISINTEGRATING ORAL EVERY 8 HOURS PRN
Status: DISCONTINUED | OUTPATIENT
Start: 2024-07-24 | End: 2024-07-25 | Stop reason: HOSPADM

## 2024-07-24 RX ORDER — GLUCAGON 1 MG
1 KIT INJECTION
Status: DISCONTINUED | OUTPATIENT
Start: 2024-07-24 | End: 2024-07-25 | Stop reason: HOSPADM

## 2024-07-24 RX ORDER — PREDNISONE 20 MG/1
20 TABLET ORAL DAILY
Status: DISCONTINUED | OUTPATIENT
Start: 2024-07-24 | End: 2024-07-24

## 2024-07-24 RX ORDER — SODIUM CHLORIDE 0.9 % (FLUSH) 0.9 %
10 SYRINGE (ML) INJECTION EVERY 8 HOURS PRN
Status: DISCONTINUED | OUTPATIENT
Start: 2024-07-24 | End: 2024-07-25 | Stop reason: HOSPADM

## 2024-07-24 RX ORDER — MECLIZINE HCL 12.5 MG 12.5 MG/1
12.5 TABLET ORAL 2 TIMES DAILY PRN
Status: DISCONTINUED | OUTPATIENT
Start: 2024-07-24 | End: 2024-07-25 | Stop reason: HOSPADM

## 2024-07-24 RX ORDER — ALBUTEROL SULFATE 90 UG/1
2 AEROSOL, METERED RESPIRATORY (INHALATION) EVERY 4 HOURS PRN
Status: DISCONTINUED | OUTPATIENT
Start: 2024-07-24 | End: 2024-07-25 | Stop reason: HOSPADM

## 2024-07-24 RX ORDER — DULOXETIN HYDROCHLORIDE 60 MG/1
60 CAPSULE, DELAYED RELEASE ORAL DAILY
Status: DISCONTINUED | OUTPATIENT
Start: 2024-07-24 | End: 2024-07-25 | Stop reason: HOSPADM

## 2024-07-24 RX ORDER — GABAPENTIN 100 MG/1
100 CAPSULE ORAL 3 TIMES DAILY PRN
Status: DISCONTINUED | OUTPATIENT
Start: 2024-07-24 | End: 2024-07-25 | Stop reason: HOSPADM

## 2024-07-24 RX ORDER — SODIUM CHLORIDE 9 MG/ML
1000 INJECTION, SOLUTION INTRAVENOUS
Status: COMPLETED | OUTPATIENT
Start: 2024-07-24 | End: 2024-07-24

## 2024-07-24 RX ORDER — IBUPROFEN 200 MG
24 TABLET ORAL
Status: DISCONTINUED | OUTPATIENT
Start: 2024-07-24 | End: 2024-07-25 | Stop reason: HOSPADM

## 2024-07-24 RX ORDER — CETIRIZINE HYDROCHLORIDE 10 MG/1
10 TABLET ORAL NIGHTLY
Status: DISCONTINUED | OUTPATIENT
Start: 2024-07-24 | End: 2024-07-25 | Stop reason: HOSPADM

## 2024-07-24 RX ORDER — ADENOSINE 3 MG/ML
INJECTION, SOLUTION INTRAVENOUS
Status: DISPENSED
Start: 2024-07-24 | End: 2024-07-24

## 2024-07-24 RX ORDER — AMITRIPTYLINE HYDROCHLORIDE 25 MG/1
25 TABLET, FILM COATED ORAL NIGHTLY
Status: DISCONTINUED | OUTPATIENT
Start: 2024-07-24 | End: 2024-07-24

## 2024-07-24 RX ORDER — NALOXONE HCL 0.4 MG/ML
0.02 VIAL (ML) INJECTION
Status: DISCONTINUED | OUTPATIENT
Start: 2024-07-24 | End: 2024-07-25 | Stop reason: HOSPADM

## 2024-07-24 RX ORDER — IBUPROFEN 200 MG
16 TABLET ORAL
Status: DISCONTINUED | OUTPATIENT
Start: 2024-07-24 | End: 2024-07-25 | Stop reason: HOSPADM

## 2024-07-24 RX ORDER — PANTOPRAZOLE SODIUM 40 MG/1
40 TABLET, DELAYED RELEASE ORAL DAILY
Status: DISCONTINUED | OUTPATIENT
Start: 2024-07-24 | End: 2024-07-25 | Stop reason: HOSPADM

## 2024-07-24 RX ORDER — POLYETHYLENE GLYCOL 3350 17 G/17G
17 POWDER, FOR SOLUTION ORAL DAILY
Status: DISCONTINUED | OUTPATIENT
Start: 2024-07-24 | End: 2024-07-25 | Stop reason: HOSPADM

## 2024-07-24 RX ORDER — ACETAMINOPHEN 325 MG/1
650 TABLET ORAL EVERY 6 HOURS PRN
Status: DISCONTINUED | OUTPATIENT
Start: 2024-07-24 | End: 2024-07-25 | Stop reason: HOSPADM

## 2024-07-24 RX ORDER — ADENOSINE 3 MG/ML
6 INJECTION, SOLUTION INTRAVENOUS
Status: COMPLETED | OUTPATIENT
Start: 2024-07-24 | End: 2024-07-24

## 2024-07-24 RX ORDER — LUBIPROSTONE 24 UG/1
24 CAPSULE ORAL 2 TIMES DAILY WITH MEALS
Status: DISCONTINUED | OUTPATIENT
Start: 2024-07-24 | End: 2024-07-25 | Stop reason: HOSPADM

## 2024-07-24 RX ORDER — IBUPROFEN 800 MG/1
800 TABLET ORAL 3 TIMES DAILY
Qty: 90 TABLET | Refills: 0 | Status: SHIPPED | OUTPATIENT
Start: 2024-07-24

## 2024-07-24 RX ORDER — FLUTICASONE FUROATE AND VILANTEROL 100; 25 UG/1; UG/1
1 POWDER RESPIRATORY (INHALATION) DAILY
Status: DISCONTINUED | OUTPATIENT
Start: 2024-07-24 | End: 2024-07-25 | Stop reason: HOSPADM

## 2024-07-24 RX ORDER — PROCHLORPERAZINE EDISYLATE 5 MG/ML
5 INJECTION INTRAMUSCULAR; INTRAVENOUS EVERY 6 HOURS PRN
Status: DISCONTINUED | OUTPATIENT
Start: 2024-07-24 | End: 2024-07-25 | Stop reason: HOSPADM

## 2024-07-24 RX ORDER — TALC
6 POWDER (GRAM) TOPICAL NIGHTLY PRN
Status: DISCONTINUED | OUTPATIENT
Start: 2024-07-24 | End: 2024-07-25 | Stop reason: HOSPADM

## 2024-07-24 RX ORDER — TOPIRAMATE 25 MG/1
75 TABLET ORAL NIGHTLY
Status: DISCONTINUED | OUTPATIENT
Start: 2024-07-24 | End: 2024-07-25 | Stop reason: HOSPADM

## 2024-07-24 RX ADMIN — TIOTROPIUM BROMIDE INHALATION SPRAY 2 PUFF: 3.12 SPRAY, METERED RESPIRATORY (INHALATION) at 12:07

## 2024-07-24 RX ADMIN — LUBIPROSTONE 24 MCG: 24 CAPSULE ORAL at 12:07

## 2024-07-24 RX ADMIN — TOPIRAMATE 75 MG: 25 TABLET, FILM COATED ORAL at 08:07

## 2024-07-24 RX ADMIN — SODIUM CHLORIDE 1000 ML: 9 INJECTION, SOLUTION INTRAVENOUS at 07:07

## 2024-07-24 RX ADMIN — FLUTICASONE FUROATE AND VILANTEROL TRIFENATATE 1 PUFF: 100; 25 POWDER RESPIRATORY (INHALATION) at 10:07

## 2024-07-24 RX ADMIN — ONDANSETRON 8 MG: 4 TABLET, ORALLY DISINTEGRATING ORAL at 08:07

## 2024-07-24 RX ADMIN — PANTOPRAZOLE SODIUM 40 MG: 40 TABLET, DELAYED RELEASE ORAL at 10:07

## 2024-07-24 RX ADMIN — DULOXETINE HYDROCHLORIDE 60 MG: 60 CAPSULE, DELAYED RELEASE ORAL at 10:07

## 2024-07-24 RX ADMIN — ADENOSINE 6 MG: 3 INJECTION INTRAVENOUS at 07:07

## 2024-07-24 RX ADMIN — CETIRIZINE HYDROCHLORIDE 10 MG: 10 TABLET, FILM COATED ORAL at 08:07

## 2024-07-24 RX ADMIN — LUBIPROSTONE 24 MCG: 24 CAPSULE ORAL at 06:07

## 2024-07-24 NOTE — HPI
This is a 44-year-old female with a history of moderate persistent asthma, chronic rhinitis, eosinophilic asthma, severe gastroparesis status post robotic pyloromyotomy 06/2024 presents with palpitations.  States that starting last night she experienced uncomfortable sensation of her chest and fluttering sensation, unclear how long episode lasted however subsided on its own.  She had another episode this morning.  Checked her Apple watch, found to have HR up to 180s.  She then presented to ED for further evaluation.  States that she infrequently follows up with cardiologist for 'arrythmia' at outside clinic in [facility]. Has not followed up in sometime as she has not had palpitations for a while.  Denies any fevers, chills, worsening cough, wheezing, shortness for breath, diarrhea.  Experiences chronic similar abdominal pain, not worsening and also not improved since procedure last month.  States that her only new medication is prednisone which she started taking yesterday.  One week taper was prescribed by ENT physician for control of rhinitis.     ED course:  Upon arrival, .  Otherwise hemodynamically stable.  EKG noting SVT, status post adenosine 6 mg IV x1 with conversion to NSR

## 2024-07-24 NOTE — NURSING
Assumed pt care. Pt VSS aaox4, able to make needs known. Independent steady gait. Denies pain and discomfort at this time.

## 2024-07-24 NOTE — ED TRIAGE NOTES
"Patient identifiers for Wandy Shah 44 y.o. female checked and correct.  Chief Complaint   Patient presents with    Tachycardia     Pt. Reports feeling "funny" since last night, reporting palpitations.      Past Medical History:   Diagnosis Date    Anemia     Back pain     Depression      Allergies reported:   Review of patient's allergies indicates:   Allergen Reactions    Raspberry (rubus idaeus) Hives         LOC: Patient is awake, alert, and aware of environment with an appropriate affect. Patient is oriented x 4 and speaking appropriately.  APPEARANCE: Patient resting comfortably and in no acute distress. Patient is clean and well groomed, patient's clothing is properly fastened.  SKIN: The skin is warm and dry. Patient has normal skin turgor and moist mucus membranes.   MUSKULOSKELETAL: Patient is moving all extremities well, no obvious deformities noted. Pulses intact.   RESPIRATORY: Airway is open and patent. Respirations are spontaneous and non-labored with normal effort and rate.  CARDIAC:  SVT on cardiac monitor.  No peripheral edema noted. Reports palpitations   ABDOMEN: No distention noted. Soft and non-tender upon palpation.  NEUROLOGICAL:PERRL. Facial expression is symmetrical. Hand grasps are equal bilaterally. Normal sensation in all extremities when touched with finger.          "

## 2024-07-24 NOTE — ASSESSMENT & PLAN NOTE
-Presents with palpitations, found to have HR 180s with SVT rhythm and now s/p conversion with adenosine x1  -Has history of unknown arrythmia, follows with cardiology at outside facility  -Possibly incited by prednisone, started yesterday for rhinitis   -Less concern for active infection, leukocytosis likely in the setting of steroid  Plan:  -Monitor on tele  -TTE  -Discussed w EP, rhythm suggestive of AVNRT therefore may be candidate for ablation. F/u in clinic  -TSH  -Hold prednisone  -Would avoid BB at this time given history of asthma  -CXR

## 2024-07-24 NOTE — SUBJECTIVE & OBJECTIVE
Past Medical History:   Diagnosis Date    Anemia     Back pain     Depression        Past Surgical History:   Procedure Laterality Date     SECTION      COLD KNIFE CONIZATION OF CERVIX  12/10/2021    Procedure: CONE BIOPSY, CERVIX, USING COLD KNIFE;  Surgeon: Modesto Nassar MD;  Location: [facility] OR;  Service: OB/GYN;;    COLONOSCOPY N/A 2021    Procedure: COLONOSCOPY;  Surgeon: Emily Cruz MD;  Location: [facility] ENDO;  Service: Endoscopy;  Laterality: N/A;    ESOPHAGOGASTRODUODENOSCOPY N/A 2021    Procedure: EGD (ESOPHAGOGASTRODUODENOSCOPY);  Surgeon: Emily Cruz MD;  Location: [facility] ENDO;  Service: Endoscopy;  Laterality: N/A;    HYSTERECTOMY      ROBOT-ASSISTED LAPAROSCOPIC HYSTERECTOMY N/A 2022    Procedure: ROBOTIC HYSTERECTOMY;  Surgeon: Modesto Nassar MD;  Location: [facility];  Service: OB/GYN;  Laterality: N/A;    ROBOT-ASSISTED LAPAROSCOPIC PYLOROPLASTY USING DA JOSE LUIS XI N/A 2024    Procedure: XI ROBOTIC PYLOROMYOTOMY WITH EGD;  Surgeon: Clifford Lynch MD;  Location: [address];  Service: General;  Laterality: N/A;  NO NARCOTICS    ROBOT-ASSISTED SALPINGECTOMY Bilateral 2022    Procedure: ROBOTIC SALPINGECTOMY;  Surgeon: Modesto Nassar MD;  Location: [facility];  Service: OB/GYN;  Laterality: Bilateral;    SINUS SURGERY      TUBAL LIGATION         Review of patient's allergies indicates:   Allergen Reactions    Raspberry (rubus idaeus) Hives       No current facility-administered medications on file prior to encounter.     Current Outpatient Medications on File Prior to Encounter   Medication Sig    ADVAIR DISKUS 100-50 mcg/dose diskus inhaler Inhale 2 puffs into the lungs 2 (two) times daily.    albuterol (PROVENTIL/VENTOLIN HFA) 90 mcg/actuation inhaler Inhale 2 puffs into the lungs every 4 (four) hours as needed for Wheezing    albuterol (PROVENTIL/VENTOLIN HFA) 90 mcg/actuation inhaler Inhale 2 puffs into the lungs every 4 (four) hours as  needed for Wheezing    amitriptyline (ELAVIL) 25 MG tablet Take 1 tablet (25 mg total) by mouth every evening.    ascorbic acid, vitamin C, (VITAMIN C) 500 MG tablet Take 500 mg by mouth once daily.    ashwagandha root extract 500 mg Cap Take 2 capsules orally as needed.    benzonatate (TESSALON) 100 MG capsule Take 2 capsules by mouth 3 (three) times daily as needed for Cough for up to 7 days    budesonide (PULMICORT) 0.5 mg/2 mL nebulizer solution Mix 2mL of budesonide into each saline rinse bottle, shake well, use daily    cetirizine (ZYRTEC) 10 MG tablet Take 1 tablet (10 mg total) by mouth once daily. (Patient taking differently: Take 10 mg by mouth every evening.)    diclofenac sodium (VOLTAREN) 1 % Gel Apply 2 g topically 4 (four) times daily as needed.    dicyclomine (BENTYL) 20 mg tablet Take 1 tablet (20 mg total) by mouth 3 (three) times daily as needed (abdominal pain).    DULoxetine (CYMBALTA) 60 MG capsule Take 1 capsule (60 mg total) by mouth once daily.    dupilumab (DUPIXENT PEN) 300 mg/2 mL PnIj Inject 300 mg into the skin every 14 (fourteen) days    estradioL (ESTRACE) 0.01 % (0.1 mg/gram) vaginal cream Place 1 g vaginally twice a week. Use 1/2 gram nightly for 1st week    FLOVENT  mcg/actuation inhaler 1 puff 2 (two) times daily.    fluticasone propionate (FLONASE) 50 mcg/actuation nasal spray 2 sprays by Nasal route daily    fluticasone propionate (FLONASE) 50 mcg/actuation nasal spray 2 sprays by Nasal route daily    fluticasone-umeclidin-vilanter (TRELEGY ELLIPTA) 200-62.5-25 mcg inhaler Inhale 1 puff into the lungs daily (Patient taking differently: Inhale 1 puff into the lungs every evening.)    gabapentin (NEURONTIN) 100 MG capsule Take 1 capsule (100 mg total) by mouth 3 (three) times daily as needed.    hydrocortisone 2.5 % cream Apply topically 2 (two) times daily. for 14 days    levocetirizine (XYZAL) 5 MG tablet Take 1 tablet by mouth nightly as needed for Allergies     loratadine (CLARITIN) 10 mg tablet     lubiprostone (AMITIZA) 24 MCG Cap Take 1 capsule (24 mcg total) by mouth 2 (two) times daily with meals.    meclizine (ANTIVERT) 12.5 mg tablet Take 1 tablet (12.5 mg total) by mouth 2 (two) times daily as needed for Dizziness.    meloxicam (MOBIC) 15 MG tablet Take 1 tablet (15 mg total) by mouth once daily. With food.    methylPREDNISolone (MEDROL DOSEPACK) 4 mg tablet     metoclopramide HCl (REGLAN) 5 MG tablet Take 1 tablet (5 mg total) by mouth 3 (three) times daily before meals.    nirmatrelvir-ritonavir (PAXLOVID) 300 mg (150 mg x 2)-100 mg copackaged tablets (EUA) Take 3 tablets by mouth 2 (two) times daily for 5 days    omeprazole (PRILOSEC) 40 MG capsule Take 1 capsule (40 mg total) by mouth once daily.    polyethylene glycol (GLYCOLAX) 17 gram/dose powder Take 17 g by mouth once daily.    predniSONE (DELTASONE) 10 MG tablet Take 3 tablets by mouth every morning x 2 days then 2 tablets  x 2 days then 1 tablet x 2 days then stop    predniSONE (DELTASONE) 20 MG tablet Take 1 tablet by mouth daily for 5 days    promethazine-dextromethorphan (PROMETHAZINE-DM) 6.25-15 mg/5 mL Syrp Take 5 mLs by mouth 4 (four) times daily as needed for Cough for up to 7 days    rizatriptan (MAXALT) 10 MG tablet take 1/2 - 1 at onset of migraine; may repeat in 2 hours if needed; maximum 2 in 24 hours; maximum 3 days per week    sumatriptan (IMITREX) 100 MG tablet Take 1 tablet (100 mg total) by mouth every 2 (two) hours as needed for Migraine.    tiotropium (SPIRIVA WITH HANDIHALER) 18 mcg inhalation capsule Inhale 1 capsule into the lungs daily    tiotropium (SPIRIVA WITH HANDIHALER) 18 mcg inhalation capsule Inhale 1 capsule into the lungs daily    topiramate (TOPAMAX) 25 MG tablet Take 1 tablet by mouth daily; increase by 1 per week as needed for migraine up to 2 tablets twice a day (Patient taking differently: Take 75 mg by mouth every evening.)     Family History       Problem Relation  (Age of Onset)    Diabetes Mother    Hypertension Mother          Tobacco Use    Smoking status: Former     Current packs/day: 0.00     Types: Cigarettes     Quit date: 3/3/2021     Years since quitting: 3.3     Passive exposure: Past    Smokeless tobacco: Never   Substance and Sexual Activity    Alcohol use: No    Drug use: Yes     Types: Marijuana     Comment: daily    Sexual activity: Yes     Partners: Male     Birth control/protection: See Surgical Hx     Review of Systems   Constitutional:  Negative for activity change and appetite change.   HENT:  Negative for congestion and dental problem.    Eyes:  Negative for discharge and itching.   Respiratory:  Negative for apnea and chest tightness.    Cardiovascular:  Positive for palpitations. Negative for chest pain and leg swelling.   Gastrointestinal:  Positive for abdominal pain. Negative for abdominal distention, diarrhea and nausea.   Endocrine: Negative for cold intolerance and heat intolerance.   Genitourinary:  Negative for difficulty urinating and dyspareunia.   Musculoskeletal:  Positive for back pain. Negative for arthralgias.   Skin:  Negative for color change and pallor.   Allergic/Immunologic: Negative for environmental allergies and food allergies.   Neurological:  Negative for dizziness and facial asymmetry.   Hematological:  Negative for adenopathy. Does not bruise/bleed easily.   Psychiatric/Behavioral:  Negative for behavioral problems.      Objective:     Vital Signs (Most Recent):  Temp: 97.7 °F (36.5 °C) (07/24/24 0716)  Pulse: 79 (07/24/24 0906)  Resp: 19 (07/24/24 0906)  BP: 111/74 (07/24/24 0906)  SpO2: 98 % (07/24/24 0906) Vital Signs (24h Range):  Temp:  [97.7 °F (36.5 °C)] 97.7 °F (36.5 °C)  Pulse:  [] 79  Resp:  [16-20] 19  SpO2:  [98 %-100 %] 98 %  BP: (111-113)/(67-77) 111/74     Weight: 77.2 kg (170 lb 3.2 oz)  Body mass index is 25.88 kg/m².     Physical Exam  Constitutional:       General: She is not in acute distress.      Appearance: Normal appearance. She is not ill-appearing or diaphoretic.   HENT:      Head: Normocephalic and atraumatic.      Nose: Nose normal.      Mouth/Throat:      Mouth: Mucous membranes are moist.   Eyes:      Pupils: Pupils are equal, round, and reactive to light.   Cardiovascular:      Rate and Rhythm: Normal rate and regular rhythm.      Pulses: Normal pulses.      Heart sounds: Normal heart sounds. No murmur heard.     No gallop.   Pulmonary:      Effort: Pulmonary effort is normal. No respiratory distress.      Breath sounds: No stridor. Wheezing present.   Abdominal:      General: Abdomen is flat. Bowel sounds are normal. There is no distension.      Palpations: Abdomen is soft.      Tenderness: There is no abdominal tenderness. There is no guarding.   Musculoskeletal:         General: Normal range of motion.   Skin:     General: Skin is warm.      Capillary Refill: Capillary refill takes less than 2 seconds.   Neurological:      General: No focal deficit present.      Mental Status: She is alert and oriented to person, place, and time. Mental status is at baseline.      Cranial Nerves: No cranial nerve deficit.      Motor: No weakness.   Psychiatric:         Mood and Affect: Mood normal.         Thought Content: Thought content normal.         Judgment: Judgment normal.              CRANIAL NERVES     CN III, IV, VI   Pupils are equal, round, and reactive to light.       Significant Labs: All pertinent labs within the past 24 hours have been reviewed.    Significant Imaging: I have reviewed all pertinent imaging results/findings within the past 24 hours.

## 2024-07-24 NOTE — Clinical Note
Diagnosis: Tachycardia [[id number]]   Future Attending Provider: LOLY BARONE [[id number]]   Reason for IP Medical Treatment  (Clinical interventions that can only be accomplished in the IP setting? ) :: SVT   I certify that Inpatient services for greater than or equal to 2 midnights are medically necessary:: Yes   Plans for Post-Acute care--if anticipated (pick the single best option):: A. No post acute care anticipated at this time

## 2024-07-24 NOTE — ED PROVIDER NOTES
"Encounter Date: 2024       History     Chief Complaint   Patient presents with    Tachycardia     Pt. Reports feeling "funny" since last night, reporting palpitations.      HPI    44-year-old female past medical history depression who presents to the ER for evaluation of palpitations.  Patient states that this started in the evening yesterday and continued into the morning.  In triage she was found to be in SVT with a heart rate in the 170s.  This has never happened to her before.  No inciting event that she is aware of.  No new medications.  She denies any fevers, chills, chest pain, syncope, headache, trauma, or any abdominal pain.    Review of patient's allergies indicates:   Allergen Reactions    Raspberry (rubus idaeus) Hives     Past Medical History:   Diagnosis Date    Anemia     Back pain     Depression      Past Surgical History:   Procedure Laterality Date     SECTION      COLD KNIFE CONIZATION OF CERVIX  12/10/2021    Procedure: CONE BIOPSY, CERVIX, USING COLD KNIFE;  Surgeon: Modesto Nassar MD;  Location: [facility];  Service: OB/GYN;;    COLONOSCOPY N/A 2021    Procedure: COLONOSCOPY;  Surgeon: Emily Cruz MD;  Location: [facility];  Service: Endoscopy;  Laterality: N/A;    ESOPHAGOGASTRODUODENOSCOPY N/A 2021    Procedure: EGD (ESOPHAGOGASTRODUODENOSCOPY);  Surgeon: Emily Cruz MD;  Location: [facility];  Service: Endoscopy;  Laterality: N/A;    HYSTERECTOMY      ROBOT-ASSISTED LAPAROSCOPIC HYSTERECTOMY N/A 2022    Procedure: ROBOTIC HYSTERECTOMY;  Surgeon: Modesto Nassar MD;  Location: [facility] OR;  Service: OB/GYN;  Laterality: N/A;    ROBOT-ASSISTED LAPAROSCOPIC PYLOROPLASTY USING DA JOSE LUIS XI N/A 2024    Procedure: XI ROBOTIC PYLOROMYOTOMY WITH EGD;  Surgeon: Clifford Lynch MD;  Location: [address];  Service: General;  Laterality: N/A;  NO NARCOTICS    ROBOT-ASSISTED SALPINGECTOMY Bilateral 2022    Procedure: ROBOTIC " SALPINGECTOMY;  Surgeon: Modesto Nassar MD;  Location: [facility] OR;  Service: OB/GYN;  Laterality: Bilateral;    SINUS SURGERY      TUBAL LIGATION       Family History   Problem Relation Name Age of Onset    Hypertension Mother      Diabetes Mother      Breast cancer Neg Hx      Colon cancer Neg Hx      Ovarian cancer Neg Hx       Social History     Tobacco Use    Smoking status: Former     Current packs/day: 0.00     Types: Cigarettes     Quit date: 3/3/2021     Years since quitting: 3.3     Passive exposure: Past    Smokeless tobacco: Never   Substance Use Topics    Alcohol use: No    Drug use: Yes     Types: Marijuana     Comment: daily     Review of Systems   Constitutional:  Negative for fever.   Respiratory:  Negative for shortness of breath.    Cardiovascular:  Positive for palpitations.   Neurological:  Negative for syncope.       Physical Exam     Initial Vitals [07/24/24 0716]   BP Pulse Resp Temp SpO2   113/77 (!) 173 18 97.7 °F (36.5 °C) 98 %      MAP       --         Physical Exam    Nursing note and vitals reviewed.  Constitutional: She appears well-nourished.   HENT:   Head: Atraumatic.   Eyes: EOM are normal.   Neck: Neck supple.   Cardiovascular:            Tachycardic, regular   Pulmonary/Chest: Breath sounds normal. No respiratory distress.   Abdominal: Abdomen is soft. There is no abdominal tenderness.   Musculoskeletal:         General: No edema. Normal range of motion.      Cervical back: Neck supple.     Neurological: She is alert and oriented to person, place, and time.   Skin: Skin is warm and dry.   Psychiatric: She has a normal mood and affect. Thought content normal.         ED Course   Procedures  Labs Reviewed   CBC W/ AUTO DIFFERENTIAL - Abnormal       Result Value    WBC 18.41 (*)     RBC 4.45      Hemoglobin 13.8      Hematocrit 43.8      MCV 98      MCH 31.0      MCHC 31.5 (*)     RDW 13.7      Platelets 276      MPV 9.9      Immature Granulocytes 1.0 (*)     Gran # (ANC) 11.8 (*)      Immature Grans (Abs) 0.18 (*)     Lymph # 4.8      Mono # 1.3 (*)     Eos # 0.3      Baso # 0.07      nRBC 0      Gran % 64.1      Lymph % 26.0      Mono % 7.1      Eosinophil % 1.4      Basophil % 0.4      Differential Method Automated      Narrative:     Release to patient->Immediate   COMPREHENSIVE METABOLIC PANEL - Abnormal    Sodium 143      Potassium 3.6      Chloride 115 (*)     CO2 21 (*)     Glucose 108      BUN 12      Creatinine 0.8      Calcium 8.5 (*)     Total Protein 7.0      Albumin 3.6      Total Bilirubin 0.2      Alkaline Phosphatase 51 (*)     AST 10      ALT 7 (*)     eGFR >60.0      Anion Gap 7 (*)     Narrative:     Release to patient->Immediate   B-TYPE NATRIURETIC PEPTIDE - Abnormal     (*)     Narrative:     Release to patient->Immediate   URINALYSIS, REFLEX TO URINE CULTURE - Abnormal    Specimen UA Urine, Clean Catch      Color, UA Yellow      Appearance, UA Clear      pH, UA 7.0      Specific Gravity, UA 1.025      Protein, UA Trace (*)     Glucose, UA Negative      Ketones, UA Negative      Bilirubin (UA) Negative      Occult Blood UA Negative      Nitrite, UA Negative      Leukocytes, UA Negative      Narrative:     Specimen Source->Urine   HIV 1 / 2 ANTIBODY    HIV 1/2 Ag/Ab Non-reactive      Narrative:     Release to patient->Immediate   HEPATITIS C ANTIBODY    Hepatitis C Ab Non-reactive      Narrative:     Release to patient->Immediate   MAGNESIUM    Magnesium 1.9      Narrative:     Release to patient->Immediate   TROPONIN I    Troponin I <0.006      Narrative:     Release to patient->Immediate   TSH   TSH    TSH 3.327      Narrative:     ADD ON TSH PER MICAELA JARRETT, RN PER LOLY BARONE MD ORDER#   [id number] @  07/24/2024  10:35   Release to patient->Immediate   POCT URINE PREGNANCY    POC Preg Test, Ur Negative       Acceptable Yes          ECG Results              EKG 12-lead (Final result)        Collection Time Result Time QRS Duration OHS QTC  Calculation    07/24/24 07:22:25 07/24/24 15:14:36 126 491                     Final result by Interface, Lab In [facility] (07/24/24 15:14:41)                   Narrative:    Test Reason : R00.2,    Vent. Rate : 168 BPM     Atrial Rate : 000 BPM     P-R Int : 000 ms          QRS Dur : 126 ms      QT Int : 294 ms       P-R-T Axes : 000 068 061 degrees     QTc Int : 491 ms    Supraventricular tachycardia  Nonspecific T wave abnormality  Abnormal ECG  When compared with ECG of 24-JUL-2024 07:11,  SVT has replaced Sinus rhythm  Confirmed by Di Gross MD ([id number]) on 7/24/2024 3:14:32 PM    Referred By: System System           Confirmed By:Di Gross MD                                     EKG 12-lead (Final result)        Collection Time Result Time QRS Duration OHS QTC Calculation    07/24/24 07:11:17 07/24/24 15:15:28 116 464                     Final result by Interface, Lab In [facility] (07/24/24 15:15:38)                   Narrative:    Test Reason : R00.0,    Vent. Rate : 178 BPM     Atrial Rate : 000 BPM     P-R Int : 000 ms          QRS Dur : 116 ms      QT Int : 270 ms       P-R-T Axes : 000 072 -86 degrees     QTc Int : 464 ms    Supraventricular tachycardia  Nonspecific ST and/or T wave abnormalities  Abnormal ECG  When compared with ECG of 25-APR-2021 19:29,  Vent. rate has increased  BPM  SVT has replaced sinus rhythm  Confirmed by Di Gross MD ([id number]) on 7/24/2024 3:15:27 PM    Referred By: [email or web address]   SELF           Confirmed By:Di Gross MD                                  Imaging Results              X-Ray Chest AP Portable (Final result)  Result time 07/24/24 09:58:10      Final result by Modesto Weir MD (07/24/24 09:58:10)                   Impression:      See above      Electronically signed by: Modesto Weir MD  Date:    07/24/2024  Time:    09:58               Narrative:    EXAMINATION:  XR CHEST AP PORTABLE    CLINICAL HISTORY:  palpitations;    TECHNIQUE:  Single frontal view of the  chest was performed.    COMPARISON:  No 02/02/2024 ne    FINDINGS:  Heart size normal.  The lungs are clear.  No pleural effusion                                       Medications   fluticasone furoate-vilanteroL 100-25 mcg/dose diskus inhaler 1 puff (1 puff Inhalation Given 7/24/24 1050)   albuterol inhaler 2 puff (has no administration in time range)   DULoxetine DR capsule 60 mg (60 mg Oral Given 7/24/24 1049)   gabapentin capsule 100 mg (has no administration in time range)   cetirizine tablet 10 mg (has no administration in time range)   lubiprostone capsule 24 mcg (24 mcg Oral Given 7/24/24 1204)   meclizine tablet 12.5 mg (has no administration in time range)   pantoprazole EC tablet 40 mg (40 mg Oral Given 7/24/24 1049)   tiotropium bromide 2.5 mcg/actuation inhaler 2 puff (2 puffs Inhalation Given 7/24/24 1203)   sumatriptan tablet 100 mg (has no administration in time range)   sodium chloride 0.9% flush 10 mL (has no administration in time range)   melatonin tablet 6 mg (has no administration in time range)   ondansetron disintegrating tablet 8 mg (has no administration in time range)   polyethylene glycol packet 17 g (17 g Oral Not Given 7/24/24 0930)   acetaminophen tablet 650 mg (has no administration in time range)   naloxone 0.4 mg/mL injection 0.02 mg (has no administration in time range)   glucose chewable tablet 16 g (has no administration in time range)   glucose chewable tablet 24 g (has no administration in time range)   glucagon (human recombinant) injection 1 mg (has no administration in time range)   topiramate tablet 75 mg (has no administration in time range)   prochlorperazine injection Soln 5 mg (has no administration in time range)   dextrose 10% bolus 125 mL 125 mL (has no administration in time range)   dextrose 10% bolus 250 mL 250 mL (has no administration in time range)   adenosine injection 6 mg (6 mg Intravenous Not Given 7/24/24 0730)   0.9%  NaCl infusion (0 mLs Intravenous  Stopped 7/24/24 1014)     Medical Decision Making  Amount and/or Complexity of Data Reviewed  Labs: ordered. Decision-making details documented in ED Course.  Radiology: ordered and independent interpretation performed. Decision-making details documented in ED Course.  ECG/medicine tests: ordered and independent interpretation performed. Decision-making details documented in ED Course.    Risk  Prescription drug management.  Decision regarding hospitalization.                                EKG   Rate 170   SVT      EKG  Rate 109   Sinus tachycardia   No STEMI      43 y/o F here with tachycardia.  HR 170s on arrival, o/w stable.  EKG shows SVT.  She converted to NSR with 6mg IV adenosine.  WBC 18, normal lytes, no infectious symptoms.  Normal CXR.  Trop negative.  Admit to  for obs.  Patient agreeable with plan.      Critical care time of 31 minutes.      Clinical Impression:  Final diagnoses:  [R00.0] Tachycardia  [R00.2] Palpitations  [I47.10] SVT (supraventricular tachycardia) (Primary)          ED Disposition Condition    Admit Stable                Kale Real MD  07/24/24 8406

## 2024-07-24 NOTE — ASSESSMENT & PLAN NOTE
-Moderate wheezing on exam. No respiratory symptoms/hypoxia to suggest exacerbation  -Cont inhalers  -Holding prednisone per above

## 2024-07-24 NOTE — SUBJECTIVE & OBJECTIVE
Past Medical History:   Diagnosis Date    Anemia     Back pain     Depression        Past Surgical History:   Procedure Laterality Date     SECTION      COLD KNIFE CONIZATION OF CERVIX  12/10/2021    Procedure: CONE BIOPSY, CERVIX, USING COLD KNIFE;  Surgeon: Modesto Nassar MD;  Location: [facility] OR;  Service: OB/GYN;;    COLONOSCOPY N/A 2021    Procedure: COLONOSCOPY;  Surgeon: Emily Cruz MD;  Location: [facility] ENDO;  Service: Endoscopy;  Laterality: N/A;    ESOPHAGOGASTRODUODENOSCOPY N/A 2021    Procedure: EGD (ESOPHAGOGASTRODUODENOSCOPY);  Surgeon: Emily Cruz MD;  Location: [facility] ENDO;  Service: Endoscopy;  Laterality: N/A;    HYSTERECTOMY      ROBOT-ASSISTED LAPAROSCOPIC HYSTERECTOMY N/A 2022    Procedure: ROBOTIC HYSTERECTOMY;  Surgeon: Modesto Nassar MD;  Location: [facility];  Service: OB/GYN;  Laterality: N/A;    ROBOT-ASSISTED LAPAROSCOPIC PYLOROPLASTY USING DA JOSE LUIS XI N/A 2024    Procedure: XI ROBOTIC PYLOROMYOTOMY WITH EGD;  Surgeon: Clifford Lynch MD;  Location: [address];  Service: General;  Laterality: N/A;  NO NARCOTICS    ROBOT-ASSISTED SALPINGECTOMY Bilateral 2022    Procedure: ROBOTIC SALPINGECTOMY;  Surgeon: Modesto Nassar MD;  Location: [facility];  Service: OB/GYN;  Laterality: Bilateral;    SINUS SURGERY      TUBAL LIGATION         Review of patient's allergies indicates:   Allergen Reactions    Raspberry (rubus idaeus) Hives       No current facility-administered medications on file prior to encounter.     Current Outpatient Medications on File Prior to Encounter   Medication Sig    ADVAIR DISKUS 100-50 mcg/dose diskus inhaler Inhale 2 puffs into the lungs 2 (two) times daily.    albuterol (PROVENTIL/VENTOLIN HFA) 90 mcg/actuation inhaler Inhale 2 puffs into the lungs every 4 (four) hours as needed for Wheezing    albuterol (PROVENTIL/VENTOLIN HFA) 90 mcg/actuation inhaler Inhale 2 puffs into the lungs every 4 (four) hours as  needed for Wheezing    amitriptyline (ELAVIL) 25 MG tablet Take 1 tablet (25 mg total) by mouth every evening.    ascorbic acid, vitamin C, (VITAMIN C) 500 MG tablet Take 500 mg by mouth once daily.    ashwagandha root extract 500 mg Cap Take 2 capsules orally as needed.    benzonatate (TESSALON) 100 MG capsule Take 2 capsules by mouth 3 (three) times daily as needed for Cough for up to 7 days    budesonide (PULMICORT) 0.5 mg/2 mL nebulizer solution Mix 2mL of budesonide into each saline rinse bottle, shake well, use daily    cetirizine (ZYRTEC) 10 MG tablet Take 1 tablet (10 mg total) by mouth once daily. (Patient taking differently: Take 10 mg by mouth every evening.)    diclofenac sodium (VOLTAREN) 1 % Gel Apply 2 g topically 4 (four) times daily as needed.    dicyclomine (BENTYL) 20 mg tablet Take 1 tablet (20 mg total) by mouth 3 (three) times daily as needed (abdominal pain).    DULoxetine (CYMBALTA) 60 MG capsule Take 1 capsule (60 mg total) by mouth once daily.    dupilumab (DUPIXENT PEN) 300 mg/2 mL PnIj Inject 300 mg into the skin every 14 (fourteen) days    estradioL (ESTRACE) 0.01 % (0.1 mg/gram) vaginal cream Place 1 g vaginally twice a week. Use 1/2 gram nightly for 1st week    FLOVENT  mcg/actuation inhaler 1 puff 2 (two) times daily.    fluticasone propionate (FLONASE) 50 mcg/actuation nasal spray 2 sprays by Nasal route daily    fluticasone propionate (FLONASE) 50 mcg/actuation nasal spray 2 sprays by Nasal route daily    fluticasone-umeclidin-vilanter (TRELEGY ELLIPTA) 200-62.5-25 mcg inhaler Inhale 1 puff into the lungs daily (Patient taking differently: Inhale 1 puff into the lungs every evening.)    gabapentin (NEURONTIN) 100 MG capsule Take 1 capsule (100 mg total) by mouth 3 (three) times daily as needed.    hydrocortisone 2.5 % cream Apply topically 2 (two) times daily. for 14 days    levocetirizine (XYZAL) 5 MG tablet Take 1 tablet by mouth nightly as needed for Allergies     loratadine (CLARITIN) 10 mg tablet     lubiprostone (AMITIZA) 24 MCG Cap Take 1 capsule (24 mcg total) by mouth 2 (two) times daily with meals.    meclizine (ANTIVERT) 12.5 mg tablet Take 1 tablet (12.5 mg total) by mouth 2 (two) times daily as needed for Dizziness.    meloxicam (MOBIC) 15 MG tablet Take 1 tablet (15 mg total) by mouth once daily. With food.    methylPREDNISolone (MEDROL DOSEPACK) 4 mg tablet     metoclopramide HCl (REGLAN) 5 MG tablet Take 1 tablet (5 mg total) by mouth 3 (three) times daily before meals.    nirmatrelvir-ritonavir (PAXLOVID) 300 mg (150 mg x 2)-100 mg copackaged tablets (EUA) Take 3 tablets by mouth 2 (two) times daily for 5 days    omeprazole (PRILOSEC) 40 MG capsule Take 1 capsule (40 mg total) by mouth once daily.    polyethylene glycol (GLYCOLAX) 17 gram/dose powder Take 17 g by mouth once daily.    predniSONE (DELTASONE) 10 MG tablet Take 3 tablets by mouth every morning x 2 days then 2 tablets  x 2 days then 1 tablet x 2 days then stop    predniSONE (DELTASONE) 20 MG tablet Take 1 tablet by mouth daily for 5 days    promethazine-dextromethorphan (PROMETHAZINE-DM) 6.25-15 mg/5 mL Syrp Take 5 mLs by mouth 4 (four) times daily as needed for Cough for up to 7 days    rizatriptan (MAXALT) 10 MG tablet take 1/2 - 1 at onset of migraine; may repeat in 2 hours if needed; maximum 2 in 24 hours; maximum 3 days per week    sumatriptan (IMITREX) 100 MG tablet Take 1 tablet (100 mg total) by mouth every 2 (two) hours as needed for Migraine.    tiotropium (SPIRIVA WITH HANDIHALER) 18 mcg inhalation capsule Inhale 1 capsule into the lungs daily    tiotropium (SPIRIVA WITH HANDIHALER) 18 mcg inhalation capsule Inhale 1 capsule into the lungs daily    topiramate (TOPAMAX) 25 MG tablet Take 1 tablet by mouth daily; increase by 1 per week as needed for migraine up to 2 tablets twice a day (Patient taking differently: Take 75 mg by mouth every evening.)     Family History       Problem Relation  (Age of Onset)    Diabetes Mother    Hypertension Mother          Tobacco Use    Smoking status: Former     Current packs/day: 0.00     Types: Cigarettes     Quit date: 3/3/2021     Years since quitting: 3.3     Passive exposure: Past    Smokeless tobacco: Never   Substance and Sexual Activity    Alcohol use: No    Drug use: Yes     Types: Marijuana     Comment: daily    Sexual activity: Yes     Partners: Male     Birth control/protection: See Surgical Hx     Review of Systems   Constitutional:  Negative for activity change and appetite change.   HENT:  Negative for congestion and dental problem.    Eyes:  Negative for discharge and itching.   Respiratory:  Negative for apnea and chest tightness.    Cardiovascular:  Positive for palpitations. Negative for chest pain and leg swelling.   Gastrointestinal:  Positive for abdominal pain. Negative for abdominal distention, diarrhea and nausea.   Endocrine: Negative for cold intolerance and heat intolerance.   Genitourinary:  Negative for difficulty urinating and dyspareunia.   Musculoskeletal:  Positive for back pain. Negative for arthralgias.   Skin:  Negative for color change and pallor.   Allergic/Immunologic: Negative for environmental allergies and food allergies.   Neurological:  Negative for dizziness and facial asymmetry.   Hematological:  Negative for adenopathy. Does not bruise/bleed easily.   Psychiatric/Behavioral:  Negative for behavioral problems.      Objective:     Vital Signs (Most Recent):  Temp: 97.7 °F (36.5 °C) (07/24/24 0716)  Pulse: 79 (07/24/24 0906)  Resp: 19 (07/24/24 0906)  BP: 111/74 (07/24/24 0906)  SpO2: 98 % (07/24/24 0906) Vital Signs (24h Range):  Temp:  [97.7 °F (36.5 °C)] 97.7 °F (36.5 °C)  Pulse:  [] 79  Resp:  [16-20] 19  SpO2:  [98 %-100 %] 98 %  BP: (111-113)/(67-77) 111/74     Weight: 77.2 kg (170 lb 3.2 oz)  Body mass index is 25.88 kg/m².     Physical Exam  Constitutional:       General: She is not in acute distress.      Appearance: Normal appearance. She is not ill-appearing or diaphoretic.   HENT:      Head: Normocephalic and atraumatic.      Nose: Nose normal.      Mouth/Throat:      Mouth: Mucous membranes are moist.   Eyes:      Pupils: Pupils are equal, round, and reactive to light.   Cardiovascular:      Rate and Rhythm: Normal rate and regular rhythm.      Pulses: Normal pulses.      Heart sounds: Normal heart sounds. No murmur heard.     No gallop.   Pulmonary:      Effort: Pulmonary effort is normal. No respiratory distress.      Breath sounds: No stridor. Wheezing present.   Abdominal:      General: Abdomen is flat. Bowel sounds are normal. There is no distension.      Palpations: Abdomen is soft.      Tenderness: There is no abdominal tenderness. There is no guarding.   Musculoskeletal:         General: Normal range of motion.   Skin:     General: Skin is warm.      Capillary Refill: Capillary refill takes less than 2 seconds.   Neurological:      General: No focal deficit present.      Mental Status: She is alert and oriented to person, place, and time. Mental status is at baseline.      Cranial Nerves: No cranial nerve deficit.      Motor: No weakness.   Psychiatric:         Mood and Affect: Mood normal.         Thought Content: Thought content normal.         Judgment: Judgment normal.              CRANIAL NERVES     CN III, IV, VI   Pupils are equal, round, and reactive to light.       Significant Labs: All pertinent labs within the past 24 hours have been reviewed.    Significant Imaging: I have reviewed all pertinent imaging results/findings within the past 24 hours.

## 2024-07-24 NOTE — PLAN OF CARE
Problem: Adult Inpatient Plan of Care  Goal: Plan of Care Review  Outcome: Progressing  Goal: Patient-Specific Goal (Individualized)  Outcome: Progressing  Goal: Absence of Hospital-Acquired Illness or Injury  Outcome: Progressing  Goal: Optimal Comfort and Wellbeing  Outcome: Progressing  Goal: Readiness for Transition of Care  Outcome: Progressing     Problem: Infection  Goal: Absence of Infection Signs and Symptoms  Outcome: Progressing     Problem: Wound  Goal: Optimal Coping  Outcome: Progressing  Goal: Optimal Functional Ability  Outcome: Progressing  Goal: Absence of Infection Signs and Symptoms  Outcome: Progressing  Goal: Improved Oral Intake  Outcome: Progressing  Goal: Optimal Pain Control and Function  Outcome: Progressing  Goal: Skin Health and Integrity  Outcome: Progressing  Goal: Optimal Wound Healing  Outcome: Progressing     Problem: Fall Injury Risk  Goal: Absence of Fall and Fall-Related Injury  Outcome: Progressing       Plan of care reviewed with patient.     Patient is AOX4 NAD.     Patient remained free of falls and trauma, fall precautions are in place.    Patient is ambulating independently.    Patient denies reports of pain     Patients care plan and medication discussed.     Patient has no questions at this time/ verbalized understanding.     Bed is in low position and wheels are locked for patient safety.      The call light is within pt's reach.     Telemetry is operating properly    Pt remains free of falls, injury, and trauma.

## 2024-07-24 NOTE — H&P
"[facility] - Emergency Dept  [facility]  History & Physical    Patient Name: Wandy Shah  MRN: [id number]  Patient Class: IP- Inpatient  Admission Date: 2024  Attending Physician: Layla Al MD   Primary Care Provider: Yari, Primary Doctor         Patient information was obtained from patient and ER records.     Subjective:     Principal Problem:<principal problem not specified>    Chief Complaint:   Chief Complaint   Patient presents with    Tachycardia     Pt. Reports feeling "funny" since last night, reporting palpitations.         HPI: This is a 44-year-old female with a history of moderate persistent asthma, chronic rhinitis, eosinophilic asthma, severe gastroparesis status post robotic pyloromyotomy 2024 presents with palpitations.  States that starting last night she experienced uncomfortable sensation of her chest and fluttering sensation, unclear how long episode lasted however subsided on its own.  She had another episode this morning.  Checked her Apple watch, found to have HR up to 180s.  She then presented to ED for further evaluation.  States that she infrequently follows up with cardiologist for 'arrythmia' at outside clinic in [facility]. Has not followed up in sometime as she has not had palpitations for a while.  Denies any fevers, chills, worsening cough, wheezing, shortness for breath, diarrhea.  Experiences chronic similar abdominal pain, not worsening and also not improved since procedure last month.  States that her only new medication is prednisone which she started taking yesterday.  One week taper was prescribed by ENT physician for control of rhinitis.     ED course:  Upon arrival, .  Otherwise hemodynamically stable.  EKG noting SVT, status post adenosine 6 mg IV x1 with conversion to NSR    Past Medical History:   Diagnosis Date    Anemia     Back pain     Depression        Past Surgical History:   Procedure Laterality Date     SECTION      COLD KNIFE " CONIZATION OF CERVIX  12/10/2021    Procedure: CONE BIOPSY, CERVIX, USING COLD KNIFE;  Surgeon: Modesto Nassar MD;  Location: [facility] OR;  Service: OB/GYN;;    COLONOSCOPY N/A 05/11/2021    Procedure: COLONOSCOPY;  Surgeon: Emily Cruz MD;  Location: [facility] ENDO;  Service: Endoscopy;  Laterality: N/A;    ESOPHAGOGASTRODUODENOSCOPY N/A 06/11/2021    Procedure: EGD (ESOPHAGOGASTRODUODENOSCOPY);  Surgeon: Emily Cruz MD;  Location: [facility] ENDO;  Service: Endoscopy;  Laterality: N/A;    HYSTERECTOMY      ROBOT-ASSISTED LAPAROSCOPIC HYSTERECTOMY N/A 03/16/2022    Procedure: ROBOTIC HYSTERECTOMY;  Surgeon: Modesto Nassar MD;  Location: [facility] OR;  Service: OB/GYN;  Laterality: N/A;    ROBOT-ASSISTED LAPAROSCOPIC PYLOROPLASTY USING DA JOSE LUIS XI N/A 6/7/2024    Procedure: XI ROBOTIC PYLOROMYOTOMY WITH EGD;  Surgeon: Clifford Lynch MD;  Location: [address];  Service: General;  Laterality: N/A;  NO NARCOTICS    ROBOT-ASSISTED SALPINGECTOMY Bilateral 03/16/2022    Procedure: ROBOTIC SALPINGECTOMY;  Surgeon: Modesto Nassar MD;  Location: [facility] OR;  Service: OB/GYN;  Laterality: Bilateral;    SINUS SURGERY      TUBAL LIGATION         Review of patient's allergies indicates:   Allergen Reactions    Raspberry (rubus idaeus) Hives       No current facility-administered medications on file prior to encounter.     Current Outpatient Medications on File Prior to Encounter   Medication Sig    ADVAIR DISKUS 100-50 mcg/dose diskus inhaler Inhale 2 puffs into the lungs 2 (two) times daily.    albuterol (PROVENTIL/VENTOLIN HFA) 90 mcg/actuation inhaler Inhale 2 puffs into the lungs every 4 (four) hours as needed for Wheezing    albuterol (PROVENTIL/VENTOLIN HFA) 90 mcg/actuation inhaler Inhale 2 puffs into the lungs every 4 (four) hours as needed for Wheezing    amitriptyline (ELAVIL) 25 MG tablet Take 1 tablet (25 mg total) by mouth every evening.    ascorbic acid, vitamin C, (VITAMIN C) 500 MG tablet Take  500 mg by mouth once daily.    ashwagandha root extract 500 mg Cap Take 2 capsules orally as needed.    benzonatate (TESSALON) 100 MG capsule Take 2 capsules by mouth 3 (three) times daily as needed for Cough for up to 7 days    budesonide (PULMICORT) 0.5 mg/2 mL nebulizer solution Mix 2mL of budesonide into each saline rinse bottle, shake well, use daily    cetirizine (ZYRTEC) 10 MG tablet Take 1 tablet (10 mg total) by mouth once daily. (Patient taking differently: Take 10 mg by mouth every evening.)    diclofenac sodium (VOLTAREN) 1 % Gel Apply 2 g topically 4 (four) times daily as needed.    dicyclomine (BENTYL) 20 mg tablet Take 1 tablet (20 mg total) by mouth 3 (three) times daily as needed (abdominal pain).    DULoxetine (CYMBALTA) 60 MG capsule Take 1 capsule (60 mg total) by mouth once daily.    dupilumab (DUPIXENT PEN) 300 mg/2 mL PnIj Inject 300 mg into the skin every 14 (fourteen) days    estradioL (ESTRACE) 0.01 % (0.1 mg/gram) vaginal cream Place 1 g vaginally twice a week. Use 1/2 gram nightly for 1st week    FLOVENT  mcg/actuation inhaler 1 puff 2 (two) times daily.    fluticasone propionate (FLONASE) 50 mcg/actuation nasal spray 2 sprays by Nasal route daily    fluticasone propionate (FLONASE) 50 mcg/actuation nasal spray 2 sprays by Nasal route daily    fluticasone-umeclidin-vilanter (TRELEGY ELLIPTA) 200-62.5-25 mcg inhaler Inhale 1 puff into the lungs daily (Patient taking differently: Inhale 1 puff into the lungs every evening.)    gabapentin (NEURONTIN) 100 MG capsule Take 1 capsule (100 mg total) by mouth 3 (three) times daily as needed.    hydrocortisone 2.5 % cream Apply topically 2 (two) times daily. for 14 days    levocetirizine (XYZAL) 5 MG tablet Take 1 tablet by mouth nightly as needed for Allergies    loratadine (CLARITIN) 10 mg tablet     lubiprostone (AMITIZA) 24 MCG Cap Take 1 capsule (24 mcg total) by mouth 2 (two) times daily with meals.    meclizine (ANTIVERT) 12.5 mg  tablet Take 1 tablet (12.5 mg total) by mouth 2 (two) times daily as needed for Dizziness.    meloxicam (MOBIC) 15 MG tablet Take 1 tablet (15 mg total) by mouth once daily. With food.    methylPREDNISolone (MEDROL DOSEPACK) 4 mg tablet     metoclopramide HCl (REGLAN) 5 MG tablet Take 1 tablet (5 mg total) by mouth 3 (three) times daily before meals.    nirmatrelvir-ritonavir (PAXLOVID) 300 mg (150 mg x 2)-100 mg copackaged tablets (EUA) Take 3 tablets by mouth 2 (two) times daily for 5 days    omeprazole (PRILOSEC) 40 MG capsule Take 1 capsule (40 mg total) by mouth once daily.    polyethylene glycol (GLYCOLAX) 17 gram/dose powder Take 17 g by mouth once daily.    predniSONE (DELTASONE) 10 MG tablet Take 3 tablets by mouth every morning x 2 days then 2 tablets  x 2 days then 1 tablet x 2 days then stop    predniSONE (DELTASONE) 20 MG tablet Take 1 tablet by mouth daily for 5 days    promethazine-dextromethorphan (PROMETHAZINE-DM) 6.25-15 mg/5 mL Syrp Take 5 mLs by mouth 4 (four) times daily as needed for Cough for up to 7 days    rizatriptan (MAXALT) 10 MG tablet take 1/2 - 1 at onset of migraine; may repeat in 2 hours if needed; maximum 2 in 24 hours; maximum 3 days per week    sumatriptan (IMITREX) 100 MG tablet Take 1 tablet (100 mg total) by mouth every 2 (two) hours as needed for Migraine.    tiotropium (SPIRIVA WITH HANDIHALER) 18 mcg inhalation capsule Inhale 1 capsule into the lungs daily    tiotropium (SPIRIVA WITH HANDIHALER) 18 mcg inhalation capsule Inhale 1 capsule into the lungs daily    topiramate (TOPAMAX) 25 MG tablet Take 1 tablet by mouth daily; increase by 1 per week as needed for migraine up to 2 tablets twice a day (Patient taking differently: Take 75 mg by mouth every evening.)     Family History       Problem Relation (Age of Onset)    Diabetes Mother    Hypertension Mother          Tobacco Use    Smoking status: Former     Current packs/day: 0.00     Types: Cigarettes     Quit date:  3/3/2021     Years since quitting: 3.3     Passive exposure: Past    Smokeless tobacco: Never   Substance and Sexual Activity    Alcohol use: No    Drug use: Yes     Types: Marijuana     Comment: daily    Sexual activity: Yes     Partners: Male     Birth control/protection: See Surgical Hx     Review of Systems   Constitutional:  Negative for activity change and appetite change.   HENT:  Negative for congestion and dental problem.    Eyes:  Negative for discharge and itching.   Respiratory:  Negative for apnea and chest tightness.    Cardiovascular:  Positive for palpitations. Negative for chest pain and leg swelling.   Gastrointestinal:  Positive for abdominal pain. Negative for abdominal distention, diarrhea and nausea.   Endocrine: Negative for cold intolerance and heat intolerance.   Genitourinary:  Negative for difficulty urinating and dyspareunia.   Musculoskeletal:  Positive for back pain. Negative for arthralgias.   Skin:  Negative for color change and pallor.   Allergic/Immunologic: Negative for environmental allergies and food allergies.   Neurological:  Negative for dizziness and facial asymmetry.   Hematological:  Negative for adenopathy. Does not bruise/bleed easily.   Psychiatric/Behavioral:  Negative for behavioral problems.      Objective:     Vital Signs (Most Recent):  Temp: 97.7 °F (36.5 °C) (07/24/24 0716)  Pulse: 79 (07/24/24 0906)  Resp: 19 (07/24/24 0906)  BP: 111/74 (07/24/24 0906)  SpO2: 98 % (07/24/24 0906) Vital Signs (24h Range):  Temp:  [97.7 °F (36.5 °C)] 97.7 °F (36.5 °C)  Pulse:  [] 79  Resp:  [16-20] 19  SpO2:  [98 %-100 %] 98 %  BP: (111-113)/(67-77) 111/74     Weight: 77.2 kg (170 lb 3.2 oz)  Body mass index is 25.88 kg/m².     Physical Exam  Constitutional:       General: She is not in acute distress.     Appearance: Normal appearance. She is not ill-appearing or diaphoretic.   HENT:      Head: Normocephalic and atraumatic.      Nose: Nose normal.      Mouth/Throat:       Mouth: Mucous membranes are moist.   Eyes:      Pupils: Pupils are equal, round, and reactive to light.   Cardiovascular:      Rate and Rhythm: Normal rate and regular rhythm.      Pulses: Normal pulses.      Heart sounds: Normal heart sounds. No murmur heard.     No gallop.   Pulmonary:      Effort: Pulmonary effort is normal. No respiratory distress.      Breath sounds: No stridor. Wheezing present.   Abdominal:      General: Abdomen is flat. Bowel sounds are normal. There is no distension.      Palpations: Abdomen is soft.      Tenderness: There is no abdominal tenderness. There is no guarding.   Musculoskeletal:         General: Normal range of motion.   Skin:     General: Skin is warm.      Capillary Refill: Capillary refill takes less than 2 seconds.   Neurological:      General: No focal deficit present.      Mental Status: She is alert and oriented to person, place, and time. Mental status is at baseline.      Cranial Nerves: No cranial nerve deficit.      Motor: No weakness.   Psychiatric:         Mood and Affect: Mood normal.         Thought Content: Thought content normal.         Judgment: Judgment normal.              CRANIAL NERVES     CN III, IV, VI   Pupils are equal, round, and reactive to light.       Significant Labs: All pertinent labs within the past 24 hours have been reviewed.    Significant Imaging: I have reviewed all pertinent imaging results/findings within the past 24 hours.  Assessment/Plan:     SVT (supraventricular tachycardia)  -Presents with palpitations, found to have HR 180s with SVT rhythm and now s/p conversion with adenosine x1  -Has history of unknown arrythmia, follows with cardiology at outside facility  -Possibly incited by prednisone, started yesterday for rhinitis   -Less concern for active infection, leukocytosis likely in the setting of steroid  Plan:  -Monitor on tele  -TTE  -Discussed w EP, rhythm suggestive of AVNRT therefore may be candidate for ablation. F/u in  clinic  -TSH  -Hold prednisone  -Would avoid BB at this time given history of asthma  -CXR      Gastroparesis  -s/p pyloromyotomy 06/2024 however reporting that symptoms not improved  -Cont home meds      Eosinophilic asthma  -Moderate wheezing on exam. No respiratory symptoms/hypoxia to suggest exacerbation  -Cont inhalers  -Holding prednisone per above      GERD (gastroesophageal reflux disease)  -Cont PPI        VTE Risk Mitigation (From admission, onward)           Ordered     IP VTE LOW RISK PATIENT  Once         07/24/24 0927     Place sequential compression device  Until discontinued         07/24/24 0927                                    Anurag Corbett DO  Department of Hospital Medicine  [facility] - Emergency Dept

## 2024-07-24 NOTE — ED NOTES
Assumed care of the patient. Report received from DEMETRA Naylor. Pt on continuous cardiac monitoring, continuous pulse oximetry, and automatic BP cuff cycling Q30min. Pt in hospital gown, side rails up X2, bed low and locked, and call light is placed within reach. One family/visitors at bedside at this time. Pt denies any complaints or needs.

## 2024-07-24 NOTE — ED NOTES
After one dose of adenosine patients HR dropped 108 sinus tachycardia and is continuing to become more NSR.

## 2024-07-24 NOTE — ASSESSMENT & PLAN NOTE
-Presents with palpitations, found to have HR 180s with SVT rhythm and now s/p conversion with adenosine x1  -Has history of unknown arrythmia, follows with cardiology at outside facility  -Possibly incited by prednisone, started yesterday for rhinitis   -Less concern for active infection, leukocytosis likely in the setting of steroid  Plan:  -Monitor on tele  -TTE  -Cardiology: follow up with outpt cardiologist to discuss ablation  -TSH  -Hold prednisone  -Would avoid BB at this time given history of asthma  -CXR

## 2024-07-25 VITALS
RESPIRATION RATE: 18 BRPM | HEIGHT: 68 IN | WEIGHT: 175.69 LBS | SYSTOLIC BLOOD PRESSURE: 117 MMHG | BODY MASS INDEX: 26.63 KG/M2 | DIASTOLIC BLOOD PRESSURE: 74 MMHG | HEART RATE: 60 BPM | OXYGEN SATURATION: 99 % | TEMPERATURE: 98 F

## 2024-07-25 LAB
ANION GAP SERPL CALC-SCNC: 6 MMOL/L (ref 8–16)
BASOPHILS # BLD AUTO: 0.07 K/UL (ref 0–0.2)
BASOPHILS NFR BLD: 0.6 % (ref 0–1.9)
BUN SERPL-MCNC: 15 MG/DL (ref 6–20)
CALCIUM SERPL-MCNC: 8.1 MG/DL (ref 8.7–10.5)
CHLORIDE SERPL-SCNC: 115 MMOL/L (ref 95–110)
CO2 SERPL-SCNC: 18 MMOL/L (ref 23–29)
CREAT SERPL-MCNC: 0.7 MG/DL (ref 0.5–1.4)
DIFFERENTIAL METHOD BLD: ABNORMAL
EOSINOPHIL # BLD AUTO: 0.2 K/UL (ref 0–0.5)
EOSINOPHIL NFR BLD: 1.4 % (ref 0–8)
ERYTHROCYTE [DISTWIDTH] IN BLOOD BY AUTOMATED COUNT: 13.6 % (ref 11.5–14.5)
EST. GFR  (NO RACE VARIABLE): >60 ML/MIN/1.73 M^2
GLUCOSE SERPL-MCNC: 75 MG/DL (ref 70–110)
HCT VFR BLD AUTO: 37.1 % (ref 37–48.5)
HGB BLD-MCNC: 11.7 G/DL (ref 12–16)
IMM GRANULOCYTES # BLD AUTO: 0.11 K/UL (ref 0–0.04)
IMM GRANULOCYTES NFR BLD AUTO: 0.9 % (ref 0–0.5)
LYMPHOCYTES # BLD AUTO: 3.9 K/UL (ref 1–4.8)
LYMPHOCYTES NFR BLD: 32.3 % (ref 18–48)
MAGNESIUM SERPL-MCNC: 1.9 MG/DL (ref 1.6–2.6)
MCH RBC QN AUTO: 30.8 PG (ref 27–31)
MCHC RBC AUTO-ENTMCNC: 31.5 G/DL (ref 32–36)
MCV RBC AUTO: 98 FL (ref 82–98)
MONOCYTES # BLD AUTO: 0.8 K/UL (ref 0.3–1)
MONOCYTES NFR BLD: 6.7 % (ref 4–15)
NEUTROPHILS # BLD AUTO: 6.9 K/UL (ref 1.8–7.7)
NEUTROPHILS NFR BLD: 58.1 % (ref 38–73)
NRBC BLD-RTO: 0 /100 WBC
OHS QRS DURATION: 94 MS
OHS QTC CALCULATION: 428 MS
PHOSPHATE SERPL-MCNC: 1.8 MG/DL (ref 2.7–4.5)
PLATELET # BLD AUTO: 224 K/UL (ref 150–450)
PMV BLD AUTO: 9.9 FL (ref 9.2–12.9)
POTASSIUM SERPL-SCNC: 3.5 MMOL/L (ref 3.5–5.1)
RBC # BLD AUTO: 3.8 M/UL (ref 4–5.4)
SODIUM SERPL-SCNC: 139 MMOL/L (ref 136–145)
WBC # BLD AUTO: 11.93 K/UL (ref 3.9–12.7)

## 2024-07-25 PROCEDURE — 25000003 PHARM REV CODE 250: Performed by: STUDENT IN AN ORGANIZED HEALTH CARE EDUCATION/TRAINING PROGRAM

## 2024-07-25 PROCEDURE — 36415 COLL VENOUS BLD VENIPUNCTURE: CPT | Performed by: STUDENT IN AN ORGANIZED HEALTH CARE EDUCATION/TRAINING PROGRAM

## 2024-07-25 PROCEDURE — 85025 COMPLETE CBC W/AUTO DIFF WBC: CPT | Performed by: STUDENT IN AN ORGANIZED HEALTH CARE EDUCATION/TRAINING PROGRAM

## 2024-07-25 PROCEDURE — 83735 ASSAY OF MAGNESIUM: CPT | Performed by: STUDENT IN AN ORGANIZED HEALTH CARE EDUCATION/TRAINING PROGRAM

## 2024-07-25 PROCEDURE — 84100 ASSAY OF PHOSPHORUS: CPT | Performed by: STUDENT IN AN ORGANIZED HEALTH CARE EDUCATION/TRAINING PROGRAM

## 2024-07-25 PROCEDURE — 93005 ELECTROCARDIOGRAM TRACING: CPT

## 2024-07-25 PROCEDURE — 93010 ELECTROCARDIOGRAM REPORT: CPT | Mod: ,,, | Performed by: INTERNAL MEDICINE

## 2024-07-25 PROCEDURE — 80048 BASIC METABOLIC PNL TOTAL CA: CPT | Performed by: STUDENT IN AN ORGANIZED HEALTH CARE EDUCATION/TRAINING PROGRAM

## 2024-07-25 RX ORDER — METOPROLOL SUCCINATE 50 MG/1
50 TABLET, EXTENDED RELEASE ORAL DAILY
Qty: 30 TABLET | Refills: 0 | Status: SHIPPED | OUTPATIENT
Start: 2024-07-25 | End: 2024-08-02 | Stop reason: SDUPTHER

## 2024-07-25 RX ORDER — FLUTICASONE FUROATE, UMECLIDINIUM BROMIDE AND VILANTEROL TRIFENATATE 200; 62.5; 25 UG/1; UG/1; UG/1
1 POWDER RESPIRATORY (INHALATION) NIGHTLY
Qty: 180 EACH | Refills: 3 | Status: SHIPPED | OUTPATIENT
Start: 2024-07-25

## 2024-07-25 RX ORDER — TOPIRAMATE 25 MG/1
75 TABLET ORAL NIGHTLY
Start: 2024-07-25

## 2024-07-25 RX ADMIN — LUBIPROSTONE 24 MCG: 24 CAPSULE ORAL at 08:07

## 2024-07-25 RX ADMIN — DULOXETINE HYDROCHLORIDE 60 MG: 60 CAPSULE, DELAYED RELEASE ORAL at 08:07

## 2024-07-25 RX ADMIN — PANTOPRAZOLE SODIUM 40 MG: 40 TABLET, DELAYED RELEASE ORAL at 08:07

## 2024-07-25 RX ADMIN — POLYETHYLENE GLYCOL 3350 17 G: 17 POWDER, FOR SOLUTION ORAL at 08:07

## 2024-07-25 NOTE — PLAN OF CARE
Problem: Adult Inpatient Plan of Care  Goal: Plan of Care Review  7/25/2024 0614 by Yvon Godinez RN  Outcome: Progressing  7/25/2024 0610 by Yvon Godinez RN  Outcome: Progressing  Goal: Patient-Specific Goal (Individualized)  7/25/2024 0614 by Yvon Godinez RN  Outcome: Progressing  7/25/2024 0610 by Yvon Godinez RN  Outcome: Progressing  Goal: Absence of Hospital-Acquired Illness or Injury  7/25/2024 0614 by Yvon Godinez RN  Outcome: Progressing  7/25/2024 0610 by Yvon Godinez RN  Outcome: Progressing  Goal: Optimal Comfort and Wellbeing  7/25/2024 0614 by Yvon Godinez RN  Outcome: Progressing  7/25/2024 0610 by Yvon Godinez RN  Outcome: Progressing  Goal: Readiness for Transition of Care  7/25/2024 0614 by Yvon Godinez RN  Outcome: Progressing  7/25/2024 0610 by Yvon Godinez RN  Outcome: Progressing     Problem: Infection  Goal: Absence of Infection Signs and Symptoms  7/25/2024 0614 by Yvon Godinez RN  Outcome: Progressing  7/25/2024 0610 by Yvon Godinez RN  Outcome: Progressing     Problem: Wound  Goal: Optimal Coping  7/25/2024 0614 by Yvon Godinez RN  Outcome: Progressing  7/25/2024 0610 by Yvon Godinez RN  Outcome: Progressing  Goal: Optimal Functional Ability  7/25/2024 0614 by Yvon Godinez RN  Outcome: Progressing  7/25/2024 0610 by Yvon Godinez RN  Outcome: Progressing  Goal: Absence of Infection Signs and Symptoms  7/25/2024 0614 by Yvon Godinez RN  Outcome: Progressing  7/25/2024 0610 by Yvon Godinez RN  Outcome: Progressing  Goal: Improved Oral Intake  7/25/2024 0614 by Yvon Godinez RN  Outcome: Progressing  7/25/2024 0610 by Yvon Godinez RN  Outcome: Progressing  Goal: Optimal Pain Control and Function  7/25/2024 0614 by Yvon Godinez, RN  Outcome: Progressing  7/25/2024 0610 by Herbert, [place], RN  Outcome: Progressing  Goal: Skin Health and Integrity  7/25/2024 0614 by Yvon Godinez,  RN  Outcome: Progressing  7/25/2024 0610 by Yvon Godinez RN  Outcome: Progressing  Goal: Optimal Wound Healing  7/25/2024 0614 by Yvon Godinez RN  Outcome: Progressing  7/25/2024 0610 by Yvon Godinez RN  Outcome: Progressing     Problem: Fall Injury Risk  Goal: Absence of Fall and Fall-Related Injury  7/25/2024 0614 by Yvon Godinez RN  Outcome: Progressing  7/25/2024 0610 by Yvon Godinez RN  Outcome: Progressing

## 2024-07-25 NOTE — PLAN OF CARE
07/25/24 1302   Final Note   Anticipated Discharge Disposition Home   What phone number can be called within the next 1-3 days to see how you are doing after discharge? [phone]   Hospital Resources/Appts/Education Provided Provided patient/caregiver with written discharge plan information;Provided education on problems/symptoms using teachback;Appointments scheduled and added to AVS   Post-Acute Status   Discharge Delays None known at this time     Patient discharging home / self care. Appointments scheduled and added to AVS. No further needs per case management. She will follow up with the [facility] for [facility] cardiologist [facility] August 2nd [facility] [address] [facility] [address] [facility] [place] [place] [address].    Karan Inman RN    [phone]      Future Appointments   Date Time Provider Department Center   7/30/2024  8:00 AM Shilo Lundy MD [facility] NEURO [place]   8/1/2024  9:30 AM Jenifer Luna PA-C [facility] ORTHO [place]   8/12/2024  7:30 AM [facility] NM1 [facility]   10/3/2024  3:30 PM Clifford Lynch MD [facility]

## 2024-07-25 NOTE — PLAN OF CARE
[facility] - Cardiology Stepdown  Initial Discharge Assessment       Primary Care Provider: No, Primary Doctor    Admission Diagnosis: Palpitations [R00.2]  Arrhythmia [I49.9]  Tachycardia [R00.0]  Chest pain [R07.9]    Admission Date: 7/24/2024  Expected Discharge Date: 7/25/2024    Transition of Care Barriers: Underinsured    Payor: MEDICAID / Plan: [facility]UN[facility] PLAN [facility] ([place] MEDICA[place]) / Product Type: Managed Medicaid /     Extended Emergency Contact Information  Primary Emergency Contact: dee shah  Mobile Phone: [phone]  Relation: Daughter    Discharge Plan A: Home with family  Discharge Plan B: Home with family      [facility]  [address]  [place] [address]  Phone: [phone] Fax: [phone]      Initial Assessment (most recent)       Adult Discharge Assessment - 07/25/24 1158          Discharge Assessment    Assessment Type Discharge Planning Assessment     Confirmed/corrected address, phone number and insurance Yes     Confirmed Demographics Correct on Facesheet     Source of Information patient     Communicated EZEQUIEL with patient/caregiver Yes     Reason For Admission Supraventricular tachycardia     People in Home parent(s);child(oscar), dependent     Facility Arrived From: home     Do you expect to return to your current living situation? Yes     Do you have help at home or someone to help you manage your care at home? Yes     Who are your caregiver(s) and their phone number(s)? Parents and daughter Dee Shah [phone]     Prior to hospitilization cognitive status: Alert/Oriented     Current cognitive status: Alert/Oriented     Equipment Currently Used at Home none     Readmission within 30 days? No     Patient currently being followed by outpatient case management? No     Do you currently have service(s) that help you manage your care at home? No     Do you take prescription medications? Yes     Do you have prescription coverage? Yes     Coverage Medicaid  [facility]     Do you have any problems affording any of your prescribed medications? No     Is the patient taking medications as prescribed? yes     Who is going to help you get home at discharge? drove herself here and will bring self home.     How do you get to doctors appointments? car, drives self     Are you on dialysis? No     Do you take coumadin? No     Discharge Plan A Home with family     Discharge Plan B Home with family     DME Needed Upon Discharge  none     Discharge Plan discussed with: Patient     Transition of Care Barriers Underinsured        OTHER    Name(s) of People in Home parents and daughter                        KEN met with the patient at the bedside and discussed the discharge plan. Gave her the discharge booklet and placed contact numbers on the white board in the room. Patient alert and sitting up in bed.  Patient verified her name , , PCP, Insurance and Pharmacy . Stated she lives with her parents and minor child in a single story house  and has 3 steps to point of entryand bilateral working hand railings . Stated she is not on coumadin nor is she a dialysis patient . DME's include:none.  She takes  medication as prescribed and has no problems getting  medication.  Patient interested in bedside delivery . She uses Dr Zhu at [facility] for her cardiology [phone].    Discharge Plan A and Plan B have been determined by review of patient's clinical status, future medical and therapeutic needs, and coverage/benefits for post-acute care in coordination with multidisciplinary team members.    Karan Inman RN         [phone]

## 2024-07-25 NOTE — NURSING
Patient is ready for discharge. Patient stable alert and oriented. IV and telemetry removed. No complaints of pain. Discussed discharge plan. Reviewed medications and side effects, appointments, and answered questions with patient and family. Rx delivered to pt.

## 2024-07-27 NOTE — DISCHARGE SUMMARY
Discharge Summary  Hospital Medicine    Attending Provider on Discharge: Layla Al MD  Hospital Medicine Team: [facility]OSP MED C  Date of Admission:  7/24/2024     Date of Discharge:  7/25/2024  Code status: Full Code    Active Hospital Problems    Diagnosis  POA    *SVT (supraventricular tachycardia) [I47.10]  Yes    Gastroparesis [K31.84]  Yes    Eosinophilic asthma [J82.83]  Yes     Chronic    GERD (gastroesophageal reflux disease) [K21.9]  Yes      Resolved Hospital Problems   No resolved problems to display.     HPI  This is a 44-year-old female with a history of moderate persistent asthma, chronic rhinitis, eosinophilic asthma, severe gastroparesis status post robotic pyloromyotomy 06/2024 presents with palpitations.  States that starting last night she experienced uncomfortable sensation of her chest and fluttering sensation, unclear how long episode lasted however subsided on its own.  She had another episode this morning.  Checked her Apple watch, found to have HR up to 180s.  She then presented to ED for further evaluation.  States that she infrequently follows up with cardiologist for 'arrythmia' at outside clinic in [facility]. Has not followed up in sometime as she has not had palpitations for a while.  Denies any fevers, chills, worsening cough, wheezing, shortness for breath, diarrhea.  Experiences chronic similar abdominal pain, not worsening and also not improved since procedure last month.  States that her only new medication is prednisone which she started taking yesterday.  One week taper was prescribed by ENT physician for control of rhinitis.     ED course:  Upon arrival, .  Otherwise hemodynamically stable.  EKG noting SVT, status post adenosine 6 mg IV x1 with conversion to NSR    Hospital Course  * SVT (supraventricular tachycardia)  -Presents with palpitations, found to have HR 180s with SVT rhythm and now s/p conversion with adenosine x1. Has history of unknown arrythmia, follows with  cardiology at outside facility. Possibly incited by prednisone, started yesterday for rhinitis. Prednisone stopped. Less concern for active infection, leukocytosis likely in the setting of steroid. No recurrence on telemetry. TTE unremarkable. Discussed w EP, rhythm suggestive of AVNRT therefore may be candidate for ablation. F/u in clinic. TSH normal. Discharged on metoprolol XL 50 mg daily. EP cardiology ambulatory referral made    Gastroparesis  -s/p pyloromyotomy 06/2024 however reporting that symptoms not improved  -Cont home meds    Eosinophilic asthma  -Moderate wheezing on exam. No respiratory symptoms/hypoxia to suggest exacerbation  -Cont inhalers  -Holding prednisone per above    GERD (gastroesophageal reflux disease)  -Cont PPI    Procedures: none    Consultants: none     Discharge Medication List as of 7/25/2024 12:56 PM        START taking these medications    Details   metoprolol succinate (TOPROL-XL) 50 MG 24 hr tablet Take 1 tablet (50 mg total) by mouth once daily., Starting Thu 7/25/2024, Normal           CONTINUE these medications which have CHANGED    Details   fluticasone-umeclidin-vilanter (TRELEGY ELLIPTA) 200-62.5-25 mcg inhaler Inhale 1 puff into the lungs every evening., Starting Thu 7/25/2024, Normal      topiramate (TOPAMAX) 25 MG tablet Take 3 tablets (75 mg total) by mouth every evening., Starting Thu 7/25/2024, No Print           CONTINUE these medications which have NOT CHANGED    Details   albuterol (PROVENTIL/VENTOLIN HFA) 90 mcg/actuation inhaler Inhale 2 puffs into the lungs every 4 (four) hours as needed for Wheezing, Starting Thu 4/25/2024, Normal      amitriptyline (ELAVIL) 25 MG tablet Take 1 tablet (25 mg total) by mouth every evening., Starting Wed 11/9/2022, Until Wed 6/5/2024, Normal      ashwagandha root extract 500 mg Cap Take 2 capsules orally as needed., Starting Wed 4/3/2024, Normal      benzonatate (TESSALON) 100 MG capsule Take 2 capsules by mouth 3 (three) times  daily as needed for Cough for up to 7 days, Starting Mon 2/5/2024, Normal      cetirizine (ZYRTEC) 10 MG tablet Take 1 tablet (10 mg total) by mouth once daily., Starting Mon 9/11/2023, Normal      diclofenac sodium (VOLTAREN) 1 % Gel Apply 2 g topically 4 (four) times daily as needed., Starting Fri 4/26/2024, Until Sun 5/26/2024 at 2359, Normal      dicyclomine (BENTYL) 20 mg tablet Take 1 tablet (20 mg total) by mouth 3 (three) times daily as needed (abdominal pain)., Starting Mon 2/19/2024, Normal      DULoxetine (CYMBALTA) 60 MG capsule Take 1 capsule (60 mg total) by mouth once daily., Starting Mon 5/27/2024, Until Sun 8/25/2024, Normal      dupilumab (DUPIXENT PEN) 300 mg/2 mL PnIj Inject 300 mg into the skin every 14 (fourteen) days, Starting Tue 2/27/2024, Normal      estradioL (ESTRACE) 0.01 % (0.1 mg/gram) vaginal cream Place 1 g vaginally twice a week. Use 1/2 gram nightly for 1st week, Starting Thu 4/27/2023, Until Mon 2/19/2024, Normal      fluticasone propionate (FLONASE) 50 mcg/actuation nasal spray 2 sprays by Nasal route daily, Starting Mon 6/3/2024, Normal      gabapentin (NEURONTIN) 100 MG capsule Take 1 capsule (100 mg total) by mouth 3 (three) times daily as needed., Starting Wed 11/29/2023, Normal      ibuprofen (ADVIL,MOTRIN) 800 MG tablet Take 1 tablet (800 mg total) by mouth 3 (three) times daily., Starting Wed 7/24/2024, Normal      levocetirizine (XYZAL) 5 MG tablet Take 1 tablet by mouth nightly as needed for Allergies, Starting Mon 7/22/2024, Normal      loratadine (CLARITIN) 10 mg tablet Starting Mon 1/21/2019, Historical Med      lubiprostone (AMITIZA) 24 MCG Cap Take 1 capsule (24 mcg total) by mouth 2 (two) times daily with meals., Starting Thu 5/9/2024, Normal      meclizine (ANTIVERT) 12.5 mg tablet Take 1 tablet (12.5 mg total) by mouth 2 (two) times daily as needed for Dizziness., Starting Mon 8/8/2022, Normal      omeprazole (PRILOSEC) 40 MG capsule Take 1 capsule (40 mg total)  by mouth once daily., Starting Mon 4/1/2024, Until Tue 4/1/2025, Normal      polyethylene glycol (GLYCOLAX) 17 gram/dose powder Take 17 g by mouth once daily., Starting Wed 1/10/2024, Normal      rizatriptan (MAXALT) 10 MG tablet take 1/2 - 1 at onset of migraine; may repeat in 2 hours if needed; maximum 2 in 24 hours; maximum 3 days per week, Starting Mon 5/20/2024, Normal           STOP taking these medications       ADVAIR DISKUS 100-50 mcg/dose diskus inhaler Comments:   Reason for Stopping:         ascorbic acid, vitamin C, (VITAMIN C) 500 MG tablet Comments:   Reason for Stopping:         budesonide (PULMICORT) 0.5 mg/2 mL nebulizer solution Comments:   Reason for Stopping:         FLOVENT  mcg/actuation inhaler Comments:   Reason for Stopping:         hydrocortisone 2.5 % cream Comments:   Reason for Stopping:         meloxicam (MOBIC) 15 MG tablet Comments:   Reason for Stopping:         methylPREDNISolone (MEDROL DOSEPACK) 4 mg tablet Comments:   Reason for Stopping:         metoclopramide HCl (REGLAN) 5 MG tablet Comments:   Reason for Stopping:         nirmatrelvir-ritonavir (PAXLOVID) 300 mg (150 mg x 2)-100 mg copackaged tablets (EUA) Comments:   Reason for Stopping:         predniSONE (DELTASONE) 10 MG tablet Comments:   Reason for Stopping:         predniSONE (DELTASONE) 20 MG tablet Comments:   Reason for Stopping:         promethazine-dextromethorphan (PROMETHAZINE-DM) 6.25-15 mg/5 mL Syrp Comments:   Reason for Stopping:         sumatriptan (IMITREX) 100 MG tablet Comments:   Reason for Stopping:         tiotropium (SPIRIVA WITH HANDIHALER) 18 mcg inhalation capsule Comments:   Reason for Stopping:         tiotropium (SPIRIVA WITH HANDIHALER) 18 mcg inhalation capsule Comments:   Reason for Stopping:               Discharge Diet:regular diet     Activity: activity as tolerated    Discharge Condition: Good    Disposition: Home or Self Care    Tests pending at the time of discharge: none       Time spent  on the discharge of the patient including review of hospital course with the patient. reviewing discharge medications and arranging follow-up care 35 min    Discharge examination Patient was seen and examined on the date of discharge and determined to be suitable for discharge.    Discharge plan     Future Appointments   Date Time Provider Department Center   7/30/2024  8:00 AM Shilo Lundy MD [facility] NEURO [place]   8/1/2024  9:30 AM Jenifer Luna PA-C [facility] ORTHO [place]   8/12/2024  7:30 AM [facility] NM1 [facility] NUCLEAR [facility]   10/3/2024  3:30 PM Clifford Lynch MD [facility] Scooter Al MD

## 2024-07-28 ENCOUNTER — PATIENT MESSAGE (OUTPATIENT)
Dept: SURGERY | Facility: CLINIC | Age: 44
End: 2024-07-28
Payer: MEDICAID

## 2024-07-30 ENCOUNTER — PROCEDURE VISIT (OUTPATIENT)
Dept: NEUROLOGY | Facility: CLINIC | Age: 44
End: 2024-07-30
Payer: MEDICAID

## 2024-07-30 DIAGNOSIS — M25.531 RIGHT WRIST PAIN: ICD-10-CM

## 2024-07-30 PROCEDURE — 95886 MUSC TEST DONE W/N TEST COMP: CPT | Mod: 26,S$PBB,, | Performed by: PSYCHIATRY & NEUROLOGY

## 2024-07-30 PROCEDURE — 95886 MUSC TEST DONE W/N TEST COMP: CPT | Mod: PBBFAC | Performed by: PSYCHIATRY & NEUROLOGY

## 2024-07-30 PROCEDURE — 95913 NRV CNDJ TEST 13/> STUDIES: CPT | Mod: 26,S$PBB,, | Performed by: PSYCHIATRY & NEUROLOGY

## 2024-07-30 PROCEDURE — 95913 NRV CNDJ TEST 13/> STUDIES: CPT | Mod: PBBFAC | Performed by: PSYCHIATRY & NEUROLOGY

## 2024-07-30 PROCEDURE — 99214 OFFICE O/P EST MOD 30 MIN: CPT | Mod: 25,S$PBB,, | Performed by: PSYCHIATRY & NEUROLOGY

## 2024-07-30 NOTE — PROCEDURES
EMG W/ ULTRASOUND AND NERVE CONDUCTION TEST 2 Extremities    Date/Time: 7/30/2024 8:00 AM    Performed by: Shilo Lundy MD  Authorized by: Jenifer Luna PA-C                                                                 [facility] [address] Neurology    Subjective:       Patient ID: Wandy Shah is a 44 y.o. female here for a EMG focused evaluation for arm pain. Previous visits and diagnostic evaluation reviewed.  Describes at least 2 months of progressive bilateral arm and hand pain and numbness.  She also describes some neck pain.  She currently works as a cook.  Symptoms have been progressively worsening and severe at times.   HPI  Review of patient's allergies indicates:   Allergen Reactions    Raspberry (rubus idaeus) Hives      There were no vitals filed for this visit.   Chief Complaint: No chief complaint on file.    Past Medical History:   Diagnosis Date    Anemia     Back pain     Depression       Social History     Socioeconomic History    Marital status:    Tobacco Use    Smoking status: Former     Current packs/day: 0.00     Types: Cigarettes     Quit date: 3/3/2021     Years since quitting: 3.4     Passive exposure: Past    Smokeless tobacco: Never   Substance and Sexual Activity    Alcohol use: No    Drug use: Yes     Types: Marijuana     Comment: daily    Sexual activity: Yes     Partners: Male     Birth control/protection: See Surgical Hx     Social Determinants of Health     Financial Resource Strain: Low Risk  (7/24/2024)    Overall Financial Resource Strain (CARDIA)     Difficulty of Paying Living Expenses: Not hard at all   Recent Concern: Financial Resource Strain - Medium Risk (5/18/2024)    Received from [facility]    Overall Financial Resource Strain (CARDIA)     Difficulty of Paying Living Expenses: Somewhat hard   Food Insecurity: No Food Insecurity (7/24/2024)    Hunger Vital Sign     Worried About Running Out of Food in the Last Year: Never true     Ran  Out of Food in the Last Year: Never true   Recent Concern: Food Insecurity - Food Insecurity Present (5/18/2024)    Received from [facility]    Hunger Vital Sign     Worried About Running Out of Food in the Last Year: Sometimes true     Ran Out of Food in the Last Year: Sometimes true   Transportation Needs: No Transportation Needs (5/18/2024)    Received from [facility]    PRAPARE - Transportation     Lack of Transportation (Medical): No     Lack of Transportation (Non-Medical): No   Physical Activity: Insufficiently Active (7/24/2024)    Exercise Vital Sign     Days of Exercise per Week: 3 days     Minutes of Exercise per Session: 10 min   Stress: Stress Concern Present (7/24/2024)    [employer] [place] [employer] - Occupational Stress Questionnaire     Feeling of Stress : Very much   Housing Stability: Unknown (7/24/2024)    Housing Stability Vital Sign     Unable to Pay for Housing in the Last Year: No            Objective:      Physical Exam    Constitutional: Patient appears well-nourished.   Head: Normocephalic and atraumatic.   Mouth/Throat: Oropharynx is clear and moist.   Pulmonary/Chest: Effort normal.   Abdominal: Soft.   Skin: Skin is warm and dry.      General:  Patient is alert and cooperative.  Affect:  Patient is appropriate to surroundings and environment.  Language:  Speech is fluent.  HEENT:  There are no outward signs of trauma to head or face.  Cranial Nerves:  Pupils are equal round and reactive to light. Extra-ocular movements are intact. Face, tongue, and palate are symmetrical.  Motor:  Patient exhibits normal strength testing in bilateral proximal and distal upper extremities.  Reflexes:  Symmetrical in bilateral upper extremities.  Gait:  Ambulation is independent without use of cane or walker without signs of ataxia or circumduction.  Cerebellar:  Normal finger to nose testing without dysmetria.  Sensory:  Intact to sensory modalities tested.  Musculoskeletal:  There is no  severe tenderness to palpation and manipulation of the cervical spine region.   Assessment:       We reviewed and discussed at length results of EMG of bilateral upper extremities performed today revealing moderate right carpal tunnel syndrome as well as mild bilateral ulnar neuropathies best localized to the across elbow segments. These findings are available via media section of chart review.   1. Right wrist pain              Plan:       We discussed treatment options at length. Recommend patient keep appointment with referring provider.       We specifically discussed the pathophysiology of carpal tunnel syndrome and treatment options including bracing, injections and possible surgical intervention.     I spent a total of 35 minutes on the day of the visit. This includes face to face time and non-face to face time preparing to see the patient (eg, review of tests), obtaining and/or reviewing separately obtained history, documenting clinical information in the electronic or other health record, independently interpreting results and communicating results to the patient/family/caregiver, or care coordinator  .  Shilo Lundy MD, FAAN   07/30/2024   9:12 AM     A dictation device was used to produce this document. Use of such devices sometimes results in grammatical errors or replacement of words that sound similarly.

## 2024-07-31 ENCOUNTER — PATIENT MESSAGE (OUTPATIENT)
Dept: FAMILY MEDICINE | Facility: HOSPITAL | Age: 44
End: 2024-07-31
Payer: MEDICAID

## 2024-07-31 ENCOUNTER — TELEPHONE (OUTPATIENT)
Dept: SURGERY | Facility: CLINIC | Age: 44
End: 2024-07-31
Payer: MEDICAID

## 2024-07-31 ENCOUNTER — PATIENT MESSAGE (OUTPATIENT)
Dept: GASTROENTEROLOGY | Facility: CLINIC | Age: 44
End: 2024-07-31
Payer: MEDICAID

## 2024-07-31 NOTE — TELEPHONE ENCOUNTER
Spoke with patient  States she has been having pain in her stomach and stomach cramping and burping  Is having regular Bms  Is not following a gastroparesis diet  Reviewed food to avoid  Also advised that she should follow up with her GI for these symptoms as they are not likely related to the procedure itself.  Pt verbalized understanding to all and has no further questions at this time.

## 2024-07-31 NOTE — TELEPHONE ENCOUNTER
----- Message from Faby Figueroa sent at 7/31/2024  4:06 PM CDT -----  Regarding: Appointment  Contact: [phone]  Calling to schedule appointment due to being in pain. She said it feels like a brick in her stomach.  Please contact patient as soon as possible.

## 2024-08-01 ENCOUNTER — OFFICE VISIT (OUTPATIENT)
Dept: ORTHOPEDICS | Facility: CLINIC | Age: 44
End: 2024-08-01
Payer: MEDICAID

## 2024-08-01 DIAGNOSIS — G56.21 CUBITAL TUNNEL SYNDROME ON RIGHT: ICD-10-CM

## 2024-08-01 DIAGNOSIS — M77.11 LATERAL EPICONDYLITIS OF RIGHT ELBOW: Primary | ICD-10-CM

## 2024-08-01 DIAGNOSIS — G56.01 CARPAL TUNNEL SYNDROME ON RIGHT: ICD-10-CM

## 2024-08-01 DIAGNOSIS — K31.84 GASTROPARESIS: Primary | ICD-10-CM

## 2024-08-01 PROCEDURE — 1159F MED LIST DOCD IN RCRD: CPT | Mod: CPTII,,,

## 2024-08-01 PROCEDURE — 99215 OFFICE O/P EST HI 40 MIN: CPT | Mod: PBBFAC,PN

## 2024-08-01 PROCEDURE — 1160F RVW MEDS BY RX/DR IN RCRD: CPT | Mod: CPTII,,,

## 2024-08-01 PROCEDURE — 3044F HG A1C LEVEL LT 7.0%: CPT | Mod: CPTII,,,

## 2024-08-01 PROCEDURE — 99999 PR PBB SHADOW E&M-EST. PATIENT-LVL V: CPT | Mod: PBBFAC,,,

## 2024-08-01 PROCEDURE — 99214 OFFICE O/P EST MOD 30 MIN: CPT | Mod: S$PBB,,,

## 2024-08-01 PROCEDURE — 1111F DSCHRG MED/CURRENT MED MERGE: CPT | Mod: CPTII,,,

## 2024-08-01 RX ORDER — CEFAZOLIN SODIUM 2 G/50ML
2 SOLUTION INTRAVENOUS
OUTPATIENT
Start: 2024-08-01

## 2024-08-01 RX ORDER — MUPIROCIN 20 MG/G
OINTMENT TOPICAL
OUTPATIENT
Start: 2024-08-01

## 2024-08-01 NOTE — PROGRESS NOTES
Subjective:      Patient ID: Wandy Shah is a 44 y.o. female.    Chief Complaint: Pain of the Right Elbow and Pain of the Right Hand      HPI: Wandy Shah is a 44 y.o. right hand dominant female who presents to clinic for follow up of right elbow and hand pain. Patient was last seen by myself on 05/30/2024 for this and corticosteroid injection was performed (lateral epicondylitis). She denies a history of trauma, but states pain has been pretty consistent for approximately 1 year.  Patient describes the pain as a numbness, tingling pain in her digits.  Associated symptoms include weakness and pain worse at night.  Patient rates the pain at 10/10 today and at its worst. The pain is aggravated by repetitive use.  Recent treatments include previously prescribed Mobic, Voltaren gel, carpal tunnel corticosteroid injection, lateral epicondylitis injection, HEP, and bracing. The pain is affecting ADLs and limiting desired level of activity.  Patient also presents today to discuss recent EMG results.    EMG dated 07/30/2024:  Moderate right carpal tunnel syndrome.  Mild bilateral ulnar neuropathies best localized across the elbow segments.  No evidence of cervical radiculopathy.      Occupation: [employer] at [employer] in [place]    Ambulating: unassisted  Diabetic:  No  Smoking:  She quit smoking approximately 2 years ago.  History of DVT/PE: Negative      PAST MEDICAL HISTORY:    Past Medical History:   Diagnosis Date    Anemia     Back pain     Depression      MEDICATIONS:   Current Outpatient Medications:     albuterol (PROVENTIL/VENTOLIN HFA) 90 mcg/actuation inhaler, Inhale 2 puffs into the lungs every 4 (four) hours as needed for Wheezing, Disp: 8.5 g, Rfl: 11    amitriptyline (ELAVIL) 25 MG tablet, Take 1 tablet (25 mg total) by mouth every evening., Disp: 30 tablet, Rfl: 2    ashwagandha root extract 500 mg Cap, Take 2 capsules orally as needed., Disp: 180 capsule, Rfl: 4    benzonatate (TESSALON) 100 MG  capsule, Take 2 capsules by mouth 3 (three) times daily as needed for Cough for up to 7 days, Disp: 20 capsule, Rfl: 0    cetirizine (ZYRTEC) 10 MG tablet, Take 1 tablet (10 mg total) by mouth once daily. (Patient taking differently: Take 10 mg by mouth every evening.), Disp: 30 tablet, Rfl: 11    diclofenac sodium (VOLTAREN) 1 % Gel, Apply 2 g topically 4 (four) times daily as needed., Disp: 200 g, Rfl: 0    dicyclomine (BENTYL) 20 mg tablet, Take 1 tablet (20 mg total) by mouth 3 (three) times daily as needed (abdominal pain)., Disp: 60 tablet, Rfl: 5    DULoxetine (CYMBALTA) 60 MG capsule, Take 1 capsule (60 mg total) by mouth once daily., Disp: 30 capsule, Rfl: 2    dupilumab (DUPIXENT PEN) 300 mg/2 mL PnIj, Inject 300 mg into the skin every 14 (fourteen) days, Disp: 4 mL, Rfl: 5    estradioL (ESTRACE) 0.01 % (0.1 mg/gram) vaginal cream, Place 1 g vaginally twice a week. Use 1/2 gram nightly for 1st week, Disp: 42.5 g, Rfl: 1    fluticasone propionate (FLONASE) 50 mcg/actuation nasal spray, 2 sprays by Nasal route daily, Disp: 16 g, Rfl: 11    fluticasone-umeclidin-vilanter (TRELEGY ELLIPTA) 200-62.5-25 mcg inhaler, Inhale 1 puff into the lungs every evening., Disp: 180 each, Rfl: 3    gabapentin (NEURONTIN) 100 MG capsule, Take 1 capsule (100 mg total) by mouth 3 (three) times daily as needed., Disp: 30 capsule, Rfl: 6    ibuprofen (ADVIL,MOTRIN) 800 MG tablet, Take 1 tablet (800 mg total) by mouth 3 (three) times daily., Disp: 90 tablet, Rfl: 0    levocetirizine (XYZAL) 5 MG tablet, Take 1 tablet by mouth nightly as needed for Allergies, Disp: 30 tablet, Rfl: 11    loratadine (CLARITIN) 10 mg tablet, , Disp: , Rfl:     lubiprostone (AMITIZA) 24 MCG Cap, Take 1 capsule (24 mcg total) by mouth 2 (two) times daily with meals., Disp: 60 capsule, Rfl: 2    meclizine (ANTIVERT) 12.5 mg tablet, Take 1 tablet (12.5 mg total) by mouth 2 (two) times daily as needed for Dizziness., Disp: 28 tablet, Rfl: 0    metoprolol  succinate (TOPROL-XL) 50 MG 24 hr tablet, Take 1 tablet (50 mg total) by mouth once daily., Disp: 30 tablet, Rfl: 0    omeprazole (PRILOSEC) 40 MG capsule, Take 1 capsule (40 mg total) by mouth once daily., Disp: 30 capsule, Rfl: 5    polyethylene glycol (GLYCOLAX) 17 gram/dose powder, Take 17 g by mouth once daily., Disp: 510 g, Rfl: 11    rizatriptan (MAXALT) 10 MG tablet, take 1/2 - 1 at onset of migraine; may repeat in 2 hours if needed; maximum 2 in 24 hours; maximum 3 days per week, Disp: 10 tablet, Rfl: 2    topiramate (TOPAMAX) 25 MG tablet, Take 3 tablets (75 mg total) by mouth every evening., Disp: , Rfl:     ALLERGIES:   Review of patient's allergies indicates:   Allergen Reactions    Raspberry (rubus idaeus) Hives       Review of Systems:  Constitution: Negative for chills, fever and night sweats.   HENT: Negative for congestion and headaches.    Eyes: Negative for blurred vision or vision loss.  Cardiovascular: Negative for chest pain and syncope.   Respiratory: Negative for cough and shortness of breath.    Endocrine: Negative for polydipsia, polyphagia and polyuria.   Hematologic/Lymphatic: Negative for bleeding problem. Does not bruise/bleed easily.   Skin: Negative for dry skin, itching and rash.   Musculoskeletal: See HPI.   Gastrointestinal: Negative for abdominal pain and bowel incontinence.   Genitourinary: Negative for bladder incontinence and nocturia.   Neurological: Negative for disturbances in coordination, loss of balance and seizures.   Psychiatric/Behavioral: Negative for depression. The patient does not have insomnia.    Allergic/Immunologic: Negative for hives and persistent infections.          Objective:      There were no vitals filed for this visit.    PHYSICAL EXAM:  General: Alert & oriented x3, well-developed and well-nourished, in no acute distress, sitting comfortably in the exam room.  Skin: Warm and dry. Capillary refill less than 2 seconds.   Head: Normocephalic and  atraumatic.   Eyes: Sclera appear normal.   Nose: No deformities seen.   Ears: No deformities seen.   Neck: No tracheal deviation present.   Pulmonary/Chest: Breathing unlabored.   Neurological: Alert and oriented to person, place, and time.   Psychiatric: Mood is pleasant and affect appropriate.     RIGHT ELBOW:         Observation/Inspection:    No evidence of lesions, erythema, swelling, or visible deformity.        Palpation:   Tenderness to palpation to the lateral condyle and surrounding musculature.   Otherwise, no tenderness to palpation to medial epicondyle, radial head, olecranon, or surrounding soft tissues.        Range of Motion:  Full to flexion, extension, supination, and pronation without pain or locking.         Strength:   Normal 5/5 strength in all tested muscle groups.        Special Tests:  Tinel's Test - Cubital Tunnel (Elbow)  Negative  Resisted wrist extension (Tennis Elbow)  Positive  Resisted wrist flexion (Golfer's Elbow)   Negative        Neurovascular Exam:  Digits warm and well perfused, brisk capillary refill <3 seconds throughout  NVI motor/LTS to median, radial, and ulnar nerves, radial pulse 2+      RIGHT HAND/WRIST:        Observation/Inspection:    No evidence of visible deformity, swelling, discoloration, scars, or atrophy.         Palpation:   No tenderness to palpation to bony prominences and soft tissues throughout.        Range of Motion:  Wrist: Full to wrist flexion, extension, radial, and ulnar deviation.  Digits: Full to digit MCP, PIP, and DIP flexion and extension without pain or difficulty.         Strength:    strength slightly decreased compared to the left.         Special Tests:  Finkelstein's Test  Negative  WHAT Test   Negative  Tinel's Test - Carpal Tunnel Negative  Tinel's Test - Cubital Tunnel Negative  CMC Grind   Negative        Neurovascular Exam:  Digits warm and well perfused, brisk capillary refill <3 seconds throughout  NVI motor/LTS to median,  radial, and ulnar nerves, radial pulse 2+    Imaging:   X-Rays: 3 views of right elbow dated 05/18/2024, and independently reviewed, show: No acute fractures or dislocations. Joint spaces are preserved. No evidence of foreign bodies.         Assessment:       1. Lateral epicondylitis of right elbow    2. Carpal tunnel syndrome on right    3. Cubital tunnel syndrome on right        Plan:       Orders Placed This Encounter    Case Request Operating Room: RELEASE, CARPAL TUNNEL, DECOMPRESSION, NERVE, ULNAR, RELEASE, ELBOW, LATERAL EPICONDYLE     I explained the nature of the problem to the patient.     I discussed at length with the patient all the different treatment options available for  her right hand and elbow including anti-inflammatories, acetaminophen, rest, ice, heat, physical/occupational therapy, and corticosteroid injections. I explained the potential role of surgery in the treatment of this condition. The patient understands that if non-surgical measures do not adequately control symptoms, surgery will be considered in the future.     Patient would like to proceed with right carpal tunnel release, right ulnar nerve decompression, and right lateral epicondyle release as an outpatient surgery. The risks and benefits of surgery were discussed with the patient today and she verbalized understand. Surgical consents were signed. Orders for surgery were entered. Surgery is scheduled for 08/23/2024.     Medications:  Continue with previously prescribed Mobic 15 mg once daily. Continue with Voltaren Gel TID to the affected areas.   HEP:  Continue with previously instructed and demonstrated lateral epicondylitis stretching HEP.  DME:  Continue use of OTC tennis elbow strap. Continue use of wrist brace at night and at work with activities for carpal tunnel symptoms.   Pain Management: Ice compress to the affected area 2-3x a day for 15-20 minutes as needed for pain management.  Orders:  Orders for surgery were placed  today.  Surgery scheduled for 08/23/2024.    Follow-Up: After surgery.     All of the patient's questions were answered and the patient will contact us if they have any questions or concerns in the interim.    Jenifer Luna PA-C  [facility]  Orthopedic Surgery    Medical Dictation software was used during the dictation of portions or the entirety of this medical record.  Phonetic or grammatic errors may exist due to the use of this software. For clarification, refer to the author of the document.

## 2024-08-01 NOTE — H&P (VIEW-ONLY)
Subjective:      Patient ID: Wandy Shah is a 44 y.o. female.    Chief Complaint: Pain of the Right Elbow and Pain of the Right Hand      HPI: Wandy Shah is a 44 y.o. right hand dominant female who presents to clinic for follow up of right elbow and hand pain. Patient was last seen by myself on 05/30/2024 for this and corticosteroid injection was performed (lateral epicondylitis). She denies a history of trauma, but states pain has been pretty consistent for approximately 1 year.  Patient describes the pain as a numbness, tingling pain in her digits.  Associated symptoms include weakness and pain worse at night.  Patient rates the pain at 10/10 today and at its worst. The pain is aggravated by repetitive use.  Recent treatments include previously prescribed Mobic, Voltaren gel, carpal tunnel corticosteroid injection, lateral epicondylitis injection, HEP, and bracing. The pain is affecting ADLs and limiting desired level of activity.  Patient also presents today to discuss recent EMG results.    EMG dated 07/30/2024:  Moderate right carpal tunnel syndrome.  Mild bilateral ulnar neuropathies best localized across the elbow segments.  No evidence of cervical radiculopathy.      Occupation: [employer] at [employer] in [place]    Ambulating: unassisted  Diabetic:  No  Smoking:  She quit smoking approximately 2 years ago.  History of DVT/PE: Negative      PAST MEDICAL HISTORY:    Past Medical History:   Diagnosis Date    Anemia     Back pain     Depression      MEDICATIONS:   Current Outpatient Medications:     albuterol (PROVENTIL/VENTOLIN HFA) 90 mcg/actuation inhaler, Inhale 2 puffs into the lungs every 4 (four) hours as needed for Wheezing, Disp: 8.5 g, Rfl: 11    amitriptyline (ELAVIL) 25 MG tablet, Take 1 tablet (25 mg total) by mouth every evening., Disp: 30 tablet, Rfl: 2    ashwagandha root extract 500 mg Cap, Take 2 capsules orally as needed., Disp: 180 capsule, Rfl: 4    benzonatate (TESSALON) 100 MG  capsule, Take 2 capsules by mouth 3 (three) times daily as needed for Cough for up to 7 days, Disp: 20 capsule, Rfl: 0    cetirizine (ZYRTEC) 10 MG tablet, Take 1 tablet (10 mg total) by mouth once daily. (Patient taking differently: Take 10 mg by mouth every evening.), Disp: 30 tablet, Rfl: 11    diclofenac sodium (VOLTAREN) 1 % Gel, Apply 2 g topically 4 (four) times daily as needed., Disp: 200 g, Rfl: 0    dicyclomine (BENTYL) 20 mg tablet, Take 1 tablet (20 mg total) by mouth 3 (three) times daily as needed (abdominal pain)., Disp: 60 tablet, Rfl: 5    DULoxetine (CYMBALTA) 60 MG capsule, Take 1 capsule (60 mg total) by mouth once daily., Disp: 30 capsule, Rfl: 2    dupilumab (DUPIXENT PEN) 300 mg/2 mL PnIj, Inject 300 mg into the skin every 14 (fourteen) days, Disp: 4 mL, Rfl: 5    estradioL (ESTRACE) 0.01 % (0.1 mg/gram) vaginal cream, Place 1 g vaginally twice a week. Use 1/2 gram nightly for 1st week, Disp: 42.5 g, Rfl: 1    fluticasone propionate (FLONASE) 50 mcg/actuation nasal spray, 2 sprays by Nasal route daily, Disp: 16 g, Rfl: 11    fluticasone-umeclidin-vilanter (TRELEGY ELLIPTA) 200-62.5-25 mcg inhaler, Inhale 1 puff into the lungs every evening., Disp: 180 each, Rfl: 3    gabapentin (NEURONTIN) 100 MG capsule, Take 1 capsule (100 mg total) by mouth 3 (three) times daily as needed., Disp: 30 capsule, Rfl: 6    ibuprofen (ADVIL,MOTRIN) 800 MG tablet, Take 1 tablet (800 mg total) by mouth 3 (three) times daily., Disp: 90 tablet, Rfl: 0    levocetirizine (XYZAL) 5 MG tablet, Take 1 tablet by mouth nightly as needed for Allergies, Disp: 30 tablet, Rfl: 11    loratadine (CLARITIN) 10 mg tablet, , Disp: , Rfl:     lubiprostone (AMITIZA) 24 MCG Cap, Take 1 capsule (24 mcg total) by mouth 2 (two) times daily with meals., Disp: 60 capsule, Rfl: 2    meclizine (ANTIVERT) 12.5 mg tablet, Take 1 tablet (12.5 mg total) by mouth 2 (two) times daily as needed for Dizziness., Disp: 28 tablet, Rfl: 0    metoprolol  succinate (TOPROL-XL) 50 MG 24 hr tablet, Take 1 tablet (50 mg total) by mouth once daily., Disp: 30 tablet, Rfl: 0    omeprazole (PRILOSEC) 40 MG capsule, Take 1 capsule (40 mg total) by mouth once daily., Disp: 30 capsule, Rfl: 5    polyethylene glycol (GLYCOLAX) 17 gram/dose powder, Take 17 g by mouth once daily., Disp: 510 g, Rfl: 11    rizatriptan (MAXALT) 10 MG tablet, take 1/2 - 1 at onset of migraine; may repeat in 2 hours if needed; maximum 2 in 24 hours; maximum 3 days per week, Disp: 10 tablet, Rfl: 2    topiramate (TOPAMAX) 25 MG tablet, Take 3 tablets (75 mg total) by mouth every evening., Disp: , Rfl:     ALLERGIES:   Review of patient's allergies indicates:   Allergen Reactions    Raspberry (rubus idaeus) Hives       Review of Systems:  Constitution: Negative for chills, fever and night sweats.   HENT: Negative for congestion and headaches.    Eyes: Negative for blurred vision or vision loss.  Cardiovascular: Negative for chest pain and syncope.   Respiratory: Negative for cough and shortness of breath.    Endocrine: Negative for polydipsia, polyphagia and polyuria.   Hematologic/Lymphatic: Negative for bleeding problem. Does not bruise/bleed easily.   Skin: Negative for dry skin, itching and rash.   Musculoskeletal: See HPI.   Gastrointestinal: Negative for abdominal pain and bowel incontinence.   Genitourinary: Negative for bladder incontinence and nocturia.   Neurological: Negative for disturbances in coordination, loss of balance and seizures.   Psychiatric/Behavioral: Negative for depression. The patient does not have insomnia.    Allergic/Immunologic: Negative for hives and persistent infections.          Objective:      There were no vitals filed for this visit.    PHYSICAL EXAM:  General: Alert & oriented x3, well-developed and well-nourished, in no acute distress, sitting comfortably in the exam room.  Skin: Warm and dry. Capillary refill less than 2 seconds.   Head: Normocephalic and  atraumatic.   Eyes: Sclera appear normal.   Nose: No deformities seen.   Ears: No deformities seen.   Neck: No tracheal deviation present.   Pulmonary/Chest: Breathing unlabored.   Neurological: Alert and oriented to person, place, and time.   Psychiatric: Mood is pleasant and affect appropriate.     RIGHT ELBOW:         Observation/Inspection:    No evidence of lesions, erythema, swelling, or visible deformity.        Palpation:   Tenderness to palpation to the lateral condyle and surrounding musculature.   Otherwise, no tenderness to palpation to medial epicondyle, radial head, olecranon, or surrounding soft tissues.        Range of Motion:  Full to flexion, extension, supination, and pronation without pain or locking.         Strength:   Normal 5/5 strength in all tested muscle groups.        Special Tests:  Tinel's Test - Cubital Tunnel (Elbow)  Negative  Resisted wrist extension (Tennis Elbow)  Positive  Resisted wrist flexion (Golfer's Elbow)   Negative        Neurovascular Exam:  Digits warm and well perfused, brisk capillary refill <3 seconds throughout  NVI motor/LTS to median, radial, and ulnar nerves, radial pulse 2+      RIGHT HAND/WRIST:        Observation/Inspection:    No evidence of visible deformity, swelling, discoloration, scars, or atrophy.         Palpation:   No tenderness to palpation to bony prominences and soft tissues throughout.        Range of Motion:  Wrist: Full to wrist flexion, extension, radial, and ulnar deviation.  Digits: Full to digit MCP, PIP, and DIP flexion and extension without pain or difficulty.         Strength:    strength slightly decreased compared to the left.         Special Tests:  Finkelstein's Test  Negative  WHAT Test   Negative  Tinel's Test - Carpal Tunnel Negative  Tinel's Test - Cubital Tunnel Negative  CMC Grind   Negative        Neurovascular Exam:  Digits warm and well perfused, brisk capillary refill <3 seconds throughout  NVI motor/LTS to median,  radial, and ulnar nerves, radial pulse 2+    Imaging:   X-Rays: 3 views of right elbow dated 05/18/2024, and independently reviewed, show: No acute fractures or dislocations. Joint spaces are preserved. No evidence of foreign bodies.         Assessment:       1. Lateral epicondylitis of right elbow    2. Carpal tunnel syndrome on right    3. Cubital tunnel syndrome on right        Plan:       Orders Placed This Encounter    Case Request Operating Room: RELEASE, CARPAL TUNNEL, DECOMPRESSION, NERVE, ULNAR, RELEASE, ELBOW, LATERAL EPICONDYLE     I explained the nature of the problem to the patient.     I discussed at length with the patient all the different treatment options available for  her right hand and elbow including anti-inflammatories, acetaminophen, rest, ice, heat, physical/occupational therapy, and corticosteroid injections. I explained the potential role of surgery in the treatment of this condition. The patient understands that if non-surgical measures do not adequately control symptoms, surgery will be considered in the future.     Patient would like to proceed with right carpal tunnel release, right ulnar nerve decompression, and right lateral epicondyle release as an outpatient surgery. The risks and benefits of surgery were discussed with the patient today and she verbalized understand. Surgical consents were signed. Orders for surgery were entered. Surgery is scheduled for 08/23/2024.     Medications:  Continue with previously prescribed Mobic 15 mg once daily. Continue with Voltaren Gel TID to the affected areas.   HEP:  Continue with previously instructed and demonstrated lateral epicondylitis stretching HEP.  DME:  Continue use of OTC tennis elbow strap. Continue use of wrist brace at night and at work with activities for carpal tunnel symptoms.   Pain Management: Ice compress to the affected area 2-3x a day for 15-20 minutes as needed for pain management.  Orders:  Orders for surgery were placed  today.  Surgery scheduled for 08/23/2024.    Follow-Up: After surgery.     All of the patient's questions were answered and the patient will contact us if they have any questions or concerns in the interim.    Jenifer Luna PA-C  [facility]  Orthopedic Surgery    Medical Dictation software was used during the dictation of portions or the entirety of this medical record.  Phonetic or grammatic errors may exist due to the use of this software. For clarification, refer to the author of the document.

## 2024-08-02 RX ORDER — ONDANSETRON 4 MG/1
4 TABLET, ORALLY DISINTEGRATING ORAL EVERY 6 HOURS PRN
Qty: 40 TABLET | Refills: 0 | Status: SHIPPED | OUTPATIENT
Start: 2024-08-02 | End: 2024-08-12

## 2024-08-03 ENCOUNTER — OFFICE VISIT (OUTPATIENT)
Dept: URGENT CARE | Facility: CLINIC | Age: 44
End: 2024-08-03
Payer: MEDICAID

## 2024-08-03 VITALS
HEART RATE: 55 BPM | TEMPERATURE: 99 F | SYSTOLIC BLOOD PRESSURE: 93 MMHG | WEIGHT: 175.69 LBS | RESPIRATION RATE: 16 BRPM | OXYGEN SATURATION: 98 % | DIASTOLIC BLOOD PRESSURE: 60 MMHG | BODY MASS INDEX: 26.63 KG/M2 | HEIGHT: 68 IN

## 2024-08-03 DIAGNOSIS — M54.50 ACUTE MIDLINE LOW BACK PAIN WITHOUT SCIATICA: Primary | ICD-10-CM

## 2024-08-03 PROCEDURE — 99213 OFFICE O/P EST LOW 20 MIN: CPT | Mod: S$GLB,,,

## 2024-08-03 RX ORDER — NAPROXEN 500 MG/1
500 TABLET ORAL 2 TIMES DAILY WITH MEALS
Qty: 14 TABLET | Refills: 0 | Status: SHIPPED | OUTPATIENT
Start: 2024-08-03 | End: 2024-08-09

## 2024-08-03 RX ORDER — KETOROLAC TROMETHAMINE 10 MG/1
TABLET, FILM COATED ORAL
COMMUNITY
Start: 2024-06-08 | End: 2024-08-03

## 2024-08-03 RX ORDER — FLUTICASONE PROPIONATE 50 MCG
2 SPRAY, SUSPENSION (ML) NASAL
COMMUNITY
Start: 2024-06-03 | End: 2025-06-03

## 2024-08-03 RX ORDER — METHOCARBAMOL 750 MG/1
750 TABLET, FILM COATED ORAL 3 TIMES DAILY PRN
COMMUNITY
End: 2024-08-09

## 2024-08-03 RX ORDER — KETOROLAC TROMETHAMINE 30 MG/ML
30 INJECTION, SOLUTION INTRAMUSCULAR; INTRAVENOUS
Status: COMPLETED | OUTPATIENT
Start: 2024-08-03 | End: 2024-08-03

## 2024-08-03 RX ORDER — IBUPROFEN 600 MG/1
TABLET ORAL
COMMUNITY
Start: 2024-04-26 | End: 2024-08-03

## 2024-08-03 RX ORDER — GABAPENTIN 300 MG/1
600 CAPSULE ORAL
COMMUNITY
End: 2024-08-03

## 2024-08-03 RX ADMIN — KETOROLAC TROMETHAMINE 30 MG: 30 INJECTION, SOLUTION INTRAMUSCULAR; INTRAVENOUS at 04:08

## 2024-08-03 NOTE — PATIENT INSTRUCTIONS
Please drink plenty of fluids.  Please get plenty of rest.    Please return here or go to the Emergency Department for any concerns or worsening of condition.    You were given a TORADOL injection today, please do not take anymore anti-inflammatory medications today. Please start taking anti-inflammatory medications tomorrow.     Please take NAPROXEN EVERY 12 hours for pain/inflammation, you may decrease once daily, or discontinue as your symptoms improve.     Tylenol:  Regular strength can take 650 mg every 4-6 hours as needed, do not exceed 3,250 mg within a day.  Extra strength can take 1,000 mg every 6 hours as needed, do not exceed 3,000 mg in one day.    Rest, ice, compression and elevation to the affected joint or limb as needed.  Please follow up with your primary care doctor or specialist as needed.    If you  smoke, please stop smoking.

## 2024-08-03 NOTE — PROGRESS NOTES
"Subjective:      Patient ID: Wandy Shah is a 44 y.o. female.    Vitals:  height is 5' 8" (1.727 m) and weight is 79.7 kg (175 lb 11.3 oz). Her oral temperature is 98.6 °F (37 °C). Her blood pressure is 93/60 and her pulse is 55 (abnormal). Her respiration is 16 and oxygen saturation is 98%.     Chief Complaint: Back Pain (I cant bend . I cough it hurts . I could not sleep . I have dic messed up on both side of my back - Entered by patient)    Patient states that she is having back pain. She states that he has a history of herniated disk, several accidents. She states that a couple of week ago he was getting something out of the washing machine and noticed the discomfort. Associated symptoms include numbness, tinging. She states that she thought that it would improve but her pain has gotten worse. She states that she has tried gabapentin, ibuprofen, stretches, heating pad with no improvement of symptoms. She denies recent trauma or injury to cause her symptoms. She denies fever, chills, cp, sob, bowel/bladder incontinence, urinary changes.    Back Pain  This is a new problem. The current episode started 1 to 4 weeks ago. The problem occurs constantly. The problem has been gradually worsening since onset. The pain is present in the lumbar spine and thoracic spine. The quality of the pain is described as aching, burning, cramping, shooting and stabbing. The pain does not radiate. The pain is at a severity of 10/10. The pain is severe. The pain is The same all the time. The symptoms are aggravated by lying down, standing, twisting and bending. Stiffness is present In the morning. Associated symptoms include numbness and tingling. Pertinent negatives include no abdominal pain, bladder incontinence, bowel incontinence, chest pain, dysuria, fever, headaches, leg pain, paresis, paresthesias, pelvic pain, perianal numbness, weakness or weight loss. She has tried bed rest, heat and muscle relaxant for the symptoms. The " treatment provided no relief.       Constitution: Negative for chills and fever.   Cardiovascular:  Negative for chest pain.   Gastrointestinal:  Negative for abdominal pain, nausea, vomiting, diarrhea and bowel incontinence.   Genitourinary:  Negative for dysuria, bladder incontinence and pelvic pain.   Musculoskeletal:  Positive for back pain. Negative for trauma.   Neurological:  Positive for numbness and tingling. Negative for headaches.      Objective:     Physical Exam   Constitutional:  Non-toxic appearance. She does not appear ill. No distress.      Comments:Patient is in no acute distress, patient is non-toxic appearing, patient is ox3, patient is answering question appropriately.   normal  Abdominal: Normal appearance.   Musculoskeletal:      Thoracic back: She exhibits tenderness. She exhibits normal range of motion, no bony tenderness, no swelling, no edema, no deformity, no laceration and no spasm.      Lumbar back: She exhibits tenderness. She exhibits normal range of motion, no bony tenderness, no swelling, no edema, no deformity, no laceration and no spasm.        Back:       Comments: Blue represents area of tenderness to palpation. 5/5 strength of LE. Decreased ROM due to pain. Sensation intact. Patellar DTR , 2+ bilaterally. There is midline/bilateral paraspinal tenderness within the thoracic and lumbar region. No step off deformities. Patient is able to ambulate in exam room and throughout clinic. No erythema, no area of fluctuance, no area of induration, no discharge, no warmth appreciated on exam.   Neurological: She is alert.   Skin: Skin is not diaphoretic.   Nursing note and vitals reviewed.    Assessment:     1. Acute midline low back pain without sciatica      Plan:   Previous notes reviewed.  Vital signs reviewed.  Discussed acute on chronic back pain, home care, tx options, and given follow up precautions.  Patient was briefed on my thought process and diagnosis.   Patient involved with  the treatment plan and agreed to the plan.  Patient informed on warning signs, patient understood warning signs and to go to urgent care or ER if warning signs appear.  Please excuse grammatical/spelling errors appreciated throughout this visit encounter for a remote dictation device was used during this encounter.    Patient Instructions   Please drink plenty of fluids.  Please get plenty of rest.    Please return here or go to the Emergency Department for any concerns or worsening of condition.    You were given a TORADOL injection today, please do not take anymore anti-inflammatory medications today. Please start taking anti-inflammatory medications tomorrow.     Please take NAPROXEN EVERY 12 hours for pain/inflammation, you may decrease once daily, or discontinue as your symptoms improve.     Tylenol:  Regular strength can take 650 mg every 4-6 hours as needed, do not exceed 3,250 mg within a day.  Extra strength can take 1,000 mg every 6 hours as needed, do not exceed 3,000 mg in one day.    Rest, ice, compression and elevation to the affected joint or limb as needed.  Please follow up with your primary care doctor or specialist as needed.    If you  smoke, please stop smoking.    Acute midline low back pain without sciatica  -     ketorolac injection 30 mg  -     naproxen (NAPROSYN) 500 MG tablet; Take 1 tablet (500 mg total) by mouth 2 (two) times daily with meals. for 7 days  Dispense: 14 tablet; Refill: 0      Vasu Crabtree PA-C

## 2024-08-09 ENCOUNTER — OFFICE VISIT (OUTPATIENT)
Dept: FAMILY MEDICINE | Facility: HOSPITAL | Age: 44
End: 2024-08-09
Payer: MEDICAID

## 2024-08-09 VITALS
HEART RATE: 63 BPM | WEIGHT: 179.44 LBS | SYSTOLIC BLOOD PRESSURE: 97 MMHG | BODY MASS INDEX: 27.19 KG/M2 | HEIGHT: 68 IN | DIASTOLIC BLOOD PRESSURE: 67 MMHG

## 2024-08-09 DIAGNOSIS — F32.A ANXIETY AND DEPRESSION: Primary | ICD-10-CM

## 2024-08-09 DIAGNOSIS — F41.9 ANXIETY AND DEPRESSION: Primary | ICD-10-CM

## 2024-08-09 PROCEDURE — 99215 OFFICE O/P EST HI 40 MIN: CPT

## 2024-08-12 ENCOUNTER — HOSPITAL ENCOUNTER (OUTPATIENT)
Dept: RADIOLOGY | Facility: HOSPITAL | Age: 44
Discharge: HOME | End: 2024-08-12
Attending: SURGERY
Payer: MEDICAID

## 2024-08-12 ENCOUNTER — ANESTHESIA EVENT (OUTPATIENT)
Dept: SURGERY | Facility: HOSPITAL | Age: 44
End: 2024-08-12
Payer: MEDICAID

## 2024-08-12 ENCOUNTER — PATIENT MESSAGE (OUTPATIENT)
Dept: HEPATOLOGY | Facility: HOSPITAL | Age: 44
End: 2024-08-12
Payer: MEDICAID

## 2024-08-12 ENCOUNTER — HOSPITAL ENCOUNTER (OUTPATIENT)
Dept: PREADMISSION TESTING | Facility: HOSPITAL | Age: 44
Discharge: HOME | End: 2024-08-12
Attending: SURGERY
Payer: MEDICAID

## 2024-08-12 ENCOUNTER — PATIENT MESSAGE (OUTPATIENT)
Dept: ANESTHESIOLOGY | Facility: HOSPITAL | Age: 44
End: 2024-08-12
Payer: MEDICAID

## 2024-08-12 DIAGNOSIS — K31.84 GASTROPARESIS: ICD-10-CM

## 2024-08-12 PROCEDURE — 78264 GASTRIC EMPTYING IMG STUDY: CPT | Mod: TC

## 2024-08-12 PROCEDURE — A9541 TC99M SULFUR COLLOID: HCPCS | Performed by: SURGERY

## 2024-08-12 PROCEDURE — 78264 GASTRIC EMPTYING IMG STUDY: CPT | Mod: 26,,, | Performed by: RADIOLOGY

## 2024-08-12 RX ORDER — TECHNETIUM TC 99M SULFUR COLLOID 2 MG
1 KIT MISCELLANEOUS
Status: COMPLETED | OUTPATIENT
Start: 2024-08-12 | End: 2024-08-12

## 2024-08-12 RX ADMIN — TECHNETIUM TC 99M SULFUR COLLOID KIT 1 MILLICURIE: KIT at 07:08

## 2024-08-12 NOTE — ANESTHESIA PREPROCEDURE EVALUATION
"                                                                                                             2024  Wandy Shah is a 44 y.o., female scheduled for  RELEASE, CARPAL TUNNEL (Right: Hand) with DECOMPRESSION, NERVE, ULNAR (Right) and RELEASE, ELBOW, LATERAL EPICONDYLE (Right: Elbow) 24.    Per cardiology Dr. Calvin, "    Past Medical History:   Diagnosis Date    Anemia     Back pain     Depression      Past Surgical History:   Procedure Laterality Date     SECTION      COLD KNIFE CONIZATION OF CERVIX  12/10/2021    Procedure: CONE BIOPSY, CERVIX, USING COLD KNIFE;  Surgeon: Modesto Nassar MD;  Location: [facility] OR;  Service: OB/GYN;;    COLONOSCOPY N/A 2021    Procedure: COLONOSCOPY;  Surgeon: Emily Cruz MD;  Location: [facility];  Service: Endoscopy;  Laterality: N/A;    ESOPHAGOGASTRODUODENOSCOPY N/A 2021    Procedure: EGD (ESOPHAGOGASTRODUODENOSCOPY);  Surgeon: Emily Cruz MD;  Location: [facility];  Service: Endoscopy;  Laterality: N/A;    HYSTERECTOMY      ROBOT-ASSISTED LAPAROSCOPIC HYSTERECTOMY N/A 2022    Procedure: ROBOTIC HYSTERECTOMY;  Surgeon: Modesto Nassar MD;  Location: [facility] OR;  Service: OB/GYN;  Laterality: N/A;    ROBOT-ASSISTED LAPAROSCOPIC PYLOROPLASTY USING DA JOSE LUIS XI N/A 2024    Procedure: XI ROBOTIC PYLOROMYOTOMY WITH EGD;  Surgeon: Clifford Lynch MD;  Location: [address];  Service: General;  Laterality: N/A;  NO NARCOTICS    ROBOT-ASSISTED SALPINGECTOMY Bilateral 2022    Procedure: ROBOTIC SALPINGECTOMY;  Surgeon: Modesto Nassar MD;  Location: [facility] OR;  Service: OB/GYN;  Laterality: Bilateral;    SINUS SURGERY      TUBAL LIGATION       Review of patient's allergies indicates:   Allergen Reactions    Raspberry (rubus idaeus) Hives     Current Outpatient Medications   Medication Instructions    albuterol (PROVENTIL/VENTOLIN HFA) 90 mcg/actuation inhaler Inhale 2 puffs into the lungs " every 4 (four) hours as needed for Wheezing    AMITIZA 24 mcg, Oral, 2 times daily with meals    amitriptyline (ELAVIL) 25 mg, Oral, Nightly    ashwagandha root extract 500 mg Cap Take 2 capsules orally as needed.    cetirizine (ZYRTEC) 10 mg, Oral, Daily    diclofenac sodium (VOLTAREN) 2 g, Topical (Top), 4 times daily PRN    dicyclomine (BENTYL) 20 mg, Oral, 3 times daily PRN    DULoxetine (CYMBALTA) 60 mg, Oral, Daily    dupilumab (DUPIXENT PEN) 300 mg/2 mL PnIj Inject 300 mg into the skin every 14 (fourteen) days    fluticasone propionate (FLONASE) 50 mcg/actuation nasal spray 2 sprays, Nasal    fluticasone-umeclidin-vilanter (TRELEGY ELLIPTA) 200-62.5-25 mcg inhaler 1 puff, Inhalation, Nightly    gabapentin (NEURONTIN) 100 mg, Oral, 3 times daily PRN    ibuprofen (ADVIL,MOTRIN) 800 mg, Oral, 3 times daily    meclizine (ANTIVERT) 12.5 mg, Oral, 2 times daily PRN    metoprolol succinate (TOPROL-XL) 50 MG 24 hr tablet Take 1 tablet orally once a day.    omeprazole (PRILOSEC) 40 mg, Oral, Daily    ondansetron (ZOFRAN-ODT) 4 mg, Oral, Every 6 hours PRN    polyethylene glycol (GLYCOLAX) 17 g, Oral, Daily    rizatriptan (MAXALT) 10 MG tablet take 1/2 - 1 at onset of migraine; may repeat in 2 hours if needed; maximum 2 in 24 hours; maximum 3 days per week    topiramate (TOPAMAX) 75 mg, Oral, Nightly       Pre-op Assessment    I have reviewed the Patient Summary Reports.     I have reviewed the Nursing Notes.    I have reviewed the Medications.     Review of Systems  Anesthesia Hx:  No problems with previous Anesthesia   History of prior surgery of interest to airway management or planning:          Denies Family Hx of Anesthesia complications.    Denies Personal Hx of Anesthesia complications.                    Social:  Non-Smoker, No Alcohol Use       Hematology/Oncology:  Hematology Normal   Oncology Normal                                   EENT/Dental:  EENT/Dental Normal            Cardiovascular:  Exercise tolerance: good       Denies CAD.    Dysrhythmias (SVT)   Denies Angina.                                  Pulmonary:    Denies COPD. Asthma mild  Denies Shortness of breath.   Denies Sleep Apnea.                Hepatic/GI:     GERD             Neurological:  Denies TIA.  Denies CVA. Denies Neuromuscular Disease.  Headaches Denies Seizures.                                Endocrine:  Denies Diabetes.           Psych:  Denies Psychiatric History. anxiety               Physical Exam  General: Cooperative and Oriented    Airway:  Neck ROM: Normal ROM    Dental:  Intact    Lab Results   Component Value Date    WBC 11.93 07/25/2024    HGB 11.7 (L) 07/25/2024    HCT 37.1 07/25/2024     07/25/2024    CHOL 196 05/23/2024    TRIG 141 05/23/2024    HDL 32 (L) 05/23/2024    ALT 7 (L) 07/24/2024    AST 10 07/24/2024     07/25/2024    K 3.5 07/25/2024     (H) 07/25/2024    CREATININE 0.7 07/25/2024    BUN 15 07/25/2024    CO2 18 (L) 07/25/2024    TSH 3.327 07/24/2024    HGBA1C 4.8 05/23/2024     Results for orders placed or performed during the hospital encounter of 07/24/24   EKG 12-lead    Collection Time: 07/25/24 11:14 AM   Result Value Ref Range    QRS Duration 94 ms    OHS QTC Calculation 428 ms    Narrative    Test Reason : I48.91,    Vent. Rate : 057 BPM     Atrial Rate : 057 BPM     P-R Int : 164 ms          QRS Dur : 094 ms      QT Int : 440 ms       P-R-T Axes : 068 072 063 degrees     QTc Int : 428 ms    Sinus bradycardia with sinus arrhythmia  Otherwise normal ECG  When compared with ECG of 24-JUL-2024 07:39,  Vent. rate has decreased BY  33 BPM  Confirmed by Di Gross MD ([id number]) on 7/25/2024 1:23:17 PM    Referred By: ARNAV   SELF           Confirmed By:Di Gross MD         Anesthesia Plan  Type of Anesthesia, risks & benefits discussed:    Anesthesia Type: Regional  Intra-op Monitoring Plan: Standard ASA Monitors  Post Op Pain Control Plan: multimodal  analgesia  Induction:  IV  ASA Score: 2  Anesthesia Plan Notes: Anesthesia consent to be signed prior to surgery 08/23/24      Ready For Surgery From Anesthesia Perspective.     .

## 2024-08-23 ENCOUNTER — ANESTHESIA (OUTPATIENT)
Dept: SURGERY | Facility: HOSPITAL | Age: 44
End: 2024-08-23
Payer: MEDICAID

## 2024-08-23 ENCOUNTER — HOSPITAL ENCOUNTER (OUTPATIENT)
Facility: HOSPITAL | Age: 44
Discharge: HOME | End: 2024-08-23
Attending: ORTHOPAEDIC SURGERY | Admitting: ORTHOPAEDIC SURGERY
Payer: MEDICAID

## 2024-08-23 VITALS
RESPIRATION RATE: 17 BRPM | OXYGEN SATURATION: 98 % | DIASTOLIC BLOOD PRESSURE: 63 MMHG | SYSTOLIC BLOOD PRESSURE: 105 MMHG | HEART RATE: 63 BPM | TEMPERATURE: 98 F

## 2024-08-23 DIAGNOSIS — G56.21 CUBITAL TUNNEL SYNDROME ON RIGHT: ICD-10-CM

## 2024-08-23 DIAGNOSIS — M77.11 LATERAL EPICONDYLITIS OF RIGHT ELBOW: ICD-10-CM

## 2024-08-23 DIAGNOSIS — G56.01 RIGHT CARPAL TUNNEL SYNDROME: ICD-10-CM

## 2024-08-23 DIAGNOSIS — G56.01 CARPAL TUNNEL SYNDROME ON RIGHT: ICD-10-CM

## 2024-08-23 PROCEDURE — 64721 CARPAL TUNNEL SURGERY: CPT | Mod: 51,RT,, | Performed by: ORTHOPAEDIC SURGERY

## 2024-08-23 PROCEDURE — 36000707: Performed by: ORTHOPAEDIC SURGERY

## 2024-08-23 PROCEDURE — 25000003 PHARM REV CODE 250: Performed by: ANESTHESIOLOGY

## 2024-08-23 PROCEDURE — 71000033 HC RECOVERY, INTIAL HOUR: Performed by: ORTHOPAEDIC SURGERY

## 2024-08-23 PROCEDURE — 64718 REVISE ULNAR NERVE AT ELBOW: CPT | Mod: 59,RT,, | Performed by: ORTHOPAEDIC SURGERY

## 2024-08-23 PROCEDURE — 63600175 PHARM REV CODE 636 W HCPCS: Mod: JZ,JG | Performed by: ANESTHESIOLOGY

## 2024-08-23 PROCEDURE — 71000016 HC POSTOP RECOV ADDL HR: Performed by: ORTHOPAEDIC SURGERY

## 2024-08-23 PROCEDURE — 64415 NJX AA&/STRD BRCH PLXS IMG: CPT | Performed by: ANESTHESIOLOGY

## 2024-08-23 PROCEDURE — 37000009 HC ANESTHESIA EA ADD 15 MINS: Performed by: ORTHOPAEDIC SURGERY

## 2024-08-23 PROCEDURE — 24359 REPAIR ELBOW DEB/ATTCH OPEN: CPT | Mod: RT,,, | Performed by: ORTHOPAEDIC SURGERY

## 2024-08-23 PROCEDURE — 37000008 HC ANESTHESIA 1ST 15 MINUTES: Performed by: ORTHOPAEDIC SURGERY

## 2024-08-23 PROCEDURE — 25000003 PHARM REV CODE 250

## 2024-08-23 PROCEDURE — 71000015 HC POSTOP RECOV 1ST HR: Performed by: ORTHOPAEDIC SURGERY

## 2024-08-23 PROCEDURE — 63600175 PHARM REV CODE 636 W HCPCS: Performed by: ORTHOPAEDIC SURGERY

## 2024-08-23 PROCEDURE — 36000706: Performed by: ORTHOPAEDIC SURGERY

## 2024-08-23 PROCEDURE — 63600175 PHARM REV CODE 636 W HCPCS

## 2024-08-23 RX ORDER — DEXAMETHASONE SODIUM PHOSPHATE 4 MG/ML
INJECTION, SOLUTION INTRA-ARTICULAR; INTRALESIONAL; INTRAMUSCULAR; INTRAVENOUS; SOFT TISSUE
Status: DISCONTINUED | OUTPATIENT
Start: 2024-08-23 | End: 2024-08-23

## 2024-08-23 RX ORDER — ONDANSETRON HYDROCHLORIDE 2 MG/ML
INJECTION, SOLUTION INTRAVENOUS
Status: DISCONTINUED | OUTPATIENT
Start: 2024-08-23 | End: 2024-08-23

## 2024-08-23 RX ORDER — HYDROMORPHONE HYDROCHLORIDE 2 MG/ML
0.2 INJECTION, SOLUTION INTRAMUSCULAR; INTRAVENOUS; SUBCUTANEOUS EVERY 5 MIN PRN
Status: DISCONTINUED | OUTPATIENT
Start: 2024-08-23 | End: 2024-08-23

## 2024-08-23 RX ORDER — OXYCODONE HYDROCHLORIDE 5 MG/1
10 TABLET ORAL EVERY 4 HOURS PRN
Status: DISCONTINUED | OUTPATIENT
Start: 2024-08-23 | End: 2024-08-23 | Stop reason: HOSPADM

## 2024-08-23 RX ORDER — CEFAZOLIN SODIUM 1 G/3ML
INJECTION, POWDER, FOR SOLUTION INTRAMUSCULAR; INTRAVENOUS
Status: DISCONTINUED | OUTPATIENT
Start: 2024-08-23 | End: 2024-08-23

## 2024-08-23 RX ORDER — PROPOFOL 10 MG/ML
VIAL (ML) INTRAVENOUS
Status: DISCONTINUED | OUTPATIENT
Start: 2024-08-23 | End: 2024-08-23

## 2024-08-23 RX ORDER — LIDOCAINE HYDROCHLORIDE 20 MG/ML
INJECTION INTRAVENOUS
Status: DISCONTINUED | OUTPATIENT
Start: 2024-08-23 | End: 2024-08-23

## 2024-08-23 RX ORDER — KETOROLAC TROMETHAMINE 30 MG/ML
15 INJECTION, SOLUTION INTRAMUSCULAR; INTRAVENOUS EVERY 8 HOURS PRN
Status: DISCONTINUED | OUTPATIENT
Start: 2024-08-23 | End: 2024-08-23 | Stop reason: HOSPADM

## 2024-08-23 RX ORDER — LIDOCAINE HYDROCHLORIDE 10 MG/ML
1 INJECTION, SOLUTION EPIDURAL; INFILTRATION; INTRACAUDAL; PERINEURAL ONCE
Status: DISCONTINUED | OUTPATIENT
Start: 2024-08-23 | End: 2024-08-23 | Stop reason: HOSPADM

## 2024-08-23 RX ORDER — ACETAMINOPHEN 10 MG/ML
INJECTION, SOLUTION INTRAVENOUS
Status: DISCONTINUED | OUTPATIENT
Start: 2024-08-23 | End: 2024-08-23

## 2024-08-23 RX ORDER — OXYCODONE AND ACETAMINOPHEN 7.5; 325 MG/1; MG/1
1 TABLET ORAL EVERY 4 HOURS PRN
Qty: 20 TABLET | Refills: 0 | Status: SHIPPED | OUTPATIENT
Start: 2024-08-23

## 2024-08-23 RX ORDER — PROCHLORPERAZINE EDISYLATE 5 MG/ML
5 INJECTION INTRAMUSCULAR; INTRAVENOUS EVERY 30 MIN PRN
Status: DISCONTINUED | OUTPATIENT
Start: 2024-08-23 | End: 2024-08-23 | Stop reason: HOSPADM

## 2024-08-23 RX ORDER — OXYCODONE HYDROCHLORIDE 5 MG/1
5 TABLET ORAL
Status: DISCONTINUED | OUTPATIENT
Start: 2024-08-23 | End: 2024-08-23 | Stop reason: HOSPADM

## 2024-08-23 RX ORDER — GLUCAGON 1 MG
1 KIT INJECTION
Status: DISCONTINUED | OUTPATIENT
Start: 2024-08-23 | End: 2024-08-23 | Stop reason: HOSPADM

## 2024-08-23 RX ORDER — FENTANYL CITRATE 50 UG/ML
INJECTION, SOLUTION INTRAMUSCULAR; INTRAVENOUS
Status: DISCONTINUED | OUTPATIENT
Start: 2024-08-23 | End: 2024-08-23

## 2024-08-23 RX ORDER — CEFAZOLIN SODIUM 2 G/50ML
2 SOLUTION INTRAVENOUS
Status: DISCONTINUED | OUTPATIENT
Start: 2024-08-23 | End: 2024-08-23 | Stop reason: HOSPADM

## 2024-08-23 RX ORDER — MUPIROCIN 20 MG/G
OINTMENT TOPICAL
Status: DISCONTINUED | OUTPATIENT
Start: 2024-08-23 | End: 2024-08-23 | Stop reason: HOSPADM

## 2024-08-23 RX ORDER — ACETAMINOPHEN 325 MG/1
650 TABLET ORAL EVERY 4 HOURS PRN
Status: DISCONTINUED | OUTPATIENT
Start: 2024-08-23 | End: 2024-08-23 | Stop reason: HOSPADM

## 2024-08-23 RX ORDER — ONDANSETRON HYDROCHLORIDE 2 MG/ML
4 INJECTION, SOLUTION INTRAVENOUS ONCE AS NEEDED
Status: DISCONTINUED | OUTPATIENT
Start: 2024-08-23 | End: 2024-08-23 | Stop reason: HOSPADM

## 2024-08-23 RX ORDER — HYDROMORPHONE HYDROCHLORIDE 2 MG/ML
0.2 INJECTION, SOLUTION INTRAMUSCULAR; INTRAVENOUS; SUBCUTANEOUS EVERY 5 MIN PRN
Status: DISCONTINUED | OUTPATIENT
Start: 2024-08-23 | End: 2024-08-23 | Stop reason: HOSPADM

## 2024-08-23 RX ORDER — KETOROLAC TROMETHAMINE 30 MG/ML
30 INJECTION, SOLUTION INTRAMUSCULAR; INTRAVENOUS ONCE
Status: COMPLETED | OUTPATIENT
Start: 2024-08-23 | End: 2024-08-23

## 2024-08-23 RX ORDER — BUPIVACAINE HYDROCHLORIDE 2.5 MG/ML
INJECTION, SOLUTION EPIDURAL; INFILTRATION; INTRACAUDAL
Status: COMPLETED | OUTPATIENT
Start: 2024-08-23 | End: 2024-08-23

## 2024-08-23 RX ORDER — ONDANSETRON 8 MG/1
8 TABLET, ORALLY DISINTEGRATING ORAL EVERY 8 HOURS PRN
Status: DISCONTINUED | OUTPATIENT
Start: 2024-08-23 | End: 2024-08-23 | Stop reason: HOSPADM

## 2024-08-23 RX ORDER — SODIUM CHLORIDE, SODIUM LACTATE, POTASSIUM CHLORIDE, CALCIUM CHLORIDE 600; 310; 30; 20 MG/100ML; MG/100ML; MG/100ML; MG/100ML
INJECTION, SOLUTION INTRAVENOUS CONTINUOUS
Status: DISCONTINUED | OUTPATIENT
Start: 2024-08-23 | End: 2024-08-23 | Stop reason: HOSPADM

## 2024-08-23 RX ADMIN — SODIUM CHLORIDE, SODIUM LACTATE, POTASSIUM CHLORIDE, AND CALCIUM CHLORIDE: .6; .31; .03; .02 INJECTION, SOLUTION INTRAVENOUS at 07:08

## 2024-08-23 RX ADMIN — BUPIVACAINE HYDROCHLORIDE 20 ML: 2.5 INJECTION, SOLUTION EPIDURAL; INFILTRATION; INTRACAUDAL; PERINEURAL at 07:08

## 2024-08-23 RX ADMIN — DEXAMETHASONE SODIUM PHOSPHATE 4 MG: 4 INJECTION, SOLUTION INTRA-ARTICULAR; INTRALESIONAL; INTRAMUSCULAR; INTRAVENOUS; SOFT TISSUE at 07:08

## 2024-08-23 RX ADMIN — ACETAMINOPHEN 1000 MG: 10 INJECTION, SOLUTION INTRAVENOUS at 08:08

## 2024-08-23 RX ADMIN — OXYCODONE HYDROCHLORIDE 5 MG: 5 TABLET ORAL at 09:08

## 2024-08-23 RX ADMIN — KETOROLAC TROMETHAMINE 30 MG: 30 INJECTION, SOLUTION INTRAMUSCULAR; INTRAVENOUS at 09:08

## 2024-08-23 RX ADMIN — PROPOFOL 200 MG: 10 INJECTION, EMULSION INTRAVENOUS at 07:08

## 2024-08-23 RX ADMIN — FENTANYL CITRATE 25 MCG: 50 INJECTION INTRAMUSCULAR; INTRAVENOUS at 07:08

## 2024-08-23 RX ADMIN — FENTANYL CITRATE 25 MCG: 50 INJECTION INTRAMUSCULAR; INTRAVENOUS at 08:08

## 2024-08-23 RX ADMIN — FENTANYL CITRATE 50 MCG: 50 INJECTION INTRAMUSCULAR; INTRAVENOUS at 08:08

## 2024-08-23 RX ADMIN — LIDOCAINE HYDROCHLORIDE 100 MG: 20 INJECTION, SOLUTION INTRAVENOUS at 07:08

## 2024-08-23 RX ADMIN — GLYCOPYRROLATE 0.2 MG: 0.2 INJECTION, SOLUTION INTRAMUSCULAR; INTRAVITREAL at 07:08

## 2024-08-23 RX ADMIN — ONDANSETRON 4 MG: 2 INJECTION, SOLUTION INTRAMUSCULAR; INTRAVENOUS at 07:08

## 2024-08-23 RX ADMIN — CEFAZOLIN 2 G: 330 INJECTION, POWDER, FOR SOLUTION INTRAMUSCULAR; INTRAVENOUS at 07:08

## 2024-08-23 NOTE — ANESTHESIA PROCEDURE NOTES
Peripheral Block    Patient location during procedure: pre-op   Block not for primary anesthetic.  Reason for block: at surgeon's request and post-op pain management   Post-op Pain Location: R arm pain   Start time: 8/23/2024 7:10 AM  Timeout: 8/23/2024 7:10 AM   End time: 8/23/2024 7:20 AM    Staffing  Authorizing Provider: Deann Hoskins MD  Performing Provider: Deann Hoskins MD    Staffing  Performed by: Deann Hoskins MD  Authorized by: Deann Hoskins MD    Preanesthetic Checklist  Completed: patient identified, IV checked, site marked, risks and benefits discussed, surgical consent, monitors and equipment checked, pre-op evaluation and timeout performed  Peripheral Block  Patient position: supine  Prep: ChloraPrep  Patient monitoring: heart rate, cardiac monitor, continuous pulse ox, continuous capnometry and frequent blood pressure checks  Block type: supraclavicular  Laterality: right  Injection technique: single shot  Needle  Needle type: Stimuplex   Needle gauge: 22 G  Needle length: 2 in  Needle localization: anatomical landmarks and ultrasound guidance   -ultrasound image captured on disc.  Assessment  Injection assessment: negative aspiration, negative parasthesia and local visualized surrounding nerve  Paresthesia pain: none  Heart rate change: no  Slow fractionated injection: yes    Medications:    Medications: bupivacaine (pf) (MARCAINE) injection 0.25% - Perineural   20 mL - 8/23/2024 7:20:00 AM    Additional Notes  VSS.  [facility] RN monitoring vitals throughout procedure.  Patient tolerated procedure well.

## 2024-08-23 NOTE — OPERATIVE NOTE ADDENDUM
Certification of Assistant at Surgery       Surgery Date: 8/23/2024     Participating Surgeons:  Surgeons and Role:     * Javy Ayala Jr., MD - Primary    Procedures:  Procedure(s) (LRB):  RELEASE, CARPAL TUNNEL (Right)  DECOMPRESSION, NERVE, ULNAR (Right)  RELEASE, ELBOW, LATERAL EPICONDYLE (Right)    Assistant Surgeon's Certification of Necessity:  I understand that section 1842 (b) (6) (d) of the Social Security Act generally prohibits Medicare Part B reasonable charge payment for the services of assistants at surgery in teaching hospitals when qualified residents are available to furnish such services. I certify that the services for which payment is claimed were medically necessary, and that no qualified resident was available to perform the services. I further understand that these services are subject to post-payment review by the Medicare carrier.      Jenifer Luna PA-C    08/23/2024  10:56 AM

## 2024-08-23 NOTE — OP NOTE
[facility] (Hospital)  Operative Note      Date of Procedure: 8/23/2024     Procedure: Procedure(s) (LRB):  RELEASE, CARPAL TUNNEL (Right)  DECOMPRESSION, NERVE, ULNAR (Right)  RELEASE, ELBOW, LATERAL EPICONDYLE (Right)     Surgeons and Role:     * Javy Ayala Jr., MD - Primary    Assisting Surgeon: None    Pre-Operative Diagnosis: Lateral epicondylitis of right elbow [M77.11]  Carpal tunnel syndrome on right [G56.01]  Cubital tunnel syndrome on right [G56.21]    Post-Operative Diagnosis: Post-Op Diagnosis Codes:     * Lateral epicondylitis of right elbow [M77.11]     * Carpal tunnel syndrome on right [G56.01]     * Cubital tunnel syndrome on right [G56.21]    Anesthesia: Regional    Operative Findings (including complications, if any):  Right cubital tunnel syndrome carpal tunnel syndrome and tennis elbow    Description of Technical Procedures:     Preop diagnosis: 1.  Right cubital tunnel syndrome.      2. Lateral epicondylitis right elbow.      3. Right carpal tunnel syndrome.      Postop diagnosis: Same.      Operative procedure:  1. Ulnar nerve decompression right elbow.      2. Lateral epicondylar release and repair right elbow.      3. Right carpal tunnel release.      Surgeon: Jamie.      First assistant: Cheryl.      Anesthesia: General.      EBL: Minimal.      Specimen: None.      Operative procedure in detail as follows:     After operative consent was obtained patient brought the operating room placed supine on the operating room table.  Anesthesia by general endotracheal method performed by the anesthesia staff.  After the patient was asleep tourniquet applied high on the right arm right upper extremity prepped and draped out in the normal sterile fashion.  Esmarch used to exsanguinated the limb tourniquet inflated 225 mmHg.      The tennis elbow was approached 1st with a lateral incision made over the just anterior to the lateral epicondyle with a 15 blade.  Full-thickness skin flaps  raised hemostasis achieved with the Bovie.  Fascia was incised and the interval between the ECRL and the ECRB was identified.  Dissection was carried straight down to bone over the lateral epicondyle and soft tissue flaps were raised directly off of the bone releasing the ECRL and the ECRB.  In this area there was noted to be some degenerative tendon this was excised with the [place] blade removing about half 2 1 cm of tendon.  Normal tendon was left intact.  The bone was roughened up with a rongeur hemostasis achieved with the Bovie.  The lateral ligaments were not dissected free.      Repair of the tendon was then performed side-to-side with 0 Vicryl suture directly over the bone and the wound irrigated with antibiotic saline solution.  Subcutaneous tissue closed with 3-0 Vicryl and Steri-Strips on the skin.      The elbow then turned over and a medial curved incision made just posterior to the medial epicondyle with a 15 blade.  Full-thickness skin flaps raised hemostasis achieved with the [place] superficial nerves were identified protected.  Beginning proximally the ulnar nerve was identified and traced from proximal to distal carefully unroofing [place]'s fascia and releasing the anconeus epitrochlearis muscle.      Distally the FCU fascia was also split.  After complete release of the ulnar nerve range of motion check noted to be full without subluxation or impingement.  The wound irrigated and closed with interrupted 3-0 Vicryl in subcutaneous layer Steri-Strips on the skin.      Attention then turned to the right wrist where a 2 cm thenar incision made with a 15 blade.  Careful dissection used to divide palmar fascia deep retractors placed transverse carpal ligament identified carefully divided with the [place] blade.  The median nerve protected with the [place] elevator and division of the ligament continued proximally and distally under direct visualization.  After complete release the contents of the canal  were inspected and noted to be intact including the recurrent branch.  The wound irrigated and closed with interrupted 4-0 Monocryl in subcutaneous layer Dermabond on the skin.      Sterile dressings applied all 3 incisions followed by a wrap around the wrist and elbow wrist splint and a sling the tourniquet deflated patient then extubated and brought to the recovery room in stable condition.  All sponge needle counts reported as correct no complications    Significant Surgical Tasks Conducted by the Assistant(s), if Applicable: retraction    Estimated Blood Loss (EBL): * No values recorded between 8/23/2024  7:46 AM and 8/23/2024  8:46 AM *           Implants: * No implants in log *    Specimens:   Specimen (24h ago, onward)      None                    Condition: Good    Disposition: PACU - hemodynamically stable.    Attestation: I was present and scrubbed for the entire procedure.    Discharge Note    OUTCOME: Patient tolerated treatment/procedure well without complication and is now ready for discharge.    DISPOSITION: Home or Self Care    FINAL DIAGNOSIS:  Lateral epicondylitis of right elbow    FOLLOWUP: In clinic    DISCHARGE INSTRUCTIONS:    Discharge Procedure Orders   Diet general     Call MD for:  temperature >100.4     Call MD for:  persistent nausea and vomiting     Call MD for:  severe uncontrolled pain     Keep surgical extremity elevated     Change dressing (specify)   Order Comments: Dressing change: REmove bandage after 5 days

## 2024-08-23 NOTE — ANESTHESIA PROCEDURE NOTES
Intubation    Date/Time: 8/23/2024 7:35 AM    Performed by: Mack Robertson CRNA  Authorized by: Deann Hoskins MD    Intubation:     Induction:  Intravenous    Intubated:  Postinduction    Mask Ventilation:  Easy mask    Attempts:  1    Attempted By:  CRNA    Method of Intubation:  Direct    Difficult Airway Encountered?: No      Complications:  None    Airway Device:  Supraglottic airway/LMA    Airway Device Size:  4.0    Style/Cuff Inflation:  Cuffed    Placement Verified By:  Capnometry    Complicating Factors:  None    Findings Post-Intubation:  BS equal bilateral and atraumatic/condition of teeth unchanged

## 2024-08-23 NOTE — PLAN OF CARE
Right supraclavicular block performed at bedside per anesthesia.  Versed 2 mg given per anesthesia.  Patient taken to surgery after block as planned.

## 2024-08-23 NOTE — DISCHARGE INSTRUCTIONS
SEE PRESCRIPTION INSERTS FOR INFORMATION ON YOUR MEDICATIONS     ANESTHESIA  -For the first 24 hours after surgery:  Do not drive, use heavy equipment, make important decisions, or drink alcohol  -It is normal to feel sleepy for several hours.  Rest until you are more awake.  -Have someone stay with you, if needed.  They can watch for problems and help keep you safe.  -Some possible post anesthesia side effects include: nausea and vomiting, sore throat and hoarseness, sleepiness, and dizziness.    PAIN  -If you have pain after surgery, pain medicine will help you feel better.  Take it as directed, before pain becomes severe.  Most pain relievers taken by mouth need at least 20-30 minutes to start working.  -Do not drive or drink alcohol while taking pain medicine.  -Pain medication can upset your stomach.  Taking them with a little food may help.  -Other ways to help control pain: elevation, ice, and relaxation  -Call your surgeon if still having unmanageable pain an hour after taking pain medicine.  -Pain medicine can cause constipation.  Taking an over-the counter stool softener while on prescription pain medicine and drinking plenty of fluids can prevent this side effect.  -Call your surgeon if you have severe side effects like: breathing problems, trouble waking up, dizziness, confusion, or severe constipation.    NAUSEA  -Some people have nausea after surgery.  This is often because of anesthesia, pain, pain medicine, or the stress of surgery.  -Do not push yourself to eat.  Start off with clear liquids and soup.  Slowly move to solid foods.  Don't eat fatty, rich, spicy foods at first.  Eat smaller amounts.  -If you develop persistent nausea and vomiting please notify your surgeon immediately.    BLEEDING  -Different types of surgery require different types of care and dressing changes.  It is important to follow all instructions and advice from your surgeon.  Change dressing as directed.  Call your surgeon  for any concerns regarding postop bleeding.    SIGNS OF INFECTION  -Signs of infection include: fever, swelling, drainage, and redness  -Notify your surgeon if you have a fever of 100.4 F (38.0 C) or higher.  -Notify your surgeon if you notice redness, swelling, increased pain, pus, or a foul smell at the incision site.

## 2024-08-26 ENCOUNTER — OFFICE VISIT (OUTPATIENT)
Dept: GASTROENTEROLOGY | Facility: CLINIC | Age: 44
End: 2024-08-26
Payer: MEDICAID

## 2024-08-26 VITALS
DIASTOLIC BLOOD PRESSURE: 61 MMHG | WEIGHT: 171.63 LBS | HEART RATE: 48 BPM | BODY MASS INDEX: 26.01 KG/M2 | HEIGHT: 68 IN | SYSTOLIC BLOOD PRESSURE: 96 MMHG

## 2024-08-26 DIAGNOSIS — G89.29 CHRONIC ABDOMINAL PAIN: ICD-10-CM

## 2024-08-26 DIAGNOSIS — R10.9 CHRONIC ABDOMINAL PAIN: ICD-10-CM

## 2024-08-26 DIAGNOSIS — R11.0 NAUSEA: ICD-10-CM

## 2024-08-26 DIAGNOSIS — K59.09 CHRONIC CONSTIPATION: Primary | ICD-10-CM

## 2024-08-26 PROCEDURE — 3044F HG A1C LEVEL LT 7.0%: CPT | Mod: CPTII,,, | Performed by: NURSE PRACTITIONER

## 2024-08-26 PROCEDURE — 1159F MED LIST DOCD IN RCRD: CPT | Mod: CPTII,,, | Performed by: NURSE PRACTITIONER

## 2024-08-26 PROCEDURE — 3078F DIAST BP <80 MM HG: CPT | Mod: CPTII,,, | Performed by: NURSE PRACTITIONER

## 2024-08-26 PROCEDURE — 99999 PR PBB SHADOW E&M-EST. PATIENT-LVL V: CPT | Mod: PBBFAC,,, | Performed by: NURSE PRACTITIONER

## 2024-08-26 PROCEDURE — 3074F SYST BP LT 130 MM HG: CPT | Mod: CPTII,,, | Performed by: NURSE PRACTITIONER

## 2024-08-26 PROCEDURE — 99215 OFFICE O/P EST HI 40 MIN: CPT | Mod: PBBFAC,PN | Performed by: NURSE PRACTITIONER

## 2024-08-26 PROCEDURE — 99214 OFFICE O/P EST MOD 30 MIN: CPT | Mod: S$PBB,,, | Performed by: NURSE PRACTITIONER

## 2024-08-26 PROCEDURE — 1160F RVW MEDS BY RX/DR IN RCRD: CPT | Mod: CPTII,,, | Performed by: NURSE PRACTITIONER

## 2024-08-26 PROCEDURE — 3008F BODY MASS INDEX DOCD: CPT | Mod: CPTII,,, | Performed by: NURSE PRACTITIONER

## 2024-08-26 RX ORDER — METOCLOPRAMIDE 5 MG/1
5 TABLET ORAL
Qty: 90 TABLET | Refills: 5 | Status: SHIPPED | OUTPATIENT
Start: 2024-08-26

## 2024-08-26 NOTE — PROGRESS NOTES
Subjective:       Patient ID: Wandy Shah is a 44 y.o. female.    Chief Complaint: Abdominal Pain and Constipation    43 y/o female with GERD, chronic constipation, and gastroparesis s/p robotic pyloromyotomy 24 presents to clinic for follow up with c/o abdominal pain and constipation. States she was doing well until last month after being hospitalized for SVT. Feels constipated and has abdominal pain after eating. Associated nausea without vomiting. GES two weeks ago was normal. States she has a bowel movement 1-3x/week. She is no longer taking Amitiza or Miralax.       GI workup  - 2021 Colonoscopy: The examined portion of the ileum was normal. The entire examined colon is normal. Biopsied. Stool in the entire examined colon, adequately   removed. Internal hemorrhoids. Redundant colon making the procecure difficult; this is a result of her constipation.  - 2021 EGD: Normal esophagus. Erythematous mucosa in the antrum. Biopsied. Normal examined duodenum. Biopsied.Biopsy (-) HP; (-) Celiac  - 1/10/2023 CT abd: No evident etiology to explain patient's abdominal pain.   - 2023 GES: Delayed gastric emptying time. At 4 hour(s) the percentage of retention is 36 % (normal retention at 4 hours is 10% and lower).     Past Medical History:   Diagnosis Date    Anemia     Back pain     Depression        Past Surgical History:   Procedure Laterality Date    CARPAL TUNNEL RELEASE Right 2024    Procedure: RELEASE, CARPAL TUNNEL;  Surgeon: Javy Ayala Jr., MD;  Location: [facility] OR;  Service: Orthopedics;  Laterality: Right;     SECTION      COLD KNIFE CONIZATION OF CERVIX  12/10/2021    Procedure: CONE BIOPSY, CERVIX, USING COLD KNIFE;  Surgeon: Modesto Nassar MD;  [facility];  Service: OB/GYN;;    COLONOSCOPY N/A 2021    Procedure: COLONOSCOPY;  Surgeon: Emily Cruz MD;  [facility];  Service: Endoscopy;  Laterality: N/A;    DECOMPRESSION, NERVE, ULNAR Right  8/23/2024    Procedure: DECOMPRESSION, NERVE, ULNAR;  Surgeon: Javy Ayala Jr., MD;  Location: [facility] OR;  Service: Orthopedics;  Laterality: Right;    ESOPHAGOGASTRODUODENOSCOPY N/A 06/11/2021    Procedure: EGD (ESOPHAGOGASTRODUODENOSCOPY);  Surgeon: Emily Cruz MD;  Location: [facility];  Service: Endoscopy;  Laterality: N/A;    HYSTERECTOMY      LATERAL EPICONDYLE RELEASE Right 8/23/2024    Procedure: RELEASE, ELBOW, LATERAL EPICONDYLE;  Surgeon: Javy Ayala Jr., MD;  Location: [facility] OR;  Service: Orthopedics;  Laterality: Right;    ROBOT-ASSISTED LAPAROSCOPIC HYSTERECTOMY N/A 03/16/2022    Procedure: ROBOTIC HYSTERECTOMY;  Surgeon: Modesto Nassar MD;  Location: [facility];  Service: OB/GYN;  Laterality: N/A;    ROBOT-ASSISTED LAPAROSCOPIC PYLOROPLASTY USING DA JOSE LUIS XI N/A 6/7/2024    Procedure: XI ROBOTIC PYLOROMYOTOMY WITH EGD;  Surgeon: Clifford Lynch MD;  Location: [address];  Service: General;  Laterality: N/A;  NO NARCOTICS    ROBOT-ASSISTED SALPINGECTOMY Bilateral 03/16/2022    Procedure: ROBOTIC SALPINGECTOMY;  Surgeon: Modesto Nassar MD;  Location: [facility];  Service: OB/GYN;  Laterality: Bilateral;    SINUS SURGERY      TUBAL LIGATION         Family History   Problem Relation Name Age of Onset    Hypertension Mother      Diabetes Mother      Breast cancer Neg Hx      Colon cancer Neg Hx      Ovarian cancer Neg Hx         Social History     Socioeconomic History    Marital status:    Tobacco Use    Smoking status: Former     Current packs/day: 0.00     Types: Cigarettes     Quit date: 3/3/2021     Years since quitting: 3.4     Passive exposure: Past    Smokeless tobacco: Never   Substance and Sexual Activity    Alcohol use: No    Drug use: Yes     Types: Marijuana     Comment: daily    Sexual activity: Yes     Partners: Male     Birth control/protection: See Surgical Hx     Social Determinants of Health     Financial Resource Strain: Low Risk  (7/24/2024)     Overall Financial Resource Strain (CARDIA)     Difficulty of Paying Living Expenses: Not hard at all   Recent Concern: Financial Resource Strain - Medium Risk (5/18/2024)    Received from [facility]    Overall Financial Resource Strain (CARDIA)     Difficulty of Paying Living Expenses: Somewhat hard   Food Insecurity: No Food Insecurity (7/24/2024)    Hunger Vital Sign     Worried About Running Out of Food in the Last Year: Never true     Ran Out of Food in the Last Year: Never true   Recent Concern: Food Insecurity - Food Insecurity Present (5/18/2024)    Received from [facility]    Hunger Vital Sign     Worried About Running Out of Food in the Last Year: Sometimes true     Ran Out of Food in the Last Year: Sometimes true   Transportation Needs: No Transportation Needs (5/18/2024)    Received from [facility]    PRAPARE - Transportation     Lack of Transportation (Medical): No     Lack of Transportation (Non-Medical): No   Physical Activity: Insufficiently Active (7/24/2024)    Exercise Vital Sign     Days of Exercise per Week: 3 days     Minutes of Exercise per Session: 10 min   Stress: Stress Concern Present (7/24/2024)    [employer] - Occupational Stress Questionnaire     Feeling of Stress : Very much   Housing Stability: Unknown (7/24/2024)    Housing Stability Vital Sign     Unable to Pay for Housing in the Last Year: No       Review of Systems   Constitutional:  Negative for appetite change and unexpected weight change.   HENT:  Negative for trouble swallowing.    Respiratory:  Negative for shortness of breath.    Gastrointestinal:  Positive for abdominal pain, constipation and nausea. Negative for diarrhea and rectal pain.   Neurological:  Negative for dizziness.   Hematological:  Negative for adenopathy. Does not bruise/bleed easily.   Psychiatric/Behavioral:  Negative for dysphoric mood.          Objective:     Vitals:    08/26/24 1046   BP: 96/61   BP Location: Right arm  "  Patient Position: Sitting   BP Method: Medium (Automatic)   Pulse: (!) 48   Weight: 77.9 kg (171 lb 10.1 oz)   Height: 5' 8" (1.727 m)          Physical Exam  Constitutional:       General: She is not in acute distress.     Appearance: Normal appearance. She is not ill-appearing.   HENT:      Head: Normocephalic.   Eyes:      Conjunctiva/sclera: Conjunctivae normal.   Pulmonary:      Effort: Pulmonary effort is normal. No respiratory distress.   Abdominal:      General: Bowel sounds are normal.      Palpations: Abdomen is soft.      Tenderness: There is abdominal tenderness.   Skin:     Coloration: Skin is not jaundiced or pale.   Neurological:      Mental Status: She is alert and oriented to person, place, and time.   Psychiatric:         Mood and Affect: Mood normal.         Behavior: Behavior normal.               Assessment:         ICD-10-CM ICD-9-CM   1. Chronic constipation  K59.09 564.00   2. Nausea  R11.0 787.02       Plan:       Chronic constipation  -     Resume Amitiza bid and Miralax daily    Chronic abdominal pain  -     Advised patient to avoid processed and fried foods, chew your food thoroughly, eat soft, well-cooked foods, avoid carbonated, or fizzy, beverages, and avoid alcohol.     Nausea  -     metoclopramide HCl (REGLAN) 5 MG tablet; Take 1 tablet (5 mg total) by mouth 3 (three) times daily before meals.  Dispense: 90 tablet; Refill: 5      Follow up if symptoms worsen or fail to improve.     Patient's Medications   New Prescriptions    METOCLOPRAMIDE HCL (REGLAN) 5 MG TABLET    Take 1 tablet (5 mg total) by mouth 3 (three) times daily before meals.   Previous Medications    ALBUTEROL (PROVENTIL/VENTOLIN HFA) 90 MCG/ACTUATION INHALER    Inhale 2 puffs into the lungs every 4 (four) hours as needed for Wheezing    AMITRIPTYLINE (ELAVIL) 25 MG TABLET    Take 1 tablet (25 mg total) by mouth every evening.    ASHWAGANDHA ROOT EXTRACT 500 MG CAP    Take 2 capsules orally as needed.    BENZONATATE " (TESSALON) 100 MG CAPSULE    Take 1 capsule by mouth 3 (three) times daily as needed for Cough for up to 7 days    CETIRIZINE (ZYRTEC) 10 MG TABLET    Take 1 tablet (10 mg total) by mouth once daily.    DICLOFENAC SODIUM (VOLTAREN) 1 % GEL    Apply 2 g topically 4 (four) times daily as needed.    DICYCLOMINE (BENTYL) 20 MG TABLET    Take 1 tablet (20 mg total) by mouth 3 (three) times daily as needed (abdominal pain).    DULOXETINE (CYMBALTA) 60 MG CAPSULE    Take 1 capsule (60 mg total) by mouth once daily.    DUPILUMAB (DUPIXENT PEN) 300 MG/2 ML PNIJ    Inject 300 mg into the skin every 14 (fourteen) days    FLUTICASONE PROPIONATE (FLONASE) 50 MCG/ACTUATION NASAL SPRAY    2 sprays by Nasal route.    FLUTICASONE-UMECLIDIN-VILANTER (TRELEGY ELLIPTA) 200-62.5-25 MCG INHALER    Inhale 1 puff into the lungs every evening.    GABAPENTIN (NEURONTIN) 100 MG CAPSULE    Take 1 capsule (100 mg total) by mouth 3 (three) times daily as needed.    IBUPROFEN (ADVIL,MOTRIN) 800 MG TABLET    Take 1 tablet (800 mg total) by mouth 3 (three) times daily.    LUBIPROSTONE (AMITIZA) 24 MCG CAP    Take 1 capsule (24 mcg total) by mouth 2 (two) times daily with meals.    MECLIZINE (ANTIVERT) 12.5 MG TABLET    Take 1 tablet (12.5 mg total) by mouth 2 (two) times daily as needed for Dizziness.    METOPROLOL SUCCINATE (TOPROL-XL) 50 MG 24 HR TABLET    Take 1 tablet orally once a day.    OMEPRAZOLE (PRILOSEC) 40 MG CAPSULE    Take 1 capsule (40 mg total) by mouth once daily.    OXYCODONE-ACETAMINOPHEN (PERCOCET) 7.5-325 MG PER TABLET    Take 1 tablet by mouth every 4 (four) hours as needed for Pain.    POLYETHYLENE GLYCOL (GLYCOLAX) 17 GRAM/DOSE POWDER    Take 17 g by mouth once daily.    RIZATRIPTAN (MAXALT) 10 MG TABLET    take 1/2 - 1 at onset of migraine; may repeat in 2 hours if needed; maximum 2 in 24 hours; maximum 3 days per week    TOPIRAMATE (TOPAMAX) 25 MG TABLET    Take 3 tablets (75 mg total) by mouth every evening.   Modified  Medications    No medications on file   Discontinued Medications    No medications on file

## 2024-08-26 NOTE — ANESTHESIA POSTPROCEDURE EVALUATION
Anesthesia Post Evaluation    Patient: Wandy Shah    Procedure(s) Performed: Procedure(s) (LRB):  RELEASE, CARPAL TUNNEL (Right)  DECOMPRESSION, NERVE, ULNAR (Right)  RELEASE, ELBOW, LATERAL EPICONDYLE (Right)    Final Anesthesia Type: general      Patient location during evaluation: PACU  Patient participation: Yes- Able to Participate  Level of consciousness: awake and alert  Post-procedure vital signs: reviewed and stable  Pain management: adequate  Airway patency: patent    PONV status at discharge: No PONV  Anesthetic complications: no      Cardiovascular status: blood pressure returned to baseline  Respiratory status: unassisted  Hydration status: euvolemic  Follow-up not needed.          Vitals Value Taken Time   /63 08/23/24 1015   Temp 36.6 °C (97.9 °F) 08/23/24 1015   Pulse 63 08/23/24 1015   Resp 17 08/23/24 1015   SpO2 98 % 08/23/24 1015         Event Time   Out of Recovery 09:20:15         Pain/Ildefonso Score: No data recorded

## 2024-08-26 NOTE — PATIENT INSTRUCTIONS
I would like for you to buy a small box of dulcolax tablets and bottle of liquid Pablo's Milk of Magnesia (do not use the pills).  When you have time to stay home near the toilet take 2 Dulcolax tablets. Then in 1 hour drink 90 mL of milk of magnesia. Drink another 90 mL 2-3 hours later.     After that is complete, resume Amitiza twice daily and Miralax 1 capful in 6-8 ounces of water or juice daily.

## 2024-08-28 ENCOUNTER — PATIENT MESSAGE (OUTPATIENT)
Dept: ORTHOPEDICS | Facility: CLINIC | Age: 44
End: 2024-08-28
Payer: MEDICAID

## 2024-08-28 NOTE — TELEPHONE ENCOUNTER
Called patient and told her she can take dressing off. 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 I told the patient if she feels comfortable with it, that's okay to return to work LIGHT-DUTY! I told patient no lifting, gripping, pushing, pulling with operative arm. Patient verbalized understanding.

## 2024-09-02 ENCOUNTER — PATIENT MESSAGE (OUTPATIENT)
Dept: GASTROENTEROLOGY | Facility: CLINIC | Age: 44
End: 2024-09-02
Payer: MEDICAID

## 2024-09-02 DIAGNOSIS — K31.84 GASTROPARESIS: ICD-10-CM

## 2024-09-02 DIAGNOSIS — R11.0 NAUSEA: Primary | ICD-10-CM

## 2024-09-03 ENCOUNTER — PATIENT MESSAGE (OUTPATIENT)
Dept: ORTHOPEDICS | Facility: CLINIC | Age: 44
End: 2024-09-03
Payer: MEDICAID

## 2024-09-03 RX ORDER — ONDANSETRON 4 MG/1
4 TABLET, ORALLY DISINTEGRATING ORAL
Qty: 120 TABLET | Refills: 5 | Status: SHIPPED | OUTPATIENT
Start: 2024-09-03

## 2024-09-10 ENCOUNTER — OFFICE VISIT (OUTPATIENT)
Dept: ORTHOPEDICS | Facility: CLINIC | Age: 44
End: 2024-09-10
Payer: MEDICAID

## 2024-09-10 ENCOUNTER — PATIENT MESSAGE (OUTPATIENT)
Dept: ADMINISTRATIVE | Facility: OTHER | Age: 44
End: 2024-09-10
Payer: MEDICAID

## 2024-09-10 DIAGNOSIS — G56.21 CUBITAL TUNNEL SYNDROME ON RIGHT: ICD-10-CM

## 2024-09-10 DIAGNOSIS — M77.11 LATERAL EPICONDYLITIS OF RIGHT ELBOW: Primary | ICD-10-CM

## 2024-09-10 DIAGNOSIS — G56.01 CARPAL TUNNEL SYNDROME ON RIGHT: ICD-10-CM

## 2024-09-10 PROCEDURE — 1160F RVW MEDS BY RX/DR IN RCRD: CPT | Mod: CPTII,,,

## 2024-09-10 PROCEDURE — 99999 PR PBB SHADOW E&M-EST. PATIENT-LVL III: CPT | Mod: PBBFAC,,,

## 2024-09-10 PROCEDURE — 1159F MED LIST DOCD IN RCRD: CPT | Mod: CPTII,,,

## 2024-09-10 PROCEDURE — 99213 OFFICE O/P EST LOW 20 MIN: CPT | Mod: PBBFAC,PN

## 2024-09-10 PROCEDURE — 3044F HG A1C LEVEL LT 7.0%: CPT | Mod: CPTII,,,

## 2024-09-10 PROCEDURE — 99024 POSTOP FOLLOW-UP VISIT: CPT | Mod: ,,,

## 2024-09-10 NOTE — PROGRESS NOTES
Subjective:      Patient ID: Wandy Shah is a 44 y.o. female.    Chief Complaint: Post-op Follow Up    HPI: Wandy Shah returns for a post-op visit approximately 2 weeks following: right carpal tunnel release, right elbow ulnar nerve decompression, and right elbow lateral epicondylar release and repair (date of surgery 08/23/2024) with Dr. Ayala. Overall, the patient is doing well with no complaints of fever, chills, nausea, vomiting, SOB, chest pains, or drainage to surgical incision. The patient reports that pain has been managed with medication. Patient reports resolution of previously noted numbness and tingling is improved. She has not begun formal PT/OT yet, but maintains compliance with activity restrictions. Post-operative complaints include: none    PAST MEDICAL HISTORY:    Past Medical History:   Diagnosis Date    Anemia     Back pain     Depression      MEDICATIONS:   Current Outpatient Medications:     albuterol (PROVENTIL/VENTOLIN HFA) 90 mcg/actuation inhaler, Inhale 2 puffs into the lungs every 4 (four) hours as needed for Wheezing, Disp: 8.5 g, Rfl: 11    amitriptyline (ELAVIL) 25 MG tablet, Take 1 tablet (25 mg total) by mouth every evening., Disp: 30 tablet, Rfl: 2    ashwagandha root extract 500 mg Cap, Take 2 capsules orally as needed., Disp: 180 capsule, Rfl: 4    benzonatate (TESSALON) 100 MG capsule, Take 1 capsule by mouth 3 (three) times daily as needed for Cough for up to 7 days, Disp: 30 capsule, Rfl: 0    cetirizine (ZYRTEC) 10 MG tablet, Take 1 tablet (10 mg total) by mouth once daily. (Patient taking differently: Take 10 mg by mouth every evening.), Disp: 30 tablet, Rfl: 11    diclofenac sodium (VOLTAREN) 1 % Gel, Apply 2 g topically 4 (four) times daily as needed., Disp: 200 g, Rfl: 0    dicyclomine (BENTYL) 20 mg tablet, Take 1 tablet (20 mg total) by mouth 3 (three) times daily as needed (abdominal pain)., Disp: 60 tablet, Rfl: 5    DULoxetine (CYMBALTA) 60 MG capsule, Take 1  capsule (60 mg total) by mouth once daily., Disp: 30 capsule, Rfl: 2    dupilumab (DUPIXENT PEN) 300 mg/2 mL PnIj, Inject 300 mg into the skin every 14 (fourteen) days, Disp: 4 mL, Rfl: 5    fluticasone propionate (FLONASE) 50 mcg/actuation nasal spray, 2 sprays by Nasal route., Disp: , Rfl:     fluticasone-umeclidin-vilanter (TRELEGY ELLIPTA) 200-62.5-25 mcg inhaler, Inhale 1 puff into the lungs every evening., Disp: 180 each, Rfl: 3    gabapentin (NEURONTIN) 100 MG capsule, Take 1 capsule (100 mg total) by mouth 3 (three) times daily as needed., Disp: 30 capsule, Rfl: 6    ibuprofen (ADVIL,MOTRIN) 800 MG tablet, Take 1 tablet (800 mg total) by mouth 3 (three) times daily., Disp: 90 tablet, Rfl: 0    lubiprostone (AMITIZA) 24 MCG Cap, Take 1 capsule (24 mcg total) by mouth 2 (two) times daily with meals., Disp: 60 capsule, Rfl: 2    meclizine (ANTIVERT) 12.5 mg tablet, Take 1 tablet (12.5 mg total) by mouth 2 (two) times daily as needed for Dizziness. (Patient not taking: Reported on 8/26/2024), Disp: 28 tablet, Rfl: 0    metoclopramide HCl (REGLAN) 5 MG tablet, Take 1 tablet (5 mg total) by mouth 3 (three) times daily before meals., Disp: 90 tablet, Rfl: 5    metoprolol succinate (TOPROL-XL) 50 MG 24 hr tablet, Take 1 tablet orally once a day., Disp: 90 tablet, Rfl: 4    omeprazole (PRILOSEC) 40 MG capsule, Take 1 capsule (40 mg total) by mouth once daily., Disp: 30 capsule, Rfl: 5    ondansetron (ZOFRAN-ODT) 4 MG TbDL, Take 1 tablet (4 mg total) by mouth 4 (four) times daily before meals and nightly., Disp: 120 tablet, Rfl: 5    oxyCODONE-acetaminophen (PERCOCET) 7.5-325 mg per tablet, Take 1 tablet by mouth every 4 (four) hours as needed for Pain., Disp: 20 tablet, Rfl: 0    polyethylene glycol (GLYCOLAX) 17 gram/dose powder, Take 17 g by mouth once daily., Disp: 510 g, Rfl: 11    rizatriptan (MAXALT) 10 MG tablet, take 1/2 - 1 at onset of migraine; may repeat in 2 hours if needed; maximum 2 in 24 hours; maximum  3 days per week, Disp: 10 tablet, Rfl: 2    topiramate (TOPAMAX) 25 MG tablet, Take 3 tablets (75 mg total) by mouth every evening., Disp: , Rfl:     ALLERGIES:   Review of patient's allergies indicates:   Allergen Reactions    Raspberry (rubus idaeus) Hives       Review of Systems:  Constitution: Negative for chills, fever and night sweats.   HENT: Negative for congestion and headaches.    Eyes: Negative for blurred vision or vision loss.  Cardiovascular: Negative for chest pain and syncope.   Respiratory: Negative for cough and shortness of breath.    Endocrine: Negative for polydipsia, polyphagia and polyuria.   Hematologic/Lymphatic: Negative for bleeding problem. Does not bruise/bleed easily.   Skin: Negative for dry skin, itching and rash.   Musculoskeletal: See HPI.   Gastrointestinal: Negative for abdominal pain and bowel incontinence.   Genitourinary: Negative for bladder incontinence and nocturia.   Neurological: Negative for disturbances in coordination, loss of balance and seizures.   Psychiatric/Behavioral: Negative for depression. The patient does not have insomnia.    Allergic/Immunologic: Negative for hives and persistent infections.        Objective:      There were no vitals filed for this visit.    PHYSICAL EXAM:  General: Alert & oriented x3, well-developed and well-nourished, in no acute distress, sitting comfortably in the exam room.  Skin: Warm and dry. Capillary refill less than 2 seconds.   Head: Normocephalic and atraumatic.   Eyes: Sclera appear normal.   Nose: No deformities seen.   Ears: No deformities seen.   Neck: No tracheal deviation present.   Pulmonary/Chest: Breathing unlabored.   Neurological: Alert and oriented to person, place, and time.   Psychiatric: Mood is pleasant and affect appropriate.     RIGHT HAND, WRIST, ELBOW:        Observation/Inspection:    Significant for surgical incisions.    Wound margins are well approximated and healing nicely.    No redness, warmth,  drainage, or other signs of infection.  No swelling.         Palpation:   No tenderness to palpation to bony prominences and soft tissues throughout.        Range of Motion:  Elbow: Slightly limited to flexion, extension, supination, and pronation due to pain.  Wrist: Slightly limited to wrist flexion, extension, radial, and ulnar deviation due to pain.  Digits: Full to digit MCP, PIP, and DIP flexion and extension without pain or difficulty.         Strength:    strength not tested today.         Neurovascular Exam:  Digits warm and well perfused, brisk capillary refill <3 seconds throughout  NVI motor/LTS to median, radial, and ulnar nerves, radial pulse 2+    Imaging:  None today.         Assessment:       1. Lateral epicondylitis of right elbow    2. Carpal tunnel syndrome on right    3. Cubital tunnel syndrome on right        Plan:          2 weeks s/p right carpal tunnel release, right elbow ulnar nerve decompression, and right elbow lateral epicondylar release and repair     Overall patient is doing well post-operatively.    Wound Care:  Regular wound care explained to the patient.  Okay to wash incision with soap and water and pat to dry.    Medications:  Continue with OTC Tylenol and/or Ibuprofen as needed for pain.   Antibiotics:  None prescribed today.  No evidence of wound infection.  HEP:  Encouraged patient perform hand, wrist, and elbow ROM HEP.   DME:  Okay to begin to wean out of wrist brace and start using the hand for light activities.  Activity:  Avoid pressure to the palm of the hand. No lifting, gripping, pushing, pulling > 10 lbs with the operative arm at this time.   Pain Management: Ice compress to the affected area 2-3x a day for 15-20 minutes as needed for pain management.      Follow-Up: 4 weeks with Jenifer Luna PA-C for 6-week post-op visit.     All of the patient's questions were answered and the patient will contact us if they have any questions or concerns in the  interim.    Jenifer Luna PA-C  [facility]  Orthopedic Surgery    Medical Dictation software was used during the dictation of portions or the entirety of this medical record.  Phonetic or grammatic errors may exist due to the use of this software. For clarification, refer to the author of the document.

## 2024-09-15 ENCOUNTER — HOSPITAL ENCOUNTER (EMERGENCY)
Facility: HOSPITAL | Age: 44
Discharge: HOME | End: 2024-09-15
Attending: STUDENT IN AN ORGANIZED HEALTH CARE EDUCATION/TRAINING PROGRAM
Payer: MEDICAID

## 2024-09-15 VITALS
RESPIRATION RATE: 16 BRPM | SYSTOLIC BLOOD PRESSURE: 94 MMHG | TEMPERATURE: 98 F | HEART RATE: 47 BPM | WEIGHT: 160 LBS | DIASTOLIC BLOOD PRESSURE: 62 MMHG | BODY MASS INDEX: 24.33 KG/M2 | OXYGEN SATURATION: 99 %

## 2024-09-15 DIAGNOSIS — M79.601 PAIN OF RIGHT UPPER EXTREMITY: ICD-10-CM

## 2024-09-15 DIAGNOSIS — R00.1 BRADYCARDIA: ICD-10-CM

## 2024-09-15 DIAGNOSIS — M47.9 SPONDYLOSIS: Primary | ICD-10-CM

## 2024-09-15 DIAGNOSIS — F41.9 ANXIETY: ICD-10-CM
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
## 2024-09-15 PROCEDURE — 96372 THER/PROPH/DIAG INJ SC/IM: CPT | Performed by: PHYSICIAN ASSISTANT

## 2024-09-15 PROCEDURE — 63600175 PHARM REV CODE 636 W HCPCS: Performed by: PHYSICIAN ASSISTANT

## 2024-09-15 PROCEDURE — 99284 EMERGENCY DEPT VISIT MOD MDM: CPT | Mod: 25

## 2024-09-15 RX ORDER — KETOROLAC TROMETHAMINE 30 MG/ML
15 INJECTION, SOLUTION INTRAMUSCULAR; INTRAVENOUS
Status: COMPLETED | OUTPATIENT
Start: 2024-09-15 | End: 2024-09-15

## 2024-09-15 RX ORDER — IBUPROFEN 800 MG/1
800 TABLET ORAL EVERY 6 HOURS PRN
Qty: 20 TABLET | Refills: 0 | Status: SHIPPED | OUTPATIENT
Start: 2024-09-15

## 2024-09-15 RX ADMIN — KETOROLAC TROMETHAMINE 15 MG: 30 INJECTION, SOLUTION INTRAMUSCULAR at 03:09

## 2024-09-15 NOTE — DISCHARGE INSTRUCTIONS
No acute fractures or other emergent findings noted on imaging today     Ibuprofen prescribed for pain.  Take with meals.    Recommend rest and ice    I placed a referral with spine clinic, please follow up for lower back pain. You may also see your orthopedic for this complaint     Please follow up with your orthopedic surgeon for arm pain

## 2024-09-15 NOTE — ED PROVIDER NOTES
"Encounter Date: 9/15/2024       History     Chief Complaint   Patient presents with    Fall     Recent right arm surgery last month, fell today backwards and used both arms to get up wanted to make sure her arm is okay, denies hitting head and LOC      44 y.o. Female with a PMHx of right carpal tunnel release, right elbow ulnar nerve decompression, and right elbow lateral epicondylar release and repair (date of surgery 2024) with Dr. Ayala and chronic back pain presents to the ED with right arm pain s/p fall. She occasionally wakes up with numbness and tingling in her legs.  This morning when she woke up she had paresthesias in legs bilaterally.  This caused her to fall.  She fell backwards and landed on lower back and her right arm. She did not hit her head or lose consciousness.  She has right elbow and wrist pain currently. She denies weakness or paresthesias     She has a history of chronic lower back pain. Reports "several accidents" in the past that have resulted in back pain. She has associated paresthesias in lower extremities.  Seems to be intermittent.  She denies lower extremity weakness, saddle anesthesia, urinary or bowel changes, fever, history of IV drug use.    The history is provided by the patient.     Review of patient's allergies indicates:   Allergen Reactions    Raspberry (rubus idaeus) Hives     Past Medical History:   Diagnosis Date    Anemia     Back pain     Depression      Past Surgical History:   Procedure Laterality Date    CARPAL TUNNEL RELEASE Right 2024    Procedure: RELEASE, CARPAL TUNNEL;  Surgeon: Javy Ayala Jr., MD;  Location: [facility] OR;  Service: Orthopedics;  Laterality: Right;     SECTION      COLD KNIFE CONIZATION OF CERVIX  12/10/2021    Procedure: CONE BIOPSY, CERVIX, USING COLD KNIFE;  Surgeon: Modesto Nassar MD;  Location: [facility] OR;  Service: OB/GYN;;    COLONOSCOPY N/A 2021    Procedure: COLONOSCOPY;  Surgeon: Emily Cruz MD;  " Location: [facility] ENDO;  Service: Endoscopy;  Laterality: N/A;    DECOMPRESSION, NERVE, ULNAR Right 8/23/2024    Procedure: DECOMPRESSION, NERVE, ULNAR;  Surgeon: Javy Ayala Jr., MD;  Location: [facility] OR;  Service: Orthopedics;  Laterality: Right;    ESOPHAGOGASTRODUODENOSCOPY N/A 06/11/2021    Procedure: EGD (ESOPHAGOGASTRODUODENOSCOPY);  Surgeon: Emily Cruz MD;  Location: [facility];  Service: Endoscopy;  Laterality: N/A;    HYSTERECTOMY      LATERAL EPICONDYLE RELEASE Right 8/23/2024    Procedure: RELEASE, ELBOW, LATERAL EPICONDYLE;  Surgeon: Javy Ayala Jr., MD;  Location: [facility] OR;  Service: Orthopedics;  Laterality: Right;    ROBOT-ASSISTED LAPAROSCOPIC HYSTERECTOMY N/A 03/16/2022    Procedure: ROBOTIC HYSTERECTOMY;  Surgeon: Modesto Nassar MD;  Location: [facility];  Service: OB/GYN;  Laterality: N/A;    ROBOT-ASSISTED LAPAROSCOPIC PYLOROPLASTY USING DA JOSE LUIS XI N/A 6/7/2024    Procedure: XI ROBOTIC PYLOROMYOTOMY WITH EGD;  Surgeon: Clifford Lynch MD;  Location: [address];  Service: General;  Laterality: N/A;  NO NARCOTICS    ROBOT-ASSISTED SALPINGECTOMY Bilateral 03/16/2022    Procedure: ROBOTIC SALPINGECTOMY;  Surgeon: Modesto Nassar MD;  Location: [facility];  Service: OB/GYN;  Laterality: Bilateral;    SINUS SURGERY      TUBAL LIGATION       Family History   Problem Relation Name Age of Onset    Hypertension Mother      Diabetes Mother      Breast cancer Neg Hx      Colon cancer Neg Hx      Ovarian cancer Neg Hx       Social History     Tobacco Use    Smoking status: Former     Current packs/day: 0.00     Types: Cigarettes     Quit date: 3/3/2021     Years since quitting: 3.5     Passive exposure: Past    Smokeless tobacco: Never   Substance Use Topics    Alcohol use: No    Drug use: Yes     Types: Marijuana     Comment: daily     Review of Systems   Constitutional:  Negative for fever.   Genitourinary:  Negative for difficulty urinating.   Musculoskeletal:  Positive for  arthralgias, back pain and myalgias. Negative for joint swelling.   Skin:  Negative for color change.   Neurological:  Positive for numbness. Negative for weakness and headaches.       Physical Exam     Initial Vitals [09/15/24 1335]   BP Pulse Resp Temp SpO2   113/74 (!) 53 18 97.6 °F (36.4 °C) 100 %      MAP       --         Physical Exam    Nursing note and vitals reviewed.  Constitutional: She appears well-developed and well-nourished. She is not diaphoretic. No distress.   HENT:   Head: Normocephalic and atraumatic.   Nose: Nose normal.   Eyes: Conjunctivae and EOM are normal.   Neck: Neck supple.   Cardiovascular:  Normal rate.           Pulses:       Radial pulses are 2+ on the right side and 2+ on the left side.        Dorsalis pedis pulses are 2+ on the right side and 2+ on the left side.   Pulmonary/Chest: No respiratory distress.   Musculoskeletal:      Right elbow: No swelling or deformity. Normal range of motion. Tenderness present.      Right forearm: No tenderness.      Right wrist: Tenderness present. No swelling or bony tenderness. Normal range of motion.      Cervical back: Neck supple.      Thoracic back: No bony tenderness.      Lumbar back: Tenderness (Right pararspinal) present. No bony tenderness.      Comments: No midline t spine or L spine ttp. No spinal step offs  Surgical wounds at elbow and wrist on right side.  No surrounding erythema, drainage, induration.  Appear to be healing well, no signs of dehiscence     Neurological: She is alert and oriented to person, place, and time. Gait normal.   5/5 strength in BLE. Sensation intact. Gait intact   Skin: No rash noted.   Psychiatric: She has a normal mood and affect. Thought content normal.         ED Course   Procedures  Labs Reviewed - No data to display       Imaging Results              X-Ray Elbow Complete Right (Final result)  Result time 09/15/24 15:55:48      Final result by Sami Machado MD (09/15/24 15:55:48)                    Impression:      1. No convincing acute displaced fracture or dislocation of the elbow.      Electronically signed by: Sami Machado MD  Date:    09/15/2024  Time:    15:55               Narrative:    EXAMINATION:  XR ELBOW COMPLETE 3 VIEW RIGHT    CLINICAL HISTORY:  . Unspecified fall, initial encounter    TECHNIQUE:  AP, lateral, and oblique views of the right elbow were performed.    COMPARISON:  05/18/2024    FINDINGS:  Three views right elbow.    No significant displacement of the anterior posterior elbow fat pads.  The anterior humeral line and radiocapitellar line are in appropriate orientation.  No acute displaced fracture or dislocation of the elbow.  No radiopaque foreign body.  No significant edema.                                       X-Ray Wrist Complete Right (Final result)  Result time 09/15/24 15:56:15      Final result by Sami Machado MD (09/15/24 15:56:15)                   Impression:      1. No acute displaced fracture or dislocation of the wrist.      Electronically signed by: Sami Machado MD  Date:    09/15/2024  Time:    15:56               Narrative:    EXAMINATION:  XR WRIST COMPLETE 3 VIEWS RIGHT    CLINICAL HISTORY:  Unspecified fall, initial encounter    TECHNIQUE:  PA, lateral, and oblique views of the right wrist were performed.    COMPARISON:  None    FINDINGS:  Three views right wrist.    No acute displaced fracture or dislocation of the wrist.  No radiopaque foreign body.  No significant edema.                                       X-Ray Lumbar Spine Ap And Lateral (Final result)  Result time 09/15/24 15:57:20      Final result by Sami Machado MD (09/15/24 15:57:20)                   Impression:      1. No acute displaced fracture or dislocation of the lumbar spine.      Electronically signed by: Sami Machado MD  Date:    09/15/2024  Time:    15:57               Narrative:    EXAMINATION:  XR LUMBAR SPINE AP AND LATERAL    CLINICAL  HISTORY:  fall;    TECHNIQUE:  AP, lateral and spot images were performed of the lumbar spine.    COMPARISON:  07/29/2009    FINDINGS:  Three views lumbar spine.    Lateral imaging demonstrates adequate alignment of the lumbar spine without significant vertebral body height loss.  There is disc space height loss primarily involving L4-L5 and L5-S1.  There is lower lumbar facet arthropathy.  The sacral segments are aligned.  AP spinal alignment is remarkable for mild dextroscoliotic curvature.  The sacroiliac joints are intact.                                       Medications   ketorolac injection 15 mg (15 mg Intramuscular Given 9/15/24 1500)     Medical Decision Making  44 y.o. Female with a PMHx of right carpal tunnel release, right elbow ulnar nerve decompression, and right elbow lateral epicondylar release and repair (date of surgery 08/23/2024) with Dr. Ayala and chronic back pain presents to the ED with right arm pain s/p fall. Nontoxic appearing. Hemodynamically stable. Afebrile. Exam as above. I will initiate workup and reassess.    Ddx:  Distal radius fracture, radial head fracture, epicondylar fracture, sprain, contusion, Spondylosis, lumbar strain, disc herniation, spinal stenosis, compression fracture, cauda equina    Patient is bradycardic though asymptomatic.  She denies dizziness, lightheadedness, chest pain, shortness of breath today.  She takes metoprolol.  I advised her to follow up with her PCP for this finding    X-ray of wrist and elbow negative for acute fracture.  She has full range of motion.  No deformities.  She is neurovascularly intact.  Surgical wounds appear well-healed and without signs of infection.  She has improvement of pain after Toradol    Imaging of L-spine with degenerative changes though negative for fracture or traumatic subluxation.  She denies signs of cauda equina including saddle anesthesia, lower extremity weakness, urinary or bowel changes.  She does not have spinal  tenderness on exam.  She is neurovascularly intact.  She is ambulatory.  She has 5/5 strength in bilateral lower extremities.  I do not believe further imaging or workup is indicated at this time for lower back pain.  I placed a referral with spine clinic for further evaluation.    Workup is reassuring.  Clinical improvement of pain.  She was stable at this time for discharge. Strict ED precautions given to return immediately for new, worsening, or concerning symptoms    Amount and/or Complexity of Data Reviewed  Radiology: ordered.    Risk  Prescription drug management.                                      Clinical Impression:  Final diagnoses:  [W19.XXXA] Fall  [M47.9] Spondylosis (Primary)  [M79.601] Pain of right upper extremity  [R00.1] Bradycardia          ED Disposition Condition    Discharge Stable          ED Prescriptions       Medication Sig Dispense Start Date End Date Auth. Provider    ibuprofen (ADVIL,MOTRIN) 800 MG tablet Take 1 tablet (800 mg total) by mouth every 6 (six) hours as needed for Pain. 20 tablet 9/15/2024 -- Lorin Lao PA-C          Follow-up Information       Follow up With Specialties Details Why Contact Info Additional Information    [facility] - Emergency Dept Emergency Medicine  If symptoms worsen [address] [facility] [id number] [phone]     [id number] Schedule an appointment as soon as possible for a visit   [address] [facility] [phone] [facility] - Main Building, 5th Floor Please park in [facility] and use [facility] elevator    Jenifer Luna PA-C Orthopedic Surgery Schedule an appointment as soon as possible for a visit   [address]   [address] [place] [id number]  [phone]                Lorin Lao PA-C  09/15/24 [id number]

## 2024-09-15 NOTE — ED TRIAGE NOTES
Fell inside getting out of bed, this am . Has a muscle spasm in her legs and they were numb and she fell backwards when she got out of bed,. Landed on her back, Now has rt arm pain .after using her arms to get up . C/O back pain and denies LOC w/ fall.

## 2024-09-15 NOTE — ED NOTES
LOC: The patient is awake and alert; oriented x 3 and speaking appropriately.  APPEARANCE: Patient resting comfortably, patient is clean and well groomed  SKIN: warm and dry, normal skin turgor & moist mucus membranes, skin intact, no breakdown noted.  MUSCULOSKELETAL: Patient moving all extremities well, no obvious swelling or deformities noted, rt arm  and back pain.   RESPIRATORY: Airway is open and patent,  respirations are spontaneous, normal effort and rate  CARDIAC: Patient has a normal rate, no peripheral edema noted, capillary refill < 3 seconds; No complaints of chest pain   ABDOMEN: Soft and non tender to palpation, no distention noted.

## 2024-09-16 ENCOUNTER — E-VISIT (OUTPATIENT)
Dept: OBSTETRICS AND GYNECOLOGY | Facility: CLINIC | Age: 44
End: 2024-09-16
Payer: MEDICAID

## 2024-09-16 DIAGNOSIS — N64.3 GALACTORRHEA IN FEMALE: Primary | ICD-10-CM

## 2024-09-16 DIAGNOSIS — R10.84 GENERALIZED ABDOMINAL PAIN: ICD-10-CM

## 2024-09-16 DIAGNOSIS — K58.1 IRRITABLE BOWEL SYNDROME WITH CONSTIPATION: ICD-10-CM

## 2024-09-16 RX ORDER — DULOXETIN HYDROCHLORIDE 60 MG/1
60 CAPSULE, DELAYED RELEASE ORAL DAILY
Qty: 30 CAPSULE | Refills: 2 | Status: SHIPPED | OUTPATIENT
Start: 2024-09-16 | End: 2024-12-15

## 2024-09-16 RX ORDER — DICYCLOMINE HYDROCHLORIDE 20 MG/1
20 TABLET ORAL 3 TIMES DAILY PRN
Qty: 60 TABLET | Refills: 5 | Status: SHIPPED | OUTPATIENT
Start: 2024-09-16

## 2024-09-17 NOTE — PROGRESS NOTES
Patient ID: Wandy Shah is a 44 y.o. female.    Chief Complaint: General Illness (Entered automatically based on patient selection in [facility].)    The patient initiated a request through [facility] on 9/16/2024 for evaluation and management with a chief complaint of General Illness (Entered automatically based on patient selection in [facility].)     I evaluated the questionnaire submission on 9/17/24.    [facility]    9/16/2024  8:39 PM CDT - Filed by Patient   What do you need help with? Other Concern   Do you agree to participate in an E-Visit? Yes   If you have any of the following symptoms, please present to your local emergency room or call 911:  I acknowledge   Are you pregnant, could you be pregnant, or are you breast feeding? None of the above   What is the main issue you would like addressed today? Leaking from Boob   Please describe your symptoms White stuff coming out from nipple   Where is your problem located? Boob   How severe are your symptoms? Mild   Have you had these symptoms before? Yes   How long have you been having these symptoms? For more than a month   Please list any medications or treatments you have used for your condition and indicate if it was effective or not. None   What makes this feel better? Squeezing it   What makes this feel worse? Filling up   Are these symptoms related to a condition that you currently have? No   Please describe any probable cause for these symptoms None   Provide any additional information you feel is important. I hava a video   Please attach any relevant images or files    Are you able to take your vital signs? No         No diagnosis found.     No orders of the defined types were placed in this encounter.           No follow-ups on file.      E-Visit Time Tracking:  Needs to do labs and come in for visit to review medications and breast exam

## 2024-09-19 ENCOUNTER — TELEPHONE (OUTPATIENT)
Dept: SPINE | Facility: CLINIC | Age: 44
End: 2024-09-19
Payer: MEDICAID

## 2024-09-19 ENCOUNTER — LAB VISIT (OUTPATIENT)
Dept: LAB | Facility: HOSPITAL | Age: 44
End: 2024-09-19
Attending: OBSTETRICS & GYNECOLOGY
Payer: MEDICAID

## 2024-09-19 DIAGNOSIS — N64.3 GALACTORRHEA IN FEMALE: ICD-10-CM

## 2024-09-19 LAB
PROLACTIN SERPL IA-MCNC: 13.2 NG/ML (ref 5.2–26.5)
TSH SERPL DL<=0.005 MIU/L-ACNC: 1.86 UIU/ML (ref 0.4–4)

## 2024-09-19 PROCEDURE — 84443 ASSAY THYROID STIM HORMONE: CPT | Performed by: OBSTETRICS & GYNECOLOGY

## 2024-09-19 PROCEDURE — 84146 ASSAY OF PROLACTIN: CPT | Performed by: OBSTETRICS & GYNECOLOGY

## 2024-09-19 PROCEDURE — 36415 COLL VENOUS BLD VENIPUNCTURE: CPT | Performed by: OBSTETRICS & GYNECOLOGY

## 2024-09-19 NOTE — TELEPHONE ENCOUNTER
----- Message from Pb Calix sent at 9/19/2024  2:24 PM CDT -----  Regarding: appt access  Contact: pt @ [phone]  Pt is needing to be scheduled from referral for  M47.9 (ICD-10-CM) - Spondylosis. Please call to advise further as there were no dates I could schedule. Thank you for all you are doing.

## 2024-09-23 ENCOUNTER — OFFICE VISIT (OUTPATIENT)
Dept: OBSTETRICS AND GYNECOLOGY | Facility: CLINIC | Age: 44
End: 2024-09-23
Payer: MEDICAID

## 2024-09-23 VITALS
SYSTOLIC BLOOD PRESSURE: 96 MMHG | WEIGHT: 170.19 LBS | BODY MASS INDEX: 25.79 KG/M2 | HEIGHT: 68 IN | DIASTOLIC BLOOD PRESSURE: 59 MMHG

## 2024-09-23 DIAGNOSIS — Z01.419 WELL WOMAN EXAM WITH ROUTINE GYNECOLOGICAL EXAM: ICD-10-CM

## 2024-09-23 DIAGNOSIS — Z12.39 SCREENING BREAST EXAMINATION: Primary | ICD-10-CM

## 2024-09-23 DIAGNOSIS — N60.19 FIBROCYSTIC BREAST CHANGES, UNSPECIFIED LATERALITY: ICD-10-CM

## 2024-09-23 PROCEDURE — 99396 PREV VISIT EST AGE 40-64: CPT | Mod: S$PBB,,, | Performed by: OBSTETRICS & GYNECOLOGY

## 2024-09-23 PROCEDURE — 99999 PR PBB SHADOW E&M-EST. PATIENT-LVL IV: CPT | Mod: PBBFAC,,, | Performed by: OBSTETRICS & GYNECOLOGY

## 2024-09-23 PROCEDURE — 3078F DIAST BP <80 MM HG: CPT | Mod: CPTII,,, | Performed by: OBSTETRICS & GYNECOLOGY

## 2024-09-23 PROCEDURE — 99214 OFFICE O/P EST MOD 30 MIN: CPT | Mod: PBBFAC,PO | Performed by: OBSTETRICS & GYNECOLOGY

## 2024-09-23 PROCEDURE — 1159F MED LIST DOCD IN RCRD: CPT | Mod: CPTII,,, | Performed by: OBSTETRICS & GYNECOLOGY

## 2024-09-23 PROCEDURE — 3044F HG A1C LEVEL LT 7.0%: CPT | Mod: CPTII,,, | Performed by: OBSTETRICS & GYNECOLOGY

## 2024-09-23 PROCEDURE — 3008F BODY MASS INDEX DOCD: CPT | Mod: CPTII,,, | Performed by: OBSTETRICS & GYNECOLOGY

## 2024-09-23 PROCEDURE — 3074F SYST BP LT 130 MM HG: CPT | Mod: CPTII,,, | Performed by: OBSTETRICS & GYNECOLOGY

## 2024-09-23 PROCEDURE — 1160F RVW MEDS BY RX/DR IN RCRD: CPT | Mod: CPTII,,, | Performed by: OBSTETRICS & GYNECOLOGY

## 2024-09-23 NOTE — PROGRESS NOTES
CC: Annual check-up    SUBJECTIVE:   44 y.o. female   for annual routine Pap and checkup. Patient's last menstrual period was 2022 (exact date)..  She complains of rt breast leaks milky fluid, mostly with manipulation.    Did labs and PRL and TSH were nml, smokes a lot of weed  Also could be related to meds, Reglan, topamax, elevil etc  Past Medical History:   Diagnosis Date    Anemia     Back pain     Depression      Past Surgical History:   Procedure Laterality Date    CARPAL TUNNEL RELEASE Right 2024    Procedure: RELEASE, CARPAL TUNNEL;  Surgeon: Javy Ayala Jr., MD;  Location: [facility] OR;  Service: Orthopedics;  Laterality: Right;     SECTION      COLD KNIFE CONIZATION OF CERVIX  12/10/2021    Procedure: CONE BIOPSY, CERVIX, USING COLD KNIFE;  Surgeon: Modesto Nassar MD;  Location: [facility] OR;  Service: OB/GYN;;    COLONOSCOPY N/A 2021    Procedure: COLONOSCOPY;  Surgeon: Emily Cruz MD;  Location: [facility];  Service: Endoscopy;  Laterality: N/A;    DECOMPRESSION, NERVE, ULNAR Right 2024    Procedure: DECOMPRESSION, NERVE, ULNAR;  Surgeon: Javy Ayala Jr., MD;  Location: [facility] OR;  Service: Orthopedics;  Laterality: Right;    ESOPHAGOGASTRODUODENOSCOPY N/A 2021    Procedure: EGD (ESOPHAGOGASTRODUODENOSCOPY);  Surgeon: Emily Cruz MD;  Location: [facility];  Service: Endoscopy;  Laterality: N/A;    HYSTERECTOMY      LATERAL EPICONDYLE RELEASE Right 2024    Procedure: RELEASE, ELBOW, LATERAL EPICONDYLE;  Surgeon: Javy Ayala Jr., MD;  Location: [facility] OR;  Service: Orthopedics;  Laterality: Right;    ROBOT-ASSISTED LAPAROSCOPIC HYSTERECTOMY N/A 2022    Procedure: ROBOTIC HYSTERECTOMY;  Surgeon: Modesto Nassar MD;  Location: [facility] OR;  Service: OB/GYN;  Laterality: N/A;    ROBOT-ASSISTED LAPAROSCOPIC PYLOROPLASTY USING DA JOSE LUIS XI N/A 2024    Procedure: XI ROBOTIC PYLOROMYOTOMY WITH EGD;  Surgeon: Clifford Lynch MD;   Location: [address];  Service: General;  Laterality: N/A;  NO NARCOTICS    ROBOT-ASSISTED SALPINGECTOMY Bilateral 03/16/2022    Procedure: ROBOTIC SALPINGECTOMY;  Surgeon: Modesto Nassar MD;  Location: [facility];  Service: OB/GYN;  Laterality: Bilateral;    SINUS SURGERY      TUBAL LIGATION       Social History     Socioeconomic History    Marital status:    Tobacco Use    Smoking status: Former     Current packs/day: 0.00     Types: Cigarettes     Quit date: 3/3/2021     Years since quitting: 3.5     Passive exposure: Past    Smokeless tobacco: Never   Substance and Sexual Activity    Alcohol use: No    Drug use: Yes     Types: Marijuana     Comment: daily    Sexual activity: Yes     Partners: Male     Birth control/protection: See Surgical Hx     Social Determinants of Health     Financial Resource Strain: Low Risk  (7/24/2024)    Overall Financial Resource Strain (CARDIA)     Difficulty of Paying Living Expenses: Not hard at all   Recent Concern: Financial Resource Strain - Medium Risk (5/18/2024)    Received from [facility]    Overall Financial Resource Strain (CARDIA)     Difficulty of Paying Living Expenses: Somewhat hard   Food Insecurity: No Food Insecurity (7/24/2024)    Hunger Vital Sign     Worried About Running Out of Food in the Last Year: Never true     Ran Out of Food in the Last Year: Never true   Recent Concern: Food Insecurity - Food Insecurity Present (5/18/2024)    Received from [facility]    Hunger Vital Sign     Worried About Running Out of Food in the Last Year: Sometimes true     Ran Out of Food in the Last Year: Sometimes true   Transportation Needs: No Transportation Needs (5/18/2024)    Received from [facility]    PRAPARE - Transportation     Lack of Transportation (Medical): No     Lack of Transportation (Non-Medical): No   Physical Activity: Insufficiently Active (7/24/2024)    Exercise Vital Sign     Days of Exercise per Week: 3 days     Minutes of Exercise per  Session: 10 min   Stress: Stress Concern Present (2024)    [employer] - Occupational Stress Questionnaire     Feeling of Stress : Very much   Housing Stability: Unknown (2024)    Housing Stability Vital Sign     Unable to Pay for Housing in the Last Year: No     Family History   Problem Relation Name Age of Onset    Hypertension Mother      Diabetes Mother      Breast cancer Neg Hx      Colon cancer Neg Hx      Ovarian cancer Neg Hx       OB History    Para Term  AB Living   2 2 2     2   SAB IAB Ectopic Multiple Live Births           2      # Outcome Date GA Lbr Howie/2nd Weight Sex Type Anes PTL Lv   2 Term 23    F CS-Unspec   DEMETRIUS   1 Term 11/12/10    M CS-Unspec   DEMETRIUS         Current Outpatient Medications   Medication Sig Dispense Refill    albuterol (PROVENTIL/VENTOLIN HFA) 90 mcg/actuation inhaler Inhale 2 puffs into the lungs every 4 (four) hours as needed for Wheezing 8.5 g 11    ashwagandha root extract 500 mg Cap Take 2 capsules orally as needed. 180 capsule 4    benzonatate (TESSALON) 100 MG capsule Take 1 capsule (100 mg total) by mouth 3 (three) times daily for up to 7 days as needed for Cough. 30 capsule 3    cetirizine (ZYRTEC) 10 MG tablet Take 1 tablet (10 mg total) by mouth once daily. (Patient taking differently: Take 10 mg by mouth every evening.) 30 tablet 11    dicyclomine (BENTYL) 20 mg tablet Take 1 tablet (20 mg total) by mouth 3 (three) times daily as needed (abdominal pain). 60 tablet 5    DULoxetine (CYMBALTA) 60 MG capsule Take 1 capsule (60 mg total) by mouth once daily. 30 capsule 2    dupilumab (DUPIXENT PEN) 300 mg/2 mL PnIj Inject 300 mg into the skin every 14 (fourteen) days 4 mL 5    fluticasone propionate (FLONASE) 50 mcg/actuation nasal spray 2 sprays by Nasal route.      fluticasone-umeclidin-vilanter (TRELEGY ELLIPTA) 200-62.5-25 mcg inhaler Inhale 1 puff into the lungs every evening. 180 each 3    gabapentin  (NEURONTIN) 100 MG capsule Take 1 capsule (100 mg total) by mouth 3 (three) times daily as needed. 30 capsule 6    ibuprofen (ADVIL,MOTRIN) 800 MG tablet Take 1 tablet (800 mg total) by mouth 3 (three) times daily. 90 tablet 0    ibuprofen (ADVIL,MOTRIN) 800 MG tablet Take 1 tablet (800 mg total) by mouth every 6 (six) hours as needed for Pain. 20 tablet 0    lubiprostone (AMITIZA) 24 MCG Cap Take 1 capsule (24 mcg total) by mouth 2 (two) times daily with meals. 60 capsule 2    meclizine (ANTIVERT) 12.5 mg tablet Take 1 tablet (12.5 mg total) by mouth 2 (two) times daily as needed for Dizziness. 28 tablet 0    metoclopramide HCl (REGLAN) 5 MG tablet Take 1 tablet (5 mg total) by mouth 3 (three) times daily before meals. 90 tablet 5    metoprolol succinate (TOPROL-XL) 50 MG 24 hr tablet Take 1 tablet orally once a day. 90 tablet 4    omeprazole (PRILOSEC) 40 MG capsule Take 1 capsule (40 mg total) by mouth once daily. 30 capsule 5    ondansetron (ZOFRAN-ODT) 4 MG TbDL Take 1 tablet (4 mg total) by mouth 4 (four) times daily before meals and nightly. 120 tablet 5    oxyCODONE-acetaminophen (PERCOCET) 7.5-325 mg per tablet Take 1 tablet by mouth every 4 (four) hours as needed for Pain. 20 tablet 0    polyethylene glycol (GLYCOLAX) 17 gram/dose powder Take 17 g by mouth once daily. 510 g 11    rizatriptan (MAXALT) 10 MG tablet take 1/2 - 1 at onset of migraine; may repeat in 2 hours if needed; maximum 2 in 24 hours; maximum 3 days per week 10 tablet 2    topiramate (TOPAMAX) 25 MG tablet Take 3 tablets (75 mg total) by mouth every evening.      amitriptyline (ELAVIL) 25 MG tablet Take 1 tablet (25 mg total) by mouth every evening. 30 tablet 2    diclofenac sodium (VOLTAREN) 1 % Gel Apply 2 g topically 4 (four) times daily as needed. 200 g 0     No current facility-administered medications for this visit.     Allergies: Raspberry (rubus idaeus)     ROS:  Constitutional: no weight loss, weight gain, fever, fatigue  Eyes:   "No vision changes, glasses/contacts  ENT/Mouth: No ulcers, sinus problems, ears ringing, headache  Cardiovascular: No inability to lie flat, chest pain, exercise intolerance, swelling, heart palpitations  Respiratory: No wheezing, coughing blood, shortness of breath, or cough  Gastrointestinal: No diarrhea, bloody stool, nausea/vomiting, constipation, gas, hemorrhoids  Genitourinary: No blood in urine, painful urination, urgency of urination, frequency of urination, incomplete emptying, incontinence, abnormal bleeding, painful periods, heavy periods, vaginal discharge, vaginal odor, painful intercourse, sexual problems, bleeding after intercourse.  Musculoskeletal: No muscle weakness  Skin/Breast: No painful breasts, nipple discharge, masses, rash, ulcers  Neurological: No passing out, seizures, numbness, headache  Endocrine: No diabetes, hypothyroid, hyperthyroid, hot flashes, hair loss, abnormal hair growth, ance  Psychiatric: No depression, crying  Hematologic: No bruises, bleeding, swollen lymph nodes, anemia.      OBJECTIVE:   The patient appears well, alert, oriented x 3, in no distress.  BP (!) 96/59   Ht 5' 8" (1.727 m)   Wt 77.2 kg (170 lb 3.1 oz)   LMP 03/16/2022 (Exact Date)   BMI 25.88 kg/m²   NECK: no thyromegaly, trachea midline  SKIN: no acne, striae, hirsutism  BREAST EXAM: breasts appear normal, no suspicious masses, no skin or nipple changes or axillary nodes, symmetric fibrous changes in both upper outer quadrants, unable to express any nipple dc  ABDOMEN: no hernias, masses, or hepatosplenomegaly  GENITALIA: normal external genitalia, no erythema, no discharge  URETHRA: normal urethra, normal urethral meatus  VAGINA: mucosal atrophy  CERVIX: no lesions or cervical motion tenderness and absent  UTERUS: uterus absent  ADNEXA: no mass, fullness, tenderness      ASSESSMENT:   well woman  1. Screening breast examination    2. Fibrocystic breast changes, unspecified laterality    3. Well woman exam " with routine gynecological exam        PLAN:   mammogram  pap smear  return annually or prn  Orders Placed This Encounter    Mammo Digital Screening Bilat w/ Denis   Discussed continue with OTC therapies such as acetaminophen or nonsteroidal anti-inflammatory drugs (NSAIDs). They can both be used to relieve breast pain. Topical NSAIDS such as OTC Diclofenac (Volteran) gel can be used. Other recommendations include use of a supportive bra. Wearing a soft, supportive bra at night prevents the breasts from pulling down on the chest wall. Some women obtain relief from application of warm compresses or ice packs. \     Discussed lifestyle recommendations such as decrease or elimination of caffeine. Also low-fat diet, high complex carbohydrate diet has been shown to be effective.       Alternative therapies such as Evening Primerose Oil (EPO) 1000mg twice daily has been found to be helpful for some patients, results are seen after 3-6 months.      Modesto Nassar MD

## 2024-09-29 ENCOUNTER — PATIENT MESSAGE (OUTPATIENT)
Dept: FAMILY MEDICINE | Facility: HOSPITAL | Age: 44
End: 2024-09-29
Payer: MEDICAID

## 2024-09-29 DIAGNOSIS — J82.83 EOSINOPHILIC ASTHMA: Primary | Chronic | ICD-10-CM

## 2024-10-03 ENCOUNTER — OFFICE VISIT (OUTPATIENT)
Dept: SURGERY | Facility: CLINIC | Age: 44
End: 2024-10-03
Payer: MEDICAID

## 2024-10-03 VITALS
HEART RATE: 65 BPM | BODY MASS INDEX: 25.94 KG/M2 | WEIGHT: 171.19 LBS | SYSTOLIC BLOOD PRESSURE: 114 MMHG | HEIGHT: 68 IN | DIASTOLIC BLOOD PRESSURE: 74 MMHG

## 2024-10-03 DIAGNOSIS — K31.84 GASTROPARESIS: Primary | ICD-10-CM

## 2024-10-03 PROCEDURE — 3078F DIAST BP <80 MM HG: CPT | Mod: CPTII,,, | Performed by: SURGERY

## 2024-10-03 PROCEDURE — 3044F HG A1C LEVEL LT 7.0%: CPT | Mod: CPTII,,, | Performed by: SURGERY

## 2024-10-03 PROCEDURE — 3074F SYST BP LT 130 MM HG: CPT | Mod: CPTII,,, | Performed by: SURGERY

## 2024-10-03 PROCEDURE — 1159F MED LIST DOCD IN RCRD: CPT | Mod: CPTII,,, | Performed by: SURGERY

## 2024-10-03 PROCEDURE — 3008F BODY MASS INDEX DOCD: CPT | Mod: CPTII,,, | Performed by: SURGERY

## 2024-10-03 PROCEDURE — 99999 PR PBB SHADOW E&M-EST. PATIENT-LVL IV: CPT | Mod: PBBFAC,,, | Performed by: SURGERY

## 2024-10-03 PROCEDURE — 1160F RVW MEDS BY RX/DR IN RCRD: CPT | Mod: CPTII,,, | Performed by: SURGERY

## 2024-10-03 PROCEDURE — 99214 OFFICE O/P EST MOD 30 MIN: CPT | Mod: PBBFAC | Performed by: SURGERY

## 2024-10-03 PROCEDURE — 99214 OFFICE O/P EST MOD 30 MIN: CPT | Mod: S$PBB,,, | Performed by: SURGERY

## 2024-10-03 NOTE — PROGRESS NOTES
I have seen the patient, reviewed the Student's history and physical, assessment and plan. I have personally interviewed and examined the patient at bedside and: agree with the findings.       43y/o with bmi 27 to 26 today, eosinophilic asthma (not hospitalized in the last year, uses rescue inhaler 2-3 times a week), covid 2/2024 (has residual cough and loss of taste), anxiety, gerd and gastroparesis s/p robotic pyloromyotomy 6/7/24.       Preop history: She has had symptoms for many years and symptoms are worsening.  She has severe epigastric pain, extremely severe nausea and moderate to severe vomiting.  She is not sure what she takes but takes amiteza for constipation.  She states she is taking zofran 2-3 times a day, bentyl about bid, and has been prescribed reglan but isn't taking it due to risk of side effects.  She is on ppi daily.  She has lost 20 lb since February (covid) and 40lb over the last year.     GERD Questionnaire      daily PPI  has Typical heartburn  has Regurgitation  has Dysphagia solids  has Dysphagia liquids  has Hoarseness  has Sore throat  has Cough  has Asthma  has Chest pain  occasional Water brash  no Globus  has Nausea  has Vomiting     Interval history 6/18/24, postop: She is doing much better after surgery.  She has mild epigastric pain, mild to moderate nausea and mild vomiting.  She is on a soft diet.  She is only on zofran.  Her reflux is improved also and is taking ppi daily and less pepsid.  She says she feels 10/10 perfect!     Interval history 6/20/24:  She is concerned about one of her trocar sites and had it looked at emergently yesterday.  She has mild pain in the area.  She says her gastroparesis symptoms are unchanged from last visit and very improved.  She is on a soft diet.     Interval history 7/2/24:  She is not feeling quite as well as last visit.  She had extremely severe epigastric pain, severe nausea and moderate vomiting.  She says this worsening is due to her  incisional issues.  She is taking zofran about every few days and ppi daily with pepcid daily     Interval history 7/11/24:  She is improved from gastroparesis and happy with her result.  Her incision is still a little open.    Interval history 10/3/24:  She has had an asthma exacerbation, svt and fall for which she has gotten medical care.  She is happy and her symptoms are much better than prior to surgery.  She complains mostly of early satiety.  She gets nausea and vomiting about twice a week.  She has severe epigastric pain, mild nausea and moderate vomiting.  She is on zofran, peptobismal, bentyl, and ppi.  She has gerd symptoms.      shows narcotics rx 2022, one in 2024 (August).     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     PE Second from right trocar site skin is opened with 2 cm deep area likely due to hematoma and the area was debrided and packed.     Preop diagnostic studies  Ges 2023 reviewed, 36% gastric retention at 4 h with increase in the last hour (gastroparesis and possible duodenal gastric reflux)  Ct 2023 reviewed, films viewed, no significant abnormality  Egd 2021 reviewed, mild gastritis on path     Postop diagnostic studies  Ges 2024 reviewed, normal.     Assessment and plan  Gastroparesis and gerd improved with pyloromyotomy.  She is happy with her symptom control.  Follow up one year.  Consider repeat egd for esophagitis 2031.

## 2024-10-03 NOTE — LETTER
October 3, 2024        [facility]  [address]  [facility] [address]  Phone: [phone]   Patient: Wandy Shah   MR Number: [id number]   YOB: 1980   Date of Visit: 10/3/2024       Dear Dr. Shah:    Thank you for referring Wandy Shah to me for evaluation. Attached you will find relevant portions of my assessment and plan of care.    If you have questions, please do not hesitate to call me. I look forward to following Wandy Shah along with you.    Sincerely,      Clifford Lynch MD            CC    No Recipients    Enclosure

## 2024-10-03 NOTE — PROGRESS NOTES
General Surgery Office Visit  Gastroparesis Follow Up    SUBJECTIVE:     History of Present Illness:  Patient is a 44 y.o. female presents for follow up of gastroparesis s/p robotic pyloromyotomy 6/7/24 for post-op follow-up.  She has been improving since surgery. Pain is well controlled, no drainage from incisions. She remains on a soft diet which she is tolerating well. Her nausea has improved, she now takes zofran 1-2x/week which is decreased from pre op. Rare vomiting. Bowel movements have been normal. Her heartburn has improved somewhat as well. Prilosec daily and pepcid as needed.     Gastroparesis scoring 6/18/24:  Vomiting:  Severity (1) /Frequency (1)  Nausea: Severity (1) /Frequency (2)  Early satiety: Severity (1) /Frequency (2)  Bloating: Severity (2) /Frequency (2)  Postprandial fullness: Severity (1) /Frequency (1)  Epigastric pain: Severity (1) / Frequency (1)  Epigastric burning: Severity (3) / Frequency (3)      Interval History 6/20/24:  She presents to clinic today for wound check after ED visit yesterday. She states that while in the shower yesterday, she noticed scant bleeding from the robotic incision site to the right of her umbilicus. No issues from her other incisions. The bleeding stopped right away, but she called the nursing line and was encouraged to be seen in the ED. She denies any other drainage or purulence coming from the incision. No fevers or chills.      Interval history 7/2/24:  She is not feeling quite as well as last visit.  She had extremely severe epigastric pain, severe nausea and moderate vomiting.  She says this worsening is due to her incisional issues.  She is taking zofran about every few days and ppi daily with pepcid daily      Interval History 7/11/24:  Presents to clinic for follow up s/p robotic pyloromyotomy 6/7/24. Since she was last seen she reports that she has been doing well. Symptoms of nausea, vomiting, and epigastric pain continue to improve. She reports  "that she is able to eat more than she was before. Incision that has opened up is draining minimal SS fluid. She denies fever or chills. Minimal pain in the area.      Gastroparesis scoring 7/11/2024:  Vomting:  Severity (0) /Frequency (0)  Nausea: Severity (3) /Frequency (3)  Early satiety: Severity (3) /Frequency (3)  Bloating: Severity (1) /Frequency (2)  Postprandial fullness: Severity (2) /Frequency (1)  Epigastric pain: Severity (0) / Frequency (0)  Epigastric burning: Severity (3) / Frequency (3)      Interval History 10/3/2024:  Ms. Shah states her symptoms have gradually and significantly improved since surgery. She previously experience n/v/GERD on a daily basis, now decreased to maybe 2x/week. She also notes she no longer experiences her stomach feeling "topsy turvy". She is very pleased with her current level of symptom control. Her most significant ongoing symptom is early satiety; she reports one or two bites, if any, before feeling full and/or losing  her appetite. Current medications include Zofran, Pepto-Bismol, Bentyl, and Damari-seltzer as needed, and Prilosec daily. All incisions are well healed; the incision (R Periumbilical) that was thought to be slow to heal at a prior visit is closed, and she reports itching.    She had a gastric emptying study 8/12/2024 which showed normal emptying time.    Of note, since her last visit, Ms. Shah has had several health events; she had and asthma exacerbation from which  she has since recovered fully. She also had an episode of palpitations which, on ER evaluation, was found to be SVT, tachy to the 170s. She is being evaluated by cardiology and in discussions regarding long term management. Currently, she is taking metoprolol and wearing a continuous monitoring watch. She has tilt table scheduled for Wednesday. Finally, she had a fall brought on by sudden LE weakness/paresthesia. ED workup found decreased disc space at L4/5 and L5/S1. Found to be neuro intact " with full BLE strength and sensation. Denied saddle anesthesia. She has a visit with spine clinic for eval tomorrow.     Gastroparesis scoring 10/3/2024:  Vomting:  Severity (2) /Frequency (2)  Nausea: Severity (1) /Frequency (1)  Early satiety: Severity (4) /Frequency (4)  Bloating: Severity (2) /Frequency (3)  Postprandial fullness: Severity (4) /Frequency (4)  Epigastric pain: Severity (3) / Frequency (3)  Epigastric burning: Severity (3) / Frequency (3)      GERD Questionnaire  Meds: Zofran, Pepto, Bentyl, stephy-seltzer PRN. Prilosec daily.   + Typical heartburn  + Regurgitation  - Dysphagia solids  - Dysphagia liquids  - Hoarseness  - Sore throat  - Cough  - Asthma  - Chest pain  - Water brash  - Globus  + Nausea  + Vomiting      Review of patient's allergies indicates:   Allergen Reactions    Raspberry (rubus idaeus) Hives       Current Outpatient Medications   Medication Sig Dispense Refill    albuterol (PROVENTIL/VENTOLIN HFA) 90 mcg/actuation inhaler Inhale 2 puffs into the lungs every 4 (four) hours as needed for Wheezing 8.5 g 11    ashwagandha root extract 500 mg Cap Take 2 capsules orally as needed. 180 capsule 4    benzonatate (TESSALON) 100 MG capsule Take 1 capsule (100 mg total) by mouth 3 (three) times daily for up to 7 days as needed for Cough. 30 capsule 3    cetirizine (ZYRTEC) 10 MG tablet Take 1 tablet (10 mg total) by mouth once daily. (Patient taking differently: Take 10 mg by mouth every evening.) 30 tablet 11    dicyclomine (BENTYL) 20 mg tablet Take 1 tablet (20 mg total) by mouth 3 (three) times daily as needed (abdominal pain). 60 tablet 5    DULoxetine (CYMBALTA) 60 MG capsule Take 1 capsule (60 mg total) by mouth once daily. 30 capsule 2    dupilumab (DUPIXENT PEN) 300 mg/2 mL PnIj Inject 300 mg into the skin every 14 (fourteen) days 4 mL 5    fluticasone propionate (FLONASE) 50 mcg/actuation nasal spray 2 sprays by Nasal route.      fluticasone-umeclidin-vilanter (TRELEGY ELLIPTA)  200-62.5-25 mcg inhaler Inhale 1 puff into the lungs every evening. 180 each 3    gabapentin (NEURONTIN) 100 MG capsule Take 1 capsule (100 mg total) by mouth 3 (three) times daily as needed. 30 capsule 6    ibuprofen (ADVIL,MOTRIN) 800 MG tablet Take 1 tablet (800 mg total) by mouth 3 (three) times daily. 90 tablet 0    ibuprofen (ADVIL,MOTRIN) 800 MG tablet Take 1 tablet (800 mg total) by mouth every 6 (six) hours as needed for Pain. 20 tablet 0    lubiprostone (AMITIZA) 24 MCG Cap Take 1 capsule (24 mcg total) by mouth 2 (two) times daily with meals. 60 capsule 2    meclizine (ANTIVERT) 12.5 mg tablet Take 1 tablet (12.5 mg total) by mouth 2 (two) times daily as needed for Dizziness. 28 tablet 0    metoprolol succinate (TOPROL-XL) 50 MG 24 hr tablet Take 1 tablet orally once a day. 90 tablet 4    omeprazole (PRILOSEC) 40 MG capsule Take 1 capsule (40 mg total) by mouth once daily. 30 capsule 5    ondansetron (ZOFRAN-ODT) 4 MG TbDL Take 1 tablet (4 mg total) by mouth 4 (four) times daily before meals and nightly. 120 tablet 5    polyethylene glycol (GLYCOLAX) 17 gram/dose powder Take 17 g by mouth once daily. 510 g 11    rizatriptan (MAXALT) 10 MG tablet take 1/2 - 1 at onset of migraine; may repeat in 2 hours if needed; maximum 2 in 24 hours; maximum 3 days per week 10 tablet 2    topiramate (TOPAMAX) 25 MG tablet Take 3 tablets (75 mg total) by mouth every evening.      amitriptyline (ELAVIL) 25 MG tablet Take 1 tablet (25 mg total) by mouth every evening. 30 tablet 2    diclofenac sodium (VOLTAREN) 1 % Gel Apply 2 g topically 4 (four) times daily as needed. 200 g 0    metoclopramide HCl (REGLAN) 5 MG tablet Take 1 tablet (5 mg total) by mouth 3 (three) times daily before meals. (Patient not taking: Take 5 mg by mouth 3 (three) times daily before meals.) 90 tablet 5    oxyCODONE-acetaminophen (PERCOCET) 7.5-325 mg per tablet Take 1 tablet by mouth every 4 (four) hours as needed for Pain. (Patient not taking:  "Reported on 10/3/2024) 20 tablet 0     No current facility-administered medications for this visit.       OBJECTIVE:     Vital Signs (Most Recent)  Pulse: 65 (10/03/24 1522)  BP: 114/74 (10/03/24 1522)  5' 8" (1.727 m)  77.7 kg (171 lb 3 oz)     Physical Exam:  Gen: awake, alert, in no acute distress  HEENT: normocephalic, atraumatic, EOMI, no scleral icterus  CV: regular rate and rhythm  Pulm: equal chest rise bilaterally, normal work of breathing  Abd:  soft, non-tender, no guarding. 5 abdominal incisions well-healed.   Ext: WWP, skin warm and dry    Diagnostic Results:  Gastric Emptying Study: Result Reviewed, Films Reviewed, and findings were normal (74>20>5>1)    ASSESSMENT/PLAN:     Wandy Shah is a 44 y.o. female with history of gastroparesis s/p robotic pyloromyotomy 6/7/24 with significant symptom improvement post op. She is very pleased with her current control.  .     PLAN:    - Incisions are well healed; recommended several options for itching over healed site.  - Recommend follow-up endoscopy q5-10 years, as she has known esophagitis and ongoing GERD symptoms. Her last esophagram was in 2021; I have instructed her have another by 2031.   - RTC in 1 year for follow-up, or sooner if she has any questions, concerns, or significantly worsening/changing symptoms.     Jenni Sharp, MS3  General Surgery    "

## 2024-10-04 ENCOUNTER — OFFICE VISIT (OUTPATIENT)
Dept: SPINE | Facility: CLINIC | Age: 44
End: 2024-10-04
Attending: PHYSICAL MEDICINE & REHABILITATION
Payer: MEDICAID

## 2024-10-04 VITALS
HEART RATE: 72 BPM | OXYGEN SATURATION: 100 % | RESPIRATION RATE: 18 BRPM | DIASTOLIC BLOOD PRESSURE: 68 MMHG | HEIGHT: 68 IN | WEIGHT: 171.31 LBS | SYSTOLIC BLOOD PRESSURE: 113 MMHG | BODY MASS INDEX: 25.96 KG/M2

## 2024-10-04 DIAGNOSIS — M54.50 CHRONIC BILATERAL LOW BACK PAIN WITHOUT SCIATICA: Primary | ICD-10-CM

## 2024-10-04 DIAGNOSIS — G89.29 CHRONIC BILATERAL LOW BACK PAIN WITHOUT SCIATICA: Primary | ICD-10-CM

## 2024-10-04 PROCEDURE — 99215 OFFICE O/P EST HI 40 MIN: CPT | Mod: PBBFAC | Performed by: PHYSICAL MEDICINE & REHABILITATION

## 2024-10-04 PROCEDURE — 99999 PR PBB SHADOW E&M-EST. PATIENT-LVL V: CPT | Mod: PBBFAC,,, | Performed by: PHYSICAL MEDICINE & REHABILITATION

## 2024-10-04 RX ORDER — IBUPROFEN 800 MG/1
800 TABLET ORAL 3 TIMES DAILY
Qty: 90 TABLET | Refills: 1 | Status: SHIPPED | OUTPATIENT
Start: 2024-10-04

## 2024-10-04 NOTE — PROGRESS NOTES
Subjective:      Patient ID: Wandy Shah is a 44 y.o. female.    Chief Complaint: Low-back Pain    Referred by: Self, Aaareferral     Ms Shah is a 43 yo female here for evaluation of low back pain.  She has had Constant back pain for the past 14 years that began after a slip and fall onto her back. The pain got worse 9/15 when legs went numb. The leg numbness is the same as it has been.  The pain is worse.  Had mri at that time that was mostly normal. She has a sharp shooting pain in her low back. Same on both sides. She has numbness in the anterior thighs all the time to the knee.  No weakness.  Shoots down both legs to the knee. Same on both sides. Pain is 8/10 currently. 7/10 at best. 10/10 at worst. Walking and bending forward make her pain worse. Laying down with a pillow between her knees makes the pain better. She has taken ibuprofen and gabapentin for the pain. She takes 100mg gabapentin a day because it makes her sleepy. She takes ibuprofen 2 times a day    X-ray lumbar 2024  Three views lumbar spine.     Lateral imaging demonstrates adequate alignment of the lumbar spine without significant vertebral body height loss.  There is disc space height loss primarily involving L4-L5 and L5-S1.  There is lower lumbar facet arthropathy.  The sacral segments are aligned.  AP spinal alignment is remarkable for mild dextroscoliotic curvature.  The sacroiliac joints are intact.     Impression:     1. No acute displaced fracture or dislocation of the lumbar spine.      Past Medical History:  No date: Anemia  No date: Back pain  No date: Depression    Past Surgical History:  2024: CARPAL TUNNEL RELEASE; Right      Comment:  Procedure: RELEASE, CARPAL TUNNEL;  Surgeon: Javy Ayala Jr., MD;  Location: [facility];  Service:                Orthopedics;  Laterality: Right;  No date:  SECTION  12/10/2021: COLD KNIFE CONIZATION OF CERVIX      Comment:  Procedure: CONE BIOPSY, CERVIX, USING COLD  KNIFE;                 Surgeon: Modesto Nassar MD;  Location: [facility] OR;                 Service: OB/GYN;;  05/11/2021: COLONOSCOPY; N/A      Comment:  Procedure: COLONOSCOPY;  Surgeon: Emily Cruz MD;  Location: [facility] ENDO;  Service: Endoscopy;                 Laterality: N/A;  8/23/2024: DECOMPRESSION, NERVE, ULNAR; Right      Comment:  Procedure: DECOMPRESSION, NERVE, ULNAR;  Surgeon:                Javy Ayala Jr., MD;  Location: [facility] OR;  Service:                Orthopedics;  Laterality: Right;  06/11/2021: ESOPHAGOGASTRODUODENOSCOPY; N/A      Comment:  Procedure: EGD (ESOPHAGOGASTRODUODENOSCOPY);  Surgeon:                Emily Cruz MD;  Location: [facility];  Service:                Endoscopy;  Laterality: N/A;  No date: HYSTERECTOMY  8/23/2024: LATERAL EPICONDYLE RELEASE; Right      Comment:  Procedure: RELEASE, ELBOW, LATERAL EPICONDYLE;  Surgeon:               Javy Ayala Jr., MD;  Location: [facility] OR;  Service:                Orthopedics;  Laterality: Right;  03/16/2022: ROBOT-ASSISTED LAPAROSCOPIC HYSTERECTOMY; N/A      Comment:  Procedure: ROBOTIC HYSTERECTOMY;  Surgeon: Modesto Nassar MD;  Location: [facility];  Service: OB/GYN;                 Laterality: N/A;  6/7/2024: ROBOT-ASSISTED LAPAROSCOPIC PYLOROPLASTY USING DA JOSE LUIS XI;   N/A      Comment:  Procedure: XI ROBOTIC PYLOROMYOTOMY WITH EGD;  Surgeon:                Clifford Lynch MD;  Location: [address];                 Service: General;  Laterality: N/A;  NO NARCOTICS  03/16/2022: ROBOT-ASSISTED SALPINGECTOMY; Bilateral      Comment:  Procedure: ROBOTIC SALPINGECTOMY;  Surgeon: Modesto Nassar MD;  Location: [facility];  Service: OB/GYN;                 Laterality: Bilateral;  No date: SINUS SURGERY  No date: TUBAL LIGATION    Review of patient's family history indicates:  Problem: Hypertension      Relation: Mother          Name:               Age of Onset:  (Not Specified)  Problem: Diabetes      Relation: Mother          Name:               Age of Onset: (Not Specified)  Problem: Breast cancer      Relation: Neg Hx          Name:               Age of Onset: (Not Specified)  Problem: Colon cancer      Relation: Neg Hx          Name:               Age of Onset: (Not Specified)  Problem: Ovarian cancer      Relation: Neg Hx          Name:               Age of Onset: (Not Specified)      Social History    Socioeconomic History      Marital status:     Tobacco Use      Smoking status: Former        Packs/day: 0.00        Types: Cigarettes        Quit date: 3/3/2021        Years since quitting: 3.5        Passive exposure: Past      Smokeless tobacco: Never    Substance and Sexual Activity      Alcohol use: No      Drug use: Yes        Types: Marijuana        Comment: daily      Sexual activity: Yes        Partners: Male        Birth control/protection: See Surgical Hx    Social Drivers of Health  Financial Resource Strain: Low Risk  (7/24/2024)      Overall Financial Resource Strain (CARDIA)          Difficulty of Paying Living Expenses: Not hard at all  Recent Concern: Financial Resource Strain - Medium Risk (5/18/2024)      Received from [facility]      Overall Financial Resource Strain (CARDIA)          Difficulty of Paying Living Expenses: Somewhat hard  Food Insecurity: No Food Insecurity (7/24/2024)      Hunger Vital Sign          Worried About Running Out of Food in the Last Year: Never true          Ran Out of Food in the Last Year: Never true  Recent Concern: Food Insecurity - Food Insecurity Present (5/18/2024)      Received from [facility]      Hunger Vital Sign          Worried About Running Out of Food in the Last Year: Sometimes true          Ran Out of Food in the Last Year: Sometimes true  Transportation Needs: No Transportation Needs (5/18/2024)      Received from [facility]      PRAPARE - Transportation          Lack of Transportation  (Medical): No          Lack of Transportation (Non-Medical): No  Physical Activity: Insufficiently Active (7/24/2024)      Exercise Vital Sign          Days of Exercise per Week: 3 days          Minutes of Exercise per Session: 10 min  Stress: Stress Concern Present (7/24/2024)      [facility] Occupational [facility] - Occupational Stress Questionnaire          Feeling of Stress : Very much  Housing Stability: Unknown (7/24/2024)      Housing Stability Vital Sign          Unable to Pay for Housing in the Last Year: No    Current Outpatient Medications:  albuterol (PROVENTIL/VENTOLIN HFA) 90 mcg/actuation inhaler, Inhale 2 puffs into the lungs every 4 (four) hours as needed for Wheezing, Disp: 8.5 g, Rfl: 11  amitriptyline (ELAVIL) 25 MG tablet, Take 1 tablet (25 mg total) by mouth every evening., Disp: 30 tablet, Rfl: 2  ashwagandha root extract 500 mg Cap, Take 2 capsules orally as needed., Disp: 180 capsule, Rfl: 4  benzonatate (TESSALON) 100 MG capsule, Take 1 capsule (100 mg total) by mouth 3 (three) times daily for up to 7 days as needed for Cough., Disp: 30 capsule, Rfl: 3  cetirizine (ZYRTEC) 10 MG tablet, Take 1 tablet (10 mg total) by mouth once daily. (Patient taking differently: Take 10 mg by mouth every evening.), Disp: 30 tablet, Rfl: 11  diclofenac sodium (VOLTAREN) 1 % Gel, Apply 2 g topically 4 (four) times daily as needed., Disp: 200 g, Rfl: 0  dicyclomine (BENTYL) 20 mg tablet, Take 1 tablet (20 mg total) by mouth 3 (three) times daily as needed (abdominal pain)., Disp: 60 tablet, Rfl: 5  DULoxetine (CYMBALTA) 60 MG capsule, Take 1 capsule (60 mg total) by mouth once daily., Disp: 30 capsule, Rfl: 2  dupilumab (DUPIXENT PEN) 300 mg/2 mL PnIj, Inject 300 mg into the skin every 14 (fourteen) days, Disp: 4 mL, Rfl: 5  fluticasone propionate (FLONASE) 50 mcg/actuation nasal spray, 2 sprays by Nasal route., Disp: , Rfl:   fluticasone-umeclidin-vilanter (TRELEGY ELLIPTA) 200-62.5-25 mcg inhaler,  Inhale 1 puff into the lungs every evening., Disp: 180 each, Rfl: 3  gabapentin (NEURONTIN) 100 MG capsule, Take 1 capsule (100 mg total) by mouth 3 (three) times daily as needed., Disp: 30 capsule, Rfl: 6  ibuprofen (ADVIL,MOTRIN) 800 MG tablet, Take 1 tablet (800 mg total) by mouth 3 (three) times daily., Disp: 90 tablet, Rfl: 0  ibuprofen (ADVIL,MOTRIN) 800 MG tablet, Take 1 tablet (800 mg total) by mouth every 6 (six) hours as needed for Pain., Disp: 20 tablet, Rfl: 0  lubiprostone (AMITIZA) 24 MCG Cap, Take 1 capsule (24 mcg total) by mouth 2 (two) times daily with meals., Disp: 60 capsule, Rfl: 2  meclizine (ANTIVERT) 12.5 mg tablet, Take 1 tablet (12.5 mg total) by mouth 2 (two) times daily as needed for Dizziness., Disp: 28 tablet, Rfl: 0  metoprolol succinate (TOPROL-XL) 50 MG 24 hr tablet, Take 1 tablet orally once a day., Disp: 90 tablet, Rfl: 4  omeprazole (PRILOSEC) 40 MG capsule, Take 1 capsule (40 mg total) by mouth once daily., Disp: 30 capsule, Rfl: 5  ondansetron (ZOFRAN-ODT) 4 MG TbDL, Take 1 tablet (4 mg total) by mouth 4 (four) times daily before meals and nightly., Disp: 120 tablet, Rfl: 5  polyethylene glycol (GLYCOLAX) 17 gram/dose powder, Take 17 g by mouth once daily., Disp: 510 g, Rfl: 11  rizatriptan (MAXALT) 10 MG tablet, take 1/2 - 1 at onset of migraine; may repeat in 2 hours if needed; maximum 2 in 24 hours; maximum 3 days per week, Disp: 10 tablet, Rfl: 2  topiramate (TOPAMAX) 25 MG tablet, Take 3 tablets (75 mg total) by mouth every evening., Disp: , Rfl:     No current facility-administered medications for this visit.      Review of patient's allergies indicates:   -- Raspberry (rubus idaeus) -- Hives            Review of Systems   Constitutional: Negative for weight gain and weight loss.   Cardiovascular:  Positive for chest pain.   Respiratory:  Positive for shortness of breath.    Musculoskeletal:  Positive for back pain and myalgias. Negative for joint pain and joint swelling.    Gastrointestinal:  Positive for constipation, diarrhea, nausea and vomiting. Negative for abdominal pain and bowel incontinence.   Genitourinary:  Negative for bladder incontinence.   Neurological:  Positive for numbness and paresthesias.           Objective:          General    Vitals reviewed.  Constitutional: She is oriented to person, place, and time. She appears well-developed and well-nourished.   HENT:   Head: Normocephalic and atraumatic.   Pulmonary/Chest: Effort normal.   Neurological: She is alert and oriented to person, place, and time.   Psychiatric: She has a normal mood and affect. Her behavior is normal. Judgment and thought content normal.     General Musculoskeletal Exam   Gait: normal     Right Ankle/Foot Exam     Tests   Heel Walk: able to perform  Tiptoe Walk: able to perform    Left Ankle/Foot Exam     Tests   Heel Walk: able to perform  Tiptoe Walk: able to perform  Back (L-Spine & T-Spine) / Neck (C-Spine) Exam     Tenderness Right paramedian tenderness of the Upper L-Spine and Lower L-Spine. Left paramedian tenderness of the Upper L-Spine and Lower L-Spine.     Back (L-Spine & T-Spine) Range of Motion   Extension:  20   Flexion:  90   Lateral bend right:  20   Lateral bend left:  20   Rotation right:  40   Rotation left:  40     Spinal Sensation   Right Side Sensation  C-Spine Level: normal   L-Spine Level: normal  S-Spine Level: normal  Left Side Sensation  C-Spine Level: normal  L-Spine Level: normal  S-Spine Level: normal    Back (L-Spine & T-Spine) Tests   Right Side Tests  Straight leg raise:        Sitting SLR: > 70 degrees    Left Side Tests  Straight leg raise:       Sitting SLR: > 70 degrees      Other   She has no scoliosis .  Spinal Kyphosis:  Absent      Muscle Strength   Right Upper Extremity   Biceps: 5/5   Deltoid:  5/5  Triceps:  5/5  Wrist extension: 5/5   Finger Flexors:  5/5  Left Upper Extremity  Biceps: 5/5   Deltoid:  5/5  Triceps:  5/5  Wrist extension: 5/5   Finger  Flexors:  5/5  Right Lower Extremity   Hip Flexion: 5/5   Quadriceps:  5/5   Anterior tibial:  5/5   EHL:  5/5  Left Lower Extremity   Hip Flexion: 5/5   Quadriceps:  5/5   Anterior tibial:  5/5   EHL:  5/5    Reflexes     Left Side  Biceps:  2+  Triceps:  2+  Brachioradialis:  2+  Achilles:  2+  Left Sanon's Sign:  Absent  Babinski Sign:  absent  Quadriceps:  2+    Right Side   Biceps:  2+  Triceps:  2+  Brachioradialis:  2+  Achilles:  2+  Right Sanon's Sign:  absent  Babinski Sign:  absent  Quadriceps:  2+    Vascular Exam     Right Pulses        Carotid:                  2+    Left Pulses        Carotid:                  2+        Assessment:       Encounter Diagnosis   Name Primary?    Chronic bilateral low back pain without sciatica Yes         Plan:       Wandy was seen today for low-back pain.    Diagnoses and all orders for this visit:    Chronic bilateral low back pain without sciatica  -     Ambulatory referral/consult to Physical/Occupational Therapy; Future    Other orders  -     ibuprofen (ADVIL,MOTRIN) 800 MG tablet; Take 1 tablet (800 mg total) by mouth 3 (three) times daily.         We discussed back pain and the nature of back pain.  We discussed that it is not one thing that causes the pain but an accumulation of multiple things that we do.  She has a long history of back pain and leg numbness.  She has had imaging and nothing was seen.  She had a fall 9/15 and she has had more back pain.  Imaging shows no acute changing  Reviewed x-ray lumbar spine  She does have significant muscle tenderness.  There is a myofascial component to the pain  We discussed posture sitting and the importance of trying to sit better.   We discussed sitting with a curve in the lower back and taking standing breaks.    We discussed the benefits of therapy and exercise and continuing to move.  We discussed the importance of continuing to move  She can continue the ibuprofen with food.  We also discussed tylenol instead  of ibuprofen  She does not want to increase gabapentin or try muscle relaxer.  She is fearful of side effects  PT for back and core strengthening, extension exercises myofascial release at [facility] 3 months    More than 50% of the total time  of 45 minutes was spent face to face in counseling on diagnosis and treatment options. I also counseled patient  on common and most usual side effect of prescribed medications. Preparing to see the patient (eg, review of tests), Obtaining and/or reviewing separately obtained history, documenting clinical information in the electronic or other health record, independently interpreting results (not separately reported) and communicating results to the patient/family/caregiver, or care coordination (not separately reported). I reviewed Primary care , and other specialty's notes to better coordinate patient's care. All questions were answered, and patient voiced understanding.

## 2024-10-08 ENCOUNTER — PATIENT MESSAGE (OUTPATIENT)
Dept: ORTHOPEDICS | Facility: CLINIC | Age: 44
End: 2024-10-08

## 2024-10-08 ENCOUNTER — OFFICE VISIT (OUTPATIENT)
Dept: ORTHOPEDICS | Facility: CLINIC | Age: 44
End: 2024-10-08
Payer: MEDICAID

## 2024-10-08 DIAGNOSIS — G56.01 CARPAL TUNNEL SYNDROME ON RIGHT: ICD-10-CM

## 2024-10-08 DIAGNOSIS — G56.21 CUBITAL TUNNEL SYNDROME ON RIGHT: ICD-10-CM

## 2024-10-08 DIAGNOSIS — M77.11 LATERAL EPICONDYLITIS OF RIGHT ELBOW: Primary | ICD-10-CM

## 2024-10-08 PROCEDURE — 99999 PR PBB SHADOW E&M-EST. PATIENT-LVL III: CPT | Mod: PBBFAC,,,

## 2024-10-08 PROCEDURE — 99213 OFFICE O/P EST LOW 20 MIN: CPT | Mod: PBBFAC,PN

## 2024-10-08 PROCEDURE — 3044F HG A1C LEVEL LT 7.0%: CPT | Mod: CPTII,,,

## 2024-10-08 PROCEDURE — 1159F MED LIST DOCD IN RCRD: CPT | Mod: CPTII,,,

## 2024-10-08 PROCEDURE — 99024 POSTOP FOLLOW-UP VISIT: CPT | Mod: ,,,

## 2024-10-08 PROCEDURE — 1160F RVW MEDS BY RX/DR IN RCRD: CPT | Mod: CPTII,,,

## 2024-10-09 NOTE — PROGRESS NOTES
Subjective:      Patient ID: Wandy Shah is a 44 y.o. female.    Chief Complaint: Post-op Follow Up    HPI: Wandy Shah returns for a post-op visit approximately 6 weeks following: right carpal tunnel release, right elbow ulnar nerve decompression, and right elbow lateral epicondylar release and repair (date of surgery 08/23/2024) with Dr. Ayala. Overall, the patient is doing well with no complaints of fever, chills, nausea, vomiting, SOB, chest pains, or drainage to surgical incision. The patient reports that pain has been managed without the need of medication. Patient reports resolution of previously noted numbness and tingling. She has not begun formal PT/OT yet, but maintains compliance with activity restrictions. Post-operative complaints include: none    PAST MEDICAL HISTORY:    Past Medical History:   Diagnosis Date    Anemia     Back pain     Depression      MEDICATIONS:   Current Outpatient Medications:     albuterol (PROVENTIL/VENTOLIN HFA) 90 mcg/actuation inhaler, Inhale 2 puffs into the lungs every 4 (four) hours as needed for Wheezing, Disp: 8.5 g, Rfl: 11    ashwagandha root extract 500 mg Cap, Take 2 capsules orally as needed., Disp: 180 capsule, Rfl: 4    benzonatate (TESSALON) 100 MG capsule, Take 1 capsule (100 mg total) by mouth 3 (three) times daily for up to 7 days as needed for Cough., Disp: 30 capsule, Rfl: 3    cetirizine (ZYRTEC) 10 MG tablet, Take 1 tablet (10 mg total) by mouth once daily. (Patient taking differently: Take 10 mg by mouth every evening.), Disp: 30 tablet, Rfl: 11    dicyclomine (BENTYL) 20 mg tablet, Take 1 tablet (20 mg total) by mouth 3 (three) times daily as needed (abdominal pain)., Disp: 60 tablet, Rfl: 5    DULoxetine (CYMBALTA) 60 MG capsule, Take 1 capsule (60 mg total) by mouth once daily., Disp: 30 capsule, Rfl: 2    dupilumab (DUPIXENT PEN) 300 mg/2 mL PnIj, Inject 300 mg into the skin every 14 (fourteen) days, Disp: 4 mL, Rfl: 5    fluticasone propionate  (FLONASE) 50 mcg/actuation nasal spray, 2 sprays by Nasal route., Disp: , Rfl:     fluticasone-umeclidin-vilanter (TRELEGY ELLIPTA) 200-62.5-25 mcg inhaler, Inhale 1 puff into the lungs every evening., Disp: 180 each, Rfl: 3    gabapentin (NEURONTIN) 100 MG capsule, Take 1 capsule (100 mg total) by mouth 3 (three) times daily as needed., Disp: 30 capsule, Rfl: 6    ibuprofen (ADVIL,MOTRIN) 800 MG tablet, Take 1 tablet (800 mg total) by mouth 3 (three) times daily., Disp: 90 tablet, Rfl: 1    lubiprostone (AMITIZA) 24 MCG Cap, Take 1 capsule (24 mcg total) by mouth 2 (two) times daily with meals., Disp: 60 capsule, Rfl: 2    meclizine (ANTIVERT) 12.5 mg tablet, Take 1 tablet (12.5 mg total) by mouth 2 (two) times daily as needed for Dizziness., Disp: 28 tablet, Rfl: 0    metoprolol succinate (TOPROL-XL) 50 MG 24 hr tablet, Take 1 tablet orally once a day., Disp: 90 tablet, Rfl: 4    omeprazole (PRILOSEC) 40 MG capsule, Take 1 capsule (40 mg total) by mouth once daily., Disp: 30 capsule, Rfl: 5    ondansetron (ZOFRAN-ODT) 4 MG TbDL, Take 1 tablet (4 mg total) by mouth 4 (four) times daily before meals and nightly., Disp: 120 tablet, Rfl: 5    polyethylene glycol (GLYCOLAX) 17 gram/dose powder, Take 17 g by mouth once daily., Disp: 510 g, Rfl: 11    rizatriptan (MAXALT) 10 MG tablet, take 1/2 - 1 at onset of migraine; may repeat in 2 hours if needed; maximum 2 in 24 hours; maximum 3 days per week, Disp: 10 tablet, Rfl: 2    topiramate (TOPAMAX) 25 MG tablet, Take 3 tablets (75 mg total) by mouth every evening., Disp: , Rfl:     amitriptyline (ELAVIL) 25 MG tablet, Take 1 tablet (25 mg total) by mouth every evening., Disp: 30 tablet, Rfl: 2    diclofenac sodium (VOLTAREN) 1 % Gel, Apply 2 g topically 4 (four) times daily as needed., Disp: 200 g, Rfl: 0    ALLERGIES:   Review of patient's allergies indicates:   Allergen Reactions    Raspberry (rubus idaeus) Hives       Review of Systems:  Constitution: Negative for  chills, fever and night sweats.   HENT: Negative for congestion and headaches.    Eyes: Negative for blurred vision or vision loss.  Cardiovascular: Negative for chest pain and syncope.   Respiratory: Negative for cough and shortness of breath.    Endocrine: Negative for polydipsia, polyphagia and polyuria.   Hematologic/Lymphatic: Negative for bleeding problem. Does not bruise/bleed easily.   Skin: Negative for dry skin, itching and rash.   Musculoskeletal: See HPI.   Gastrointestinal: Negative for abdominal pain and bowel incontinence.   Genitourinary: Negative for bladder incontinence and nocturia.   Neurological: Negative for disturbances in coordination, loss of balance and seizures.   Psychiatric/Behavioral: Negative for depression. The patient does not have insomnia.    Allergic/Immunologic: Negative for hives and persistent infections.        Objective:      There were no vitals filed for this visit.    PHYSICAL EXAM:  General: Alert & oriented x3, well-developed and well-nourished, in no acute distress, sitting comfortably in the exam room.  Skin: Warm and dry. Capillary refill less than 2 seconds.   Head: Normocephalic and atraumatic.   Eyes: Sclera appear normal.   Nose: No deformities seen.   Ears: No deformities seen.   Neck: No tracheal deviation present.   Pulmonary/Chest: Breathing unlabored.   Neurological: Alert and oriented to person, place, and time.   Psychiatric: Mood is pleasant and affect appropriate.     RIGHT HAND, WRIST, ELBOW:        Observation/Inspection:    Surgical incision is well healed with appropriate scar formation.  No redness, warmth, drainage, or other signs of infection.  No swelling.        Palpation:   No tenderness to palpation to bony prominences and soft tissues throughout.        Range of Motion:  Elbow: Full to flexion, extension, supination, and pronation without pain or difficulty.  Wrist: Full to wrist flexion, extension, radial, and ulnar deviation without pain or  difficulty.  Digits: Full to digit MCP, PIP, and DIP flexion and extension without pain or difficulty.         Strength:    strength not tested today.         Neurovascular Exam:  Digits warm and well perfused, brisk capillary refill <3 seconds throughout  NVI motor/LTS to median, radial, and ulnar nerves, radial pulse 2+    Imaging:  None today.         Assessment:       1. Lateral epicondylitis of right elbow    2. Carpal tunnel syndrome on right    3. Cubital tunnel syndrome on right        Plan:          6 weeks s/p right carpal tunnel release, right elbow ulnar nerve decompression, and right elbow lateral epicondylar release and repair     Overall patient is doing well post-operatively.    Wound Care:  Continue regular wound care.  Encouraged and demonstrated scar massage.  Medications:  Continue with OTC Tylenol and/or Ibuprofen as needed for pain.   Antibiotics:  None prescribed today.  No evidence of wound infection.  HEP:  Continue with previously instructed and demonstrated hand, wrist, and elbow ROM HEP.   Activity:  Continue to advance activities as tolerated.  Pain Management: Ice compress to the affected area 2-3x a day for 15-20 minutes as needed for pain management.      Follow-Up: 6 weeks with Jenifer Luna PA-C if needed for a 3-month post-op visit.    All of the patient's questions were answered and the patient will contact us if they have any questions or concerns in the interim.    Jenifer Luna PA-C  [facility]  Orthopedic Surgery    Medical Dictation software was used during the dictation of portions or the entirety of this medical record.  Phonetic or grammatic errors may exist due to the use of this software. For clarification, refer to the author of the document.

## 2024-10-13 ENCOUNTER — PATIENT MESSAGE (OUTPATIENT)
Dept: SPINE | Facility: CLINIC | Age: 44
End: 2024-10-13
Payer: MEDICAID

## 2024-10-17 ENCOUNTER — PATIENT MESSAGE (OUTPATIENT)
Dept: RESEARCH | Facility: HOSPITAL | Age: 44
End: 2024-10-17
Payer: MEDICAID

## 2024-11-05 ENCOUNTER — PATIENT MESSAGE (OUTPATIENT)
Dept: ADMINISTRATIVE | Facility: OTHER | Age: 44
End: 2024-11-05
Payer: MEDICAID

## 2024-11-14 DIAGNOSIS — R42 DIZZINESS: ICD-10-CM

## 2024-11-14 RX ORDER — MECLIZINE HCL 12.5 MG 12.5 MG/1
12.5 TABLET ORAL 2 TIMES DAILY PRN
Qty: 28 TABLET | Refills: 0 | Status: CANCELLED | OUTPATIENT
Start: 2024-11-13

## 2024-11-15 DIAGNOSIS — R42 DIZZINESS: ICD-10-CM

## 2024-11-15 RX ORDER — MECLIZINE HCL 12.5 MG 12.5 MG/1
12.5 TABLET ORAL 2 TIMES DAILY PRN
Qty: 28 TABLET | Refills: 0 | Status: CANCELLED | OUTPATIENT
Start: 2024-11-13

## 2024-11-17 ENCOUNTER — PATIENT MESSAGE (OUTPATIENT)
Dept: ORTHOPEDICS | Facility: CLINIC | Age: 44
End: 2024-11-17
Payer: MEDICAID

## 2024-11-18 RX ORDER — FLUTICASONE PROPIONATE 50 MCG
SPRAY, SUSPENSION (ML) NASAL
Qty: 16 G | Refills: 5 | OUTPATIENT
Start: 2024-11-18

## 2024-11-18 RX ORDER — TOPIRAMATE 25 MG/1
75 TABLET ORAL NIGHTLY
Qty: 90 TABLET | Refills: 0 | Status: CANCELLED | OUTPATIENT
Start: 2024-11-18

## 2024-11-19 RX ORDER — TOPIRAMATE 25 MG/1
75 TABLET ORAL NIGHTLY
Qty: 90 TABLET | Refills: 0 | Status: CANCELLED | OUTPATIENT
Start: 2024-11-18

## 2024-11-21 DIAGNOSIS — R42 DIZZINESS: ICD-10-CM

## 2024-11-21 RX ORDER — MECLIZINE HCL 12.5 MG 12.5 MG/1
12.5 TABLET ORAL 2 TIMES DAILY PRN
Qty: 28 TABLET | Refills: 0 | Status: CANCELLED | OUTPATIENT
Start: 2024-11-13

## 2024-11-27 DIAGNOSIS — R42 DIZZINESS: ICD-10-CM

## 2024-11-27 RX ORDER — MECLIZINE HCL 12.5 MG 12.5 MG/1
12.5 TABLET ORAL 2 TIMES DAILY PRN
Qty: 28 TABLET | Refills: 0 | Status: CANCELLED | OUTPATIENT
Start: 2024-11-13

## 2024-11-29 RX ORDER — TOPIRAMATE 25 MG/1
75 TABLET ORAL NIGHTLY
Qty: 90 TABLET | Refills: 0 | OUTPATIENT
Start: 2024-11-29

## 2024-12-09 ENCOUNTER — PATIENT MESSAGE (OUTPATIENT)
Dept: SPINE | Facility: CLINIC | Age: 44
End: 2024-12-09
Payer: MEDICAID

## 2024-12-15 DIAGNOSIS — F41.9 ANXIETY: ICD-10-CM

## 2024-12-15 DIAGNOSIS — K21.9 GASTROESOPHAGEAL REFLUX DISEASE, UNSPECIFIED WHETHER ESOPHAGITIS PRESENT: ICD-10-CM

## 2024-12-15 DIAGNOSIS — K59.04 CHRONIC IDIOPATHIC CONSTIPATION: ICD-10-CM

## 2024-12-15 RX ORDER — DULOXETIN HYDROCHLORIDE 60 MG/1
60 CAPSULE, DELAYED RELEASE ORAL DAILY
Qty: 30 CAPSULE | Refills: 2 | Status: CANCELLED | OUTPATIENT
Start: 2024-12-15 | End: 2025-03-15

## 2024-12-15 RX ORDER — OMEPRAZOLE 40 MG/1
40 CAPSULE, DELAYED RELEASE ORAL DAILY
Qty: 30 CAPSULE | Refills: 5 | Status: CANCELLED | OUTPATIENT
Start: 2024-12-15 | End: 2025-12-15

## 2024-12-15 RX ORDER — LUBIPROSTONE 24 UG/1
24 CAPSULE ORAL 2 TIMES DAILY WITH MEALS
Qty: 60 CAPSULE | Refills: 2 | Status: CANCELLED | OUTPATIENT
Start: 2024-12-15

## 2024-12-16 RX ORDER — OMEPRAZOLE 40 MG/1
40 CAPSULE, DELAYED RELEASE ORAL DAILY
Qty: 30 CAPSULE | Refills: 5 | Status: SHIPPED | OUTPATIENT
Start: 2024-12-16 | End: 2025-12-16

## 2024-12-16 RX ORDER — LUBIPROSTONE 24 UG/1
24 CAPSULE ORAL 2 TIMES DAILY WITH MEALS
Qty: 60 CAPSULE | Refills: 5 | Status: SHIPPED | OUTPATIENT
Start: 2024-12-16

## 2024-12-17 DIAGNOSIS — R42 DIZZINESS: ICD-10-CM

## 2024-12-17 DIAGNOSIS — F41.9 ANXIETY: ICD-10-CM

## 2024-12-18 ENCOUNTER — HOSPITAL ENCOUNTER (EMERGENCY)
Facility: HOSPITAL | Age: 44
Discharge: LEFT BEFORE DISCHARGE | End: 2024-12-18
Attending: EMERGENCY MEDICINE
Payer: MEDICAID

## 2024-12-18 VITALS
HEART RATE: 88 BPM | SYSTOLIC BLOOD PRESSURE: 140 MMHG | OXYGEN SATURATION: 100 % | BODY MASS INDEX: 25.09 KG/M2 | TEMPERATURE: 98 F | DIASTOLIC BLOOD PRESSURE: 86 MMHG | WEIGHT: 165 LBS | RESPIRATION RATE: 18 BRPM

## 2024-12-18 DIAGNOSIS — R07.9 CHEST PAIN: ICD-10-CM

## 2024-12-18 LAB
OHS QRS DURATION: 98 MS
OHS QTC CALCULATION: 443 MS

## 2024-12-18 PROCEDURE — 93010 ELECTROCARDIOGRAM REPORT: CPT | Mod: ,,, | Performed by: INTERNAL MEDICINE

## 2024-12-18 PROCEDURE — 99283 EMERGENCY DEPT VISIT LOW MDM: CPT | Mod: 25

## 2024-12-18 PROCEDURE — 93005 ELECTROCARDIOGRAM TRACING: CPT

## 2024-12-18 RX ORDER — MECLIZINE HCL 12.5 MG 12.5 MG/1
12.5 TABLET ORAL 2 TIMES DAILY PRN
Qty: 28 TABLET | Refills: 0 | Status: SHIPPED | OUTPATIENT
Start: 2024-12-18 | End: 2025-01-02
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
## 2024-12-30 RX ORDER — DULOXETIN HYDROCHLORIDE 60 MG/1
60 CAPSULE, DELAYED RELEASE ORAL DAILY
Qty: 30 CAPSULE | Refills: 2 | OUTPATIENT
Start: 2024-12-30 | End: 2025-03-30

## 2025-01-01 ENCOUNTER — PATIENT MESSAGE (OUTPATIENT)
Dept: ORTHOPEDICS | Facility: CLINIC | Age: 45
End: 2025-01-01
Payer: MEDICAID

## 2025-01-02 ENCOUNTER — OFFICE VISIT (OUTPATIENT)
Dept: URGENT CARE | Facility: CLINIC | Age: 45
End: 2025-01-02
Payer: MEDICAID

## 2025-01-02 VITALS
HEART RATE: 58 BPM | DIASTOLIC BLOOD PRESSURE: 66 MMHG | BODY MASS INDEX: 25.01 KG/M2 | SYSTOLIC BLOOD PRESSURE: 98 MMHG | HEIGHT: 68 IN | RESPIRATION RATE: 18 BRPM | OXYGEN SATURATION: 98 % | WEIGHT: 165 LBS | TEMPERATURE: 98 F

## 2025-01-02 DIAGNOSIS — T14.90XA TRAUMA: ICD-10-CM

## 2025-01-02 DIAGNOSIS — M77.11 EPICONDYLITIS, LATERAL, RIGHT: Primary | ICD-10-CM

## 2025-01-02 PROCEDURE — 73080 X-RAY EXAM OF ELBOW: CPT | Mod: RT,S$GLB,, | Performed by: RADIOLOGY

## 2025-01-02 PROCEDURE — 99214 OFFICE O/P EST MOD 30 MIN: CPT | Mod: S$GLB,,, | Performed by: FAMILY MEDICINE

## 2025-01-02 NOTE — PATIENT INSTRUCTIONS
CONTINUE IBUPROFEN 800 MG TWICE DAILY.    TRY USING YOUR BRACE AGAIN THAT YOU WERE USING BEFORE THE SURGERY.    KEEP PLANS TO FOLLOW-UP WITH YOUR ORTHOPEDIC PROVIDER LATER THIS MONTH IF POSSIBLE

## 2025-01-02 NOTE — PROGRESS NOTES
"Subjective:      Patient ID: Wandy Shah is a 44 y.o. female.    Vitals:  height is 5' 8" (1.727 m) and weight is 74.8 kg (165 lb). Her oral temperature is 97.8 °F (36.6 °C). Her blood pressure is 98/66 and her pulse is 58 (abnormal). Her respiration is 18 and oxygen saturation is 98%.     Chief Complaint: Arm Pain    44-year-old female who fell on the sidewalk 2 weeks ago (on December 19) and since then has been experienced a recurrence of right elbow pain.  She reports having had carpal tunnel release surgery as well as epicondylitis surgery in August, and her right elbow symptoms had improved.  She is using ibuprofen 800 mg twice daily, but still with pain        Arm Pain   Her dominant hand is their right hand. The incident occurred more than 1 week ago. The incident occurred in the street. The injury mechanism was a fall. The pain is present in the right elbow. The quality of the pain is described as aching. The pain does not radiate. The pain is at a severity of 8/10. The pain is severe. The pain has been Intermittent since the incident. The symptoms are aggravated by movement. She has tried NSAIDs for the symptoms. The treatment provided no relief.       Musculoskeletal:  Positive for pain.      Objective:     Physical Exam   Constitutional: She is oriented to person, place, and time. She appears well-developed.  Non-toxic appearance. She does not appear ill. No distress.   HENT:   Head: Normocephalic and atraumatic.   Ears:   Right Ear: External ear normal.   Left Ear: External ear normal.   Pulmonary/Chest: Effort normal. No stridor. No respiratory distress.   Musculoskeletal:      Comments: Full range of motion of right elbow.  No elbow pain at rest, although she does report pain with movement.  Some point tenderness, not exquisite, is elicited over her lateral epicondyle.  No swelling or erythema.  Excellent strength noted with flexion and extension of right elbow.     Neurological: She is alert and " oriented to person, place, and time.   Skin: Skin is not diaphoretic.   Psychiatric: Her behavior is normal. Thought content normal.   Nursing note and vitals reviewed.    XRAY:    No evidence of fracture or dislocation at right elbow    Assessment:     1. Epicondylitis, lateral, right    2. Trauma        Plan:       Epicondylitis, lateral, right    Trauma  -     XR ELBOW COMPLETE 3 VIEW RIGHT; Future; Expected date: 01/02/2025    CONTINUE IBUPROFEN 800 MG TWICE DAILY.    TRY USING YOUR BRACE AGAIN THAT YOU WERE USING BEFORE THE SURGERY.    KEEP PLANS TO FOLLOW-UP WITH YOUR ORTHOPEDIC PROVIDER LATER THIS MONTH IF POSSIBLE

## 2025-01-19 ENCOUNTER — OFFICE VISIT (OUTPATIENT)
Dept: URGENT CARE | Facility: CLINIC | Age: 45
End: 2025-01-19
Payer: MEDICAID

## 2025-01-19 VITALS
RESPIRATION RATE: 16 BRPM | HEART RATE: 94 BPM | OXYGEN SATURATION: 98 % | WEIGHT: 162.5 LBS | HEIGHT: 68 IN | TEMPERATURE: 98 F | DIASTOLIC BLOOD PRESSURE: 59 MMHG | BODY MASS INDEX: 24.63 KG/M2 | SYSTOLIC BLOOD PRESSURE: 98 MMHG

## 2025-01-19 DIAGNOSIS — R05.9 COUGH, UNSPECIFIED TYPE: ICD-10-CM

## 2025-01-19 DIAGNOSIS — J32.9 BACTERIAL SINUSITIS: Primary | ICD-10-CM

## 2025-01-19 DIAGNOSIS — B96.89 BACTERIAL SINUSITIS: Primary | ICD-10-CM

## 2025-01-19 DIAGNOSIS — J02.9 SORE THROAT: ICD-10-CM

## 2025-01-19 LAB
CTP QC/QA: YES
CTP QC/QA: YES
MOLECULAR STREP A: NEGATIVE
POC MOLECULAR INFLUENZA A AGN: NEGATIVE
POC MOLECULAR INFLUENZA B AGN: NEGATIVE

## 2025-01-19 PROCEDURE — 99213 OFFICE O/P EST LOW 20 MIN: CPT | Mod: S$GLB,,, | Performed by: NURSE PRACTITIONER

## 2025-01-19 PROCEDURE — 87651 STREP A DNA AMP PROBE: CPT | Mod: QW,S$GLB,, | Performed by: NURSE PRACTITIONER

## 2025-01-19 PROCEDURE — 87502 INFLUENZA DNA AMP PROBE: CPT | Mod: QW,S$GLB,, | Performed by: NURSE PRACTITIONER

## 2025-01-19 RX ORDER — KETOROLAC TROMETHAMINE 30 MG/ML
30 INJECTION, SOLUTION INTRAMUSCULAR; INTRAVENOUS
Status: COMPLETED | OUTPATIENT
Start: 2025-01-19 | End: 2025-01-19

## 2025-01-19 RX ORDER — BENZONATATE 100 MG/1
200 CAPSULE ORAL 3 TIMES DAILY PRN
Qty: 60 CAPSULE | Refills: 0 | Status: SHIPPED | OUTPATIENT
Start: 2025-01-19 | End: 2025-02-11

## 2025-01-19 RX ORDER — PROMETHAZINE HYDROCHLORIDE AND DEXTROMETHORPHAN HYDROBROMIDE 6.25; 15 MG/5ML; MG/5ML
5 SYRUP ORAL EVERY 8 HOURS PRN
Qty: 150 ML | Refills: 0 | Status: SHIPPED | OUTPATIENT
Start: 2025-01-19 | End: 2025-01-29

## 2025-01-19 RX ADMIN — KETOROLAC TROMETHAMINE 30 MG: 30 INJECTION, SOLUTION INTRAMUSCULAR; INTRAVENOUS at 01:01

## 2025-01-19 NOTE — PATIENT INSTRUCTIONS
Discharge instructions for Bacterial Sinusitis, Cough, Sore throat  Push fluids maintain hydration  Continue to take amoxicillin clavulanate as prescribed per Dr. Tucker patient is 5 days into treatment, complete treatment  Tessalon Perles and Phenergan DM to help manage cough at home  See clinical reference discharge instructions for cough and bacterial sinusitis  Patient can take Tylenol for pain as prescribed over-the-counter medication    When do I need to call the doctor?   You have a stiff neck, especially if you also have fever, chills, vomiting, or severe headache  You have trouble thinking clearly.  You have trouble seeing or have double vision.  You have swelling or redness or pain around one or both eyes.  You have a fever of 102°F (38.9°C) or higher, or have shaking chills or sweats.  You have an upset stomach and throwing up.  You have more pain in your face and head.  You are not getting better within 1 to 2 weeks.    1) See orders for this visit as documented in the electronic medical record.  2) Symptomatic therapy suggested: use acetaminophen/ibuprofen every 6-8 hours prn pain or fever, push fluids.   3) Call or return to clinic prn if these symptoms worsen or fail to improve as anticipated.    Discussed results/diagnosis/plan with patient in clinic.  We had shared decision making for patient's treatment. Patient verbalized understanding and in agreement with current treatment plan.     Patient was instructed to return for re-evaluation with urgent care or PCP for continued outpatient workup and management if symptoms do not improve/worsening symptoms. Strict ED versus clinic precautions given in depth.    Discharge and follow-up instructions given verbally/printed with the patient who expressed understanding. The instructions and results are also available on [employer].      - You must understand that you have received an Urgent Care treatment only and that you may be released before all of your  medical problems are known or treated.   - You, the patient, will arrange for follow up care as instructed.   - Follow up with your PCP or specialty clinic as directed in the next 1-2 weeks if not improved or as needed.  You can call [phone] to schedule an appointment with the appropriate provider.   - If your condition worsens or fails to improve we recommend that you receive another evaluation at the ER immediately or contact your PCP to discuss your concerns or return here.        RAMOS Klein

## 2025-01-19 NOTE — PROGRESS NOTES
"Subjective:      Patient ID: Wandy Shah is a 44 y.o. female.    Vitals:  height is 5' 8" (1.727 m) and weight is 73.7 kg (162 lb 7.7 oz). Her oral temperature is 98.4 °F (36.9 °C). Her blood pressure is 98/59 (abnormal) and her pulse is 94. Her respiration is 16 and oxygen saturation is 98%.     Chief Complaint: Cough (I got a bad cough nasal congestion and head aches want make sure not flu r covid before I go by my grandparents - Entered by patient)    Patient complains of cough, congestion, sore throat and left ear pain that started yesterday. Patient took robitussin. Pt reports seeing Dr. Tucker Pulmonologist and started on Amoxicillin Clavulanate for sinus infection and has taken for 5 days.    Cough  This is a new problem. The current episode started yesterday. The problem has been unchanged. The problem occurs constantly. The cough is Productive of sputum. Associated symptoms include nasal congestion and a sore throat. Pertinent negatives include no chest pain, chills, ear pain, eye redness, fever, headaches, rash or shortness of breath. Nothing aggravates the symptoms. She has tried OTC cough suppressant for the symptoms. The treatment provided no relief. Her past medical history is significant for asthma.       Constitution: Negative for chills, sweating, fatigue and fever.   HENT:  Positive for sore throat. Negative for ear pain and congestion.    Neck: Negative for neck pain and neck stiffness.   Cardiovascular:  Negative for chest pain, leg swelling, palpitations and sob on exertion.   Eyes:  Negative for eye pain, eye redness and vision loss.   Respiratory:  Positive for cough. Negative for sputum production and shortness of breath.    Gastrointestinal:  Negative for abdominal pain, nausea, vomiting and diarrhea.   Genitourinary:  Negative for dysuria, frequency, urgency, flank pain and hematuria.   Musculoskeletal:  Negative for pain and trauma.   Skin:  Negative for color change and rash. "   Neurological:  Negative for dizziness, headaches and disorientation.   Psychiatric/Behavioral:  Negative for disorientation.       Objective:     Physical Exam   Constitutional: She is oriented to person, place, and time. She appears well-developed. She is cooperative.  Non-toxic appearance. She does not appear ill. No distress. awake  HENT:   Head: Normocephalic and atraumatic.   Ears:   Right Ear: Hearing, tympanic membrane, external ear and ear canal normal. no impacted cerumen  Left Ear: Hearing, tympanic membrane, external ear and ear canal normal. no impacted cerumen  Nose: Mucosal edema and congestion present. No rhinorrhea or nasal deformity. No epistaxis. Right sinus exhibits maxillary sinus tenderness and frontal sinus tenderness. Left sinus exhibits maxillary sinus tenderness and frontal sinus tenderness.   Mouth/Throat: Uvula is midline and mucous membranes are normal. No trismus in the jaw. Normal dentition. No uvula swelling. Posterior oropharyngeal erythema and cobblestoning present. No oropharyngeal exudate, posterior oropharyngeal edema or tonsillar abscesses.   Eyes: Conjunctivae and lids are normal. Pupils are equal, round, and reactive to light. No scleral icterus. Extraocular movement intact   Neck: Trachea normal and phonation normal. Neck supple. No edema present. No erythema present. No neck rigidity present.   Cardiovascular: Normal rate, regular rhythm, normal heart sounds and normal pulses.   Pulmonary/Chest: Effort normal and breath sounds normal. No respiratory distress. She has no decreased breath sounds. She has no wheezes. She has no rhonchi. She has no rales.   Abdominal: Normal appearance and bowel sounds are normal. Soft. flat abdomen   Musculoskeletal: Normal range of motion.         General: No deformity. Normal range of motion.   Neurological: no focal deficit. She is alert and oriented to person, place, and time. She exhibits normal muscle tone. Coordination normal.   Skin:  Skin is warm, dry, intact, not diaphoretic and not pale. Capillary refill takes less than 2 seconds.   Psychiatric: Her speech is normal and behavior is normal. Mood, judgment and thought content normal.   Nursing note and vitals reviewed.      Results for orders placed or performed in visit on 01/19/25   POCT Influenza A/B MOLECULAR    Collection Time: 01/19/25 12:59 PM   Result Value Ref Range    POC Molecular Influenza A Ag Negative Negative    POC Molecular Influenza B Ag Negative Negative     Acceptable Yes    POCT Strep A, Molecular    Collection Time: 01/19/25  1:14 PM   Result Value Ref Range    Molecular Strep A, POC Negative Negative     Acceptable Yes      *Note: Due to a large number of results and/or encounters for the requested time period, some results have not been displayed. A complete set of results can be found in Results Review.       Assessment:     1. Bacterial sinusitis    2. Cough, unspecified type    3. Sore throat      Discussed with patient findings flu negative and strep negative  Patient to continue to take amoxicillin clavulanate ordered by Dr. Tucker she is 5 days into treatment, patient is a start Tessalon and Phenergan DM to help manage cough at home.  Magic mouthwash for sore throat.  Plan:       Bacterial sinusitis  -     benzonatate (TESSALON) 100 MG capsule; Take 2 capsules (200 mg total) by mouth 3 (three) times daily as needed for Cough.  Dispense: 60 capsule; Refill: 0  -     promethazine-dextromethorphan (PROMETHAZINE-DM) 6.25-15 mg/5 mL Syrp; Take 5 mLs by mouth every 8 (eight) hours as needed (cough).  Dispense: 150 mL; Refill: 0  -     (Magic mouthwash) 1:1:1 diphenhydrAMINE(Benadryl) 12.5mg/5ml liq, aluminum & magnesium hydroxide-simethicone (Maalox), LIDOcaine viscous 2%; Swish and spit 5 mLs every 4 (four) hours as needed (sore throat).  Dispense: 200 mL; Refill: 0    Cough, unspecified type  -     POCT Influenza A/B MOLECULAR  -     POCT  Strep A, Molecular  -     benzonatate (TESSALON) 100 MG capsule; Take 2 capsules (200 mg total) by mouth 3 (three) times daily as needed for Cough.  Dispense: 60 capsule; Refill: 0  -     promethazine-dextromethorphan (PROMETHAZINE-DM) 6.25-15 mg/5 mL Syrp; Take 5 mLs by mouth every 8 (eight) hours as needed (cough).  Dispense: 150 mL; Refill: 0    Sore throat  -     ketorolac injection 30 mg  -     POCT Strep A, Molecular  -     (Magic mouthwash) 1:1:1 diphenhydrAMINE(Benadryl) 12.5mg/5ml liq, aluminum & magnesium hydroxide-simethicone (Maalox), LIDOcaine viscous 2%; Swish and spit 5 mLs every 4 (four) hours as needed (sore throat).  Dispense: 200 mL; Refill: 0        Patient Instructions   Discharge instructions for Bacterial Sinusitis, Cough, Sore throat  Push fluids maintain hydration  Continue to take amoxicillin clavulanate as prescribed per Dr. Tucker patient is 5 days into treatment, complete treatment  Tessalon Perles and Phenergan DM to help manage cough at home  See clinical reference discharge instructions for cough and bacterial sinusitis  Patient can take Tylenol for pain as prescribed over-the-counter medication    When do I need to call the doctor?   You have a stiff neck, especially if you also have fever, chills, vomiting, or severe headache  You have trouble thinking clearly.  You have trouble seeing or have double vision.  You have swelling or redness or pain around one or both eyes.  You have a fever of 102°F (38.9°C) or higher, or have shaking chills or sweats.  You have an upset stomach and throwing up.  You have more pain in your face and head.  You are not getting better within 1 to 2 weeks.    1) See orders for this visit as documented in the electronic medical record.  2) Symptomatic therapy suggested: use acetaminophen/ibuprofen every 6-8 hours prn pain or fever, push fluids.   3) Call or return to clinic prn if these symptoms worsen or fail to improve as anticipated.    Discussed  results/diagnosis/plan with patient in clinic.  We had shared decision making for patient's treatment. Patient verbalized understanding and in agreement with current treatment plan.     Patient was instructed to return for re-evaluation with urgent care or PCP for continued outpatient workup and management if symptoms do not improve/worsening symptoms. Strict ED versus clinic precautions given in depth.    Discharge and follow-up instructions given verbally/printed with the patient who expressed understanding. The instructions and results are also available on [employer].      - You must understand that you have received an Urgent Care treatment only and that you may be released before all of your medical problems are known or treated.   - You, the patient, will arrange for follow up care as instructed.   - Follow up with your PCP or specialty clinic as directed in the next 1-2 weeks if not improved or as needed.  You can call [phone] to schedule an appointment with the appropriate provider.   - If your condition worsens or fails to improve we recommend that you receive another evaluation at the ER immediately or contact your PCP to discuss your concerns or return here.        RAMOS Klein

## 2025-01-23 RX ORDER — IBUPROFEN 800 MG/1
800 TABLET ORAL 3 TIMES DAILY
Qty: 90 TABLET | Refills: 1 | Status: SHIPPED | OUTPATIENT
Start: 2025-01-23

## 2025-01-29 ENCOUNTER — OFFICE VISIT (OUTPATIENT)
Dept: ORTHOPEDICS | Facility: CLINIC | Age: 45
End: 2025-01-29
Payer: MEDICAID

## 2025-01-29 DIAGNOSIS — G56.21 CUBITAL TUNNEL SYNDROME ON RIGHT: ICD-10-CM

## 2025-01-29 DIAGNOSIS — M77.11 LATERAL EPICONDYLITIS OF RIGHT ELBOW: Primary | ICD-10-CM

## 2025-01-29 DIAGNOSIS — G56.01 CARPAL TUNNEL SYNDROME ON RIGHT: ICD-10-CM

## 2025-01-29 PROCEDURE — 99214 OFFICE O/P EST MOD 30 MIN: CPT | Mod: PBBFAC,PN

## 2025-01-29 PROCEDURE — 99214 OFFICE O/P EST MOD 30 MIN: CPT | Mod: S$PBB,,,

## 2025-01-29 PROCEDURE — 99999 PR PBB SHADOW E&M-EST. PATIENT-LVL IV: CPT | Mod: PBBFAC,,,

## 2025-01-29 PROCEDURE — 1159F MED LIST DOCD IN RCRD: CPT | Mod: CPTII,,,

## 2025-01-29 PROCEDURE — 1160F RVW MEDS BY RX/DR IN RCRD: CPT | Mod: CPTII,,,

## 2025-01-29 NOTE — PROGRESS NOTES
Subjective:      Patient ID: Wandy Shah is a 44 y.o. female.    Chief Complaint: Post-op Follow Up    HPI: Wandy Shah returns for a post-op visit approximately 5 months following: right carpal tunnel release, right elbow ulnar nerve decompression, and right elbow lateral epicondylar release and repair (date of surgery 08/23/2024) with Dr. Ayala.  Patient states she recently fell on 12/19/2024, and has been having a sore aching pain following this incident.  The pain is mostly noticeable when she pulls heavy objects.  Pain today 8/10.    PAST MEDICAL HISTORY:    Past Medical History:   Diagnosis Date    Anemia     Back pain     Depression      MEDICATIONS:   Current Outpatient Medications:     albuterol (PROVENTIL/VENTOLIN HFA) 90 mcg/actuation inhaler, Inhale 2 puffs into the lungs every 4 (four) hours as needed for Wheezing, Disp: 8.5 g, Rfl: 11    amoxicillin-clavulanate 875-125mg (AUGMENTIN) 875-125 mg per tablet, Take 1 tablet by mouth 2 (two) times daily for 14 days, Disp: 28 tablet, Rfl: 0    ashwagandha root extract 500 mg Cap, Take 2 capsules orally as needed., Disp: 180 capsule, Rfl: 4    benzonatate (TESSALON) 100 MG capsule, Take 2 capsules (200 mg total) by mouth 3 (three) times daily as needed for Cough., Disp: 60 capsule, Rfl: 0    cetirizine (ZYRTEC) 10 MG tablet, Take 1 tablet (10 mg total) by mouth once daily., Disp: 30 tablet, Rfl: 11    dicyclomine (BENTYL) 20 mg tablet, Take 1 tablet (20 mg total) by mouth 3 (three) times daily as needed (abdominal pain)., Disp: 60 tablet, Rfl: 5    dupilumab (DUPIXENT PEN) 300 mg/2 mL PnIj, Inject 300 mg into the skin every 14 (fourteen) days, Disp: 4 mL, Rfl: 5    fluticasone propionate (FLONASE) 50 mcg/actuation nasal spray, 2 sprays by Nasal route., Disp: , Rfl:     fluticasone-umeclidin-vilanter (TRELEGY ELLIPTA) 200-62.5-25 mcg inhaler, Inhale 1 puff into the lungs every evening., Disp: 180 each, Rfl: 3    gabapentin (NEURONTIN) 100 MG capsule, Take 1  capsule (100 mg total) by mouth 3 (three) times daily as needed., Disp: 30 capsule, Rfl: 6    ibuprofen (ADVIL,MOTRIN) 800 MG tablet, Take 1 tablet (800 mg total) by mouth 3 (three) times daily., Disp: 90 tablet, Rfl: 1    levocetirizine (XYZAL) 5 MG tablet, Take 1 tablet (5 mg total) by mouth nightly as needed for allergies., Disp: 30 tablet, Rfl: 11    lubiprostone (AMITIZA) 24 MCG Cap, Take 1 capsule (24 mcg total) by mouth 2 (two) times daily with meals., Disp: 60 capsule, Rfl: 5    magic mouthwash diphen/antac/lidoc, Swish and spit 5 mLs every 4 (four) hours as needed (sore throat)., Disp: 200 mL, Rfl: 0    metoprolol succinate (TOPROL-XL) 25 MG 24 hr tablet, Take one-half tablet by mouth once a day., Disp: 45 tablet, Rfl: 4    metoprolol succinate (TOPROL-XL) 25 MG 24 hr tablet, Take 1 tablet by mouth daily Hold for Heart Rate less than 55, Disp: 30 tablet, Rfl: 11    omeprazole (PRILOSEC) 40 MG capsule, Take 1 capsule (40 mg total) by mouth once daily., Disp: 30 capsule, Rfl: 5    ondansetron (ZOFRAN-ODT) 4 MG TbDL, DISSOLVE 1 tablet (4 mg total) by mouth 4 (four) times daily before meals and nightly., Disp: 120 tablet, Rfl: 5    promethazine-dextromethorphan (PROMETHAZINE-DM) 6.25-15 mg/5 mL Syrp, Take 5 mLs by mouth every 8 (eight) hours as needed (cough)., Disp: 150 mL, Rfl: 0    rizatriptan (MAXALT) 10 MG tablet, 1/2 - 1 at onset of migraine; may repeat in 2 hours if needed; maximum 2 in 24 hours; maximum 3 days per week, Disp: 10 tablet, Rfl: 2    topiramate (TOPAMAX) 25 MG tablet, Take 3 tablets (75 mg total) by mouth every evening., Disp: , Rfl:     amitriptyline (ELAVIL) 25 MG tablet, Take 1 tablet (25 mg total) by mouth every evening., Disp: 30 tablet, Rfl: 2    DULoxetine (CYMBALTA) 60 MG capsule, Take 1 capsule (60 mg total) by mouth once daily., Disp: 30 capsule, Rfl: 2    meclizine (ANTIVERT) 12.5 mg tablet, Take 1 tablet (12.5 mg total) by mouth 2 (two) times daily as needed for Dizziness.,  Disp: 28 tablet, Rfl: 0    ALLERGIES:   Review of patient's allergies indicates:   Allergen Reactions    Raspberry (rubus idaeus) Hives       Review of Systems:  Constitution: Negative for chills, fever and night sweats.   HENT: Negative for congestion and headaches.    Eyes: Negative for blurred vision or vision loss.  Cardiovascular: Negative for chest pain and syncope.   Respiratory: Negative for cough and shortness of breath.    Endocrine: Negative for polydipsia, polyphagia and polyuria.   Hematologic/Lymphatic: Negative for bleeding problem. Does not bruise/bleed easily.   Skin: Negative for dry skin, itching and rash.   Musculoskeletal: See HPI.   Gastrointestinal: Negative for abdominal pain and bowel incontinence.   Genitourinary: Negative for bladder incontinence and nocturia.   Neurological: Negative for disturbances in coordination, loss of balance and seizures.   Psychiatric/Behavioral: Negative for depression. The patient does not have insomnia.    Allergic/Immunologic: Negative for hives and persistent infections.        Objective:      There were no vitals filed for this visit.    PHYSICAL EXAM:  General: Alert & oriented x3, well-developed and well-nourished, in no acute distress, sitting comfortably in the exam room.  Skin: Warm and dry. Capillary refill less than 2 seconds.   Head: Normocephalic and atraumatic.   Eyes: Sclera appear normal.   Nose: No deformities seen.   Ears: No deformities seen.   Neck: No tracheal deviation present.   Pulmonary/Chest: Breathing unlabored.   Neurological: Alert and oriented to person, place, and time.   Psychiatric: Mood is pleasant and affect appropriate.     RIGHT HAND, WRIST, ELBOW:        Observation/Inspection:    Surgical incision is well healed with appropriate scar formation.  No redness, warmth, drainage, or other signs of infection.  No swelling.        Palpation:   No tenderness to palpation to bony prominences and soft tissues throughout.         Range of Motion:  Elbow: Full to flexion, extension, supination, and pronation without pain or difficulty.  Wrist: Full to wrist flexion, extension, radial, and ulnar deviation without pain or difficulty.  Digits: Full to digit MCP, PIP, and DIP flexion and extension without pain or difficulty.         Strength:    strength not tested today.         Neurovascular Exam:  Digits warm and well perfused, brisk capillary refill <3 seconds throughout  NVI motor/LTS to median, radial, and ulnar nerves, radial pulse 2+    Imaging:    X-Rays:  3 views of the right elbow dated 01/02/2025, and independently reviewed, show: No acute fractures or dislocations. Joint spaces are preserved. No evidence of foreign bodies.         Assessment:       1. Lateral epicondylitis of right elbow    2. Carpal tunnel syndrome on right    3. Cubital tunnel syndrome on right        Plan:          5 months s/p right carpal tunnel release, right elbow ulnar nerve decompression, and right elbow lateral epicondylar release and repair     Overall patient is doing well post-operatively.    I did explain to the patient sounds like she just had an aggravating incident.    Wound Care:  Continue scar massage.  Medications:  Continue with OTC Tylenol and/or Ibuprofen as needed for pain.   HEP:  Continue with previously instructed and demonstrated hand, wrist, and elbow ROM HEP.   Occupational Therapy:  None today.  Consider referral to occupational therapy next visit if symptoms do not improve.  Activity:  Continue to advance activities as tolerated.  Pain Management: Ice compress to the affected area 2-3x a day for 15-20 minutes as needed for pain management.      Follow-Up: 4 weeks for virtual visit.  Consider therapy referral if needed.    All of the patient's questions were answered and the patient will contact us if they have any questions or concerns in the interim.    Jenifer Luna PA-C  [facility]  Orthopedic Surgery    Medical  Dictation software was used during the dictation of portions or the entirety of this medical record.  Phonetic or grammatic errors may exist due to the use of this software. For clarification, refer to the author of the document.

## 2025-02-02 DIAGNOSIS — R42 DIZZINESS: ICD-10-CM

## 2025-02-02 RX ORDER — MECLIZINE HCL 12.5 MG 12.5 MG/1
12.5 TABLET ORAL 2 TIMES DAILY PRN
Qty: 28 TABLET | Refills: 0 | Status: CANCELLED | OUTPATIENT
Start: 2025-02-02 | End: 2025-02-16

## 2025-02-04 DIAGNOSIS — R42 DIZZINESS: ICD-10-CM

## 2025-02-04 RX ORDER — MECLIZINE HCL 12.5 MG 12.5 MG/1
12.5 TABLET ORAL 2 TIMES DAILY PRN
Qty: 28 TABLET | Refills: 0 | Status: CANCELLED | OUTPATIENT
Start: 2025-02-02 | End: 2025-02-16

## 2025-02-13 ENCOUNTER — PATIENT MESSAGE (OUTPATIENT)
Dept: ORTHOPEDICS | Facility: CLINIC | Age: 45
End: 2025-02-13
Payer: MEDICAID

## 2025-02-13 DIAGNOSIS — R42 DIZZINESS: ICD-10-CM

## 2025-02-13 RX ORDER — MECLIZINE HCL 12.5 MG 12.5 MG/1
12.5 TABLET ORAL 2 TIMES DAILY PRN
Qty: 28 TABLET | Refills: 0 | Status: CANCELLED | OUTPATIENT
Start: 2025-02-02 | End: 2025-02-16

## 2025-02-21 ENCOUNTER — OFFICE VISIT (OUTPATIENT)
Dept: URGENT CARE | Facility: CLINIC | Age: 45
End: 2025-02-21
Payer: MEDICAID

## 2025-02-21 VITALS
HEIGHT: 68 IN | HEART RATE: 70 BPM | DIASTOLIC BLOOD PRESSURE: 67 MMHG | BODY MASS INDEX: 24.89 KG/M2 | RESPIRATION RATE: 18 BRPM | SYSTOLIC BLOOD PRESSURE: 102 MMHG | TEMPERATURE: 98 F | WEIGHT: 164.25 LBS | OXYGEN SATURATION: 98 %

## 2025-02-21 DIAGNOSIS — J31.0 CHRONIC RHINITIS: ICD-10-CM

## 2025-02-21 DIAGNOSIS — J02.9 ACUTE PHARYNGITIS, UNSPECIFIED ETIOLOGY: Primary | ICD-10-CM

## 2025-02-21 LAB
CTP QC/QA: YES
MOLECULAR STREP A: NEGATIVE

## 2025-02-21 RX ORDER — FLUTICASONE PROPIONATE 50 MCG
1 SPRAY, SUSPENSION (ML) NASAL DAILY
Qty: 16 G | Refills: 0 | Status: SHIPPED | OUTPATIENT
Start: 2025-02-21

## 2025-02-21 NOTE — PROGRESS NOTES
"Subjective:      Patient ID: Wandy Shah is a 44 y.o. female.    Vitals:  height is 5' 8" (1.727 m) and weight is 74.5 kg (164 lb 3.9 oz). Her oral temperature is 97.9 °F (36.6 °C). Her blood pressure is 102/67 and her pulse is 70. Her respiration is 18 and oxygen saturation is 98%.     Chief Complaint: Sore Throat (Entered by patient)    44-year-old female here for sore throat x1 week.  Also has some mild coughing congestion that appears to be chronic.  Denies fever, chills, nausea, vomiting, diarrhea, ear pain, headaches or body aches.    Sore Throat   This is a new problem. The current episode started in the past 7 days. The problem has been unchanged. Neither side of throat is experiencing more pain than the other. There has been no fever. The pain is at a severity of 6/10. The pain is moderate. Associated symptoms include congestion and coughing. Pertinent negatives include no abdominal pain, diarrhea, headaches, neck pain, shortness of breath or vomiting. She has tried gargles for the symptoms. The treatment provided no relief.       Constitution: Negative for chills and fever.   HENT:  Positive for congestion and sore throat.    Neck: Negative for neck pain and neck stiffness.   Cardiovascular:  Negative for chest pain.   Respiratory:  Positive for cough. Negative for shortness of breath.    Gastrointestinal:  Negative for abdominal pain, nausea, vomiting and diarrhea.   Musculoskeletal:  Negative for muscle ache.   Skin:  Negative for rash.   Allergic/Immunologic: Negative for sneezing.   Neurological:  Negative for dizziness and headaches.      Objective:     Physical Exam   Constitutional: She is oriented to person, place, and time. She appears well-developed.   HENT:   Head: Normocephalic and atraumatic.   Ears:   Right Ear: Tympanic membrane, external ear and ear canal normal.   Left Ear: Tympanic membrane, external ear and ear canal normal.   Nose: Nose normal.   Mouth/Throat: Oropharynx is clear and " moist. Mucous membranes are moist. Oropharynx is clear.   Eyes: Conjunctivae, EOM and lids are normal.   Neck: Trachea normal and phonation normal. Neck supple.   Cardiovascular: Normal rate and regular rhythm.   Pulmonary/Chest: Effort normal and breath sounds normal.   Musculoskeletal: Normal range of motion.         General: Normal range of motion.   Neurological: She is alert and oriented to person, place, and time.   Skin: Skin is warm, dry and intact.   Psychiatric: Her speech is normal and behavior is normal. Judgment and thought content normal.   Nursing note and vitals reviewed.    Results for orders placed or performed in visit on 02/21/25   POCT Strep A, Molecular    Collection Time: 02/21/25  3:25 PM   Result Value Ref Range    Molecular Strep A, POC Negative Negative     Acceptable Yes      *Note: Due to a large number of results and/or encounters for the requested time period, some results have not been displayed. A complete set of results can be found in Results Review.         Assessment:     1. Acute pharyngitis, unspecified etiology    2. Chronic rhinitis        Plan:       Acute pharyngitis, unspecified etiology  -     POCT Strep A, Molecular  -     (Magic mouthwash) 1:1:1 diphenhydrAMINE(Benadryl) 12.5mg/5ml liq, aluminum & magnesium hydroxide-simethicone (Maalox), LIDOcaine viscous 2%; Swish and spit 10 mLs every 4 (four) hours as needed (sore throat).  Dispense: 180 mL; Refill: 0    Chronic rhinitis  -     fluticasone propionate (FLONASE) 50 mcg/actuation nasal spray; 1 spray (50 mcg total) by Each Nostril route once daily.  Dispense: 15.8 mL; Refill: 0            Patient Instructions   Take Flonase as prescribed for nasal congestion and postnasal drip.    You can use lb to mouthwash as needed for sore throat.    You can take antihistamine such as Claritin, Zyrtec, or Allegra to additionally help with sinus symptoms.  Over-the-counter Tylenol or ibuprofen for pain.    - Follow up  with your PCP or specialty clinic as directed in the next 1-2 weeks if not improved or as needed.  You can call [phone] to schedule an appointment with the appropriate provider.    - Go to the ER or seek medical attention immediately if you develop new or worsening symptoms.    - You must understand that you have received an Urgent Care treatment only and that you may be released before all of your medical problems are known or treated.   - You, the patient, will arrange for follow up care as instructed.   - If your condition worsens or fails to improve we recommend that you receive another evaluation at the ER immediately or contact your PCP to discuss your concerns or return here.

## 2025-02-21 NOTE — PATIENT INSTRUCTIONS
Take Flonase as prescribed for nasal congestion and postnasal drip.    You can use magic mouthwash as needed for sore throat.    You can take antihistamine such as Claritin, Zyrtec, or Allegra to additionally help with sinus symptoms.  Over-the-counter Tylenol or ibuprofen for pain.    - Follow up with your PCP or specialty clinic as directed in the next 1-2 weeks if not improved or as needed.  You can call [phone] to schedule an appointment with the appropriate provider.    - Go to the ER or seek medical attention immediately if you develop new or worsening symptoms.    - You must understand that you have received an Urgent Care treatment only and that you may be released before all of your medical problems are known or treated.   - You, the patient, will arrange for follow up care as instructed.   - If your condition worsens or fails to improve we recommend that you receive another evaluation at the ER immediately or contact your PCP to discuss your concerns or return here.

## 2025-02-26 ENCOUNTER — PATIENT MESSAGE (OUTPATIENT)
Dept: OBSTETRICS AND GYNECOLOGY | Facility: CLINIC | Age: 45
End: 2025-02-26
Payer: MEDICAID

## 2025-02-27 ENCOUNTER — PATIENT MESSAGE (OUTPATIENT)
Dept: ADMINISTRATIVE | Facility: OTHER | Age: 45
End: 2025-02-27
Payer: MEDICAID

## 2025-03-03 ENCOUNTER — OFFICE VISIT (OUTPATIENT)
Dept: OBSTETRICS AND GYNECOLOGY | Facility: CLINIC | Age: 45
End: 2025-03-03
Payer: MEDICAID

## 2025-03-03 VITALS — SYSTOLIC BLOOD PRESSURE: 113 MMHG | DIASTOLIC BLOOD PRESSURE: 78 MMHG | WEIGHT: 165.06 LBS | BODY MASS INDEX: 25.1 KG/M2

## 2025-03-03 DIAGNOSIS — R30.0 DYSURIA: ICD-10-CM

## 2025-03-03 DIAGNOSIS — N89.8 VAGINAL DISCHARGE: Primary | ICD-10-CM

## 2025-03-03 PROCEDURE — 99999 PR PBB SHADOW E&M-EST. PATIENT-LVL IV: CPT | Mod: PBBFAC,,, | Performed by: OBSTETRICS & GYNECOLOGY

## 2025-03-03 PROCEDURE — 99213 OFFICE O/P EST LOW 20 MIN: CPT | Mod: S$PBB,,, | Performed by: OBSTETRICS & GYNECOLOGY

## 2025-03-03 PROCEDURE — 1160F RVW MEDS BY RX/DR IN RCRD: CPT | Mod: CPTII,,, | Performed by: OBSTETRICS & GYNECOLOGY

## 2025-03-03 PROCEDURE — 3008F BODY MASS INDEX DOCD: CPT | Mod: CPTII,,, | Performed by: OBSTETRICS & GYNECOLOGY

## 2025-03-03 PROCEDURE — 99214 OFFICE O/P EST MOD 30 MIN: CPT | Mod: PBBFAC,PO | Performed by: OBSTETRICS & GYNECOLOGY

## 2025-03-03 PROCEDURE — 1159F MED LIST DOCD IN RCRD: CPT | Mod: CPTII,,, | Performed by: OBSTETRICS & GYNECOLOGY

## 2025-03-03 PROCEDURE — 87491 CHLMYD TRACH DNA AMP PROBE: CPT

## 2025-03-03 PROCEDURE — 3078F DIAST BP <80 MM HG: CPT | Mod: CPTII,,, | Performed by: OBSTETRICS & GYNECOLOGY

## 2025-03-03 PROCEDURE — 3074F SYST BP LT 130 MM HG: CPT | Mod: CPTII,,, | Performed by: OBSTETRICS & GYNECOLOGY

## 2025-03-03 PROCEDURE — 87086 URINE CULTURE/COLONY COUNT: CPT

## 2025-03-03 NOTE — PROGRESS NOTES
"CC: "UTI concerns"    HPI:   44 y.o. female  complains of UTI concerns. Endorses dysuria/irritation for 1 week. Denies hematuria, fever, chills. Also reports frequent "boils" in groin area.  Patient's last menstrual period was 2022 (exact date)..  She is not sexually active.  She uses bilateral tubal ligation, hysterectomy for contraception.    ROS:  GENERAL: No fever, chills, fatigability or weight loss.  VULVAR: No pain, no lesions and no itching.  VAGINAL: No relaxation, no itching, no discharge, no abnormal bleeding and no lesions.  ABDOMEN: No abdominal pain. Denies nausea. Denies vomiting. No diarrhea. No constipation  BREAST: Denies pain. No lumps. No discharge.  URINARY: No incontinence, no nocturia, no frequency and no dysuria.  CARDIOVASCULAR: No chest pain. No shortness of breath. No leg cramps.  NEUROLOGICAL: No headaches. No vision changes.        Vitals:    25 0802   BP: 113/78         OBJECTIVE:   She appears well, afebrile.  Abdomen: benign, soft, nontender, no masses.  VULVA: Normal external female genitalia, normal urethra, normal urethral meatus  VAGINA:discharge: copious, white, and curd-like  CERVIXabsent cervix  UTERUSuterus absent  ADNEXAabsent        ASSESSMENT:   rule out UTI, vaginitis, GC, or chlamydia    PLAN:   Orders Placed This Encounter    Urine Culture High Risk    Vaginosis Screen by DNA Probe    C. trachomatis/N. gonorrhoeae by AMP DNA     ROV prn if symptoms persist or worsen.     ________________________  Amauri Mg MD  [facility] PGY-3      "

## 2025-03-04 DIAGNOSIS — R42 DIZZINESS: ICD-10-CM

## 2025-03-04 LAB
BACTERIA UR CULT: NORMAL
BACTERIA UR CULT: NORMAL

## 2025-03-04 RX ORDER — MECLIZINE HCL 12.5 MG 12.5 MG/1
12.5 TABLET ORAL 2 TIMES DAILY PRN
Qty: 28 TABLET | Refills: 0 | Status: CANCELLED | OUTPATIENT
Start: 2025-03-04 | End: 2025-03-18

## 2025-03-05 ENCOUNTER — PATIENT MESSAGE (OUTPATIENT)
Dept: OBSTETRICS AND GYNECOLOGY | Facility: CLINIC | Age: 45
End: 2025-03-05
Payer: MEDICAID

## 2025-03-05 LAB
C TRACH DNA SPEC QL NAA+PROBE: NOT DETECTED
N GONORRHOEA DNA SPEC QL NAA+PROBE: NOT DETECTED

## 2025-03-07 DIAGNOSIS — R42 DIZZINESS: ICD-10-CM

## 2025-03-09 ENCOUNTER — PATIENT MESSAGE (OUTPATIENT)
Dept: SURGERY | Facility: CLINIC | Age: 45
End: 2025-03-09
Payer: MEDICAID

## 2025-03-17 ENCOUNTER — PATIENT MESSAGE (OUTPATIENT)
Dept: GASTROENTEROLOGY | Facility: CLINIC | Age: 45
End: 2025-03-17
Payer: MEDICAID

## 2025-03-17 ENCOUNTER — OFFICE VISIT (OUTPATIENT)
Dept: SURGERY | Facility: CLINIC | Age: 45
End: 2025-03-17
Payer: MEDICAID

## 2025-03-17 VITALS
WEIGHT: 167.56 LBS | HEIGHT: 68 IN | SYSTOLIC BLOOD PRESSURE: 116 MMHG | BODY MASS INDEX: 25.39 KG/M2 | HEART RATE: 70 BPM | DIASTOLIC BLOOD PRESSURE: 65 MMHG

## 2025-03-17 DIAGNOSIS — R45.82 FEELING WORRIED: ICD-10-CM

## 2025-03-17 DIAGNOSIS — K21.9 GASTROESOPHAGEAL REFLUX DISEASE, UNSPECIFIED WHETHER ESOPHAGITIS PRESENT: ICD-10-CM

## 2025-03-17 DIAGNOSIS — R11.2 NAUSEA AND VOMITING, UNSPECIFIED VOMITING TYPE: ICD-10-CM

## 2025-03-17 DIAGNOSIS — Z13.31 POSITIVE DEPRESSION SCREENING: ICD-10-CM

## 2025-03-17 DIAGNOSIS — K31.84 GASTROPARESIS: Primary | ICD-10-CM

## 2025-03-17 PROCEDURE — 99999 PR PBB SHADOW E&M-EST. PATIENT-LVL V: CPT | Mod: PBBFAC,,,

## 2025-03-17 PROCEDURE — 3074F SYST BP LT 130 MM HG: CPT | Mod: CPTII,,,

## 2025-03-17 PROCEDURE — 99215 OFFICE O/P EST HI 40 MIN: CPT | Mod: PBBFAC

## 2025-03-17 PROCEDURE — 99214 OFFICE O/P EST MOD 30 MIN: CPT | Mod: S$PBB,,,

## 2025-03-17 PROCEDURE — 3008F BODY MASS INDEX DOCD: CPT | Mod: CPTII,,,

## 2025-03-17 PROCEDURE — 1160F RVW MEDS BY RX/DR IN RCRD: CPT | Mod: CPTII,,,

## 2025-03-17 PROCEDURE — 3078F DIAST BP <80 MM HG: CPT | Mod: CPTII,,,

## 2025-03-17 PROCEDURE — 1159F MED LIST DOCD IN RCRD: CPT | Mod: CPTII,,,

## 2025-03-17 RX ORDER — SCOPOLAMINE 1 MG/3D
1 PATCH, EXTENDED RELEASE TRANSDERMAL
Qty: 10 PATCH | Refills: 0 | Status: SHIPPED | OUTPATIENT
Start: 2025-03-17 | End: 2025-04-17

## 2025-03-17 RX ORDER — MECLIZINE HCL 12.5 MG 12.5 MG/1
12.5 TABLET ORAL 2 TIMES DAILY PRN
Qty: 28 TABLET | Refills: 0 | OUTPATIENT
Start: 2025-03-17 | End: 2025-03-31

## 2025-03-17 RX ORDER — ERYTHROMYCIN 500 MG/1
500 TABLET, COATED ORAL 2 TIMES DAILY WITH MEALS
Qty: 60 TABLET | Refills: 1 | Status: SHIPPED | OUTPATIENT
Start: 2025-03-17 | End: 2025-05-16

## 2025-03-17 RX ORDER — FAMOTIDINE 20 MG/1
20 TABLET, FILM COATED ORAL 2 TIMES DAILY PRN
Qty: 60 TABLET | Refills: 5 | Status: SHIPPED | OUTPATIENT
Start: 2025-03-17

## 2025-03-17 NOTE — PROGRESS NOTES
[facility] H[facility]  Gastroparesis Follow-Up    ASSESSMENT AND PLAN:     We discussed Wandy Shah's gastroparesis and associated diagnoses today.     Gastroparesis symptoms are extremely severe despite surgical treatment, which is worsened from severe.  GERD symptoms are extremely severe with moderate dysphagia, which is worsened from mild.   Complete EGD with pH probe and specifically assess pylorus.  Need to determine the degree of symptom experience caused by GERD.   Constipation is stable.  Patient manages with medication regimen.   Nutrition: Wandy Shah is not likely to be malnourished.  Reviewed intake options when feeling poorly.    Positive PHQ-9 depression screening: I have reviewed the positive depression score which warrants active treatment with psychotherapy and/or medications.   Ms. Shah is interested in talk therapy, I have placed this referral.   She expresses anxiety about possibly needing a colostomy herself.  We reviewed which provider(s) to see about these concerns.  I discussed that a stomach issue is not necessarily related to an intestinal issue.  I discussed that without knowing the reason her grandmother required a colectomy, I cannot give her specific direction on what to do, who to see, and how to reassure herself that she will not require this intervention.  I recommended she speak to her gastroenterologist for a more thorough review of her symptoms, personal and family medical history, and risk factors.      Ms. Shah verbalized understanding to all information provided and voiced no further questions, comments, or concerns.      RTC in 3 months with EGD complete, or sooner PRN: concern regarding postop recovery or concern for new or worsening symptoms.     Due to gastroparesis pathophysiology, this patient should not be on GLP-1 medications and opioids.          SUBJECTIVE:     Ms. Shah is a known patient of Dr. Syed MD, FACS.  She provided the history.   "    Wandy Shah is a 44 y.o. y/o female with a BMI of 25.48 kg/m^2 - weight of 76 kg (167 lb 8.8 oz) - and gastroparesis, migraines, anxiety, asthma, SVT, GERD, constipation, and acute low back pain.  Relevant surgical history includes robotic pyloroplasty (6/2024), robotic hysterectomy and bilateral salpingectomy, loop recorder implantation (11/2024), and c-sections.  She has been to an emergency facility 5 times in the last year and has not received narcotics at any of these visits.     History 3/17/25:   Pertinent history since last visit: Cardiac loop recorder implanted in November.  She is not improved since last visit.  She is net 4 lbs weight loss since October 2024.      Wandy Shah reports her most troubling symptom today is the reflux, which restarted approximately 2-3 weeks ago.  It gradually increased to its current level.  It has not improved.  Eating and drinking make the symptom worse and sometimes medications make the symptom better.  She recently got a new job and her hours have changed, she was let go in August.     Eats or drinks and her stomach immediately feels she is full.  She does not use straws, drink carbonated drinks, and eat fried/fatty foods.  She has tried pepto-bismol and some pills for nausea that she got from [facility] but she doesn't know what it is, neither of these are helping.  She also takes Zofran for nausea.  These medications are not consistently helpful.  When symptoms are poor, she feels "like something is sitting in epigastric area and doesn't move."     Her grandmother recently underwent a colectomy (likely partial) for unknown reason.  She is concerned that she may be heading in the same direction with her stomach issues.  She endorses chills - feeling alternately hot and cold - and suspects fever, but does not confirm with a thermometer.  She has had a hysterectomy but her ovaries were not removed.          Gastroparesis Assessments  Symptoms 3/17/25:    Severity " Frequency   Vomiting 3 3    Nausea 4 4   Early satiety 4 4   Bloating 4 4   Postprandial fullness 4 4   Epigastric pain 4 4   Epigastric burning 4 4   # episodes of vomiting in last 24 hours   0   Impression: Gastroparesis symptoms are extremely severe.       GERD Symptoms  + PPI - omeprazole 40mg qam   + Typical heartburn  + Regurgitation  + Dysphagia (yes solids, yes liquids).  If yes, see Eckardt score, below.  + Hoarseness  + Sore throat  + Cough  + Asthma  + Chest pain - all the time, usually when coughing very hard, always in the center of her chest/substernal, 7/10 at worst, burning - reviewed heart attack s/sx in women   + Water brash  + Globus  + Nausea  + Vomiting    Eckardt Score:   Weight Loss (kg): 0 - None  Dysphagia: 1 - Occasional   Retrosternal pain: 3 - Every meal  Regurgitation: 1 - Occasional   Total Score: 5    Impression: GERD symptoms are extremely severe with moderate dysphagia.      Diet Assessment:  Fluids: Poorly tolerated (nausea and vomiting)  Solids: Poorly tolerated (nausea and vomiting)  Nutrition supplements:  not tried  Impression: PO intake is moderately reduced.       Medications:  She has tried:   nothing for motility  ondansetron and OTC med (unknown, see HPI) for nausea and vomiting  dicyclomine for epigastric or abdominal pain  omeprazole, famotidine, and OTC med (pepto) for epigastric burning or GERD  dulcolax and linaclotide  (Linzess) for constipation   nothing for diarrhea  THC: Yes, currently uses > 5 times daily for symptoms (nausea, vomiting, and poor appetite)  Of these, she is currently taking zofran, OTC med for nausea (not sure which one), bentyl, prilosec, pepcid AC, pepto-bismol, linzess, dulcolax, and THC.  She feels like her symptoms are not well controlled.    reviewed 3/17/25: percocet 5 (11/2024), percocet 7.5 (8/2024)        Review of Systems:  Review of Systems   Constitutional:  Positive for chills, fever, malaise/fatigue and weight loss.   HENT:   Positive for sore throat.    Eyes: Negative.    Respiratory:  Positive for cough. Negative for shortness of breath.    Cardiovascular:  Positive for chest pain (see HPI).   Gastrointestinal:  Positive for abdominal pain, constipation, heartburn, nausea and vomiting. Negative for diarrhea.   Genitourinary:  Positive for frequency.   Musculoskeletal:  Positive for back pain.   Skin: Negative.    Neurological:  Positive for dizziness, weakness and headaches. Negative for tremors, seizures and loss of consciousness.   Endo/Heme/Allergies:  Bruises/bleeds easily.   Psychiatric/Behavioral:  Positive for depression. Negative for suicidal ideas. The patient has insomnia. The patient is not nervous/anxious.          PHQ-2 Screening:      3/17/2025     2:48 PM 10/4/2024     1:44 PM   PHQ2 & PHQ9 Depression Results   Over the last two weeks how often have you been bothered by little interest or pleasure in doing things 3 0   Over the last two weeks how often have you been bothered by feeling down, depressed or hopeless 3 0   PHQ-2 Total Score 6 0   Over the last two weeks how often have you been bothered by trouble falling or staying asleep, or sleeping too much 2    Over the last two weeks how often have you been bothered by feeling tired or having little energy 2    Over the last two weeks how often have you been bothered by a poor appetite or overeating 3    Over the last two weeks how often have you been bothered by feeling bad about yourself - or that you are a failure or have let yourself or your family down 1    Over the last two weeks how often have you been bothered by trouble concentrating on things, such as reading the newspaper or watching television 1    Over the last two weeks how often have you been bothered by moving or speaking so slowly that other people could have noticed. Or the opposite - being so fidgety or restless that you have been moving around a lot more than usual. 0    Over the last two weeks how  often have you been bothered by thoughts that you would be better off dead, or of hurting yourself 0    If you checked off any problems, how difficult have these problems made it for you to do your work, take care of things at home or get along with other people? Somewhat difficult    PHQ-9 Score 15           OBJECTIVE:     Most Recent Vital Signs:   Pulse: 70 (03/17/25 1446)  BP: 116/65 (03/17/25 1446)      Physical Exam:   Physical Exam  Vitals reviewed.   Constitutional:       General: She is awake. She is not in acute distress.     Appearance: Normal appearance.   HENT:      Head: Normocephalic and atraumatic.      Mouth/Throat:      Mouth: Mucous membranes are moist. No oral lesions.      Tongue: No lesions.      Palate: No lesions.      Tonsils: 0 on the right. 0 on the left.   Cardiovascular:      Rate and Rhythm: Normal rate.   Pulmonary:      Effort: Pulmonary effort is normal.   Abdominal:      General: A surgical scar is present. Bowel sounds are normal. There is no distension.      Palpations: Abdomen is soft.      Tenderness: There is abdominal tenderness (specifically postprandial) in the epigastric area.      Comments: 4x robotic trocar incision sites, well-healed, without erythema, swelling, tenderness, drainage    Skin:     General: Skin is warm and dry.   Neurological:      General: No focal deficit present.      Mental Status: She is alert and oriented to person, place, and time.   Psychiatric:         Attention and Perception: Attention normal.         Mood and Affect: Mood and affect normal.         Behavior: Behavior normal. Behavior is cooperative.         Thought Content: Thought content does not include suicidal ideation.         Cognition and Memory: Cognition normal.            Reviewed data obtained since LOV (10/3/24):   None new.      Other reviewed data:   CT abd/pelvis with IV & PO contrast 1/2023.  No apparent etiology of patient's abdominal pain.  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   No mass or perihepatic fat stranding.  Unremarkable abdominal wall.    GES 8/2024.  Report reviewed.  1% retained at 4 hours (normal is < 10% at 4 hours).   GES 5/2023.  Report reviewed.  36% retained at 4 hours (normal is < 10% at 4 hours).         Thank you for including me in the care of Mrs. Shah.  It was a pleasure speaking with her today.     Layla Alvarez, MSN, APRN, FNP-C  General Surgery  [facility]

## 2025-03-17 NOTE — PATIENT INSTRUCTIONS
"Over-the-counter options for gastroparesis symptoms:     Medication Purpose Notes   FDgard Indigestion, bloating, nausea, upset stomach Take before meals  Contains leslye oil and I-Menthol   IBgard Cramping, diarrhea, bloating, bowel urgency, constipation, gas Take daily  Contains peppermint oil   Iberogast Indigestion, heartburn, bloating, nausea, gas, abdominal discomfort, constipation/diarrhea Take daily  Contains iberis emerald, peppermint, Sami chamomile, lemon balm, leslye, licorice   Emetrol or Nauzene Rapid nausea relief    Boiron NauseaCalm Upset stomach, nausea, vomiting Contains Cocculus indicus, ipecacuanha, nux vomica, tabacum, petroleum   Stomach Settle drops Nausea, upset stomach Contains jena, lemon, mint, vitamin B6   [employer] brand "Queasy Drops," "Queasy Drops Plus," "Preggie Pop Drops" Nausea, upset stomach Contains jena, lemon, mint, vitamin B6   Dramamine Nausea Can try "less drowsy" versions   Imodium Diarrhea Take as directed if you aren't concerned you have a stomach virus   Laceteol DiarrhEase  Diarrhea Mainly contains probiotics   Motion sickness wrist band  Nausea Uses acupuncture knowledge to reduce the experience of nausea       "

## 2025-03-21 DIAGNOSIS — K31.84 GASTROPARESIS: Primary | ICD-10-CM

## 2025-03-23 DIAGNOSIS — F41.9 ANXIETY: ICD-10-CM

## 2025-03-23 DIAGNOSIS — K58.1 IRRITABLE BOWEL SYNDROME WITH CONSTIPATION: ICD-10-CM

## 2025-03-23 DIAGNOSIS — R10.84 GENERALIZED ABDOMINAL PAIN: ICD-10-CM

## 2025-03-24 ENCOUNTER — TELEPHONE (OUTPATIENT)
Dept: ENDOSCOPY | Facility: HOSPITAL | Age: 45
End: 2025-03-24
Payer: MEDICAID

## 2025-03-24 DIAGNOSIS — K31.84 GASTROPARESIS: Primary | ICD-10-CM

## 2025-03-24 RX ORDER — DICYCLOMINE HYDROCHLORIDE 20 MG/1
20 TABLET ORAL 3 TIMES DAILY PRN
Qty: 60 TABLET | Refills: 5 | Status: SHIPPED | OUTPATIENT
Start: 2025-03-24

## 2025-03-24 NOTE — TELEPHONE ENCOUNTER
"----- Message from Med Assistant Luna sent at 3/21/2025  3:19 PM CDT -----  Regarding: EGD w/ Ph Probe  Procedure: EGD/BravoDiagnosis: Gastropareis Procedure Timing: Within 4 weeks (Urgent)#If within 4 weeks selected, please hakan as high priority##If greater than 12 weeks, please select "5-12 weeks" and delay sending until 3 months prior to requested date# Additional Scheduling Information: Please call for schedulingIs the patient taking a GLP-1 Agonist and/or blood thinner :noHave you attached a patient to this message: yes  "

## 2025-03-24 NOTE — TELEPHONE ENCOUNTER
Referral for procedure from [facility]      Spoke to Wandy to schedule procedure(s) Upper Endoscopy (EGD) with [facility]       Physician to perform procedure(s) Dr. MICHAEL Sin  Date of Procedure (s) 4/30/25  Arrival Time 8:00 AM  Time of Procedure(s) 9:00 AM   Location of Procedure(s) [place] [address]nd Floor  Type of Rx Prep sent to patient: Other  Instructions provided to patient via [facility]    Patient was informed on the following information and verbalized understanding. Screening questionnaire reviewed with patient and complete. If procedure requires anesthesia, a responsible adult needs to be present to accompany the patient home, patient cannot drive after receiving anesthesia. Appointment details are tentative, especially check-in time. Patient will receive a prep-op call 7 days prior to confirm check-in time for procedure. If applicable the patient should contact their pharmacy to verify Rx for procedure prep is ready for pick-up. Patient was advised to call the scheduling department at [phone] if pharmacy states no Rx is available. Patient was advised to call the endoscopy scheduling department if any questions or concerns arise.       Endoscopy Scheduling Department

## 2025-03-26 ENCOUNTER — PATIENT MESSAGE (OUTPATIENT)
Dept: ADMINISTRATIVE | Facility: OTHER | Age: 45
End: 2025-03-26
Payer: MEDICAID

## 2025-03-26 ENCOUNTER — OFFICE VISIT (OUTPATIENT)
Dept: GASTROENTEROLOGY | Facility: CLINIC | Age: 45
End: 2025-03-26
Payer: MEDICAID

## 2025-03-26 DIAGNOSIS — R10.84 GENERALIZED ABDOMINAL PAIN: Primary | ICD-10-CM

## 2025-03-26 DIAGNOSIS — K21.9 GERD WITHOUT ESOPHAGITIS: ICD-10-CM

## 2025-03-26 DIAGNOSIS — K59.04 CHRONIC IDIOPATHIC CONSTIPATION: ICD-10-CM

## 2025-03-26 DIAGNOSIS — K31.84 GASTROPARESIS: ICD-10-CM

## 2025-03-27 RX ORDER — LUBIPROSTONE 24 UG/1
24 CAPSULE ORAL 2 TIMES DAILY WITH MEALS
Qty: 60 CAPSULE | Refills: 5 | Status: SHIPPED | OUTPATIENT
Start: 2025-03-27

## 2025-03-27 RX ORDER — POLYETHYLENE GLYCOL 3350 17 G/17G
17 POWDER, FOR SOLUTION ORAL DAILY
Qty: 510 G | Refills: 11 | Status: SHIPPED | OUTPATIENT
Start: 2025-03-27

## 2025-03-27 NOTE — PATIENT INSTRUCTIONS
I would like for you to buy a small box of dulcolax tablets and bottle of liquid Pablo's Milk of Magnesia (do not use the pills).  When you have time to stay home near the toilet take 2 Dulcolax tablets. Then in 1 hour drink 90 mL of milk of magnesia. Drink another 90 mL 2-3 hours later.     After that is complete, start Amitiza 24 mcg twice daily and Miralax 1 capful in 6-8 ounces of water or juice daily.

## 2025-03-27 NOTE — PROGRESS NOTES
Subjective:       Patient ID: Wandy Shah is a 44 y.o. female.    Chief Complaint: Abdominal Pain and Constipation    The patient location is: [place], [place]  The chief complaint leading to consultation is: abdominal pain and constipation    Visit type: audiovisual    Face to Face time with patient: 20 minutes  45 minutes of total time spent on the encounter, which includes face to face time and non-face to face time preparing to see the patient (eg, review of tests), Obtaining and/or reviewing separately obtained history, Documenting clinical information in the electronic or other health record, Independently interpreting results (not separately reported) and communicating results to the patient/family/caregiver, or Care coordination (not separately reported).         Each patient to whom he or she provides medical services by telemedicine is:  (1) informed of the relationship between the physician and patient and the respective role of any other health care provider with respect to management of the patient; and (2) notified that he or she may decline to receive medical services by telemedicine and may withdraw from such care at any time.    Notes:     45 y/o female with GERD, chronic constipation, and gastroparesis s/p robotic pyloromyotomy 6/7/24 presents for virtual follow up with c/o GERD, abdominal pain, and constipation. States GI symptoms have progressively worsening over the last 3-4 months. She reports upper abdominal pain with nausea and vomiting after eating a small amount of food. Feels like food is not digesting well. Has difficulty swallowing solid food. Food feels stuck in her chest and epigastric region. States she lost ~10 lbs in the last 3 months. Heartburn not controlled with current medication. She is taking omeprazole 40 mg once daily and famotidine 20 mg twice daily. Repeat EGD and GES pending. Has been more constipated lately. Prescribed Amitiza but unsure if she is taking medication.  States her grandmother recently had a colectomy for unknown intestinal issues and she is concerned she will need a colostomy in the future.      Endoscopy History  - 2021 Colonoscopy: The examined portion of the ileum was normal. The entire examined colon is normal. Biopsied. Stool in the entire examined colon, adequately   removed. Internal hemorrhoids. Redundant colon making the procecure difficult; this is a result of her constipation.  - 2021 EGD: Normal esophagus. Erythematous mucosa in the antrum. Biopsied. Normal examined duodenum. Biopsied.Biopsy (-) HP; (-) Celiac    Imaging History  - 1/10/2023 CT abd: No evident etiology to explain patient's abdominal pain.   - 2023 GES: Delayed gastric emptying time. At 4 hour(s) the percentage of retention is 36 % (normal retention at 4 hours is 10% and lower).   - 2024 GES: normal    Past Medical History:   Diagnosis Date    Anemia     Back pain     Depression        Past Surgical History:   Procedure Laterality Date    CARPAL TUNNEL RELEASE Right 2024    Procedure: RELEASE, CARPAL TUNNEL;  Surgeon: Javy Ayala Jr., MD;  Location: [facility] OR;  Service: Orthopedics;  Laterality: Right;     SECTION      COLD KNIFE CONIZATION OF CERVIX  12/10/2021    Procedure: CONE BIOPSY, CERVIX, USING COLD KNIFE;  Surgeon: Modesto Nassar MD;  Location: [facility] OR;  Service: OB/GYN;;    COLONOSCOPY N/A 2021    Procedure: COLONOSCOPY;  Surgeon: Emily Cruz MD;  Location: [facility] ENDO;  Service: Endoscopy;  Laterality: N/A;    DECOMPRESSION, NERVE, ULNAR Right 2024    Procedure: DECOMPRESSION, NERVE, ULNAR;  Surgeon: Javy Ayala Jr., MD;  Location: [facility] OR;  Service: Orthopedics;  Laterality: Right;    ESOPHAGOGASTRODUODENOSCOPY N/A 2021    Procedure: EGD (ESOPHAGOGASTRODUODENOSCOPY);  Surgeon: Emily Cruz MD;  Location: [facility] ENDO;  Service: Endoscopy;  Laterality: N/A;    HYSTERECTOMY      INSERTION OF IMPLANTABLE  LOOP RECORDER  2024    heart    LATERAL EPICONDYLE RELEASE Right 2024    Procedure: RELEASE, ELBOW, LATERAL EPICONDYLE;  Surgeon: Javy Ayala Jr., MD;  Location: [facility];  Service: Orthopedics;  Laterality: Right;    ROBOT-ASSISTED LAPAROSCOPIC HYSTERECTOMY N/A 2022    Procedure: ROBOTIC HYSTERECTOMY;  Surgeon: Modesto Nassar MD;  Location: [facility];  Service: OB/GYN;  Laterality: N/A;    ROBOT-ASSISTED LAPAROSCOPIC PYLOROPLASTY USING DA JOSE LUIS XI N/A 2024    Procedure: XI ROBOTIC PYLOROMYOTOMY WITH EGD;  Surgeon: Clifford Lynch MD;  Location: [address];  Service: General;  Laterality: N/A;  NO NARCOTICS    ROBOT-ASSISTED SALPINGECTOMY Bilateral 2022    Procedure: ROBOTIC SALPINGECTOMY;  Surgeon: Modesto Nassar MD;  Location: [facility];  Service: OB/GYN;  Laterality: Bilateral;    SINUS SURGERY      TUBAL LIGATION         Family History   Problem Relation Name Age of Onset    Hypertension Mother      Diabetes Mother      Breast cancer Neg Hx      Colon cancer Neg Hx      Ovarian cancer Neg Hx         Social History     Socioeconomic History    Marital status:    Tobacco Use    Smoking status: Former     Current packs/day: 0.00     Types: Cigarettes     Quit date: 3/3/2021     Years since quittin.0     Passive exposure: Past    Smokeless tobacco: Never   Substance and Sexual Activity    Alcohol use: No    Drug use: Yes     Types: Marijuana     Comment: daily    Sexual activity: Not Currently     Partners: Male     Birth control/protection: See Surgical Hx     Social Drivers of Health     Financial Resource Strain: Low Risk  (2024)    Overall Financial Resource Strain (CARDIA)     Difficulty of Paying Living Expenses: Not hard at all   Recent Concern: Financial Resource Strain - Medium Risk (2024)    Received from [facility]    Overall Financial Resource Strain (CARDIA)     Difficulty of Paying Living Expenses: Somewhat hard   Food  Insecurity: No Food Insecurity (7/24/2024)    Hunger Vital Sign     Worried About Running Out of Food in the Last Year: Never true     Ran Out of Food in the Last Year: Never true   Recent Concern: Food Insecurity - Food Insecurity Present (5/18/2024)    Received from [facility]    Hunger Vital Sign     Worried About Running Out of Food in the Last Year: Sometimes true     Ran Out of Food in the Last Year: Sometimes true   Transportation Needs: No Transportation Needs (5/18/2024)    Received from [facility]    PRAPARE - Transportation     Lack of Transportation (Medical): No     Lack of Transportation (Non-Medical): No   Physical Activity: Insufficiently Active (7/24/2024)    Exercise Vital Sign     Days of Exercise per Week: 3 days     Minutes of Exercise per Session: 10 min   Stress: Stress Concern Present (7/24/2024)    [employer] - Occupational Stress Questionnaire     Feeling of Stress : Very much   Housing Stability: Unknown (7/24/2024)    Housing Stability Vital Sign     Unable to Pay for Housing in the Last Year: No       Review of Systems   Constitutional:  Negative for appetite change and unexpected weight change.   HENT:  Negative for trouble swallowing.    Respiratory:  Negative for shortness of breath.    Cardiovascular:  Negative for chest pain.   Gastrointestinal:  Positive for abdominal pain, constipation and nausea. Negative for diarrhea and rectal pain.   Neurological:  Negative for dizziness.   Hematological:  Negative for adenopathy. Does not bruise/bleed easily.   Psychiatric/Behavioral:  Negative for dysphoric mood.          Objective:     There were no vitals filed for this visit.       Physical Exam  Constitutional:       General: She is not in acute distress.  HENT:      Head: Normocephalic.   Eyes:      Conjunctiva/sclera: Conjunctivae normal.   Pulmonary:      Effort: Pulmonary effort is normal. No respiratory distress.   Skin:     Coloration: Skin is not  jaundiced or pale.   Neurological:      Mental Status: She is alert and oriented to person, place, and time.   Psychiatric:         Mood and Affect: Mood normal.         Behavior: Behavior normal.               Assessment:         ICD-10-CM ICD-9-CM   1. Generalized abdominal pain  R10.84 789.07   2. Gastroparesis  K31.84 536.3   3. GERD without esophagitis  K21.9 530.81   4. Chronic idiopathic constipation  K59.04 564.00       Plan:       Generalized abdominal pain  -     CT Abdomen Pelvis With IV Contrast Routine Oral Contrast; Future; Expected date: 03/26/2025    GERD without esophagitis  -     Repeat EGD scheduled 4/30  -     Pantoprazole 40 mg every morning  -     Famotidine 20 mg bid  - Discussed elimination of dietary triggers (fatty foods, caffeine, chocolate, spicy foods, food with high fat content, carbonated beverages, and peppermint.  - Raise the head of your bed by 6 to 8 inches - You can do this by putting blocks of wood or rubber under 2 legs of the bed or a foam wedge under the mattress.  - Avoid late meals - Lying down with a full stomach can make reflux worse. Try to plan meals for at least 2 to 3 hours before bedtime.  - Avoid tight clothing - Some people feel better if they wear comfortable clothing that does not squeeze the stomach area.     Gastroparesis  -     Gastroparesis diet  -     Advised to start erythromycin base as recently prescribed by general surgery  -     Repeat GES pending    Chronic idiopathic constipation  -     polyethylene glycol (GLYCOLAX) 17 gram/dose powder; Take 17 g by mouth once daily.  Dispense: 510 g; Refill: 11  -     lubiprostone (AMITIZA) 24 MCG Cap; Take 1 capsule (24 mcg total) by mouth 2 (two) times daily with meals.  Dispense: 60 capsule; Refill: 5      Follow up if symptoms worsen or fail to improve.     Patient's Medications   New Prescriptions    POLYETHYLENE GLYCOL (GLYCOLAX) 17 GRAM/DOSE POWDER    Take 17 g by mouth once daily.   Previous Medications     ALBUTEROL (PROVENTIL/VENTOLIN HFA) 90 MCG/ACTUATION INHALER    Inhale 2 puffs into the lungs every 4 (four) hours as needed for Wheezing    AMITRIPTYLINE (ELAVIL) 25 MG TABLET    Take 1 tablet (25 mg total) by mouth every evening.    ASHWAGANDHA ROOT EXTRACT 500 MG CAP    Take 2 capsules orally as needed.    CETIRIZINE (ZYRTEC) 10 MG TABLET    Take 1 tablet (10 mg total) by mouth once daily.    DICYCLOMINE (BENTYL) 20 MG TABLET    Take 1 tablet (20 mg total) by mouth 3 (three) times daily as needed (abdominal pain).    DULOXETINE (CYMBALTA) 60 MG CAPSULE    Take 1 capsule (60 mg total) by mouth once daily.    DUPILUMAB (DUPIXENT PEN) 300 MG/2 ML PNIJ    Inject 300 mg into the skin every 14 (fourteen) days    ERYTHROMYCIN BASE (E-MYCIN) 500 MG TABLET    Take 1 tablet (500 mg total) by mouth 2 (two) times daily with meals.    FAMOTIDINE (PEPCID) 20 MG TABLET    Take 1 tablet (20 mg total) by mouth 2 (two) times daily as needed for Heartburn.    FLUTICASONE PROPIONATE (FLONASE) 50 MCG/ACTUATION NASAL SPRAY    1 spray (50 mcg total) by Each Nostril route once daily.    FLUTICASONE-UMECLIDIN-VILANTER (TRELEGY ELLIPTA) 200-62.5-25 MCG INHALER    Inhale 1 puff into the lungs every evening.    GABAPENTIN (NEURONTIN) 100 MG CAPSULE    Take 1 capsule (100 mg total) by mouth 3 (three) times daily as needed.    IBUPROFEN (ADVIL,MOTRIN) 800 MG TABLET    Take 1 tablet (800 mg total) by mouth 3 (three) times daily.    LEVOCETIRIZINE (XYZAL) 5 MG TABLET    Take 1 tablet (5 mg total) by mouth nightly as needed for allergies.    MAGIC MOUTHWASH DIPHEN/ANTAC/LIDOC    Swish and spit 10 mLs every 4 (four) hours as needed (sore throat).    MECLIZINE (ANTIVERT) 12.5 MG TABLET    Take 1 tablet (12.5 mg total) by mouth 2 (two) times daily as needed for Dizziness.    METOPROLOL SUCCINATE (TOPROL-XL) 25 MG 24 HR TABLET    Take one-half tablet by mouth once a day.    METOPROLOL SUCCINATE (TOPROL-XL) 25 MG 24 HR TABLET    Take 1 tablet by mouth  daily Hold for Heart Rate less than 55    METOPROLOL SUCCINATE (TOPROL-XL) 25 MG 24 HR TABLET    Take 1 tablet by mouth daily Hold for Heart Rate less than 55    OMEPRAZOLE (PRILOSEC) 40 MG CAPSULE    Take 1 capsule (40 mg total) by mouth once daily.    ONDANSETRON (ZOFRAN-ODT) 4 MG TBDL    DISSOLVE 1 tablet (4 mg total) by mouth 4 (four) times daily before meals and nightly.    RIZATRIPTAN (MAXALT) 10 MG TABLET    1/2 - 1 at onset of migraine; may repeat in 2 hours if needed; maximum 2 in 24 hours; maximum 3 days per week    SCOPOLAMINE (TRANSDERM-SCOP) 1.3-1.5 MG (1 MG OVER 3 DAYS)    Place 1 patch onto the skin every 72 hours. for 10 doses    TOPIRAMATE (TOPAMAX) 25 MG TABLET    Take 3 tablets (75 mg total) by mouth every evening.    TOPIRAMATE (TOPAMAX) 25 MG TABLET    Take 1 tablet (25 mg total) by mouth once daily. Increase by 1 tablet per week as needed for migraine up to 2 tablets twice daily   Modified Medications    Modified Medication Previous Medication    LUBIPROSTONE (AMITIZA) 24 MCG CAP lubiprostone (AMITIZA) 24 MCG Cap       Take 1 capsule (24 mcg total) by mouth 2 (two) times daily with meals.    Take 1 capsule (24 mcg total) by mouth 2 (two) times daily with meals.   Discontinued Medications    AMOXICILLIN-CLAVULANATE 875-125MG (AUGMENTIN) 875-125 MG PER TABLET    Take 1 tablet by mouth 2 (two) times daily for 14 days

## 2025-03-31 RX ORDER — DULOXETIN HYDROCHLORIDE 60 MG/1
60 CAPSULE, DELAYED RELEASE ORAL DAILY
Qty: 30 CAPSULE | Refills: 2 | OUTPATIENT
Start: 2025-03-31 | End: 2025-06-29

## 2025-04-05 ENCOUNTER — HOSPITAL ENCOUNTER (OUTPATIENT)
Dept: RADIOLOGY | Facility: HOSPITAL | Age: 45
Discharge: HOME | End: 2025-04-05
Attending: NURSE PRACTITIONER
Payer: MEDICAID

## 2025-04-05 DIAGNOSIS — R10.84 GENERALIZED ABDOMINAL PAIN: ICD-10-CM

## 2025-04-05 PROCEDURE — A9698 NON-RAD CONTRAST MATERIALNOC: HCPCS | Performed by: NURSE PRACTITIONER

## 2025-04-05 PROCEDURE — 25500020 PHARM REV CODE 255: Performed by: NURSE PRACTITIONER

## 2025-04-05 PROCEDURE — 74177 CT ABD & PELVIS W/CONTRAST: CPT | Mod: TC

## 2025-04-05 PROCEDURE — 74177 CT ABD & PELVIS W/CONTRAST: CPT | Mod: 26,,, | Performed by: RADIOLOGY

## 2025-04-05 RX ADMIN — BARIUM SULFATE 450 ML: 20 SUSPENSION ORAL at 10:04

## 2025-04-05 RX ADMIN — IOHEXOL 75 ML: 350 INJECTION, SOLUTION INTRAVENOUS at 10:04

## 2025-04-07 ENCOUNTER — RESULTS FOLLOW-UP (OUTPATIENT)
Dept: GASTROENTEROLOGY | Facility: CLINIC | Age: 45
End: 2025-04-07

## 2025-04-13 DIAGNOSIS — R11.2 NAUSEA AND VOMITING, UNSPECIFIED VOMITING TYPE: ICD-10-CM

## 2025-04-13 RX ORDER — SCOPOLAMINE 1 MG/3D
1 PATCH, EXTENDED RELEASE TRANSDERMAL
Qty: 10 PATCH | Refills: 0 | Status: CANCELLED | OUTPATIENT
Start: 2025-04-13 | End: 2025-05-11

## 2025-04-14 DIAGNOSIS — R11.2 NAUSEA AND VOMITING, UNSPECIFIED VOMITING TYPE: ICD-10-CM

## 2025-04-14 RX ORDER — SCOPOLAMINE 1 MG/3D
1 PATCH, EXTENDED RELEASE TRANSDERMAL
Qty: 10 PATCH | Refills: 0 | Status: SHIPPED | OUTPATIENT
Start: 2025-04-14 | End: 2025-05-15

## 2025-04-21 ENCOUNTER — HOSPITAL ENCOUNTER (OUTPATIENT)
Dept: RADIOLOGY | Facility: HOSPITAL | Age: 45
Discharge: HOME | End: 2025-04-21
Payer: MEDICAID

## 2025-04-21 DIAGNOSIS — K31.84 GASTROPARESIS: ICD-10-CM

## 2025-04-21 PROCEDURE — A9541 TC99M SULFUR COLLOID: HCPCS

## 2025-04-21 PROCEDURE — 78264 GASTRIC EMPTYING IMG STUDY: CPT | Mod: TC

## 2025-04-21 PROCEDURE — 78264 GASTRIC EMPTYING IMG STUDY: CPT | Mod: 26,,, | Performed by: NUCLEAR MEDICINE

## 2025-04-21 RX ORDER — TECHNETIUM TC 99M SULFUR COLLOID 2 MG
1 KIT MISCELLANEOUS
Status: COMPLETED | OUTPATIENT
Start: 2025-04-21 | End: 2025-04-21

## 2025-04-21 RX ADMIN — TECHNETIUM TC 99M SULFUR COLLOID 1 MILLICURIE: KIT at 09:04

## 2025-04-23 ENCOUNTER — RESULTS FOLLOW-UP (OUTPATIENT)
Dept: SURGERY | Facility: CLINIC | Age: 45
End: 2025-04-23
Payer: MEDICAID

## 2025-04-26 ENCOUNTER — PATIENT MESSAGE (OUTPATIENT)
Dept: ADMINISTRATIVE | Facility: OTHER | Age: 45
End: 2025-04-26
Payer: MEDICAID

## 2025-04-29 ENCOUNTER — TELEPHONE (OUTPATIENT)
Dept: SURGERY | Facility: CLINIC | Age: 45
End: 2025-04-29
Payer: MEDICAID

## 2025-04-29 DIAGNOSIS — G43.009 MIGRAINE WITHOUT AURA AND WITHOUT STATUS MIGRAINOSUS, NOT INTRACTABLE: ICD-10-CM

## 2025-04-29 DIAGNOSIS — R05.3 CHRONIC COUGH: Primary | ICD-10-CM

## 2025-04-29 RX ORDER — AMITRIPTYLINE HYDROCHLORIDE 25 MG/1
25 TABLET, FILM COATED ORAL NIGHTLY
Qty: 30 TABLET | Refills: 2 | Status: SHIPPED | OUTPATIENT
Start: 2025-04-29 | End: 2025-07-28

## 2025-04-30 ENCOUNTER — ANESTHESIA (OUTPATIENT)
Dept: ENDOSCOPY | Facility: HOSPITAL | Age: 45
End: 2025-04-30
Payer: MEDICAID

## 2025-04-30 ENCOUNTER — HOSPITAL ENCOUNTER (OUTPATIENT)
Facility: HOSPITAL | Age: 45
Discharge: HOME | End: 2025-04-30
Attending: INTERNAL MEDICINE | Admitting: INTERNAL MEDICINE
Payer: MEDICAID

## 2025-04-30 ENCOUNTER — ANESTHESIA EVENT (OUTPATIENT)
Dept: ENDOSCOPY | Facility: HOSPITAL | Age: 45
End: 2025-04-30
Payer: MEDICAID

## 2025-04-30 ENCOUNTER — RESULTS FOLLOW-UP (OUTPATIENT)
Dept: SURGERY | Facility: CLINIC | Age: 45
End: 2025-04-30
Payer: MEDICAID

## 2025-04-30 VITALS
SYSTOLIC BLOOD PRESSURE: 111 MMHG | RESPIRATION RATE: 14 BRPM | HEIGHT: 68 IN | WEIGHT: 155 LBS | OXYGEN SATURATION: 100 % | HEART RATE: 65 BPM | DIASTOLIC BLOOD PRESSURE: 57 MMHG | BODY MASS INDEX: 23.49 KG/M2 | TEMPERATURE: 98 F

## 2025-04-30 DIAGNOSIS — K21.9 GERD WITHOUT ESOPHAGITIS: Primary | ICD-10-CM

## 2025-04-30 DIAGNOSIS — K21.9 GERD (GASTROESOPHAGEAL REFLUX DISEASE): ICD-10-CM

## 2025-04-30 DIAGNOSIS — K31.84 GASTROPARESIS: ICD-10-CM

## 2025-04-30 PROCEDURE — 37000008 HC ANESTHESIA 1ST 15 MINUTES: Performed by: INTERNAL MEDICINE

## 2025-04-30 PROCEDURE — 43239 EGD BIOPSY SINGLE/MULTIPLE: CPT | Performed by: INTERNAL MEDICINE

## 2025-04-30 PROCEDURE — 37000009 HC ANESTHESIA EA ADD 15 MINS: Performed by: INTERNAL MEDICINE

## 2025-04-30 PROCEDURE — 27200942

## 2025-04-30 PROCEDURE — 25000003 PHARM REV CODE 250

## 2025-04-30 PROCEDURE — 88305 TISSUE EXAM BY PATHOLOGIST: CPT | Mod: TC,91 | Performed by: INTERNAL MEDICINE

## 2025-04-30 PROCEDURE — 91035 G-ESOPH REFLX TST W/ELECTROD: CPT | Mod: TC | Performed by: INTERNAL MEDICINE

## 2025-04-30 PROCEDURE — 63600175 PHARM REV CODE 636 W HCPCS

## 2025-04-30 PROCEDURE — 43239 EGD BIOPSY SINGLE/MULTIPLE: CPT | Mod: ,,, | Performed by: INTERNAL MEDICINE

## 2025-04-30 PROCEDURE — 27201012 HC FORCEPS, HOT/COLD, DISP: Performed by: INTERNAL MEDICINE

## 2025-04-30 RX ORDER — DEXAMETHASONE SODIUM PHOSPHATE 4 MG/ML
INJECTION, SOLUTION INTRA-ARTICULAR; INTRALESIONAL; INTRAMUSCULAR; INTRAVENOUS; SOFT TISSUE
Status: DISCONTINUED | OUTPATIENT
Start: 2025-04-30 | End: 2025-04-30

## 2025-04-30 RX ORDER — ONDANSETRON HYDROCHLORIDE 2 MG/ML
INJECTION, SOLUTION INTRAVENOUS
Status: DISCONTINUED | OUTPATIENT
Start: 2025-04-30 | End: 2025-04-30

## 2025-04-30 RX ORDER — LIDOCAINE HYDROCHLORIDE 20 MG/ML
INJECTION INTRAVENOUS
Status: DISCONTINUED | OUTPATIENT
Start: 2025-04-30 | End: 2025-04-30

## 2025-04-30 RX ORDER — PROPOFOL 10 MG/ML
VIAL (ML) INTRAVENOUS
Status: DISCONTINUED | OUTPATIENT
Start: 2025-04-30 | End: 2025-04-30

## 2025-04-30 RX ORDER — ROCURONIUM BROMIDE 10 MG/ML
INJECTION, SOLUTION INTRAVENOUS
Status: DISCONTINUED | OUTPATIENT
Start: 2025-04-30 | End: 2025-04-30

## 2025-04-30 RX ORDER — GLUCAGON 1 MG
1 KIT INJECTION
Status: DISCONTINUED | OUTPATIENT
Start: 2025-04-30 | End: 2025-04-30 | Stop reason: HOSPADM

## 2025-04-30 RX ORDER — SUCCINYLCHOLINE CHLORIDE 20 MG/ML
INJECTION INTRAMUSCULAR; INTRAVENOUS
Status: DISCONTINUED | OUTPATIENT
Start: 2025-04-30 | End: 2025-04-30

## 2025-04-30 RX ADMIN — SODIUM CHLORIDE: 0.9 INJECTION, SOLUTION INTRAVENOUS at 08:04

## 2025-04-30 RX ADMIN — ROCURONIUM BROMIDE 20 MG: 10 INJECTION INTRAVENOUS at 08:04

## 2025-04-30 RX ADMIN — PROPOFOL 200 MG: 10 INJECTION, EMULSION INTRAVENOUS at 08:04

## 2025-04-30 RX ADMIN — LIDOCAINE HYDROCHLORIDE 80 MG: 20 INJECTION INTRAVENOUS at 08:04

## 2025-04-30 RX ADMIN — SUGAMMADEX 400 MG: 100 INJECTION, SOLUTION INTRAVENOUS at 08:04

## 2025-04-30 RX ADMIN — SUCCINYLCHOLINE CHLORIDE 160 MG: 20 INJECTION, SOLUTION INTRAMUSCULAR; INTRAVENOUS at 08:04

## 2025-04-30 RX ADMIN — GLYCOPYRROLATE 0.2 MG: 0.2 INJECTION, SOLUTION INTRAMUSCULAR; INTRAVENOUS at 08:04

## 2025-04-30 RX ADMIN — DEXAMETHASONE SODIUM PHOSPHATE 8 MG: 4 INJECTION, SOLUTION INTRAMUSCULAR; INTRAVENOUS at 08:04

## 2025-04-30 RX ADMIN — ONDANSETRON 8 MG: 2 INJECTION INTRAMUSCULAR; INTRAVENOUS at 08:04

## 2025-04-30 NOTE — ANESTHESIA PREPROCEDURE EVALUATION
[facility]  Anesthesia Pre-Operative Evaluation         Patient Name/: Wandy Shah, 1980  MRN: [id number]    SUBJECTIVE:     Pre-operative evaluation for Procedure(s) (LRB):  EGD (ESOPHAGOGASTRODUODENOSCOPY) (N/A)  PH MONITORING, ESOPHAGUS, WIRELESS, (OFF REFLUX MEDS) (N/A)     2025    Wandy Shah is a 44 y.o. female     Patient now presents for the above procedure(s).    ________________________________________  Results for orders placed during the hospital encounter of 24    Echo    Interpretation Summary    Left Ventricle: The left ventricle is normal in size. Normal wall thickness. There is normal systolic function with a visually estimated ejection fraction of 55 - 60%. There is normal diastolic function.    Right Ventricle: Normal right ventricular cavity size. Wall thickness is normal. Systolic function is normal.    IVC/SVC: Intermediate venous pressure at 8 mmHg.    ________________________________________    LDA:        Drips:       Problem List[1]    Review of patient's allergies indicates:   Allergen Reactions    Raspberry (rubus idaeus) Hives       Current Inpatient Medications:       Medications Ordered Prior to Encounter[2]    Past Surgical History:   Procedure Laterality Date    CARPAL TUNNEL RELEASE Right 2024    Procedure: RELEASE, CARPAL TUNNEL;  Surgeon: Javy Ayala Jr., MD;  Location: [facility] OR;  Service: Orthopedics;  Laterality: Right;     SECTION      COLD KNIFE CONIZATION OF CERVIX  12/10/2021    Procedure: CONE BIOPSY, CERVIX, USING COLD KNIFE;  Surgeon: Modesto Nassar MD;  Location: [facility] OR;  Service: OB/GYN;;    COLONOSCOPY N/A 2021    Procedure: COLONOSCOPY;  Surgeon: Emily Cruz MD;  Location: [facility] ENDO;  Service: Endoscopy;  Laterality: N/A;    DECOMPRESSION, NERVE, ULNAR Right 2024    Procedure: DECOMPRESSION, NERVE, ULNAR;  Surgeon: Javy Ayala Jr., MD;  Location: [facility] OR;  Service: Orthopedics;   Laterality: Right;    ESOPHAGOGASTRODUODENOSCOPY N/A 06/11/2021    Procedure: EGD (ESOPHAGOGASTRODUODENOSCOPY);  Surgeon: Emily Cruz MD;  Location: [facility];  Service: Endoscopy;  Laterality: N/A;    HYSTERECTOMY      INSERTION OF IMPLANTABLE LOOP RECORDER  11/22/2024    heart    LATERAL EPICONDYLE RELEASE Right 08/23/2024    Procedure: RELEASE, ELBOW, LATERAL EPICONDYLE;  Surgeon: Javy Ayala Jr., MD;  Location: [facility];  Service: Orthopedics;  Laterality: Right;    ROBOT-ASSISTED LAPAROSCOPIC HYSTERECTOMY N/A 03/16/2022    Procedure: ROBOTIC HYSTERECTOMY;  Surgeon: Modesto Nassar MD;  Location: [facility];  Service: OB/GYN;  Laterality: N/A;    ROBOT-ASSISTED LAPAROSCOPIC PYLOROPLASTY USING DA JOSE LUIS XI N/A 06/07/2024    Procedure: XI ROBOTIC PYLOROMYOTOMY WITH EGD;  Surgeon: Clifford Lynch MD;  Location: [address];  Service: General;  Laterality: N/A;  NO NARCOTICS    ROBOT-ASSISTED SALPINGECTOMY Bilateral 03/16/2022    Procedure: ROBOTIC SALPINGECTOMY;  Surgeon: Modesto Nassar MD;  Location: [facility];  Service: OB/GYN;  Laterality: Bilateral;    SINUS SURGERY      TUBAL LIGATION         Social History:  Tobacco Use: Medium Risk (3/27/2025)    Patient History     Smoking Tobacco Use: Former     Smokeless Tobacco Use: Never     Passive Exposure: Past       Alcohol Use: Not At Risk (7/24/2024)    AUDIT-C     Frequency of Alcohol Consumption: Never     Average Number of Drinks: Patient does not drink     Frequency of Binge Drinking: Never       OBJECTIVE:     Vital Signs Range:  BMI Readings from Last 1 Encounters:   03/24/25 23.57 kg/m²               Significant Labs:        Component Value Date/Time    WBC 11.93 07/25/2024 0314    HGB 11.7 (L) 07/25/2024 0314    HCT 37.1 07/25/2024 0314    HCT 35 (L) 05/16/2021 1540     07/25/2024 0314     07/25/2024 0314    K 3.5 07/25/2024 0314     (H) 07/25/2024 0314    CO2 18 (L) 07/25/2024 0314    GLU 75 07/25/2024 0314     BUN 15 07/25/2024 0314    CREATININE 0.7 07/25/2024 0314    MG 1.9 07/25/2024 0314    PHOS 1.8 (L) 07/25/2024 0314    CALCIUM 8.1 (L) 07/25/2024 0314    ALBUMIN 3.6 07/24/2024 0728    PROT 7.0 07/24/2024 0728    ALKPHOS 51 (L) 07/24/2024 0728    BILITOT 0.2 07/24/2024 0728    AST 10 07/24/2024 0728    ALT 7 (L) 07/24/2024 0728    HGBA1C 4.8 05/23/2024 0708        Please see Results Review for additional labs.     Diagnostic Studies: No relevant studies.    EKG:   Results for orders placed or performed during the hospital encounter of 12/18/24   EKG 12-lead    Collection Time: 12/18/24 12:14 PM   Result Value Ref Range    QRS Duration 98 ms    [facility] QTC Calculation 443 ms    Narrative    Test Reason : R07.9,    Vent. Rate :  69 BPM     Atrial Rate :  69 BPM     P-R Int : 166 ms          QRS Dur :  98 ms      QT Int : 414 ms       P-R-T Axes :  74  81  56 degrees    QTcB Int : 443 ms    Normal sinus rhythm  Right atrial enlargement  Borderline Abnormal ECG  No previous ECGs available  Confirmed by Allan Prado ([id number]) on 12/18/2024 1:05:41 PM    Referred By:            Confirmed By: Allan Prado       ECHO:  See subjective, if available.      ASSESSMENT/PLAN:                                                                                                                  04/30/2025  Wandy Shah is a 44 y.o., female.      Pre-op Assessment          Review of Systems  Cardiovascular:         Dysrhythmias (SVT)                                      Pulmonary:    Asthma                    Hepatic/GI:     GERD                Neurological:    Denies Neuromuscular Disease.  Headaches                                 Psych:  Psychiatric History anxiety                 Physical Exam  General: Cooperative and Oriented    Airway:  Neck ROM: Normal ROM    Dental:  Intact        Anesthesia Plan  Type of Anesthesia, risks & benefits discussed:    Anesthesia Type: Gen Natural Airway  Intra-op Monitoring Plan: Standard ASA  Monitors  Induction:  IV  Informed Consent: Informed consent signed with the Patient and all parties understand the risks and agree with anesthesia plan.  All questions answered.   ASA Score: 2  Day of Surgery Review of History & Physical: H&P Update referred to the surgeon/provider.    Ready For Surgery From Anesthesia Perspective.     .           [1]   Patient Active Problem List  Diagnosis    Condyloma acuminatum    Hypertrophy of both inferior nasal turbinates    GERD without esophagitis    Chronic anterior ethmoidal sinusitis    Anxiety state    Chronic sphenoidal sinusitis    Generalized abdominal pain    Chronic idiopathic constipation    GERD (gastroesophageal reflux disease)    Migraine without aura, not intractable    Cherry angioma    ENZO II (cervical intraepithelial neoplasia II)    Acute bilateral low back pain with right-sided sciatica    Eosinophilic asthma    Gastroparesis    Overweight (BMI 25.0-29.9)    Preop examination    SVT (supraventricular tachycardia)    Lateral epicondylitis of right elbow   [2]   No current facility-administered medications on file prior to encounter.     Current Outpatient Medications on File Prior to Encounter   Medication Sig Dispense Refill    albuterol (PROVENTIL/VENTOLIN HFA) 90 mcg/actuation inhaler Inhale 2 puffs into the lungs every 4 (four) hours as needed for Wheezing 8.5 g 11    ashwagandha root extract 500 mg Cap Take 2 capsules orally as needed. 180 capsule 4    cetirizine (ZYRTEC) 10 MG tablet Take 1 tablet (10 mg total) by mouth once daily. 30 tablet 11    dicyclomine (BENTYL) 20 mg tablet Take 1 tablet (20 mg total) by mouth 3 (three) times daily as needed (abdominal pain). 60 tablet 5    DULoxetine (CYMBALTA) 60 MG capsule Take 1 capsule (60 mg total) by mouth once daily. 30 capsule 2    dupilumab (DUPIXENT PEN) 300 mg/2 mL PnIj Inject 300 mg into the skin every 14 (fourteen) days 4 mL 5    erythromycin base (E-MYCIN) 500 MG tablet Take 1 tablet (500 mg  total) by mouth 2 (two) times daily with meals. 60 tablet 1    famotidine (PEPCID) 20 MG tablet Take 1 tablet (20 mg total) by mouth 2 (two) times daily as needed for Heartburn. 60 tablet 5    fluticasone propionate (FLONASE) 50 mcg/actuation nasal spray 1 spray (50 mcg total) by Each Nostril route once daily. 16 g 0    fluticasone-umeclidin-vilanter (TRELEGY ELLIPTA) 200-62.5-25 mcg inhaler Inhale 1 puff into the lungs every evening. 180 each 3    gabapentin (NEURONTIN) 100 MG capsule Take 1 capsule (100 mg total) by mouth 3 (three) times daily as needed. 30 capsule 6    ibuprofen (ADVIL,MOTRIN) 800 MG tablet Take 1 tablet (800 mg total) by mouth 3 (three) times daily. 90 tablet 1    levocetirizine (XYZAL) 5 MG tablet Take 1 tablet (5 mg total) by mouth nightly as needed for allergies. 30 tablet 11    magic mouthwash diphen/antac/lidoc Swish and spit 10 mLs every 4 (four) hours as needed (sore throat). 180 mL 0    meclizine (ANTIVERT) 12.5 mg tablet Take 1 tablet (12.5 mg total) by mouth 2 (two) times daily as needed for Dizziness. 28 tablet 0    metoprolol succinate (TOPROL-XL) 25 MG 24 hr tablet Take one-half tablet by mouth once a day. 45 tablet 4    metoprolol succinate (TOPROL-XL) 25 MG 24 hr tablet Take 1 tablet by mouth daily Hold for Heart Rate less than 55 30 tablet 11    metoprolol succinate (TOPROL-XL) 25 MG 24 hr tablet Take 1 tablet by mouth daily Hold for Heart Rate less than 55 30 tablet 11    omeprazole (PRILOSEC) 40 MG capsule Take 1 capsule (40 mg total) by mouth once daily. 30 capsule 5    ondansetron (ZOFRAN-ODT) 4 MG TbDL DISSOLVE 1 tablet (4 mg total) by mouth 4 (four) times daily before meals and nightly. 120 tablet 5    rizatriptan (MAXALT) 10 MG tablet 1/2 - 1 at onset of migraine; may repeat in 2 hours if needed; maximum 2 in 24 hours; maximum 3 days per week 10 tablet 2    topiramate (TOPAMAX) 25 MG tablet Take 3 tablets (75 mg total) by mouth every evening.      topiramate (TOPAMAX) 25  MG tablet Take 1 tablet (25 mg total) by mouth once daily. Increase by 1 tablet per week as needed for migraine up to 2 tablets twice daily 120 tablet 4

## 2025-04-30 NOTE — PLAN OF CARE
MD at bedside. Post op instructions reviewed with patient and sister. Reflux diary given to patient. No changes to medications.

## 2025-04-30 NOTE — PROGRESS NOTES
Some erythematous tissue in gastric body, gastric antrum, and duodenum.  BRAVO deployed.  Await biopsies, at this point the exam is not concerning.

## 2025-04-30 NOTE — TRANSFER OF CARE
"Anesthesia Transfer of Care Note    Patient: Wandy Shah    Procedure(s) Performed: Procedure(s) (LRB):  EGD (ESOPHAGOGASTRODUODENOSCOPY) (N/A)  PH MONITORING, ESOPHAGUS, WIRELESS, (OFF REFLUX MEDS) (N/A)    Patient location: [facility]    Anesthesia Type: general    Transport from OR: Transported from OR on 6-10 L/min O2 by face mask with adequate spontaneous ventilation    Post pain: adequate analgesia    Post assessment: no apparent anesthetic complications and tolerated procedure well    Post vital signs: stable    Level of consciousness: awake and alert    Nausea/Vomiting: no nausea/vomiting    Complications: none    Transfer of care protocol was followed      Last vitals: Visit Vitals  /55   Pulse 68   Temp 36.8 °C (98.2 °F)   Resp 16   Ht 5' 8" (1.727 m)   Wt 70.3 kg (155 lb)   LMP 03/16/2022 (Exact Date)   SpO2 98%   Breastfeeding No   BMI 23.57 kg/m²     "

## 2025-04-30 NOTE — H&P
Short Stay Endoscopy History and Physical    PCP - Julieth Sheldon MD     Procedure - EGD with Bravo  ASA - per anesthesia  Mallampati - per anesthesia  History of Anesthesia problems - no  Family history Anesthesia problems -  no   Plan of anesthesia - General    HPI:  This is a 44 y.o. female here for evaluation of : GERD        ROS:  Constitutional: No fevers, chills, No weight loss  CV: No chest pain  Pulm: No cough, No shortness of breath  Ophtho: No vision changes  GI: see HPI  Derm: No rash    Medical History:  has a past medical history of Anemia, Back pain, and Depression.    Surgical History:  has a past surgical history that includes  section; Tubal ligation; Sinus surgery; Colonoscopy (N/A, 2021); Esophagogastroduodenoscopy (N/A, 2021); Cold knife conization of cervix (12/10/2021); Robot-assisted laparoscopic hysterectomy (N/A, 2022); Robot-assisted salpingectomy (Bilateral, 2022); Hysterectomy; Robot-assisted laparoscopic pyloroplasty using da Larissa Xi (N/A, 2024); Carpal tunnel release (Right, 2024); decompression, nerve, ulnar (Right, 2024); Lateral epicondyle release (Right, 2024); and Insertion of implantable loop recorder (2024).    Family History: family history includes Diabetes in her mother; Hypertension in her mother.. Otherwise no colon cancer, inflammatory bowel disease, or GI malignancies.    Social History:  reports that she quit smoking about 4 years ago. Her smoking use included cigarettes. She has been exposed to tobacco smoke. She has never used smokeless tobacco. She reports current drug use. Drug: Marijuana. She reports that she does not drink alcohol.    Review of patient's allergies indicates:   Allergen Reactions    Raspberry (rubus idaeus) Hives       Medications:   Prescriptions Prior to Admission[1]    Physical Exam:    Vital Signs:   Vitals:    25 0754   BP: (!) 86/52   Pulse: (!) 58   Resp: 16   Temp: 98.2  °F (36.8 °C)       Gen: NAD, lying comfortably  HENT: NCAT, oropharynx clear  Eyes: anicteric sclerae, EOMI grossly  Neck: supple, no visible masses/goiter  Cardiac: RRR  Lungs: non-labored breathing  Abd: soft, NT/ND, normoactive BS  Ext: no LE edema, warm, well perfused  Skin: skin intact on exposed body parts, no visible rashes, lesions  Neuro: A&Ox4, neuro exam grossly intact, moves all extremities  Psych: appropriate mood, affect      Labs:  Lab Results   Component Value Date    WBC 11.93 07/25/2024    HGB 11.7 (L) 07/25/2024    HCT 37.1 07/25/2024     07/25/2024    CHOL 196 05/23/2024    TRIG 141 05/23/2024    HDL 32 (L) 05/23/2024    ALT 7 (L) 07/24/2024    AST 10 07/24/2024     07/25/2024    K 3.5 07/25/2024     (H) 07/25/2024    CREATININE 0.7 07/25/2024    BUN 15 07/25/2024    CO2 18 (L) 07/25/2024    TSH 1.857 09/19/2024    HGBA1C 4.8 05/23/2024       Plan:  EGD with Bravo for GERD    I have explained the risks and benefits of endoscopy procedures to the patient including but not limited to bleeding, perforation, infection, and death.  The patient was asked if they understand and allowed to ask any further questions to their satisfaction.      Annel Sin MD         [1]   Medications Prior to Admission   Medication Sig Dispense Refill Last Dose/Taking    albuterol (PROVENTIL/VENTOLIN HFA) 90 mcg/actuation inhaler Inhale 2 puffs into the lungs every 4 (four) hours as needed for Wheezing 8.5 g 11 4/29/2025    dicyclomine (BENTYL) 20 mg tablet Take 1 tablet (20 mg total) by mouth 3 (three) times daily as needed (abdominal pain). 60 tablet 5 4/29/2025    famotidine (PEPCID) 20 MG tablet Take 1 tablet (20 mg total) by mouth 2 (two) times daily as needed for Heartburn. 60 tablet 5 4/29/2025    fluticasone-umeclidin-vilanter (TRELEGY ELLIPTA) 200-62.5-25 mcg inhaler Inhale 1 puff into the lungs every evening. 180 each 3 4/29/2025    gabapentin (NEURONTIN) 100 MG capsule Take 1 capsule  (100 mg total) by mouth 3 (three) times daily as needed. 30 capsule 6 4/29/2025    ibuprofen (ADVIL,MOTRIN) 800 MG tablet Take 1 tablet (800 mg total) by mouth 3 (three) times daily. 90 tablet 1 4/29/2025    metoprolol succinate (TOPROL-XL) 25 MG 24 hr tablet Take one-half tablet by mouth once a day. 45 tablet 4 4/29/2025    omeprazole (PRILOSEC) 40 MG capsule Take 1 capsule (40 mg total) by mouth once daily. 30 capsule 5 Past Month    ondansetron (ZOFRAN-ODT) 4 MG TbDL DISSOLVE 1 tablet (4 mg total) by mouth 4 (four) times daily before meals and nightly. 120 tablet 5 4/29/2025    scopolamine (TRANSDERM-SCOP) 1.3-1.5 mg (1 mg over 3 days) Place 1 patch onto the skin every 72 hours. for 10 doses 10 patch 0 4/29/2025    topiramate (TOPAMAX) 25 MG tablet Take 1 tablet (25 mg total) by mouth once daily. Increase by 1 tablet per week as needed for migraine up to 2 tablets twice daily 120 tablet 4 4/29/2025    amitriptyline (ELAVIL) 25 MG tablet Take 1 tablet (25 mg total) by mouth every evening. 30 tablet 2     ashwagandha root extract 500 mg Cap Take 2 capsules orally as needed. 180 capsule 4     cetirizine (ZYRTEC) 10 MG tablet Take 1 tablet (10 mg total) by mouth once daily. 30 tablet 11     DULoxetine (CYMBALTA) 60 MG capsule Take 1 capsule (60 mg total) by mouth once daily. 30 capsule 2     dupilumab (DUPIXENT PEN) 300 mg/2 mL PnIj Inject 300 mg into the skin every 14 (fourteen) days 4 mL 5     erythromycin base (E-MYCIN) 500 MG tablet Take 1 tablet (500 mg total) by mouth 2 (two) times daily with meals. 60 tablet 1     fluticasone propionate (FLONASE) 50 mcg/actuation nasal spray 1 spray (50 mcg total) by Each Nostril route once daily. 16 g 0     levocetirizine (XYZAL) 5 MG tablet Take 1 tablet (5 mg total) by mouth nightly as needed for allergies. 30 tablet 11     lubiprostone (AMITIZA) 24 MCG Cap Take 1 capsule (24 mcg total) by mouth 2 (two) times daily with meals. 60 capsule 5     magic mouthwash  diphen/antac/lidoc Swish and spit 10 mLs every 4 (four) hours as needed (sore throat). 180 mL 0     meclizine (ANTIVERT) 12.5 mg tablet Take 1 tablet (12.5 mg total) by mouth 2 (two) times daily as needed for Dizziness. 28 tablet 0     metoprolol succinate (TOPROL-XL) 25 MG 24 hr tablet Take 1 tablet by mouth daily Hold for Heart Rate less than 55 30 tablet 11     metoprolol succinate (TOPROL-XL) 25 MG 24 hr tablet Take 1 tablet by mouth daily Hold for Heart Rate less than 55 30 tablet 11     polyethylene glycol (GLYCOLAX) 17 gram/dose powder Take 17 g by mouth once daily. 510 g 11     rizatriptan (MAXALT) 10 MG tablet 1/2 - 1 at onset of migraine; may repeat in 2 hours if needed; maximum 2 in 24 hours; maximum 3 days per week 10 tablet 2     topiramate (TOPAMAX) 25 MG tablet Take 3 tablets (75 mg total) by mouth every evening.

## 2025-04-30 NOTE — PROVATION PATIENT INSTRUCTIONS
Discharge Summary/Instructions after an Endoscopic Procedure  Patient Name: Wandy Shah  Patient MRN: [id number]  Patient YOB: 1980  Wednesday, April 30, 2025  Annel Sin MD  Dear patient,  As a result of recent federal legislation (The Federal Cures Act), you may   receive lab or pathology results from your procedure in your [facility]   account before your physician is able to contact you. Your physician or   their representative will relay the results to you with their   recommendations at their soonest availability.  Thank you,  RESTRICTIONS:  During your procedure today, you received medications for sedation.  These   medications may affect your judgment, balance and coordination.  Therefore,   for 24 hours, you have the following restrictions:   - DO NOT drive a car, operate machinery, make legal/financial decisions,   sign important papers or drink alcohol.    ACTIVITY:  Today: no heavy lifting, straining or running due to procedural   sedation/anesthesia.  The following day: return to full activity including work.  DIET:  Eat and drink normally unless instructed otherwise.     TREATMENT FOR COMMON SIDE EFFECTS:  - Mild abdominal pain, nausea, belching, bloating or excessive gas:  rest,   eat lightly and use a heating pad.  - Sore Throat: treat with throat lozenges and/or gargle with warm salt   water.  - Because air was used during the procedure, expelling large amounts of air   from your rectum or belching is normal.  - If a bowel prep was taken, you may not have a bowel movement for 1-3 days.    This is normal.  SYMPTOMS TO WATCH FOR AND REPORT TO YOUR PHYSICIAN:  1. Abdominal pain or bloating, other than gas cramps.  2. Chest pain.  3. Back pain.  4. Signs of infection such as: chills or fever occurring within 24 hours   after the procedure.  5. Rectal bleeding, which would show as bright red, maroon, or black stools.   (A tablespoon of blood from the rectum is not serious, especially if    hemorrhoids are present.)  6. Vomiting.  7. Weakness or dizziness.  GO DIRECTLY TO THE NEAREST EMERGENCY ROOM IF YOU HAVE ANY OF THE FOLLOWING:      Difficulty breathing              Chills and/or fever over 101 F   Persistent vomiting and/or vomiting blood   Severe abdominal pain   Severe chest pain   Black, tarry stools   Bleeding- more than one tablespoon   Any other symptom or condition that you feel may need urgent attention  Your doctor recommends these additional instructions:  If any biopsies were taken, your doctors clinic will contact you in 1 to 2   weeks with any results.  - Discharge patient to home.   - Resume previous diet.   - Continue present medications.   - Await pathology results.  For questions, problems or results please call your physician - Annel Sin MD at Work:  [phone].  [facility], EMERGENCY ROOM PHONE NUMBER: [phone]  IF A COMPLICATION OR EMERGENCY SITUATION ARISES AND YOU ARE UNABLE TO REACH   YOUR PHYSICIAN - GO DIRECTLY TO THE EMERGENCY ROOM.  Annel Sin MD  4/30/2025 9:07:17 AM  This report has been verified and signed electronically.  Dear patient,  As a result of recent federal legislation (The Federal Cures Act), you may   receive lab or pathology results from your procedure in your [facility]   account before your physician is able to contact you. Your physician or   their representative will relay the results to you with their   recommendations at their soonest availability.  Thank you,  PROVATION

## 2025-04-30 NOTE — ANESTHESIA PROCEDURE NOTES
Intubation    Date/Time: 4/30/2025 8:39 AM    Performed by: Dawna Rausch CRNA  Authorized by: Toan Ellington MD    Intubation:     Induction:  Rapid sequence induction    Intubated:  Postinduction    Mask Ventilation:  Not attempted    Attempts:  1    Attempted By:  CRNA    Method of Intubation:  Video laryngoscopy    Blade:  Rueda 3    Laryngeal View Grade: Grade I - full view of cords      Difficult Airway Encountered?: No      Complications:  None    Airway Device:  Oral endotracheal tube    Airway Device Size:  7.0    Style/Cuff Inflation:  Cuffed (inflated to minimal occlusive pressure)    Tube secured:  21    Secured at:  The lips    Placement Verified By:  Capnometry    Complicating Factors:  None    Findings Post-Intubation:  BS equal bilateral and atraumatic/condition of teeth unchanged

## 2025-05-01 DIAGNOSIS — J31.0 CHRONIC RHINITIS: ICD-10-CM

## 2025-05-01 LAB
ESTROGEN SERPL-MCNC: NORMAL PG/ML
INSULIN SERPL-ACNC: NORMAL U[IU]/ML
LAB AP CLINICAL INFORMATION: NORMAL
LAB AP GROSS DESCRIPTION: NORMAL
LAB AP PERFORMING LOCATION(S): NORMAL
LAB AP REPORT FOOTNOTES: NORMAL

## 2025-05-01 RX ORDER — FLUTICASONE PROPIONATE 50 MCG
1 SPRAY, SUSPENSION (ML) NASAL DAILY
Qty: 16 G | Refills: 0 | Status: CANCELLED | OUTPATIENT
Start: 2025-05-01

## 2025-05-01 NOTE — ANESTHESIA POSTPROCEDURE EVALUATION
Anesthesia Post Evaluation    Patient: Wandy Shah    Procedure(s) Performed: Procedure(s) (LRB):  EGD (ESOPHAGOGASTRODUODENOSCOPY) (N/A)  PH MONITORING, ESOPHAGUS, WIRELESS, (OFF REFLUX MEDS) (N/A)    Final Anesthesia Type: general      Patient location during evaluation: PACU  Patient participation: Yes- Able to Participate  Level of consciousness: awake and alert  Post-procedure vital signs: reviewed and stable  Pain management: adequate  Airway patency: patent    PONV status at discharge: No PONV  Anesthetic complications: no      Cardiovascular status: blood pressure returned to baseline  Respiratory status: unassisted  Hydration status: euvolemic  Follow-up not needed.              Vitals Value Taken Time   /57 04/30/25 09:39   Temp 36.4 °C (97.6 °F) 04/30/25 09:39   Pulse 65 04/30/25 09:39   Resp 14 04/30/25 09:39   SpO2 100 % 04/30/25 09:39         No case tracking events are documented in the log.      Pain/Ildefonso Score: Ildefonso Score: 10 (4/30/2025  9:24 AM)

## 2025-05-02 DIAGNOSIS — J31.0 CHRONIC RHINITIS: ICD-10-CM

## 2025-05-02 RX ORDER — FLUTICASONE PROPIONATE 50 MCG
1 SPRAY, SUSPENSION (ML) NASAL DAILY
Qty: 16 G | Refills: 0 | Status: CANCELLED | OUTPATIENT
Start: 2025-05-01

## 2025-05-02 NOTE — TELEPHONE ENCOUNTER
[facility]    Discussed with the patient.  Her rib pain occurs when she coughs or laughs but she is not in pain otherwise.  The Bravo capsule in her esophagus may be contributing to this pain.  I have recommended that she take an over the counter cough suppressant for now to try to avoid coughing and exacerbating her rib pain.    Annel Sin MD

## 2025-05-06 NOTE — PROVATION PATIENT INSTRUCTIONS
Discharge Summary/Instructions after an Endoscopic Procedure  Patient Name: Wandy Shah  Patient MRN: [id number]  Patient YOB: 1980  Tuesday, May 6, 2025  Annel Sin MD  Dear patient,  As a result of recent federal legislation (The Federal Cures Act), you may   receive lab or pathology results from your procedure in your [facility]   account before your physician is able to contact you. Your physician or   their representative will relay the results to you with their   recommendations at their soonest availability.  Thank you,  RESTRICTIONS:  During your procedure today, you received medications for sedation.  These   medications may affect your judgment, balance and coordination.  Therefore,   for 24 hours, you have the following restrictions:   - DO NOT drive a car, operate machinery, make legal/financial decisions,   sign important papers or drink alcohol.    ACTIVITY:  Today: no heavy lifting, straining or running due to procedural   sedation/anesthesia.  The following day: return to full activity including work.  DIET:  Eat and drink normally unless instructed otherwise.     TREATMENT FOR COMMON SIDE EFFECTS:  - Mild abdominal pain, nausea, belching, bloating or excessive gas:  rest,   eat lightly and use a heating pad.  - Sore Throat: treat with throat lozenges and/or gargle with warm salt   water.  - Because air was used during the procedure, expelling large amounts of air   from your rectum or belching is normal.  - If a bowel prep was taken, you may not have a bowel movement for 1-3 days.    This is normal.  SYMPTOMS TO WATCH FOR AND REPORT TO YOUR PHYSICIAN:  1. Abdominal pain or bloating, other than gas cramps.  2. Chest pain.  3. Back pain.  4. Signs of infection such as: chills or fever occurring within 24 hours   after the procedure.  5. Rectal bleeding, which would show as bright red, maroon, or black stools.   (A tablespoon of blood from the rectum is not serious, especially if    hemorrhoids are present.)  6. Vomiting.  7. Weakness or dizziness.  GO DIRECTLY TO THE NEAREST EMERGENCY ROOM IF YOU HAVE ANY OF THE FOLLOWING:      Difficulty breathing              Chills and/or fever over 101 F   Persistent vomiting and/or vomiting blood   Severe abdominal pain   Severe chest pain   Black, tarry stools   Bleeding- more than one tablespoon   Any other symptom or condition that you feel may need urgent attention  Your doctor recommends these additional instructions:  If any biopsies were taken, your doctors clinic will contact you in 1 to 2   weeks with any results.  Consider repeat EGD with Bravo or 24 hour pH impedance testing OFF of acid   reflux medications due to shortened study.  Follow up with your referring provider.  For questions, problems or results please call your physician - Annel Sin MD at Work:  [phone].  [facility], EMERGENCY ROOM PHONE NUMBER: [phone]  IF A COMPLICATION OR EMERGENCY SITUATION ARISES AND YOU ARE UNABLE TO REACH   YOUR PHYSICIAN - GO DIRECTLY TO THE EMERGENCY ROOM.  Annel Sin MD  5/6/2025 9:54:54 AM  This report has been verified and signed electronically.  Dear patient,  As a result of recent federal legislation (The Federal Cures Act), you may   receive lab or pathology results from your procedure in your [facility]   account before your physician is able to contact you. Your physician or   their representative will relay the results to you with their   recommendations at their soonest availability.  Thank you,  PROVATION

## 2025-05-07 ENCOUNTER — TELEPHONE (OUTPATIENT)
Dept: SURGERY | Facility: CLINIC | Age: 45
End: 2025-05-07
Payer: MEDICAID

## 2025-05-07 NOTE — TELEPHONE ENCOUNTER
Call to schedule appt after EGD.  I scheduled her for a VV with Dr. Lynch to discuss surgery workup for GERD.  I told her I am still awaiting his review of her recent testing and that she may be rescheduled back to me.  Ms. Shah verbalized understanding to all information provided and voiced no further questions, comments, or concerns.

## 2025-05-08 ENCOUNTER — PATIENT MESSAGE (OUTPATIENT)
Dept: SURGERY | Facility: CLINIC | Age: 45
End: 2025-05-08
Payer: MEDICAID

## 2025-05-08 ENCOUNTER — TELEPHONE (OUTPATIENT)
Dept: ENDOSCOPY | Facility: HOSPITAL | Age: 45
End: 2025-05-08
Payer: MEDICAID

## 2025-05-08 NOTE — TELEPHONE ENCOUNTER
"----- Message from LACHELLE Castro sent at 5/8/2025  8:02 AM CDT -----  Regarding: Motility study  Contact: [phone]  Procedure: ManometryDiagnosis: GERD  Procedure Timing: Within 4 weeks (Urgent)#If within 4 weeks selected, please hakan as high priority##If greater than 12 weeks, please select "5-12 weeks" and delay sending until 3 months prior to requested date# Additional Scheduling Information: n/aIs the patient taking a GLP-1 Agonist and/or blood thinner :noHave you attached a patient to this message: yes  "

## 2025-05-08 NOTE — TELEPHONE ENCOUNTER
Contacted the patient to schedule an endoscopy procedure(s) Esophageal Manometry . The patient did not answer the call and left a voice message requesting a call back.

## 2025-05-09 ENCOUNTER — TELEPHONE (OUTPATIENT)
Dept: ENDOSCOPY | Facility: HOSPITAL | Age: 45
End: 2025-05-09
Payer: MEDICAID

## 2025-05-09 VITALS — HEIGHT: 68 IN | BODY MASS INDEX: 23.49 KG/M2 | WEIGHT: 155 LBS

## 2025-05-09 DIAGNOSIS — K21.9 GASTROESOPHAGEAL REFLUX DISEASE, UNSPECIFIED WHETHER ESOPHAGITIS PRESENT: Primary | ICD-10-CM

## 2025-05-09 RX ORDER — GABAPENTIN 100 MG/1
100 CAPSULE ORAL 3 TIMES DAILY PRN
Qty: 30 CAPSULE | Refills: 6 | Status: CANCELLED | OUTPATIENT
Start: 2025-05-09

## 2025-05-09 NOTE — TELEPHONE ENCOUNTER
Patient is scheduled for a Esophageal Manometry on 7/1/25 with Dr. TOMY Larson  Referral for procedure from [facility]

## 2025-05-09 NOTE — TELEPHONE ENCOUNTER
"----- Message from LACHELLE Castro sent at 5/8/2025  8:02 AM CDT -----  Regarding: Motility study  Contact: [phone]  Procedure: ManometryDiagnosis: GERD  Procedure Timing: Within 4 weeks (Urgent)#If within 4 weeks selected, please hakan as high priority##If greater than 12 weeks, please select "5-12 weeks" and delay sending until 3 months prior to requested date# Additional Scheduling Information: n/aIs the patient taking a GLP-1 Agonist and/or blood thinner :noHave you attached a patient to this message: yes  "

## 2025-05-12 DIAGNOSIS — J31.0 CHRONIC RHINITIS: ICD-10-CM

## 2025-05-13 ENCOUNTER — OFFICE VISIT (OUTPATIENT)
Dept: SURGERY | Facility: CLINIC | Age: 45
End: 2025-05-13
Payer: MEDICAID

## 2025-05-13 DIAGNOSIS — K21.9 GERD WITHOUT ESOPHAGITIS: Primary | ICD-10-CM

## 2025-05-13 DIAGNOSIS — K31.84 GASTROPARESIS: ICD-10-CM

## 2025-05-13 DIAGNOSIS — G56.00 CARPAL TUNNEL SYNDROME, UNSPECIFIED LATERALITY: Primary | ICD-10-CM

## 2025-05-13 RX ORDER — GABAPENTIN 100 MG/1
100 CAPSULE ORAL 3 TIMES DAILY PRN
Qty: 30 CAPSULE | Refills: 6 | Status: SHIPPED | OUTPATIENT
Start: 2025-05-13

## 2025-05-13 NOTE — PROGRESS NOTES
The patient location is: at work, in [place]  The chief complaint leading to consultation is: gerd/gastroparesis    Visit type: audiovisual      14 minutes of total time spent on the encounter, which includes face to face time and non-face to face time preparing to see the patient (eg, review of tests), Obtaining and/or reviewing separately obtained history, Documenting clinical information in the electronic or other health record, Independently interpreting results (not separately reported) and communicating results to the patient/family/caregiver, or Care coordination (not separately reported).         Each patient to whom he or she provides medical services by telemedicine is:  (1) informed of the relationship between the physician and patient and the respective role of any other health care provider with respect to management of the patient; and (2) notified that he or she may decline to receive medical services by telemedicine and may withdraw from such care at any time.    Notes:     Wandy Shah is a 44 y.o. y/o female with a BMI of 25.48 kg/m^2 - weight of 76 kg (167 lb 8.8 oz) - and gastroparesis, migraines, anxiety, asthma, SVT, GERD, constipation, and acute low back pain.  Relevant surgical history includes robotic pyloroplasty (6/2024), robotic hysterectomy and bilateral salpingectomy, loop recorder implantation (11/2024), and c-sections.  She has been to an emergency facility 5 times in the last year and has not received narcotics at any of these visits.      History 3/17/25:   Pertinent history since last visit: Cardiac loop recorder implanted in November.  She is not improved since last visit.  She is net 4 lbs weight loss since October 2024.       Wandy Shah reports her most troubling symptom today is the reflux, which restarted approximately 2-3 weeks ago.  It gradually increased to its current level.  It has not improved.  Eating and drinking make the symptom worse and sometimes medications make the  "symptom better.  She recently got a new job and her hours have changed, she was let go in August.      Eats or drinks and her stomach immediately feels she is full.  She does not use straws, drink carbonated drinks, and eat fried/fatty foods.  She has tried pepto-bismol and some pills for nausea that she got from [employer] but she doesn't know what it is, neither of these are helping.  She also takes Zofran for nausea.  These medications are not consistently helpful.  When symptoms are poor, she feels "like something is sitting in epigastric area and doesn't move."      Her grandmother recently underwent a colectomy (likely partial) for unknown reason.  She is concerned that she may be heading in the same direction with her stomach issues.  She endorses chills - feeling alternately hot and cold - and suspects fever, but does not confirm with a thermometer.  She has had a hysterectomy but her ovaries were not removed.             Gastroparesis Assessments  Symptoms 3/17/25:     Severity Frequency   Vomiting 3 3    Nausea 4 4   Early satiety 4 4   Bloating 4 4   Postprandial fullness 4 4   Epigastric pain 4 4   Epigastric burning 4 4   # episodes of vomiting in last 24 hours    0   Impression: Gastroparesis symptoms are extremely severe.         GERD Symptoms  + PPI - omeprazole 40mg qam   + Typical heartburn  + Regurgitation  + Dysphagia (yes solids, yes liquids).  If yes, see Eckardt score, below.  + Hoarseness  + Sore throat  + Cough  + Asthma  + Chest pain - all the time, usually when coughing very hard, always in the center of her chest/substernal, 7/10 at worst, burning - reviewed heart attack s/sx in women   + Water brash  + Globus  + Nausea  + Vomiting     Eckardt Score:   Weight Loss (kg): 0 - None  Dysphagia: 1 - Occasional   Retrosternal pain: 3 - Every meal  Regurgitation: 1 - Occasional   Total Score: 5    Impression: GERD symptoms are extremely severe with moderate dysphagia.        Diet " Assessment:  Fluids: Poorly tolerated (nausea and vomiting)  Solids: Poorly tolerated (nausea and vomiting)  Nutrition supplements: not tried  Impression: PO intake is moderately reduced.         Medications:  She has tried:   nothing for motility  ondansetron and OTC med (unknown, see HPI) for nausea and vomiting  dicyclomine for epigastric or abdominal pain  omeprazole, famotidine, and OTC med (pepto) for epigastric burning or GERD  dulcolax and linaclotide  (Linzess) for constipation   nothing for diarrhea  THC: Yes, currently uses > 5 times daily for symptoms (nausea, vomiting, and poor appetite)  Of these, she is currently taking zofran, OTC med for nausea (not sure which one), bentyl, prilosec, pepcid AC, pepto-bismol, linzess, dulcolax, and THC.  She feels like her symptoms are not well controlled.    reviewed 3/17/25: percocet 5 (11/2024), percocet 7.5 (8/2024)          Review of Systems:  Review of Systems   Constitutional:  Positive for chills, fever, malaise/fatigue and weight loss.   HENT:  Positive for sore throat.    Eyes: Negative.    Respiratory:  Positive for cough. Negative for shortness of breath.    Cardiovascular:  Positive for chest pain (see HPI).   Gastrointestinal:  Positive for abdominal pain, constipation, heartburn, nausea and vomiting. Negative for diarrhea.   Genitourinary:  Positive for frequency.   Musculoskeletal:  Positive for back pain.   Skin: Negative.    Neurological:  Positive for dizziness, weakness and headaches. Negative for tremors, seizures and loss of consciousness.   Endo/Heme/Allergies:  Bruises/bleeds easily.   Psychiatric/Behavioral:  Positive for depression. Negative for suicidal ideas. The patient has insomnia. The patient is not nervous/anxious.            PHQ-2 Screening:       3/17/2025     2:48 PM 10/4/2024     1:44 PM   PHQ2 & PHQ9 Depression Results   Over the last two weeks how often have you been bothered by little interest or pleasure in doing things 3 0    Over the last two weeks how often have you been bothered by feeling down, depressed or hopeless 3 0   PHQ-2 Total Score 6 0   Over the last two weeks how often have you been bothered by trouble falling or staying asleep, or sleeping too much 2     Over the last two weeks how often have you been bothered by feeling tired or having little energy 2     Over the last two weeks how often have you been bothered by a poor appetite or overeating 3     Over the last two weeks how often have you been bothered by feeling bad about yourself - or that you are a failure or have let yourself or your family down 1     Over the last two weeks how often have you been bothered by trouble concentrating on things, such as reading the newspaper or watching television 1     Over the last two weeks how often have you been bothered by moving or speaking so slowly that other people could have noticed. Or the opposite - being so fidgety or restless that you have been moving around a lot more than usual. 0     Over the last two weeks how often have you been bothered by thoughts that you would be better off dead, or of hurting yourself 0     If you checked off any problems, how difficult have these problems made it for you to do your work, take care of things at home or get along with other people? Somewhat difficult     PHQ-9 Score 15           Interval history 5/13/25:  She says that her main complaint is nausea and she continues to have gerd symptoms.     OBJECTIVE:    From my NP note last visit  Most Recent Vital Signs:   Pulse: 70 (03/17/25 1446)  BP: 116/65 (03/17/25 1446)        Physical Exam:   Physical Exam  Vitals reviewed.   Constitutional:       General: She is awake. She is not in acute distress.     Appearance: Normal appearance.   HENT:      Head: Normocephalic and atraumatic.      Mouth/Throat:      Mouth: Mucous membranes are moist. No oral lesions.      Tongue: No lesions.      Palate: No lesions.      Tonsils: 0 on the  right. 0 on the left.   Cardiovascular:      Rate and Rhythm: Normal rate.   Pulmonary:      Effort: Pulmonary effort is normal.   Abdominal:      General: A surgical scar is present. Bowel sounds are normal. There is no distension.      Palpations: Abdomen is soft.      Tenderness: There is abdominal tenderness (specifically postprandial) in the epigastric area.      Comments: 4x robotic trocar incision sites, well-healed, without erythema, swelling, tenderness, drainage    Skin:     General: Skin is warm and dry.   Neurological:      General: No focal deficit present.      Mental Status: She is alert and oriented to person, place, and time.   Psychiatric:         Attention and Perception: Attention normal.         Mood and Affect: Mood and affect normal.         Behavior: Behavior normal. Behavior is cooperative.         Thought Content: Thought content does not include suicidal ideation.         Cognition and Memory: Cognition normal.                  Reviewed data:   CT abd/pelvis with IV & PO contrast 2025, images and report.  No acute findings  Ges 2025.  Report reviewed, 6% retained at 3h, c/w normal  GES 8/2024.  Report reviewed.  1% retained at 4 hours (normal is < 10% at 4 hours).   GES 5/2023.  Report reviewed.  36% retained at 4 hours (normal is < 10% at 4 hours).   Ph reviewed, positive (study 31h as pill fell off)  Egd reviewed, 2cm hh, ring  Motility pending     Assessment and plan  Severe gerd.  Gastroparesis well controlled by ges but continues to have symptoms. Awaiting motility study in July.  If this is ok for robotic hh with toupet, pcp clearance, anesthesia block and no narcotics.

## 2025-05-13 NOTE — LETTER
May 13, 2025        Julieth Sheldon MD  [address] [place] [address]             [facility]  [address]  [facility] [address]  Phone: [phone]   Patient: Wandy Shah   MR Number: [id number]   YOB: 1980   Date of Visit: 5/13/2025       Dear Dr. Sheldon:    Thank you for referring Wandy Shah to me for evaluation. Attached you will find relevant portions of my assessment and plan of care.    If you have questions, please do not hesitate to call me. I look forward to following Wandy Shah along with you.    Sincerely,      Clifford Lynch MD            CC  No Recipients    Enclosure

## 2025-05-15 ENCOUNTER — TELEPHONE (OUTPATIENT)
Dept: SURGERY | Facility: CLINIC | Age: 45
End: 2025-05-15
Payer: MEDICAID

## 2025-05-15 NOTE — TELEPHONE ENCOUNTER
----- Message from DEMETRA Barlow sent at 5/15/2025  6:47 AM CDT -----  ANU. ill see if we can move it up, but they are pretty backed up.  ----- Message -----  From: Clifford Lynch MD  Sent: 5/13/2025   5:49 PM CDT  To: Yen Bell RN    Awaiting motility study (set up for July, can this be moved up?).  Then pcp clearance for robotic hh toupet, no narcotics, anesthesia block.

## 2025-05-19 RX ORDER — FLUTICASONE PROPIONATE 50 MCG
1 SPRAY, SUSPENSION (ML) NASAL DAILY
Qty: 16 G | Refills: 0 | OUTPATIENT
Start: 2025-05-19

## 2025-05-20 DIAGNOSIS — J31.0 CHRONIC RHINITIS: ICD-10-CM

## 2025-05-20 RX ORDER — FLUTICASONE PROPIONATE 50 MCG
1 SPRAY, SUSPENSION (ML) NASAL DAILY
Qty: 16 G | Refills: 0 | Status: CANCELLED | OUTPATIENT
Start: 2025-05-20

## 2025-05-21 DIAGNOSIS — J31.0 CHRONIC RHINITIS: ICD-10-CM

## 2025-05-22 ENCOUNTER — PATIENT MESSAGE (OUTPATIENT)
Dept: ADMINISTRATIVE | Facility: OTHER | Age: 45
End: 2025-05-22
Payer: MEDICAID

## 2025-05-28 DIAGNOSIS — F41.9 ANXIETY: ICD-10-CM

## 2025-06-03 ENCOUNTER — PATIENT MESSAGE (OUTPATIENT)
Dept: FAMILY MEDICINE | Facility: HOSPITAL | Age: 45
End: 2025-06-03
Payer: MEDICAID

## 2025-06-05 ENCOUNTER — OFFICE VISIT (OUTPATIENT)
Dept: PRIMARY CARE CLINIC | Facility: CLINIC | Age: 45
End: 2025-06-05
Payer: MEDICAID

## 2025-06-05 DIAGNOSIS — F41.9 ANXIETY: Primary | ICD-10-CM

## 2025-06-05 PROCEDURE — 98005 SYNCH AUDIO-VIDEO EST LOW 20: CPT | Mod: 95,,,

## 2025-06-05 RX ORDER — DULOXETIN HYDROCHLORIDE 60 MG/1
60 CAPSULE, DELAYED RELEASE ORAL DAILY
Qty: 30 CAPSULE | Refills: 2 | OUTPATIENT
Start: 2025-06-05 | End: 2025-09-03

## 2025-06-05 RX ORDER — FLUTICASONE PROPIONATE 50 MCG
1 SPRAY, SUSPENSION (ML) NASAL DAILY
Qty: 16 G | Refills: 0 | OUTPATIENT
Start: 2025-06-05

## 2025-06-05 RX ORDER — DULOXETIN HYDROCHLORIDE 60 MG/1
60 CAPSULE, DELAYED RELEASE ORAL DAILY
Qty: 30 CAPSULE | Refills: 0 | Status: SHIPPED | OUTPATIENT
Start: 2025-06-05 | End: 2025-07-02 | Stop reason: SDUPTHER

## 2025-06-05 NOTE — PROGRESS NOTES
The patient location is: [place]  The chief complaint leading to consultation is: The patient's location is:  Patient's Home   The chief complaint leading to consultation is:  Anxiety [F41.9]    Visit type: audiovisual    Time spent in discussion with the patient: 12  20 minutes of total time spent on the encounter. This includes time spent in discussion with the patient and time preparing for the patient appointment: review of tests, obtaining and/or reviewing separately obtained history, documenting clinical information in the electronic or other health record, independently interpreting results (not separately reported), and communicating results to the patient/family/caregiver or care coordination (not separately reported).     Each patient to whom he or she provides medical services by telemedicine is: (1) informed of the relationship between the physician and patient and the respective role of any other health care provider with respect to management of the patient; and (2) notified that he or she may decline to receive medical services by telemedicine and may withdraw from such care at any time.      Subjective:   Wandy Shah is a 45 y.o. female who presents for Anxiety [F41.9].       HPI    History of Present Illness    CHIEF COMPLAINT:  Patient presents today for anxiety medication management.    ANXIETY:  She reports Cymbalta is working well for anxiety management but is currently out of medication and would like to discuss dose increase.    ALLERGIES:  She has an allergy to raspberries.    SOCIAL HISTORY:  She lives on the [facility] and is in the process of changing jobs.     Review of Systems   Constitutional:  Negative for activity change, chills, fatigue and fever.   HENT:  Negative for congestion, facial swelling, rhinorrhea and sore throat.    Respiratory:  Negative for cough, chest tightness and shortness of breath.    Cardiovascular:  Negative for chest pain and palpitations.   Gastrointestinal:   Negative for abdominal pain, constipation, diarrhea, nausea and vomiting.   Genitourinary:  Negative for dysuria.   Musculoskeletal:  Negative for back pain and joint swelling.   Skin:  Negative for rash and wound.   Neurological:  Negative for dizziness, speech difficulty, light-headedness and headaches.   Psychiatric/Behavioral:  Negative for behavioral problems, dysphoric mood and sleep disturbance. The patient is not nervous/anxious.        Current Outpatient Medications   Medication Instructions    albuterol (PROVENTIL/VENTOLIN HFA) 90 mcg/actuation inhaler Inhale 2 puffs into the lungs every 4 (four) hours as needed for Wheezing    amitriptyline (ELAVIL) 25 mg, Oral, Nightly    ashwagandha root extract 500 mg Cap Take 2 capsules orally as needed.    azelastine (ASTELIN) 137 mcg (0.1 %) nasal spray 1 puff in each nostril Nasally Twice a day 30 days    cetirizine (ZYRTEC) 10 mg, Oral, Daily    dicyclomine (BENTYL) 20 mg, Oral, 3 times daily PRN    DULoxetine (CYMBALTA) 60 mg, Oral, Daily    dupilumab (DUPIXENT PEN) 300 mg/2 mL PnIj Inject 300 mg (1 pen) into the skin every 14 (fourteen) days    famotidine (PEPCID) 20 mg, Oral, 2 times daily PRN    fluticasone propionate (FLONASE) 50 mcg/actuation nasal spray 1 spray in each nostril Nasally Once a day 30 days    fluticasone propionate (FLONASE) 50 mcg, Each Nostril, Daily    fluticasone-umeclidin-vilanter (TRELEGY ELLIPTA) 200-62.5-25 mcg inhaler 1 puff, Inhalation, Nightly    gabapentin (NEURONTIN) 100 mg, Oral, 3 times daily PRN    ibuprofen (ADVIL,MOTRIN) 800 mg, Oral, 3 times daily    ketorolac (TORADOL) 10 mg, Oral, Every 8 hours PRN    levocetirizine (XYZAL) 5 MG tablet Take 1 tablet (5 mg total) by mouth nightly as needed for allergies.    levocetirizine (XYZAL) 5 MG tablet 1 tablet in the evening Orally Once a day 90 days    lubiprostone (AMITIZA) 24 mcg, Oral, 2 times daily with meals    magic mouthwash diphen/antac/lidoc 10 mLs, Swish & Spit, Every 4  hours PRN    meclizine (ANTIVERT) 12.5 mg, Oral, 2 times daily PRN    metoprolol succinate (TOPROL-XL) 25 MG 24 hr tablet Take one-half tablet by mouth once a day.    metoprolol succinate (TOPROL-XL) 25 MG 24 hr tablet Take 1 tablet by mouth daily Hold for Heart Rate less than 55    metoprolol succinate (TOPROL-XL) 25 MG 24 hr tablet Take 1 tablet by mouth daily   Hold for Heart Rate less than 55    omeprazole (PRILOSEC) 40 mg, Oral, Daily    ondansetron (ZOFRAN-ODT) 4 MG TbDL DISSOLVE 1 tablet (4 mg total) by mouth 4 (four) times daily before meals and nightly.    polyethylene glycol (GLYCOLAX) 17 g, Oral, Daily    rizatriptan (MAXALT) 10 MG tablet 1/2 - 1 at onset of migraine; may repeat in 2 hours if needed; maximum 2 in 24 hours; maximum 3 days per week    topiramate (TOPAMAX) 75 mg, Oral, Nightly    topiramate (TOPAMAX) 25 mg, Oral, Daily, Increase by 1 tablet per week as needed for migraine up to 2 tablets twice daily       Objective:   No home vitals reported.     Physical Exam  Constitutional:       Appearance: Normal appearance.   Pulmonary:      Effort: Pulmonary effort is normal.   Neurological:      General: No focal deficit present.      Mental Status: She is alert and oriented to person, place, and time.   Psychiatric:         Mood and Affect: Mood normal.         Behavior: Behavior normal.       Assessment/Plan:        Diagnoses and all orders for this visit:    Anxiety  Improving. Will refill duloxetine 60 mg daily for 30 day supply this one time. Patient is scheduled to see \[facility]\"" family medicine on 07/02/25. Advised patient to discuss dosage increase at her scheduled in-person appointment. Patient verbalized understanding.   -     DULoxetine (CYMBALTA) 60 MG capsule; Take 1 capsule (60 mg total) by mouth once daily.      Assessment & Plan    ANXIETY DISORDER:  - Assessed patient's anxiety medication regimen.  - Cymbalta has been working well, but patient is currently out of medication and needs to  discuss dose adjustment.  - Prescribed 30-day supply of Cymbalta to bridge until upcoming primary care appointment on July 2nd.    FOOD ALLERGY:  - Documented that the patient is allergic to raspberries.           RAMOS Steele  [facility]  06/05/2025    This note was generated with the assistance of ambient listening technology. Verbal consent was obtained by the patient and accompanying visitor(s) for the recording of patient appointment to facilitate this note. I attest to having reviewed and edited the generated note for accuracy, though some syntax or spelling errors may persist. Please contact the author of this note for any clarification.

## 2025-06-17 ENCOUNTER — PATIENT MESSAGE (OUTPATIENT)
Dept: FAMILY MEDICINE | Facility: HOSPITAL | Age: 45
End: 2025-06-17
Payer: MEDICAID

## 2025-06-19 ENCOUNTER — OFFICE VISIT (OUTPATIENT)
Dept: URGENT CARE | Facility: CLINIC | Age: 45
End: 2025-06-19
Payer: MEDICAID

## 2025-06-19 VITALS
DIASTOLIC BLOOD PRESSURE: 67 MMHG | OXYGEN SATURATION: 98 % | SYSTOLIC BLOOD PRESSURE: 104 MMHG | HEIGHT: 68 IN | WEIGHT: 155.19 LBS | BODY MASS INDEX: 23.52 KG/M2 | TEMPERATURE: 98 F | HEART RATE: 83 BPM | RESPIRATION RATE: 20 BRPM

## 2025-06-19 NOTE — PROGRESS NOTES
"Subjective:      Patient ID: Wandy Shah is a 45 y.o. female.    Vitals:  height is 5' 8" (1.727 m) and weight is 70.4 kg (155 lb 3.3 oz). Her oral temperature is 98.4 °F (36.9 °C). Her blood pressure is 104/67 and her pulse is 83. Her respiration is 20 and oxygen saturation is 98%.     Chief Complaint: Leg Pain    Pt presents with bilateral leg pain. Pt states the pain feels like it is in the thigh bone causing extreme pain when walking or bending the patient states pain started Saturday.     Leg Pain   The incident occurred more than 1 week ago (5 days). The incident occurred at home. The injury mechanism is unknown. The pain is present in the left leg, left thigh, left knee, right thigh and right leg. The quality of the pain is described as aching. The pain is at a severity of 8/10. The pain is severe. The pain has been Constant since onset. Associated symptoms include an inability to bear weight, numbness and tingling. Pertinent negatives include no loss of motion, loss of sensation or muscle weakness. She reports no foreign bodies present. The symptoms are aggravated by movement and weight bearing. Treatments tried: ibruprofen 800mg. The treatment provided no relief.     Neurological:  Positive for numbness.    Objective:     Physical Exam    Assessment:     No diagnosis found.    Plan:       There are no diagnoses linked to this encounter.                "

## 2025-06-21 ENCOUNTER — PATIENT MESSAGE (OUTPATIENT)
Dept: ADMINISTRATIVE | Facility: OTHER | Age: 45
End: 2025-06-21
Payer: MEDICAID

## 2025-06-24 DIAGNOSIS — J32.9 CHRONIC SINUSITIS: ICD-10-CM

## 2025-06-24 DIAGNOSIS — R05.3 CHRONIC COUGH: Primary | ICD-10-CM

## 2025-06-25 ENCOUNTER — NURSE TRIAGE (OUTPATIENT)
Dept: ADMINISTRATIVE | Facility: CLINIC | Age: 45
End: 2025-06-25
Payer: MEDICAID

## 2025-06-25 NOTE — TELEPHONE ENCOUNTER
Pt calling with c/o abdominal pain on the right side and pain now 10/10 and she said that it started yesterday. Pt was triaged and care advice to go to the ED. Pt told to call back if running any fever or worsening condition once back home.                 Reason for Disposition   SEVERE abdominal pain (e.g., excruciating)    Additional Information   Negative: Passed out (e.g., fainted, lost consciousness, blacked out and was not responding)   Negative: Shock suspected (e.g., cold/pale/clammy skin, too weak to stand, low BP, rapid pulse)   Negative: Sounds like a life-threatening emergency to the triager    Protocols used: Abdominal Pain - Female-A-[place]

## 2025-06-26 ENCOUNTER — PATIENT MESSAGE (OUTPATIENT)
Dept: ORTHOPEDICS | Facility: CLINIC | Age: 45
End: 2025-06-26
Payer: MEDICAID

## 2025-06-29 DIAGNOSIS — F41.9 ANXIETY: ICD-10-CM

## 2025-07-01 ENCOUNTER — HOSPITAL ENCOUNTER (OUTPATIENT)
Facility: HOSPITAL | Age: 45
Discharge: HOME | End: 2025-07-01
Attending: STUDENT IN AN ORGANIZED HEALTH CARE EDUCATION/TRAINING PROGRAM
Payer: MEDICAID

## 2025-07-01 VITALS
DIASTOLIC BLOOD PRESSURE: 57 MMHG | HEART RATE: 70 BPM | RESPIRATION RATE: 15 BRPM | SYSTOLIC BLOOD PRESSURE: 103 MMHG | WEIGHT: 163.13 LBS | HEIGHT: 68 IN | TEMPERATURE: 98 F | OXYGEN SATURATION: 97 % | BODY MASS INDEX: 24.72 KG/M2

## 2025-07-01 DIAGNOSIS — K21.9 GERD (GASTROESOPHAGEAL REFLUX DISEASE): ICD-10-CM

## 2025-07-01 PROCEDURE — 25000003 PHARM REV CODE 250: Performed by: STUDENT IN AN ORGANIZED HEALTH CARE EDUCATION/TRAINING PROGRAM

## 2025-07-01 PROCEDURE — 91010 ESOPHAGUS MOTILITY STUDY: CPT | Mod: TC | Performed by: STUDENT IN AN ORGANIZED HEALTH CARE EDUCATION/TRAINING PROGRAM

## 2025-07-01 PROCEDURE — 91037 ESOPH IMPED FUNCTION TEST: CPT | Mod: TC | Performed by: STUDENT IN AN ORGANIZED HEALTH CARE EDUCATION/TRAINING PROGRAM

## 2025-07-01 RX ORDER — DULOXETIN HYDROCHLORIDE 60 MG/1
60 CAPSULE, DELAYED RELEASE ORAL DAILY
Qty: 30 CAPSULE | Refills: 0 | OUTPATIENT
Start: 2025-07-01

## 2025-07-01 RX ORDER — LIDOCAINE HYDROCHLORIDE 20 MG/ML
JELLY TOPICAL ONCE
Status: COMPLETED | OUTPATIENT
Start: 2025-07-01 | End: 2025-07-01

## 2025-07-01 RX ORDER — SODIUM CHLORIDE 9 MG/ML
INJECTION, SOLUTION INTRAVENOUS CONTINUOUS
Status: DISCONTINUED | OUTPATIENT
Start: 2025-07-01 | End: 2025-07-01 | Stop reason: HOSPADM

## 2025-07-01 RX ADMIN — LIDOCAINE HYDROCHLORIDE 10 ML: 20 JELLY TOPICAL at 08:07

## 2025-07-01 NOTE — NURSING
Patient tolerated esophageal manometry well. Catheter passed easily through right naris. Full protocol completed.

## 2025-07-02 ENCOUNTER — OFFICE VISIT (OUTPATIENT)
Dept: PRIMARY CARE CLINIC | Facility: CLINIC | Age: 45
End: 2025-07-02
Payer: MEDICAID

## 2025-07-02 VITALS
WEIGHT: 163 LBS | HEART RATE: 85 BPM | OXYGEN SATURATION: 100 % | BODY MASS INDEX: 24.71 KG/M2 | HEIGHT: 68 IN | TEMPERATURE: 97 F | DIASTOLIC BLOOD PRESSURE: 66 MMHG | SYSTOLIC BLOOD PRESSURE: 98 MMHG

## 2025-07-02 DIAGNOSIS — F41.9 ANXIETY: Primary | ICD-10-CM

## 2025-07-02 PROCEDURE — 3078F DIAST BP <80 MM HG: CPT | Mod: CPTII,,, | Performed by: FAMILY MEDICINE

## 2025-07-02 PROCEDURE — 3074F SYST BP LT 130 MM HG: CPT | Mod: CPTII,,, | Performed by: FAMILY MEDICINE

## 2025-07-02 PROCEDURE — 99214 OFFICE O/P EST MOD 30 MIN: CPT | Mod: S$PBB,,, | Performed by: FAMILY MEDICINE

## 2025-07-02 PROCEDURE — 99213 OFFICE O/P EST LOW 20 MIN: CPT | Mod: PBBFAC,PN | Performed by: FAMILY MEDICINE

## 2025-07-02 PROCEDURE — 3008F BODY MASS INDEX DOCD: CPT | Mod: CPTII,,, | Performed by: FAMILY MEDICINE

## 2025-07-02 PROCEDURE — 99999 PR PBB SHADOW E&M-EST. PATIENT-LVL III: CPT | Mod: PBBFAC,,, | Performed by: FAMILY MEDICINE

## 2025-07-02 PROCEDURE — 1159F MED LIST DOCD IN RCRD: CPT | Mod: CPTII,,, | Performed by: FAMILY MEDICINE

## 2025-07-02 RX ORDER — DULOXETIN HYDROCHLORIDE 60 MG/1
60 CAPSULE, DELAYED RELEASE ORAL DAILY
Qty: 90 CAPSULE | Refills: 0 | Status: SHIPPED | OUTPATIENT
Start: 2025-07-02

## 2025-07-02 RX ORDER — BUSPIRONE HYDROCHLORIDE 7.5 MG/1
7.5 TABLET ORAL 3 TIMES DAILY
Qty: 180 TABLET | Refills: 2 | Status: SHIPPED | OUTPATIENT
Start: 2025-07-02 | End: 2026-07-02

## 2025-07-02 NOTE — PROGRESS NOTES
"Subjective:       Patient ID: Wandy Shah is a 45 y.o. female.    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     ANXIETY AND DEPRESSION:  She reports borderline controlled anxiety and depression currently managed with Duloxetine, suggesting possible need for dose adjustment or medication change. She experiences significant emotional distress and is seeking additional management strategies.    SLEEP:  She reports fragmented, non-restorative sleep with consistent early morning awakening at 4 AM despite bedtime between 10-11 PM. She denies difficulty falling asleep initially or breathing interruptions during sleep. Attempting earlier bedtime results in midnight awakening followed by even earlier morning awakening. She reports waking up feeling exhausted.       Review of Systems    Objective:      Vitals:    07/02/25 0843   BP: 98/66   BP Location: Right arm   Patient Position: Sitting   Pulse: 85   Temp: 97 °F (36.1 °C)   TempSrc: Oral   SpO2: 100%   Weight: 73.9 kg (163 lb)   Height: 5' 8" (1.727 m)     Physical Exam  Vitals and nursing note reviewed.   Constitutional:       Appearance: She is well-developed.   HENT:      Head: Normocephalic and atraumatic.      Nose: Nose normal.   Eyes:      Conjunctiva/sclera: Conjunctivae normal.   Pulmonary:      Effort: Pulmonary effort is normal. No respiratory distress.   Abdominal:      General: There is no distension.      Palpations: Abdomen is soft.   Neurological:      Mental Status: She is alert.      Cranial Nerves: No cranial nerve deficit.   Psychiatric:         Mood and Affect: Mood is depressed.         Physical Exam               Lab Results   Component Value Date     06/25/2025     07/25/2024    K 3.9 06/25/2025    K 3.5 07/25/2024     06/25/2025     (H) 07/25/2024    CO2 17 (L) 06/25/2025 "    CO2 18 (L) 07/25/2024    BUN 11 06/25/2025    CREATININE 0.7 06/25/2025    ANIONGAP 9 06/25/2025     Lab Results   Component Value Date    HGBA1C 4.8 05/23/2024     Lab Results   Component Value Date     (H) 07/24/2024       Lab Results   Component Value Date    WBC 11.65 06/25/2025    HGB 13.6 06/25/2025    HGB 11.7 (L) 07/25/2024    HCT 40.8 06/25/2025    HCT 37.1 07/25/2024    HCT 35 (L) 05/16/2021     06/25/2025     07/25/2024    GRAN 6.9 07/25/2024    GRAN 58.1 07/25/2024     Lab Results   Component Value Date    CHOL 196 05/23/2024    HDL 32 (L) 05/23/2024    LDLCALC 135.8 05/23/2024    TRIG 141 05/23/2024        Current Medications[1]        Assessment:       1. Anxiety           Plan:       Anxiety  -     busPIRone (BUSPAR) 7.5 MG tablet; Take 1 tablet (7.5 mg total) by mouth 3 (three) times daily.  Dispense: 180 tablet; Refill: 2    Anxiety still uncontrolled with chronic fatigue and poor sleep. Found some improvement in duloxetine but not ideal still. Long discussion about coping strategies, sleep, therapy, exercise. She is not interested in therapy at this time but may be open to this in the future. Refill on duloxetine and will add buspar 7.5 mg bid may double dose in a week if desired and follow up with PCP in the coming weeks.            This note was generated with the assistance of ambient listening technology. Verbal consent was obtained by the patient and accompanying visitor(s) for the recording of patient appointment to facilitate this note. I attest to having reviewed and edited the generated note for accuracy, though some syntax or spelling errors may persist. Please contact the author of this note for any clarification.            [1]   Current Outpatient Medications:     albuterol (PROVENTIL/VENTOLIN HFA) 90 mcg/actuation inhaler, Inhale 2 puffs into the lungs every 4 (four) hours as needed for Wheezing, Disp: 8.5 g, Rfl: 11    amitriptyline (ELAVIL) 25 MG tablet, Take  1 tablet (25 mg total) by mouth every evening., Disp: 30 tablet, Rfl: 2    ashwagandha root extract 500 mg Cap, Take 2 capsules orally as needed., Disp: 180 capsule, Rfl: 4    azelastine (ASTELIN) 137 mcg (0.1 %) nasal spray, 1 puff in each nostril Nasally Twice a day 30 days, Disp: 30 mL, Rfl: 11    cetirizine (ZYRTEC) 10 MG tablet, Take 1 tablet (10 mg total) by mouth once daily., Disp: 30 tablet, Rfl: 11    dicyclomine (BENTYL) 20 mg tablet, Take 1 tablet (20 mg total) by mouth 3 (three) times daily as needed (abdominal pain)., Disp: 60 tablet, Rfl: 5    DULoxetine (CYMBALTA) 60 MG capsule, Take 1 capsule (60 mg total) by mouth once daily., Disp: 30 capsule, Rfl: 0    dupilumab (DUPIXENT PEN) 300 mg/2 mL PnIj, Inject 300 mg (1 pen) into the skin every 14 (fourteen) days, Disp: 4 mL, Rfl: 5    famotidine (PEPCID) 20 MG tablet, Take 1 tablet (20 mg total) by mouth 2 (two) times daily as needed for Heartburn., Disp: 60 tablet, Rfl: 5    fluticasone-umeclidin-vilanter (TRELEGY ELLIPTA) 200-62.5-25 mcg inhaler, Inhale 1 puff into the lungs every evening., Disp: 180 each, Rfl: 3    gabapentin (NEURONTIN) 100 MG capsule, Take 1 capsule (100 mg total) by mouth 3 (three) times daily as needed., Disp: 30 capsule, Rfl: 6    ibuprofen (ADVIL,MOTRIN) 800 MG tablet, Take 1 tablet (800 mg total) by mouth 3 (three) times daily., Disp: 90 tablet, Rfl: 1    ketorolac (TORADOL) 10 mg tablet, Take 1 tablet (10 mg total) by mouth every 8 (eight) hours as needed for Pain., Disp: 12 tablet, Rfl: 0    levocetirizine (XYZAL) 5 MG tablet, Take 1 tablet (5 mg total) by mouth nightly as needed for allergies., Disp: 30 tablet, Rfl: 11    levocetirizine (XYZAL) 5 MG tablet, 1 tablet in the evening Orally Once a day 90 days, Disp: 90 tablet, Rfl: 3    lubiprostone (AMITIZA) 24 MCG Cap, Take 1 capsule (24 mcg total) by mouth 2 (two) times daily with meals., Disp: 60 capsule, Rfl: 5    metoprolol succinate (TOPROL-XL) 25 MG 24 hr tablet, Take  one-half tablet by mouth once a day., Disp: 45 tablet, Rfl: 4    metoprolol succinate (TOPROL-XL) 25 MG 24 hr tablet, Take 1 tablet by mouth daily Hold for Heart Rate less than 55, Disp: 30 tablet, Rfl: 11    metoprolol succinate (TOPROL-XL) 25 MG 24 hr tablet, Take 1 tablet by mouth daily  Hold for Heart Rate less than 55, Disp: 30 tablet, Rfl: 11    omeprazole (PRILOSEC) 40 MG capsule, Take 1 capsule (40 mg total) by mouth once daily., Disp: 30 capsule, Rfl: 5    ondansetron (ZOFRAN-ODT) 4 MG TbDL, DISSOLVE 1 tablet (4 mg total) by mouth 4 (four) times daily before meals and nightly., Disp: 120 tablet, Rfl: 5    rizatriptan (MAXALT) 10 MG tablet, 1/2 - 1 at onset of migraine; may repeat in 2 hours if needed; maximum 2 in 24 hours; maximum 3 days per week, Disp: 10 tablet, Rfl: 2    topiramate (TOPAMAX) 25 MG tablet, Take 3 tablets (75 mg total) by mouth every evening., Disp: , Rfl:     topiramate (TOPAMAX) 25 MG tablet, Take 1 tablet (25 mg total) by mouth once daily. Increase by 1 tablet per week as needed for migraine up to 2 tablets twice daily, Disp: 120 tablet, Rfl: 4    busPIRone (BUSPAR) 7.5 MG tablet, Take 1 tablet (7.5 mg total) by mouth 3 (three) times daily., Disp: 180 tablet, Rfl: 2    fluticasone propionate (FLONASE) 50 mcg/actuation nasal spray, 1 spray (50 mcg total) by Each Nostril route once daily. (Patient not taking: Reported on 7/2/2025), Disp: 16 g, Rfl: 0    fluticasone propionate (FLONASE) 50 mcg/actuation nasal spray, 1 spray in each nostril Nasally Once a day 30 days (Patient not taking: Reported on 7/2/2025), Disp: 16 g, Rfl: 11    magic mouthwash diphen/antac/lidoc, Swish and spit 10 mLs every 4 (four) hours as needed (sore throat). (Patient not taking: Reported on 7/2/2025), Disp: 180 mL, Rfl: 0    meclizine (ANTIVERT) 12.5 mg tablet, Take 1 tablet (12.5 mg total) by mouth 2 (two) times daily as needed for Dizziness., Disp: 28 tablet, Rfl: 0    polyethylene glycol (GLYCOLAX) 17  gram/dose powder, Take 17 g by mouth once daily. (Patient not taking: Reported on 7/2/2025), Disp: 510 g, Rfl: 11  No current facility-administered medications for this visit.

## 2025-07-04 PROCEDURE — 91037 ESOPH IMPED FUNCTION TEST: CPT | Mod: 26,,, | Performed by: STUDENT IN AN ORGANIZED HEALTH CARE EDUCATION/TRAINING PROGRAM

## 2025-07-04 PROCEDURE — 91010 ESOPHAGUS MOTILITY STUDY: CPT | Mod: 26,,, | Performed by: STUDENT IN AN ORGANIZED HEALTH CARE EDUCATION/TRAINING PROGRAM

## 2025-07-04 NOTE — PROVATION PATIENT INSTRUCTIONS
Discharge Summary/Instructions after an Endoscopic Procedure  Patient Name: Wandy Shah  Patient MRN: [id number]  Patient YOB: 1980  Thursday, July 3, 2025  Layla Larson MD  Dear patient,  As a result of recent federal legislation (The Federal Cures Act), you may   receive lab or pathology results from your procedure in your [facility]   account before your physician is able to contact you. Your physician or   their representative will relay the results to you with their   recommendations at their soonest availability.  Thank you,  RESTRICTIONS:  During your procedure today, you received medications for sedation.  These   medications may affect your judgment, balance and coordination.  Therefore,   for 24 hours, you have the following restrictions:   - DO NOT drive a car, operate machinery, make legal/financial decisions,   sign important papers or drink alcohol.    ACTIVITY:  Today: no heavy lifting, straining or running due to procedural   sedation/anesthesia.  The following day: return to full activity including work.  DIET:  Eat and drink normally unless instructed otherwise.     TREATMENT FOR COMMON SIDE EFFECTS:  - Mild abdominal pain, nausea, belching, bloating or excessive gas:  rest,   eat lightly and use a heating pad.  - Sore Throat: treat with throat lozenges and/or gargle with warm salt   water.  - Because air was used during the procedure, expelling large amounts of air   from your rectum or belching is normal.  - If a bowel prep was taken, you may not have a bowel movement for 1-3 days.    This is normal.  SYMPTOMS TO WATCH FOR AND REPORT TO YOUR PHYSICIAN:  1. Abdominal pain or bloating, other than gas cramps.  2. Chest pain.  3. Back pain.  4. Signs of infection such as: chills or fever occurring within 24 hours   after the procedure.  5. Rectal bleeding, which would show as bright red, maroon, or black stools.   (A tablespoon of blood from the rectum is not serious, especially if    hemorrhoids are present.)  6. Vomiting.  7. Weakness or dizziness.  GO DIRECTLY TO THE NEAREST EMERGENCY ROOM IF YOU HAVE ANY OF THE FOLLOWING:      Difficulty breathing              Chills and/or fever over 101 F   Persistent vomiting and/or vomiting blood   Severe abdominal pain   Severe chest pain   Black, tarry stools   Bleeding- more than one tablespoon   Any other symptom or condition that you feel may need urgent attention  Your doctor recommends these additional instructions:  If any biopsies were taken, your doctors clinic will contact you in 1 to 2   weeks with any results.  - Return to referring physician as previously scheduled.   - Continue present medications.   - Consider trial of a calcium channel blocker  For questions, problems or results please call your physician - Layla Larson MD at Work:  [phone].  [facility], EMERGENCY ROOM PHONE NUMBER: [phone]  IF A COMPLICATION OR EMERGENCY SITUATION ARISES AND YOU ARE UNABLE TO REACH   YOUR PHYSICIAN - GO DIRECTLY TO THE EMERGENCY ROOM.  Layla Larson MD  7/4/2025 7:38:11 AM  This report has been verified and signed electronically.  Dear patient,  As a result of recent federal legislation (The Federal Cures Act), you may   receive lab or pathology results from your procedure in your [facility]   account before your physician is able to contact you. Your physician or   their representative will relay the results to you with their   recommendations at their soonest availability.  Thank you,  PROVATION

## 2025-07-07 DIAGNOSIS — K22.4 HYPERCONTRACTILE ESOPHAGUS: Primary | ICD-10-CM

## 2025-07-07 RX ORDER — AMLODIPINE BESYLATE 5 MG/1
5 TABLET ORAL DAILY
Qty: 30 TABLET | Refills: 1 | Status: SHIPPED | OUTPATIENT
Start: 2025-07-07

## 2025-07-11 ENCOUNTER — OFFICE VISIT (OUTPATIENT)
Dept: OBSTETRICS AND GYNECOLOGY | Facility: CLINIC | Age: 45
End: 2025-07-11
Payer: MEDICAID

## 2025-07-11 VITALS
WEIGHT: 162.94 LBS | DIASTOLIC BLOOD PRESSURE: 76 MMHG | SYSTOLIC BLOOD PRESSURE: 117 MMHG | BODY MASS INDEX: 24.77 KG/M2

## 2025-07-11 DIAGNOSIS — N83.201 CYST OF RIGHT OVARY: Primary | ICD-10-CM

## 2025-07-11 PROCEDURE — 99214 OFFICE O/P EST MOD 30 MIN: CPT | Mod: PBBFAC,PO | Performed by: OBSTETRICS & GYNECOLOGY

## 2025-07-11 PROCEDURE — 99999 PR PBB SHADOW E&M-EST. PATIENT-LVL IV: CPT | Mod: PBBFAC,,, | Performed by: OBSTETRICS & GYNECOLOGY

## 2025-07-11 NOTE — PROGRESS NOTES
CC:No chief complaint on file.      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   OB History          2    Para   2    Term   2            AB        Living   2         SAB        IAB        Ectopic        Multiple        Live Births   2               Complaining of:     (Not in a hospital admission)      Review of patient's allergies indicates:   Allergen Reactions    Raspberry (rubus idaeus) Hives        Past Medical History:   Diagnosis Date    Anemia     Back pain     Depression      Past Surgical History:   Procedure Laterality Date    CARPAL TUNNEL RELEASE Right 2024    Procedure: RELEASE, CARPAL TUNNEL;  Surgeon: Javy Ayala Jr., MD;  Location: [facility] OR;  Service: Orthopedics;  Laterality: Right;     SECTION      COLD KNIFE CONIZATION OF CERVIX  12/10/2021    Procedure: CONE BIOPSY, CERVIX, USING COLD KNIFE;  Surgeon: Modesto Nassar MD;  Location: [facility] OR;  Service: OB/GYN;;    COLONOSCOPY N/A 2021    Procedure: COLONOSCOPY;  Surgeon: Emily Cruz MD;  Location: [facility];  Service: Endoscopy;  Laterality: N/A;    DECOMPRESSION, NERVE, ULNAR Right 2024    Procedure: DECOMPRESSION, NERVE, ULNAR;  Surgeon: Javy Ayala Jr., MD;  Location: [facility] OR;  Service: Orthopedics;  Laterality: Right;    ESOPHAGEAL MANOMETRY WITH MEASUREMENT OF IMPEDANCE N/A 2025    Procedure: MANOMETRY, ESOPHAGUS, WITH IMPEDANCE MEASUREMENT;  Surgeon: Layla Larson MD;  Location: [facility] ENDO (4TH FLR);  Service: Endoscopy;  Laterality: N/A;  jm/portal/rosalba/   precall complete/ mleone    ESOPHAGOGASTRODUODENOSCOPY N/A 2021    Procedure: EGD (ESOPHAGOGASTRODUODENOSCOPY);   Surgeon: Emily Cruz MD;  Location: [facility];  Service: Endoscopy;  Laterality: N/A;    ESOPHAGOGASTRODUODENOSCOPY N/A 4/30/2025    Procedure: EGD (ESOPHAGOGASTRODUODENOSCOPY);  Surgeon: Annel Sin MD;  Location: [facility] ENDO (2ND FLR);  Service: Endoscopy;  Laterality: N/A;  jm/referral k creeck/prep inst sent to pt via portal/looprecorder/asthma, gastroparesis  egd/bravo off ppi  4/23 precall complete-MD    HYSTERECTOMY      INSERTION OF IMPLANTABLE LOOP RECORDER  11/22/2024    heart    LATERAL EPICONDYLE RELEASE Right 08/23/2024    Procedure: RELEASE, ELBOW, LATERAL EPICONDYLE;  Surgeon: Javy Ayala Jr., MD;  Location: [facility];  Service: Orthopedics;  Laterality: Right;    PH MONITORING, ESOPHAGUS, WIRELESS, (OFF REFLUX MEDS) N/A 4/30/2025    Procedure: PH MONITORING, ESOPHAGUS, WIRELESS, (OFF REFLUX MEDS);  Surgeon: Annel Sin MD;  Location: [facility] (2ND FLR);  Service: Endoscopy;  Laterality: N/A;    ROBOT-ASSISTED LAPAROSCOPIC HYSTERECTOMY N/A 03/16/2022    Procedure: ROBOTIC HYSTERECTOMY;  Surgeon: Modesto Nassar MD;  Location: [facility];  Service: OB/GYN;  Laterality: N/A;    ROBOT-ASSISTED LAPAROSCOPIC PYLOROPLASTY USING DA JOSE LUIS XI N/A 06/07/2024    Procedure: XI ROBOTIC PYLOROMYOTOMY WITH EGD;  Surgeon: Clifford Lynch MD;  Location: [facility] 2ND FLR;  Service: General;  Laterality: N/A;  NO NARCOTICS    ROBOT-ASSISTED SALPINGECTOMY Bilateral 03/16/2022    Procedure: ROBOTIC SALPINGECTOMY;  Surgeon: Modesto Nassar MD;  Location: [facility];  Service: OB/GYN;  Laterality: Bilateral;    SINUS SURGERY      TUBAL LIGATION       Family History   Problem Relation Name Age of Onset    Hypertension Mother      Diabetes Mother      Breast cancer Neg Hx      Colon cancer Neg Hx      Ovarian cancer Neg Hx       Social History[1]  ROS:  GENERAL: Feeling well overall. Denies fever or chills.   SKIN: Denies rash or lesions.   HEAD: Denies head injury or headache.   NODES: Denies  enlarged lymph nodes.   CHEST: Denies chest pain or shortness of breath.   CARDIOVASCULAR: Denies palpitations or left sided chest pain.    ABDOMEN: Denies diarrhea, nausea, vomiting or rectal bleeding.   URINARY: No dysuria, hematuria, or burning on urination.  REPRODUCTIVE: See HPI.   BREASTS: Denies pain, lumps, or nipple discharge.   HEMATOLOGIC: No easy bruisability or excessive bleeding.   MUSCULOSKELETAL: Denies joint pain or swelling.   NEUROLOGIC: Denies syncope or weakness.   PSYCHIATRIC: Denies depression, anxiety or mood swings.      PE: /76   Wt 73.9 kg (162 lb 14.7 oz)   LMP 2022 (Exact Date)   BMI 24.77 kg/m²      APPEARANCE: Well nourished, well developed, in no acute distress.  SKIN: Normal skin turgor, no lesions.    NODES: No inguinal, cervical, axillary or femoral lymph node enlargement.  CARDIOVASCULAR: Normal S1, S2. No rubs, murmurs or gallops.  NEUROLOGIC: Normal mood and affect. No depression or anxiety.   ABDOMEN: Soft. No tenderness or masses. No hepatosplenomegaly. No hernias.  RESPIRATORY: Normal respiratory effort with no retractions or use of accessory muscles.      ASSESSMENT/ PLAN    Diagnoses and all orders for this visit:    Cyst of right ovary  -     US Pelvis Limited Non OB; Future    - patient with right sided pain for about 2 weeks, improving with toradol  - cysts noted on 2025 CT A/P  - Will get pelvic ultrasound        Modesto Nassar MD          [1]   Social History  Tobacco Use    Smoking status: Former     Current packs/day: 0.00     Types: Cigarettes     Quit date: 3/3/2021     Years since quittin.3     Passive exposure: Past    Smokeless tobacco: Never   Substance Use Topics    Alcohol use: No    Drug use: Yes     Types: Marijuana     Comment: daily

## 2025-07-26 DIAGNOSIS — G43.009 MIGRAINE WITHOUT AURA AND WITHOUT STATUS MIGRAINOSUS, NOT INTRACTABLE: ICD-10-CM

## 2025-07-26 RX ORDER — AMITRIPTYLINE HYDROCHLORIDE 25 MG/1
25 TABLET, FILM COATED ORAL NIGHTLY
Qty: 30 TABLET | Refills: 2 | Status: CANCELLED | OUTPATIENT
Start: 2025-07-26 | End: 2025-10-24

## 2025-07-27 DIAGNOSIS — G43.009 MIGRAINE WITHOUT AURA AND WITHOUT STATUS MIGRAINOSUS, NOT INTRACTABLE: ICD-10-CM

## 2025-07-28 ENCOUNTER — OFFICE VISIT (OUTPATIENT)
Dept: URGENT CARE | Facility: CLINIC | Age: 45
End: 2025-07-28
Payer: MEDICAID

## 2025-07-28 ENCOUNTER — TELEPHONE (OUTPATIENT)
Dept: PRIMARY CARE CLINIC | Facility: CLINIC | Age: 45
End: 2025-07-28
Payer: MEDICAID

## 2025-07-28 VITALS
OXYGEN SATURATION: 97 % | HEIGHT: 68 IN | HEART RATE: 75 BPM | DIASTOLIC BLOOD PRESSURE: 63 MMHG | SYSTOLIC BLOOD PRESSURE: 95 MMHG | WEIGHT: 162 LBS | BODY MASS INDEX: 24.55 KG/M2 | TEMPERATURE: 98 F | RESPIRATION RATE: 19 BRPM

## 2025-07-28 DIAGNOSIS — M25.562 ACUTE PAIN OF LEFT KNEE: Primary | ICD-10-CM

## 2025-07-28 PROCEDURE — 99213 OFFICE O/P EST LOW 20 MIN: CPT | Mod: S$GLB,,,

## 2025-07-28 RX ORDER — KETOROLAC TROMETHAMINE 30 MG/ML
30 INJECTION, SOLUTION INTRAMUSCULAR; INTRAVENOUS
Status: COMPLETED | OUTPATIENT
Start: 2025-07-28 | End: 2025-07-28

## 2025-07-28 RX ORDER — METHOCARBAMOL 500 MG/1
500 TABLET, FILM COATED ORAL 3 TIMES DAILY PRN
Qty: 20 TABLET | Refills: 0 | Status: SHIPPED | OUTPATIENT
Start: 2025-07-28

## 2025-07-28 RX ORDER — DICLOFENAC SODIUM 10 MG/G
2 GEL TOPICAL 3 TIMES DAILY
Qty: 100 G | Refills: 0 | Status: SHIPPED | OUTPATIENT
Start: 2025-07-28

## 2025-07-28 RX ORDER — AMITRIPTYLINE HYDROCHLORIDE 25 MG/1
25 TABLET, FILM COATED ORAL NIGHTLY
Qty: 30 TABLET | Refills: 2 | Status: SHIPPED | OUTPATIENT
Start: 2025-07-28 | End: 2025-10-26

## 2025-07-28 RX ADMIN — KETOROLAC TROMETHAMINE 30 MG: 30 INJECTION, SOLUTION INTRAMUSCULAR; INTRAVENOUS at 03:07

## 2025-07-28 NOTE — PATIENT INSTRUCTIONS
Return this Wednesday 7/30/25 after 2pm for XR of your left knee. Take medications as prescribed. Please be aware Robaxin can cause drowsiness. REST, ICE, and ELEVATE knee daily. Follow up with orthopedic if symptoms persist.     When do I need to call the doctor?   You have a fever of 100.4°F (38°C) or higher.  You have very bad pain that is not helped by pain medicine.  Your knee has signs of infection, such as getting red and more swollen.  Your toes are numb, tingly, or blue in color.  Your knee locks or gives out.  You are not able to put weight on your leg.  You have new or worsening symptoms.  - Rest.    - Drink plenty of fluids.    - Acetaminophen (tylenol) or Ibuprofen (advil,motrin) as directed as needed for fever/pain. Avoid tylenol if you have a history of liver disease. Do not take ibuprofen if you have a history of GI bleeding, kidney disease, or if you take blood thinners.     - Follow up with your PCP or specialty clinic as directed in the next 1-2 weeks if not improved or as needed.  You can call [phone] to schedule an appointment with the appropriate provider.    - Go to the ER or seek medical attention immediately if you develop new or worsening symptoms.     - You must understand that you have received an Urgent Care treatment only and that you may be released before all of your medical problems are known or treated.   - You, the patient, will arrange for follow up care as instructed.   - If your condition worsens or fails to improve we recommend that you receive another evaluation at the ER immediately or contact your PCP to discuss your concerns or return here.

## 2025-07-28 NOTE — PROGRESS NOTES
"Subjective:      Patient ID: Wandy Shah is a 45 y.o. female.    Vitals:  height is 5' 8" (1.727 m) and weight is 73.5 kg (162 lb). Her oral temperature is 98.4 °F (36.9 °C). Her blood pressure is 95/63 and her pulse is 75. Her respiration is 19 and oxygen saturation is 97%.     Chief Complaint: Knee Pain    46 y/o female here for left knee pain x 3 days.  She denies any injury or trauma. She states she is on her feet a lot at work.. Pt has been treating with BC powder. She is ambulatory and has full ROM of knee joint.     Knee Pain   The incident occurred 3 to 5 days ago. The incident occurred at home. There was no injury mechanism. The pain is present in the left knee. The quality of the pain is described as aching. The pain is at a severity of 10/10. The pain is severe. The pain has been Constant since onset. Associated symptoms include an inability to bear weight. The symptoms are aggravated by weight bearing and movement. Treatments tried: BC powder. The treatment provided no relief.       Constitution: Negative for activity change and appetite change.   HENT:  Negative for ear pain, ear discharge, trouble swallowing and voice change.    Neck: Negative for neck pain, neck stiffness and painful lymph nodes.   Cardiovascular:  Negative for chest trauma.   Eyes:  Negative for eye trauma and foreign body in eye.   Respiratory:  Negative for sleep apnea and chest tightness.    Gastrointestinal:  Negative for abdominal trauma and abdominal pain.   Endocrine: hair loss and cold intolerance.   Genitourinary:  Negative for dysuria and frequency.   Musculoskeletal:  Positive for joint pain. Negative for pain, trauma, joint swelling, abnormal ROM of joint, arthritis, gout and back pain.   Skin:  Negative for pale and erythema.   Allergic/Immunologic: Negative for environmental allergies and seasonal allergies.   Neurological:  Negative for dizziness, disorientation and altered mental status.   Hematologic/Lymphatic: " Negative for swollen lymph nodes and easy bruising/bleeding. Does not bruise/bleed easily.   Psychiatric/Behavioral:  Negative for altered mental status and disorientation.       Objective:     Physical Exam   Constitutional: She is oriented to person, place, and time. She appears well-developed. She is cooperative. No distress.   HENT:   Head: Normocephalic and atraumatic.   Nose: Nose normal.   Mouth/Throat: Oropharynx is clear and moist and mucous membranes are normal.   Eyes: Conjunctivae and lids are normal.   Neck: Trachea normal and phonation normal. Neck supple.   Cardiovascular: Normal rate, regular rhythm, normal heart sounds and normal pulses.   Pulmonary/Chest: Effort normal and breath sounds normal.   Abdominal: Normal appearance and bowel sounds are normal. She exhibits no mass. Soft.   Musculoskeletal:         General: Tenderness present. No deformity.      Left knee: She exhibits bony tenderness. She exhibits normal alignment, no LCL laxity, normal meniscus and no MCL laxity. Tenderness found.     Instability Tests: anterior Lachman test negative, left knee negative medial Anma test and left knee negative lateral Anam test     Comments: Mild Tenderness over patellar.    Neurological: She is alert and oriented to person, place, and time. She has normal strength and normal reflexes. No sensory deficit.   Skin: Skin is warm, dry, intact, not diaphoretic, not pale and no rash. No bruising, No erythema and No lesion no jaundice  Psychiatric: Her speech is normal and behavior is normal. Judgment and thought content normal.   Nursing note and vitals reviewed.      Assessment:     1. Acute pain of left knee        Plan:       Acute pain of left knee  -     ketorolac injection 30 mg  -     diclofenac sodium (VOLTAREN) 1 % Gel; Apply 2 g topically 3 (three) times daily. Apply to knee  Dispense: 100 g; Refill: 0  -     methocarbamoL (ROBAXIN) 500 MG Tab; Take 1 tablet (500 mg total) by mouth 3 (three)  times daily as needed (pain).  Dispense: 20 tablet; Refill: 0  -     XR KNEE 3 VIEW LEFT; Future; Expected date: 07/28/2025  -     Ambulatory referral/consult to Orthopedics          Medical Decision Making:   Urgent Care Management:  No XR available today. Patient states she will return Wednesday 7/30 for XR.

## 2025-07-29 ENCOUNTER — TELEPHONE (OUTPATIENT)
Dept: FAMILY MEDICINE | Facility: HOSPITAL | Age: 45
End: 2025-07-29
Payer: MEDICAID

## 2025-07-29 NOTE — TELEPHONE ENCOUNTER
Copied from CRM #[id number]. Topic: General Inquiry - Return Call  >> Jul 28, 2025 12:37 PM Angelina wrote:  Type:  Patient Returning Call    Who Called:Patient   Who Left Message for Patient:Dr Sheldon  Does the patient know what this is regarding Reschedule appt   Would the patient rather a call back or a response via [facility]? Call   Best Call Back Number:  Additional Information:    Appointment scheduled for August 11, @ 11:00am -Monday

## 2025-07-30 ENCOUNTER — HOSPITAL ENCOUNTER (OUTPATIENT)
Dept: RADIOLOGY | Facility: HOSPITAL | Age: 45
Discharge: HOME | End: 2025-07-30
Payer: MEDICAID

## 2025-07-30 DIAGNOSIS — M25.562 ACUTE PAIN OF LEFT KNEE: ICD-10-CM

## 2025-07-30 PROCEDURE — 73562 X-RAY EXAM OF KNEE 3: CPT | Mod: TC,LT

## 2025-07-30 PROCEDURE — 73562 X-RAY EXAM OF KNEE 3: CPT | Mod: 26,LT,, | Performed by: RADIOLOGY

## 2025-08-06 ENCOUNTER — OFFICE VISIT (OUTPATIENT)
Dept: GASTROENTEROLOGY | Facility: CLINIC | Age: 45
End: 2025-08-06
Payer: MEDICAID

## 2025-08-06 DIAGNOSIS — K22.4 HYPERCONTRACTILE ESOPHAGUS: ICD-10-CM

## 2025-08-06 DIAGNOSIS — K31.84 GASTROPARESIS: ICD-10-CM

## 2025-08-06 DIAGNOSIS — K21.9 GASTROESOPHAGEAL REFLUX DISEASE WITHOUT ESOPHAGITIS: Primary | ICD-10-CM

## 2025-08-06 DIAGNOSIS — K59.04 CHRONIC IDIOPATHIC CONSTIPATION: ICD-10-CM

## 2025-08-06 PROCEDURE — 98006 SYNCH AUDIO-VIDEO EST MOD 30: CPT | Mod: 95,,, | Performed by: NURSE PRACTITIONER

## 2025-08-06 PROCEDURE — 1159F MED LIST DOCD IN RCRD: CPT | Mod: CPTII,95,, | Performed by: NURSE PRACTITIONER

## 2025-08-06 PROCEDURE — 1160F RVW MEDS BY RX/DR IN RCRD: CPT | Mod: CPTII,95,, | Performed by: NURSE PRACTITIONER

## 2025-08-07 RX ORDER — PLECANATIDE 3 MG/1
3 TABLET ORAL DAILY
Qty: 30 TABLET | Refills: 11 | Status: SHIPPED | OUTPATIENT
Start: 2025-08-07

## 2025-08-07 NOTE — PROGRESS NOTES
Subjective:       Patient ID: Wandy Shah is a 45 y.o. female.    Chief Complaint: Abdominal Pain and Constipation    The patient location is: [place], [place]  The chief complaint leading to consultation is: abdominal pain and constipation     Visit type: audiovisual     Face to Face time with patient: 20 minutes  45 minutes of total time spent on the encounter, which includes face to face time and non-face to face time preparing to see the patient (eg, review of tests), Obtaining and/or reviewing separately obtained history, Documenting clinical information in the electronic or other health record, Independently interpreting results (not separately reported) and communicating results to the patient/family/caregiver, or Care coordination (not separately reported).            Each patient to whom he or she provides medical services by telemedicine is:  (1) informed of the relationship between the physician and patient and the respective role of any other health care provider with respect to management of the patient; and (2) notified that he or she may decline to receive medical services by telemedicine and may withdraw from such care at any time.     Notes:      46 y/o female with GERD, chronic constipation, and gastroparesis s/p robotic pyloromyotomy 6/7/24 presents for virtual follow up with c/o abdominal pain and constipation. She reports upper abdominal pain with nausea after eating a small amount of food. Recent GES was normal. Manometry study revealed findings consistent with hypercontractile esophagus. Started on a CCB which has helped with dysphagia. Heartburn improved with current medication. She is taking omeprazole 40 mg once daily and famotidine 20 mg twice daily. She is constipated. Tried Amitiza, Linzess, and Miralax all not effective. Has BM 1-2x/week.        Endoscopy History  - 5/11/2021 Colonoscopy: The examined portion of the ileum was normal. The entire examined colon is normal. Biopsied. Stool  in the entire examined colon, adequately   removed. Internal hemorrhoids. Redundant colon making the procecure difficult; this is a result of her constipation.  - 2021 EGD: Normal esophagus. Erythematous mucosa in the antrum. Biopsied. Normal examined duodenum. Biopsied.Biopsy (-) HP; (-) Celiac  - 2025 EGD: 2 cm hiatal hernia. Widely patent Schatzki ring. Erythematous mucosa in the gastric body and antrum. Biopsied. Erythematous duodenopathy.   - 2025 BRAVO: Bravo capsule detached after only 31 hours. Only            31 hours of data was recorded. Positive Day 1 for GERD.   - 2025 Esophageal manometry: Hypercontractile esophagus; 9 swallows noted to be hypercontractile     Imaging History  - 1/10/2023 CT abd: No evident etiology to explain patient's abdominal pain.   - 2023 GES: Delayed gastric emptying time. At 4 hour(s) the percentage of retention is 36 % (normal retention at 4 hours is 10% and lower).   - 2024 GES: normal  - 2025 GES: Normal radionuclide solid gastric emptying study with 3 hour gastric retention of 6%.     Past Medical History:   Diagnosis Date    Anemia     Back pain     Depression        Past Surgical History:   Procedure Laterality Date    CARPAL TUNNEL RELEASE Right 2024    Procedure: RELEASE, CARPAL TUNNEL;  Surgeon: Javy Ayala Jr., MD;  Location: [facility] OR;  Service: Orthopedics;  Laterality: Right;     SECTION      COLD KNIFE CONIZATION OF CERVIX  12/10/2021    Procedure: CONE BIOPSY, CERVIX, USING COLD KNIFE;  Surgeon: Modesto Nassar MD;  Location: [facility] OR;  Service: OB/GYN;;    COLONOSCOPY N/A 2021    Procedure: COLONOSCOPY;  Surgeon: Emily Cruz MD;  Location: [facility];  Service: Endoscopy;  Laterality: N/A;    DECOMPRESSION, NERVE, ULNAR Right 2024    Procedure: DECOMPRESSION, NERVE, ULNAR;  Surgeon: Javy Ayala Jr., MD;  Location: [facility] OR;  Service: Orthopedics;  Laterality: Right;    ESOPHAGEAL  MANOMETRY WITH MEASUREMENT OF IMPEDANCE N/A 7/1/2025    Procedure: MANOMETRY, ESOPHAGUS, WITH IMPEDANCE MEASUREMENT;  Surgeon: Layla Larson MD;  Location: [facility] (4TH FLR);  Service: Endoscopy;  Laterality: N/A;  jm/portal/rosalba/  6/23 precall complete/ mleone    ESOPHAGOGASTRODUODENOSCOPY N/A 06/11/2021    Procedure: EGD (ESOPHAGOGASTRODUODENOSCOPY);  Surgeon: Emily Cruz MD;  Location: [facility];  Service: Endoscopy;  Laterality: N/A;    ESOPHAGOGASTRODUODENOSCOPY N/A 4/30/2025    Procedure: EGD (ESOPHAGOGASTRODUODENOSCOPY);  Surgeon: Annel Sin MD;  Location: [facility] (2ND FLR);  Service: Endoscopy;  Laterality: N/A;  /referral k creeck/prep inst sent to pt via portal/looprecorder/asthma, gastroparesis  egd/bravo off ppi  4/23 precall complete-MD    HYSTERECTOMY      INSERTION OF IMPLANTABLE LOOP RECORDER  11/22/2024    heart    LATERAL EPICONDYLE RELEASE Right 08/23/2024    Procedure: RELEASE, ELBOW, LATERAL EPICONDYLE;  Surgeon: Javy Ayala Jr., MD;  Location: [facility] OR;  Service: Orthopedics;  Laterality: Right;    PH MONITORING, ESOPHAGUS, WIRELESS, (OFF REFLUX MEDS) N/A 4/30/2025    Procedure: PH MONITORING, ESOPHAGUS, WIRELESS, (OFF REFLUX MEDS);  Surgeon: Annel Sin MD;  Location: [facility] (2ND FLR);  Service: Endoscopy;  Laterality: N/A;    ROBOT-ASSISTED LAPAROSCOPIC HYSTERECTOMY N/A 03/16/2022    Procedure: ROBOTIC HYSTERECTOMY;  Surgeon: Modesto Nassar MD;  Location: [facility] OR;  Service: OB/GYN;  Laterality: N/A;    ROBOT-ASSISTED LAPAROSCOPIC PYLOROPLASTY USING DA JOSE LUIS XI N/A 06/07/2024    Procedure: XI ROBOTIC PYLOROMYOTOMY WITH EGD;  Surgeon: Clifford Lynch MD;  Location: [facility] 2ND FLR;  Service: General;  Laterality: N/A;  NO NARCOTICS    ROBOT-ASSISTED SALPINGECTOMY Bilateral 03/16/2022    Procedure: ROBOTIC SALPINGECTOMY;  Surgeon: Modesto Nassar MD;  Location: [facility];  Service: OB/GYN;  Laterality: Bilateral;    SINUS SURGERY      TUBAL  LIGATION         Family History   Problem Relation Name Age of Onset    Hypertension Mother      Diabetes Mother      Breast cancer Neg Hx      Colon cancer Neg Hx      Ovarian cancer Neg Hx         Social History     Socioeconomic History    Marital status:    Tobacco Use    Smoking status: Former     Current packs/day: 0.00     Types: Cigarettes     Quit date: 3/3/2021     Years since quittin.4     Passive exposure: Past    Smokeless tobacco: Never   Substance and Sexual Activity    Alcohol use: No    Drug use: Yes     Types: Marijuana     Comment: daily    Sexual activity: Not Currently     Partners: Male     Birth control/protection: See Surgical Hx     Social Drivers of Health     Financial Resource Strain: High Risk (2025)    Overall Financial Resource Strain (CARDIA)     Difficulty of Paying Living Expenses: Very hard   Food Insecurity: Food Insecurity Present (2025)    Hunger Vital Sign     Worried About Running Out of Food in the Last Year: Often true     Ran Out of Food in the Last Year: Sometimes true   Transportation Needs: No Transportation Needs (2025)    PRAPARE - Transportation     Lack of Transportation (Medical): No     Lack of Transportation (Non-Medical): No   Physical Activity: Insufficiently Active (2025)    Exercise Vital Sign     Days of Exercise per Week: 2 days     Minutes of Exercise per Session: 30 min   Stress: Stress Concern Present (2025)    [employer] [place] [employer] - Occupational Stress Questionnaire     Feeling of Stress : To some extent   Housing Stability: Low Risk  (2025)    Housing Stability Vital Sign     Unable to Pay for Housing in the Last Year: No     Number of Times Moved in the Last Year: 0     Homeless in the Last Year: No       Review of Systems   Constitutional:  Negative for appetite change and unexpected weight change.   HENT:  Negative for trouble swallowing.    Respiratory:  Negative for shortness of breath.     Cardiovascular:  Negative for chest pain.   Gastrointestinal:  Positive for abdominal pain, constipation and nausea. Negative for diarrhea and rectal pain.   Neurological:  Negative for dizziness.   Hematological:  Negative for adenopathy. Does not bruise/bleed easily.   Psychiatric/Behavioral:  Negative for dysphoric mood.          Objective:     There were no vitals filed for this visit.       Physical Exam  Constitutional:       General: She is not in acute distress.     Appearance: Normal appearance. She is not ill-appearing.   HENT:      Head: Normocephalic.   Eyes:      Conjunctiva/sclera: Conjunctivae normal.   Pulmonary:      Effort: Pulmonary effort is normal. No respiratory distress.   Skin:     Coloration: Skin is not jaundiced or pale.   Neurological:      Mental Status: She is alert and oriented to person, place, and time.   Psychiatric:         Mood and Affect: Mood normal.         Behavior: Behavior normal.               Assessment:         ICD-10-CM ICD-9-CM   1. Gastroesophageal reflux disease without esophagitis  K21.9 530.81   2. Hypercontractile esophagus  K22.4 530.5   3. Gastroparesis  K31.84 536.3   4. Chronic idiopathic constipation  K59.04 564.00       Plan:       Gastroesophageal reflux disease without esophagitis  - Continue PPI and H2RA as previously prescribed    Hypercontractile esophagus  - Continue amlodipine daily    Gastroparesis  - Symptoms persist despite normal GES  - Gastroparesis diet  - Patient declined Reglan trial    Chronic idiopathic constipation  -   Continue Amitiza bid and start Trulance daily  -     plecanatide (TRULANCE) 3 mg Tab; Take 1 tablet (3 mg total) by mouth once daily.  Dispense: 30 tablet; Refill: 11      Follow up in about 3 months (around 11/6/2025) for medication management, If symptoms worsen or fail to improve.     Patient's Medications   New Prescriptions    PLECANATIDE (TRULANCE) 3 MG TAB    Take 1 tablet (3 mg total) by mouth once daily.   Previous  Medications    ALBUTEROL (PROVENTIL/VENTOLIN HFA) 90 MCG/ACTUATION INHALER    Inhale 2 puffs into the lungs every 4 (four) hours as needed for Wheezing    AMITRIPTYLINE (ELAVIL) 25 MG TABLET    Take 1 tablet (25 mg total) by mouth every evening.    AMLODIPINE (NORVASC) 5 MG TABLET    Take 1 tablet (5 mg total) by mouth once daily.    ASHWAGANDHA ROOT EXTRACT 500 MG CAP    Take 2 capsules orally as needed.    AZELASTINE (ASTELIN) 137 MCG (0.1 %) NASAL SPRAY    1 puff in each nostril Nasally Twice a day 30 days    BUSPIRONE (BUSPAR) 7.5 MG TABLET    Take 1 tablet (7.5 mg total) by mouth 3 (three) times daily.    CETIRIZINE (ZYRTEC) 10 MG TABLET    Take 1 tablet (10 mg total) by mouth once daily.    DICLOFENAC SODIUM (VOLTAREN) 1 % GEL    Apply 2 g topically 3 (three) times daily. Apply to knee    DICYCLOMINE (BENTYL) 20 MG TABLET    Take 1 tablet (20 mg total) by mouth 3 (three) times daily as needed (abdominal pain).    DULOXETINE (CYMBALTA) 60 MG CAPSULE    Take 1 capsule (60 mg total) by mouth once daily.    DUPILUMAB (DUPIXENT PEN) 300 MG/2 ML PNIJ    Inject 300 mg (1 pen) into the skin every 14 (fourteen) days    FAMOTIDINE (PEPCID) 20 MG TABLET    Take 1 tablet (20 mg total) by mouth 2 (two) times daily as needed for Heartburn.    FLUTICASONE PROPIONATE (FLONASE) 50 MCG/ACTUATION NASAL SPRAY    1 spray in each nostril Nasally Once a day 30 days    FLUTICASONE-UMECLIDIN-VILANTER (TRELEGY ELLIPTA) 200-62.5-25 MCG INHALER    Inhale 1 puff into the lungs every evening.    GABAPENTIN (NEURONTIN) 100 MG CAPSULE    Take 1 capsule (100 mg total) by mouth 3 (three) times daily as needed.    IBUPROFEN (ADVIL,MOTRIN) 800 MG TABLET    Take 1 tablet (800 mg total) by mouth 3 (three) times daily.    KETOROLAC (TORADOL) 10 MG TABLET    Take 1 tablet (10 mg total) by mouth every 8 (eight) hours as needed for Pain.    LEVOCETIRIZINE (XYZAL) 5 MG TABLET    1 tablet in the evening Orally Once a day 90 days    LUBIPROSTONE  (AMITIZA) 24 MCG CAP    Take 1 capsule (24 mcg total) by mouth 2 (two) times daily with meals.    MAGIC MOUTHWASH DIPHEN/ANTAC/LIDOC    Swish and spit 10 mLs every 4 (four) hours as needed (sore throat).    MECLIZINE (ANTIVERT) 12.5 MG TABLET    Take 1 tablet (12.5 mg total) by mouth 2 (two) times daily as needed for Dizziness.    METHOCARBAMOL (ROBAXIN) 500 MG TAB    Take 1 tablet (500 mg total) by mouth 3 (three) times daily as needed (pain).    METOPROLOL SUCCINATE (TOPROL-XL) 25 MG 24 HR TABLET    Take 1 tablet by mouth daily; Hold for Heart Rate less than 55    OMEPRAZOLE (PRILOSEC) 40 MG CAPSULE    Take 1 capsule (40 mg total) by mouth once daily.    ONDANSETRON (ZOFRAN-ODT) 4 MG TBDL    DISSOLVE 1 tablet (4 mg total) by mouth 4 (four) times daily before meals and nightly.    POLYETHYLENE GLYCOL (GLYCOLAX) 17 GRAM/DOSE POWDER    Take 17 g by mouth once daily.    RIZATRIPTAN (MAXALT) 10 MG TABLET    1/2 - 1 at onset of migraine; may repeat in 2 hours if needed; maximum 2 in 24 hours; maximum 3 days per week    TOPIRAMATE (TOPAMAX) 25 MG TABLET    Take 1 tablet (25 mg total) by mouth once daily. Increase by 1 tablet per week as needed for migraine up to 2 tablets twice daily   Modified Medications    No medications on file   Discontinued Medications    FLUTICASONE PROPIONATE (FLONASE) 50 MCG/ACTUATION NASAL SPRAY    1 spray (50 mcg total) by Each Nostril route once daily.    LEVOCETIRIZINE (XYZAL) 5 MG TABLET    Take 1 tablet (5 mg total) by mouth nightly as needed for allergies.    METOPROLOL SUCCINATE (TOPROL-XL) 25 MG 24 HR TABLET    Take one-half tablet by mouth once a day.    METOPROLOL SUCCINATE (TOPROL-XL) 25 MG 24 HR TABLET    Take 1 tablet by mouth daily Hold for Heart Rate less than 55    TOPIRAMATE (TOPAMAX) 25 MG TABLET    Take 3 tablets (75 mg total) by mouth every evening.

## 2025-08-08 ENCOUNTER — HOSPITAL ENCOUNTER (EMERGENCY)
Facility: HOSPITAL | Age: 45
Discharge: HOME | End: 2025-08-08
Attending: PEDIATRICS
Payer: MEDICAID

## 2025-08-08 VITALS
OXYGEN SATURATION: 100 % | BODY MASS INDEX: 24.55 KG/M2 | HEART RATE: 65 BPM | SYSTOLIC BLOOD PRESSURE: 120 MMHG | WEIGHT: 162 LBS | TEMPERATURE: 98 F | HEIGHT: 68 IN | DIASTOLIC BLOOD PRESSURE: 57 MMHG | RESPIRATION RATE: 18 BRPM

## 2025-08-08 DIAGNOSIS — I47.10 SVT (SUPRAVENTRICULAR TACHYCARDIA): Primary | ICD-10-CM

## 2025-08-08 DIAGNOSIS — R00.0 TACHYCARDIA: ICD-10-CM

## 2025-08-08 DIAGNOSIS — R00.2 PALPITATIONS: ICD-10-CM

## 2025-08-08 LAB
ABSOLUTE EOSINOPHIL (OHS): 0.29 K/UL
ABSOLUTE MONOCYTE (OHS): 0.63 K/UL (ref 0.3–1)
ABSOLUTE NEUTROPHIL COUNT (OHS): 7.44 K/UL (ref 1.8–7.7)
ALBUMIN SERPL BCP-MCNC: 3.7 G/DL (ref 3.5–5.2)
ALP SERPL-CCNC: 51 UNIT/L (ref 40–150)
ALT SERPL W/O P-5'-P-CCNC: 8 UNIT/L (ref 0–55)
ANION GAP (OHS): 9 MMOL/L (ref 8–16)
AST SERPL-CCNC: 16 UNIT/L (ref 0–50)
BASOPHILS # BLD AUTO: 0.06 K/UL
BASOPHILS NFR BLD AUTO: 0.5 %
BILIRUB SERPL-MCNC: 0.3 MG/DL (ref 0.1–1)
BUN SERPL-MCNC: 11 MG/DL (ref 6–20)
CALCIUM SERPL-MCNC: 8.5 MG/DL (ref 8.7–10.5)
CHLORIDE SERPL-SCNC: 106 MMOL/L (ref 95–110)
CO2 SERPL-SCNC: 23 MMOL/L (ref 23–29)
CREAT SERPL-MCNC: 0.8 MG/DL (ref 0.5–1.4)
ERYTHROCYTE [DISTWIDTH] IN BLOOD BY AUTOMATED COUNT: 13 % (ref 11.5–14.5)
GFR SERPLBLD CREATININE-BSD FMLA CKD-EPI: >60 ML/MIN/1.73/M2
GLUCOSE SERPL-MCNC: 111 MG/DL (ref 70–110)
HCT VFR BLD AUTO: 43.7 % (ref 37–48.5)
HCV AB SERPL QL IA: NORMAL
HGB BLD-MCNC: 14.2 GM/DL (ref 12–16)
HIV 1+2 AB+HIV1 P24 AG SERPL QL IA: NORMAL
IMM GRANULOCYTES # BLD AUTO: 0.05 K/UL (ref 0–0.04)
IMM GRANULOCYTES NFR BLD AUTO: 0.4 % (ref 0–0.5)
LYMPHOCYTES # BLD AUTO: 2.89 K/UL (ref 1–4.8)
MAGNESIUM SERPL-MCNC: 1.7 MG/DL (ref 1.6–2.6)
MCH RBC QN AUTO: 31.1 PG (ref 27–31)
MCHC RBC AUTO-ENTMCNC: 32.5 G/DL (ref 32–36)
MCV RBC AUTO: 96 FL (ref 82–98)
NUCLEATED RBC (/100WBC) (OHS): 0 /100 WBC
OHS QRS DURATION: 122 MS
OHS QRS DURATION: 84 MS
OHS QTC CALCULATION: 446 MS
OHS QTC CALCULATION: 477 MS
PHOSPHATE SERPL-MCNC: 3.5 MG/DL (ref 2.7–4.5)
PLATELET # BLD AUTO: 221 K/UL (ref 150–450)
PMV BLD AUTO: 9.8 FL (ref 9.2–12.9)
POTASSIUM SERPL-SCNC: 4.3 MMOL/L (ref 3.5–5.1)
PROT SERPL-MCNC: 7 GM/DL (ref 6–8.4)
RBC # BLD AUTO: 4.57 M/UL (ref 4–5.4)
RELATIVE EOSINOPHIL (OHS): 2.6 %
RELATIVE LYMPHOCYTE (OHS): 25.4 % (ref 18–48)
RELATIVE MONOCYTE (OHS): 5.5 % (ref 4–15)
RELATIVE NEUTROPHIL (OHS): 65.6 % (ref 38–73)
SODIUM SERPL-SCNC: 138 MMOL/L (ref 136–145)
WBC # BLD AUTO: 11.36 K/UL (ref 3.9–12.7)

## 2025-08-08 PROCEDURE — 82040 ASSAY OF SERUM ALBUMIN: CPT

## 2025-08-08 PROCEDURE — 93005 ELECTROCARDIOGRAM TRACING: CPT

## 2025-08-08 PROCEDURE — 93010 ELECTROCARDIOGRAM REPORT: CPT | Mod: ,,, | Performed by: INTERNAL MEDICINE

## 2025-08-08 PROCEDURE — 86803 HEPATITIS C AB TEST: CPT | Performed by: EMERGENCY MEDICINE

## 2025-08-08 PROCEDURE — 99284 EMERGENCY DEPT VISIT MOD MDM: CPT | Mod: 25

## 2025-08-08 PROCEDURE — 85025 COMPLETE CBC W/AUTO DIFF WBC: CPT

## 2025-08-08 PROCEDURE — 83735 ASSAY OF MAGNESIUM: CPT

## 2025-08-08 PROCEDURE — 84100 ASSAY OF PHOSPHORUS: CPT

## 2025-08-08 PROCEDURE — 87389 HIV-1 AG W/HIV-1&-2 AB AG IA: CPT | Performed by: EMERGENCY MEDICINE

## 2025-08-08 PROCEDURE — 25000003 PHARM REV CODE 250: Performed by: EMERGENCY MEDICINE

## 2025-08-08 RX ORDER — LANOLIN ALCOHOL/MO/W.PET/CERES
400 CREAM (GRAM) TOPICAL
Status: DISCONTINUED | OUTPATIENT
Start: 2025-08-08 | End: 2025-08-08

## 2025-08-08 RX ORDER — LANOLIN ALCOHOL/MO/W.PET/CERES
800 CREAM (GRAM) TOPICAL
Status: COMPLETED | OUTPATIENT
Start: 2025-08-08 | End: 2025-08-08

## 2025-08-08 RX ADMIN — Medication 800 MG: at 12:08

## 2025-08-11 ENCOUNTER — OFFICE VISIT (OUTPATIENT)
Dept: FAMILY MEDICINE | Facility: HOSPITAL | Age: 45
End: 2025-08-11
Payer: MEDICAID

## 2025-08-11 VITALS
HEART RATE: 70 BPM | BODY MASS INDEX: 25.29 KG/M2 | SYSTOLIC BLOOD PRESSURE: 92 MMHG | DIASTOLIC BLOOD PRESSURE: 66 MMHG | HEIGHT: 68 IN | OXYGEN SATURATION: 95 % | WEIGHT: 166.88 LBS

## 2025-08-11 DIAGNOSIS — G89.29 CHRONIC BILATERAL LOW BACK PAIN WITHOUT SCIATICA: Primary | ICD-10-CM

## 2025-08-11 DIAGNOSIS — R42 DIZZINESS: ICD-10-CM

## 2025-08-11 DIAGNOSIS — M25.562 ACUTE PAIN OF LEFT KNEE: ICD-10-CM

## 2025-08-11 DIAGNOSIS — M54.50 CHRONIC BILATERAL LOW BACK PAIN WITHOUT SCIATICA: Primary | ICD-10-CM

## 2025-08-11 DIAGNOSIS — F41.9 ANXIETY: ICD-10-CM

## 2025-08-11 LAB — HOLD SPECIMEN: NORMAL

## 2025-08-11 PROCEDURE — 99215 OFFICE O/P EST HI 40 MIN: CPT

## 2025-08-11 RX ORDER — MECLIZINE HCL 12.5 MG 12.5 MG/1
12.5 TABLET ORAL 2 TIMES DAILY PRN
Qty: 28 TABLET | Refills: 0 | Status: SHIPPED | OUTPATIENT
Start: 2025-08-11 | End: 2025-08-30

## 2025-08-11 RX ORDER — BUSPIRONE HYDROCHLORIDE 15 MG/1
15 TABLET ORAL 3 TIMES DAILY
Qty: 180 TABLET | Refills: 2 | Status: SHIPPED | OUTPATIENT
Start: 2025-08-11 | End: 2026-02-07

## 2025-08-11 RX ORDER — NAPROXEN 500 MG/1
500 TABLET ORAL 2 TIMES DAILY WITH MEALS
Qty: 60 TABLET | Refills: 1 | Status: SHIPPED | OUTPATIENT
Start: 2025-08-11

## 2025-08-11 RX ORDER — DULOXETIN HYDROCHLORIDE 60 MG/1
60 CAPSULE, DELAYED RELEASE ORAL DAILY
Qty: 90 CAPSULE | Refills: 3 | Status: SHIPPED | OUTPATIENT
Start: 2025-08-11

## 2025-08-11 RX ORDER — METHOCARBAMOL 500 MG/1
500 TABLET, FILM COATED ORAL 3 TIMES DAILY PRN
Qty: 20 TABLET | Refills: 0 | Status: SHIPPED | OUTPATIENT
Start: 2025-08-11

## 2025-08-11 RX ORDER — LIDOCAINE 50 MG/G
1 PATCH TOPICAL DAILY
Qty: 30 PATCH | Refills: 3 | Status: SHIPPED | OUTPATIENT
Start: 2025-08-11

## 2025-08-26 ENCOUNTER — OFFICE VISIT (OUTPATIENT)
Dept: PODIATRY | Facility: CLINIC | Age: 45
End: 2025-08-26
Payer: MEDICAID

## 2025-08-26 VITALS — HEIGHT: 68 IN | WEIGHT: 166.88 LBS | BODY MASS INDEX: 25.29 KG/M2

## 2025-08-26 DIAGNOSIS — L60.0 INGROWN NAIL: Primary | ICD-10-CM

## 2025-08-26 PROCEDURE — 99212 OFFICE O/P EST SF 10 MIN: CPT | Mod: PBBFAC,PN | Performed by: PODIATRIST

## 2025-08-26 PROCEDURE — 11765 WEDGE EXCISION SKN NAIL FOLD: CPT | Mod: PBBFAC,PN | Performed by: PODIATRIST

## 2025-08-26 PROCEDURE — 99999 PR PBB SHADOW E&M-EST. PATIENT-LVL II: CPT | Mod: PBBFAC,,, | Performed by: PODIATRIST

## 2025-08-28 DIAGNOSIS — K21.9 GASTROESOPHAGEAL REFLUX DISEASE, UNSPECIFIED WHETHER ESOPHAGITIS PRESENT: ICD-10-CM

## 2025-08-28 RX ORDER — OMEPRAZOLE 40 MG/1
40 CAPSULE, DELAYED RELEASE ORAL DAILY
Qty: 30 CAPSULE | Refills: 5 | Status: CANCELLED | OUTPATIENT
Start: 2025-08-28 | End: 2026-08-28

## 2025-08-29 RX ORDER — OMEPRAZOLE 40 MG/1
40 CAPSULE, DELAYED RELEASE ORAL DAILY
Qty: 30 CAPSULE | Refills: 5 | Status: SHIPPED | OUTPATIENT
Start: 2025-08-29 | End: 2026-08-29

## (undated) DEVICE — DRAPE LAVH LAPAROSCOPY W/FLUID

## (undated) DEVICE — SEE MEDLINE ITEM 156952

## (undated) DEVICE — BLADE SURG #15 CARBON STEEL

## (undated) DEVICE — SOL NS 1000CC

## (undated) DEVICE — JELLY LUBRICANT STERILE 4 OZ

## (undated) DEVICE — SLING ARM FASHION NAVY X-LG

## (undated) DEVICE — TRAY MINOR GEN SURG OMC

## (undated) DEVICE — PAD CNTOUR SUP-ABSRB POSTPRTM

## (undated) DEVICE — SEE MEDLINE ITEM 157116

## (undated) DEVICE — ELECTRODE REM PLYHSV RETURN 9

## (undated) DEVICE — BLADE SCALP OPHTL BEVEL STR

## (undated) DEVICE — KIT ANTIFOG W/SPONG & FLUID

## (undated) DEVICE — GLOVE BIOGEL PI MICRO INDIC 7

## (undated) DEVICE — NDL HYPO REG 25G X 1 1/2

## (undated) DEVICE — TOWEL OR XRAY BLUE 17X26IN

## (undated) DEVICE — SPLINT WRIST FOAM 8.5IN LG/RT

## (undated) DEVICE — ALCOHOL 70% ISOP W/GREEN 16OZ

## (undated) DEVICE — PADDING 4X4YD SPECIALIST100

## (undated) DEVICE — BANDAGE ESMARK ELASTIC ST 4X9

## (undated) DEVICE — GLOVE SURG BIOGEL LATEX SZ 7.5

## (undated) DEVICE — CLOSURE SKIN STERI STRIP 1/2X4

## (undated) DEVICE — PACK SURGERY START

## (undated) DEVICE — DRAPE ARM DAVINCI XI

## (undated) DEVICE — OBTURATOR BLADELESS 8MM XI CLR

## (undated) DEVICE — GOWN POLY REINF BRTH SLV LG

## (undated) DEVICE — DRAPE HAND STERILE

## (undated) DEVICE — SEAL UNIVERSAL 5MM-8MM XI

## (undated) DEVICE — SUT GUT PL. 4-0 27 FS-2

## (undated) DEVICE — NDL INSUF ULTRA VERESS 120MM

## (undated) DEVICE — SOL ELECTROLUBE ANTI-STIC

## (undated) DEVICE — TROCAR ENDOPATH XCEL 5MM 7.5CM

## (undated) DEVICE — ADHESIVE DERMABOND ADVANCED

## (undated) DEVICE — SUT 0 VICRYL / CT-1

## (undated) DEVICE — BANDAGE MATRIX HK LOOP 2IN 5YD

## (undated) DEVICE — GOWN SURGICAL XX LARGE X LONG

## (undated) DEVICE — KIT WING PAD POSITIONING

## (undated) DEVICE — ADHESIVE MASTISOL VIAL 48/BX

## (undated) DEVICE — BLADE SURG CARBON STEEL SZ11

## (undated) DEVICE — SYR 50CC LL

## (undated) DEVICE — SUT 0 VICRYL / UR6 (J603)

## (undated) DEVICE — PAD PREP CUFFED NS 24X48IN

## (undated) DEVICE — SCISSOR 5MMX35CM DIRECT DRIVE

## (undated) DEVICE — DRAPE SCOPE PILLOW WARMER

## (undated) DEVICE — TOURNIQUET SB QC DP 18X4IN

## (undated) DEVICE — BANDAGE MATRIX HK LOOP 3IN 5YD

## (undated) DEVICE — PAD CAST SPECIALIST 2X4

## (undated) DEVICE — APPLICATOR CHLORAPREP ORN 26ML

## (undated) DEVICE — TUBE SET SINGLE LUMEN FILTERED

## (undated) DEVICE — IRRIGATOR ENDOWRIST XI SUCTION

## (undated) DEVICE — ELECTRODE NEEDLE 2.8IN

## (undated) DEVICE — GOWN SURGICAL X-LARGE

## (undated) DEVICE — PAD CAST SPECIALIST STRL 3

## (undated) DEVICE — SYR 10CC LUER LOCK

## (undated) DEVICE — BANDAGE MATRIX HK LOOP 4IN 5YD

## (undated) DEVICE — PANTIES FEMININE NAPKIN LG/XLG

## (undated) DEVICE — DRAPE COLUMN DAVINCI XI

## (undated) DEVICE — SPONGE PATTY SURGICAL .5X3IN

## (undated) DEVICE — GOWN POLY REINF BRTH SLV XL

## (undated) DEVICE — GLOVE SURGICAL LATEX SZ 6.5

## (undated) DEVICE — PADDING CAST SPECIALIST 3X4YD

## (undated) DEVICE — COVER LIGHT HANDLE 80/CA

## (undated) DEVICE — CLOSURE SKIN STERI STRIP 1/4X4

## (undated) DEVICE — SEE L#120831

## (undated) DEVICE — SEE MEDLINE ITEM 154981

## (undated) DEVICE — SEE MEDLINE ITEM 157117

## (undated) DEVICE — SUT MONOCRYL 4-0 PS-2

## (undated) DEVICE — PAD PREP 50/CA

## (undated) DEVICE — BAG PRESSURE INFUSER 3000CC

## (undated) DEVICE — DRESSING LEUKOPLAST FLEX 1X3IN

## (undated) DEVICE — BRIEF MESH LARGE

## (undated) DEVICE — TIP RUMI WHITE DISP UMW676

## (undated) DEVICE — COVER TIP CURVED SCISSORS XI

## (undated) DEVICE — PACK ECLIPSE BASIC III SURG

## (undated) DEVICE — SOL IRR STRL WATER 500ML

## (undated) DEVICE — CONTAINERS 32OZ

## (undated) DEVICE — STOCKINET 4INX48

## (undated) DEVICE — TRAY FOLEY 16FR INFECTION CONT

## (undated) DEVICE — COVER OVERHEAD SURG LT BLUE

## (undated) DEVICE — Device

## (undated) DEVICE — PAD CAST SPECIALIST STRL 4

## (undated) DEVICE — SLING ARM COMFT NAVY BLU LG

## (undated) DEVICE — MANIFOLD 4 PORT

## (undated) DEVICE — OCCLUDER COLPO-PNEUMO STERILE

## (undated) DEVICE — SPONGE COTTON TRAY 4X4IN

## (undated) DEVICE — GLOVE SENSICARE PI MICRO 7.5

## (undated) DEVICE — SUT 4/0 18IN COATED VICRYL

## (undated) DEVICE — SUT VICRYL 3-0 27 SH